# Patient Record
Sex: FEMALE | Race: WHITE | NOT HISPANIC OR LATINO | Employment: OTHER | URBAN - METROPOLITAN AREA
[De-identification: names, ages, dates, MRNs, and addresses within clinical notes are randomized per-mention and may not be internally consistent; named-entity substitution may affect disease eponyms.]

---

## 2021-03-12 ENCOUNTER — HOSPITAL ENCOUNTER (OUTPATIENT)
Dept: URGENT CARE | Facility: CLINIC | Age: 71
Discharge: HOME | End: 2021-03-12
Attending: PHYSICIAN ASSISTANT

## 2021-03-15 ENCOUNTER — HOSPITAL ENCOUNTER (OUTPATIENT)
Dept: URGENT CARE | Facility: CLINIC | Age: 71
Discharge: HOME | End: 2021-03-15
Attending: EMERGENCY MEDICINE

## 2021-03-16 LAB — SARS-COV-2 RNA SPEC QL NAA+PROBE: NOT DETECTED

## 2021-03-17 ENCOUNTER — HOSPITAL ENCOUNTER (OUTPATIENT)
Dept: FAMILY MEDICINE CLINIC | Facility: CLINIC | Age: 71
Discharge: HOME | End: 2021-03-17
Attending: NURSE PRACTITIONER

## 2021-03-17 ENCOUNTER — OFFICE VISIT CONVERTED (OUTPATIENT)
Dept: FAMILY MEDICINE CLINIC | Facility: CLINIC | Age: 71
End: 2021-03-17
Attending: NURSE PRACTITIONER

## 2021-03-17 LAB
ALBUMIN SERPL-MCNC: 4.4 G/DL (ref 3.5–5)
ALBUMIN/GLOB SERPL: 1.8 {RATIO} (ref 1.4–2.6)
ALP SERPL-CCNC: 98 U/L (ref 43–160)
ALT SERPL-CCNC: 88 U/L (ref 10–40)
ANION GAP SERPL CALC-SCNC: 18 MMOL/L (ref 8–19)
AST SERPL-CCNC: 80 U/L (ref 15–50)
BASOPHILS # BLD AUTO: 0.03 10*3/UL (ref 0–0.2)
BASOPHILS NFR BLD AUTO: 0.1 % (ref 0–3)
BILIRUB SERPL-MCNC: 0.27 MG/DL (ref 0.2–1.3)
BUN SERPL-MCNC: 32 MG/DL (ref 5–25)
BUN/CREAT SERPL: 41 {RATIO} (ref 6–20)
CALCIUM SERPL-MCNC: 9.3 MG/DL (ref 8.7–10.4)
CHLORIDE SERPL-SCNC: 103 MMOL/L (ref 99–111)
CHOLEST SERPL-MCNC: 226 MG/DL (ref 107–200)
CHOLEST/HDLC SERPL: 5.4 {RATIO} (ref 3–6)
CONV ABS IMM GRAN: 0.14 10*3/UL (ref 0–0.2)
CONV CO2: 26 MMOL/L (ref 22–32)
CONV IMMATURE GRAN: 0.6 % (ref 0–1.8)
CONV TOTAL PROTEIN: 6.9 G/DL (ref 6.3–8.2)
CREAT UR-MCNC: 0.78 MG/DL (ref 0.5–0.9)
DEPRECATED RDW RBC AUTO: 45.7 FL (ref 36.4–46.3)
EOSINOPHIL # BLD AUTO: 0 % (ref 0–7)
EOSINOPHIL # BLD AUTO: 0 10*3/UL (ref 0–0.7)
ERYTHROCYTE [DISTWIDTH] IN BLOOD BY AUTOMATED COUNT: 14.9 % (ref 11.7–14.4)
GFR SERPLBLD BASED ON 1.73 SQ M-ARVRAT: >60 ML/MIN/{1.73_M2}
GLOBULIN UR ELPH-MCNC: 2.5 G/DL (ref 2–3.5)
GLUCOSE SERPL-MCNC: 126 MG/DL (ref 65–99)
HCT VFR BLD AUTO: 32.6 % (ref 37–47)
HDLC SERPL-MCNC: 42 MG/DL (ref 40–60)
HGB BLD-MCNC: 10.2 G/DL (ref 12–16)
LDLC SERPL CALC-MCNC: 153 MG/DL (ref 70–100)
LYMPHOCYTES # BLD AUTO: 2.41 10*3/UL (ref 1–5)
LYMPHOCYTES NFR BLD AUTO: 11.1 % (ref 20–45)
MCH RBC QN AUTO: 26.6 PG (ref 27–31)
MCHC RBC AUTO-ENTMCNC: 31.3 G/DL (ref 33–37)
MCV RBC AUTO: 85.1 FL (ref 81–99)
MONOCYTES # BLD AUTO: 1.16 10*3/UL (ref 0.2–1.2)
MONOCYTES NFR BLD AUTO: 5.3 % (ref 3–10)
NEUTROPHILS # BLD AUTO: 17.95 10*3/UL (ref 2–8)
NEUTROPHILS NFR BLD AUTO: 82.9 % (ref 30–85)
NRBC CBCN: 0 % (ref 0–0.7)
OSMOLALITY SERPL CALC.SUM OF ELEC: 304 MOSM/KG (ref 273–304)
PLATELET # BLD AUTO: 445 10*3/UL (ref 130–400)
PMV BLD AUTO: 10 FL (ref 9.4–12.3)
POTASSIUM SERPL-SCNC: 4.3 MMOL/L (ref 3.5–5.3)
RBC # BLD AUTO: 3.83 10*6/UL (ref 4.2–5.4)
SODIUM SERPL-SCNC: 143 MMOL/L (ref 135–147)
TRIGL SERPL-MCNC: 156 MG/DL (ref 40–150)
TSH SERPL-ACNC: 1.16 M[IU]/L (ref 0.27–4.2)
VLDLC SERPL-MCNC: 31 MG/DL (ref 5–37)
WBC # BLD AUTO: 21.69 10*3/UL (ref 4.8–10.8)

## 2021-03-18 ENCOUNTER — TRANSCRIBE ORDERS (OUTPATIENT)
Dept: ADMINISTRATIVE | Facility: HOSPITAL | Age: 71
End: 2021-03-18

## 2021-03-18 DIAGNOSIS — R01.1 HEART MURMUR: Primary | ICD-10-CM

## 2021-03-19 ENCOUNTER — APPOINTMENT (OUTPATIENT)
Dept: GENERAL RADIOLOGY | Facility: HOSPITAL | Age: 71
End: 2021-03-19

## 2021-03-19 ENCOUNTER — APPOINTMENT (OUTPATIENT)
Dept: CT IMAGING | Facility: HOSPITAL | Age: 71
End: 2021-03-19

## 2021-03-19 ENCOUNTER — HOSPITAL ENCOUNTER (INPATIENT)
Facility: HOSPITAL | Age: 71
LOS: 4 days | Discharge: HOME | End: 2021-03-23
Attending: EMERGENCY MEDICINE | Admitting: INTERNAL MEDICINE

## 2021-03-19 DIAGNOSIS — I38 ENDOCARDITIS, UNSPECIFIED CHRONICITY, UNSPECIFIED ENDOCARDITIS TYPE: Primary | ICD-10-CM

## 2021-03-19 DIAGNOSIS — R77.8 ELEVATED TROPONIN: ICD-10-CM

## 2021-03-19 DIAGNOSIS — I50.9 ACUTE CONGESTIVE HEART FAILURE, UNSPECIFIED HEART FAILURE TYPE (HCC): ICD-10-CM

## 2021-03-19 PROBLEM — R79.89 ELEVATED TROPONIN: Status: ACTIVE | Noted: 2021-03-19

## 2021-03-19 PROBLEM — K04.7 DENTAL INFECTION: Status: ACTIVE | Noted: 2021-03-19

## 2021-03-19 PROBLEM — D64.9 ANEMIA: Status: ACTIVE | Noted: 2021-03-19

## 2021-03-19 LAB
ALBUMIN SERPL-MCNC: 3.8 G/DL (ref 3.5–5.2)
ALBUMIN/GLOB SERPL: 1.9 G/DL
ALP SERPL-CCNC: 95 U/L (ref 39–117)
ALT SERPL W P-5'-P-CCNC: 113 U/L (ref 1–33)
ANION GAP SERPL CALCULATED.3IONS-SCNC: 9.1 MMOL/L (ref 5–15)
AST SERPL-CCNC: 56 U/L (ref 1–32)
B PARAPERT DNA SPEC QL NAA+PROBE: NOT DETECTED
B PERT DNA SPEC QL NAA+PROBE: NOT DETECTED
BASOPHILS # BLD AUTO: 0.04 10*3/MM3 (ref 0–0.2)
BASOPHILS NFR BLD AUTO: 0.3 % (ref 0–1.5)
BILIRUB SERPL-MCNC: 0.4 MG/DL (ref 0–1.2)
BUN SERPL-MCNC: 25 MG/DL (ref 8–23)
BUN/CREAT SERPL: 32.1 (ref 7–25)
C PNEUM DNA NPH QL NAA+NON-PROBE: NOT DETECTED
CALCIUM SPEC-SCNC: 8.2 MG/DL (ref 8.6–10.5)
CHLORIDE SERPL-SCNC: 104 MMOL/L (ref 98–107)
CO2 SERPL-SCNC: 27.9 MMOL/L (ref 22–29)
CREAT SERPL-MCNC: 0.78 MG/DL (ref 0.57–1)
CRP SERPL-MCNC: 0.5 MG/DL (ref 0–0.5)
CRP SERPL-MCNC: 6 MG/L (ref 0–5)
D-LACTATE SERPL-SCNC: 1.6 MMOL/L (ref 0.5–2)
DEPRECATED RDW RBC AUTO: 43.2 FL (ref 37–54)
EOSINOPHIL # BLD AUTO: 0.16 10*3/MM3 (ref 0–0.4)
EOSINOPHIL NFR BLD AUTO: 1.1 % (ref 0.3–6.2)
ERYTHROCYTE [DISTWIDTH] IN BLOOD BY AUTOMATED COUNT: 14.5 % (ref 12.3–15.4)
EST. AVERAGE GLUCOSE BLD GHB EST-MCNC: 120 MG/DL
FLUAV SUBTYP SPEC NAA+PROBE: NOT DETECTED
FLUBV RNA ISLT QL NAA+PROBE: NOT DETECTED
GFR SERPL CREATININE-BSD FRML MDRD: 72 ML/MIN/1.73
GLOBULIN UR ELPH-MCNC: 2 GM/DL
GLUCOSE SERPL-MCNC: 97 MG/DL (ref 65–99)
HADV DNA SPEC NAA+PROBE: NOT DETECTED
HBA1C MFR BLD: 5.8 % (ref 3.5–5.7)
HCOV 229E RNA SPEC QL NAA+PROBE: NOT DETECTED
HCOV HKU1 RNA SPEC QL NAA+PROBE: NOT DETECTED
HCOV NL63 RNA SPEC QL NAA+PROBE: NOT DETECTED
HCOV OC43 RNA SPEC QL NAA+PROBE: NOT DETECTED
HCT VFR BLD AUTO: 30.8 % (ref 34–46.6)
HGB BLD-MCNC: 10.1 G/DL (ref 12–15.9)
HMPV RNA NPH QL NAA+NON-PROBE: NOT DETECTED
HPIV1 RNA SPEC QL NAA+PROBE: NOT DETECTED
HPIV2 RNA SPEC QL NAA+PROBE: NOT DETECTED
HPIV3 RNA NPH QL NAA+PROBE: NOT DETECTED
HPIV4 P GENE NPH QL NAA+PROBE: NOT DETECTED
IMM GRANULOCYTES # BLD AUTO: 0.12 10*3/MM3 (ref 0–0.05)
IMM GRANULOCYTES NFR BLD AUTO: 0.8 % (ref 0–0.5)
LYMPHOCYTES # BLD AUTO: 4.29 10*3/MM3 (ref 0.7–3.1)
LYMPHOCYTES NFR BLD AUTO: 30.4 % (ref 19.6–45.3)
M PNEUMO IGG SER IA-ACNC: NOT DETECTED
MCH RBC QN AUTO: 27.1 PG (ref 26.6–33)
MCHC RBC AUTO-ENTMCNC: 32.8 G/DL (ref 31.5–35.7)
MCV RBC AUTO: 82.6 FL (ref 79–97)
MONOCYTES # BLD AUTO: 1.33 10*3/MM3 (ref 0.1–0.9)
MONOCYTES NFR BLD AUTO: 9.4 % (ref 5–12)
NEUTROPHILS NFR BLD AUTO: 58 % (ref 42.7–76)
NEUTROPHILS NFR BLD AUTO: 8.18 10*3/MM3 (ref 1.7–7)
NRBC BLD AUTO-RTO: 0 /100 WBC (ref 0–0.2)
NT-PROBNP SERPL-MCNC: 2477 PG/ML (ref 0–900)
PLATELET # BLD AUTO: 383 10*3/MM3 (ref 140–450)
PMV BLD AUTO: 9.3 FL (ref 6–12)
POTASSIUM SERPL-SCNC: 3.7 MMOL/L (ref 3.5–5.2)
PROCALCITONIN SERPL-MCNC: 0.07 NG/ML (ref 0–0.25)
PROT SERPL-MCNC: 5.8 G/DL (ref 6–8.5)
RBC # BLD AUTO: 3.73 10*6/MM3 (ref 3.77–5.28)
RHINOVIRUS RNA SPEC NAA+PROBE: NOT DETECTED
RSV RNA NPH QL NAA+NON-PROBE: NOT DETECTED
SARS-COV-2 RNA NPH QL NAA+NON-PROBE: NOT DETECTED
SODIUM SERPL-SCNC: 141 MMOL/L (ref 136–145)
TROPONIN T SERPL-MCNC: 0.67 NG/ML (ref 0–0.03)
WBC # BLD AUTO: 14.12 10*3/MM3 (ref 3.4–10.8)

## 2021-03-19 PROCEDURE — 85025 COMPLETE CBC W/AUTO DIFF WBC: CPT | Performed by: NURSE PRACTITIONER

## 2021-03-19 PROCEDURE — 25010000003 CEFTRIAXONE PER 250 MG: Performed by: NURSE PRACTITIONER

## 2021-03-19 PROCEDURE — 71045 X-RAY EXAM CHEST 1 VIEW: CPT

## 2021-03-19 PROCEDURE — 83880 ASSAY OF NATRIURETIC PEPTIDE: CPT | Performed by: NURSE PRACTITIONER

## 2021-03-19 PROCEDURE — 86140 C-REACTIVE PROTEIN: CPT | Performed by: INTERNAL MEDICINE

## 2021-03-19 PROCEDURE — 25010000002 FUROSEMIDE PER 20 MG: Performed by: INTERNAL MEDICINE

## 2021-03-19 PROCEDURE — 83605 ASSAY OF LACTIC ACID: CPT | Performed by: NURSE PRACTITIONER

## 2021-03-19 PROCEDURE — 25010000002 VANCOMYCIN 10 G RECONSTITUTED SOLUTION: Performed by: NURSE PRACTITIONER

## 2021-03-19 PROCEDURE — 87040 BLOOD CULTURE FOR BACTERIA: CPT | Performed by: NURSE PRACTITIONER

## 2021-03-19 PROCEDURE — 71275 CT ANGIOGRAPHY CHEST: CPT

## 2021-03-19 PROCEDURE — 0202U NFCT DS 22 TRGT SARS-COV-2: CPT | Performed by: NURSE PRACTITIONER

## 2021-03-19 PROCEDURE — 93005 ELECTROCARDIOGRAM TRACING: CPT | Performed by: NURSE PRACTITIONER

## 2021-03-19 PROCEDURE — 0 IOPAMIDOL PER 1 ML: Performed by: EMERGENCY MEDICINE

## 2021-03-19 PROCEDURE — 93010 ELECTROCARDIOGRAM REPORT: CPT | Performed by: INTERNAL MEDICINE

## 2021-03-19 PROCEDURE — 84484 ASSAY OF TROPONIN QUANT: CPT | Performed by: NURSE PRACTITIONER

## 2021-03-19 PROCEDURE — 84145 PROCALCITONIN (PCT): CPT | Performed by: NURSE PRACTITIONER

## 2021-03-19 PROCEDURE — 63710000001 DIPHENHYDRAMINE PER 50 MG: Performed by: INTERNAL MEDICINE

## 2021-03-19 PROCEDURE — 99285 EMERGENCY DEPT VISIT HI MDM: CPT

## 2021-03-19 PROCEDURE — 80053 COMPREHEN METABOLIC PANEL: CPT | Performed by: NURSE PRACTITIONER

## 2021-03-19 RX ORDER — BENZONATATE 200 MG/1
200 CAPSULE ORAL 3 TIMES DAILY PRN
COMMUNITY
End: 2021-03-23 | Stop reason: HOSPADM

## 2021-03-19 RX ORDER — PENICILLIN V POTASSIUM 500 MG/1
500 TABLET ORAL 4 TIMES DAILY
COMMUNITY
Start: 2021-03-12 | End: 2021-03-23 | Stop reason: HOSPADM

## 2021-03-19 RX ORDER — ONDANSETRON 2 MG/ML
4 INJECTION INTRAMUSCULAR; INTRAVENOUS EVERY 6 HOURS PRN
Status: DISCONTINUED | OUTPATIENT
Start: 2021-03-19 | End: 2021-03-23 | Stop reason: HOSPADM

## 2021-03-19 RX ORDER — ACETAMINOPHEN 160 MG/5ML
650 SOLUTION ORAL EVERY 4 HOURS PRN
Status: DISCONTINUED | OUTPATIENT
Start: 2021-03-19 | End: 2021-03-23 | Stop reason: HOSPADM

## 2021-03-19 RX ORDER — ASPIRIN 325 MG
325 TABLET ORAL ONCE
Status: COMPLETED | OUTPATIENT
Start: 2021-03-19 | End: 2021-03-19

## 2021-03-19 RX ORDER — ALBUTEROL SULFATE 90 UG/1
2 AEROSOL, METERED RESPIRATORY (INHALATION) EVERY 4 HOURS PRN
Status: ON HOLD | COMMUNITY
End: 2021-11-27

## 2021-03-19 RX ORDER — ACETAMINOPHEN 650 MG/1
650 SUPPOSITORY RECTAL EVERY 4 HOURS PRN
Status: DISCONTINUED | OUTPATIENT
Start: 2021-03-19 | End: 2021-03-23 | Stop reason: HOSPADM

## 2021-03-19 RX ORDER — SODIUM CHLORIDE 0.9 % (FLUSH) 0.9 %
10 SYRINGE (ML) INJECTION EVERY 12 HOURS SCHEDULED
Status: DISCONTINUED | OUTPATIENT
Start: 2021-03-19 | End: 2021-03-23 | Stop reason: HOSPADM

## 2021-03-19 RX ORDER — GUAIFENESIN AND DEXTROMETHORPHAN HYDROBROMIDE 100; 10 MG/5ML; MG/5ML
10 SOLUTION ORAL EVERY 6 HOURS PRN
COMMUNITY
End: 2021-03-23 | Stop reason: HOSPADM

## 2021-03-19 RX ORDER — FUROSEMIDE 10 MG/ML
40 INJECTION INTRAMUSCULAR; INTRAVENOUS EVERY 12 HOURS
Status: DISCONTINUED | OUTPATIENT
Start: 2021-03-19 | End: 2021-03-21

## 2021-03-19 RX ORDER — CEFTRIAXONE SODIUM 2 G/50ML
2 INJECTION, SOLUTION INTRAVENOUS EVERY 24 HOURS
Status: DISCONTINUED | OUTPATIENT
Start: 2021-03-20 | End: 2021-03-23 | Stop reason: HOSPADM

## 2021-03-19 RX ORDER — SODIUM CHLORIDE 0.9 % (FLUSH) 0.9 %
10 SYRINGE (ML) INJECTION AS NEEDED
Status: DISCONTINUED | OUTPATIENT
Start: 2021-03-19 | End: 2021-03-23 | Stop reason: HOSPADM

## 2021-03-19 RX ORDER — NITROGLYCERIN 0.4 MG/1
0.4 TABLET SUBLINGUAL
Status: DISCONTINUED | OUTPATIENT
Start: 2021-03-19 | End: 2021-03-23 | Stop reason: HOSPADM

## 2021-03-19 RX ORDER — ACETAMINOPHEN 325 MG/1
650 TABLET ORAL EVERY 4 HOURS PRN
Status: DISCONTINUED | OUTPATIENT
Start: 2021-03-19 | End: 2021-03-23 | Stop reason: HOSPADM

## 2021-03-19 RX ORDER — TRAZODONE HYDROCHLORIDE 50 MG/1
50 TABLET ORAL NIGHTLY
COMMUNITY
End: 2021-06-09

## 2021-03-19 RX ORDER — DIPHENHYDRAMINE HCL 25 MG
25 CAPSULE ORAL NIGHTLY PRN
Status: DISCONTINUED | OUTPATIENT
Start: 2021-03-19 | End: 2021-03-23 | Stop reason: HOSPADM

## 2021-03-19 RX ORDER — VANCOMYCIN HYDROCHLORIDE 1 G/200ML
15 INJECTION, SOLUTION INTRAVENOUS EVERY 12 HOURS
Status: DISCONTINUED | OUTPATIENT
Start: 2021-03-19 | End: 2021-03-19

## 2021-03-19 RX ORDER — CEFDINIR 300 MG/1
300 CAPSULE ORAL 2 TIMES DAILY
COMMUNITY
Start: 2021-03-15 | End: 2021-03-23 | Stop reason: HOSPADM

## 2021-03-19 RX ORDER — CEFTRIAXONE SODIUM 2 G/50ML
2 INJECTION, SOLUTION INTRAVENOUS ONCE
Status: COMPLETED | OUTPATIENT
Start: 2021-03-19 | End: 2021-03-19

## 2021-03-19 RX ORDER — ACETAMINOPHEN 500 MG
500 TABLET ORAL NIGHTLY PRN
Status: DISCONTINUED | OUTPATIENT
Start: 2021-03-19 | End: 2021-03-23 | Stop reason: HOSPADM

## 2021-03-19 RX ADMIN — IOPAMIDOL 95 ML: 755 INJECTION, SOLUTION INTRAVENOUS at 15:18

## 2021-03-19 RX ADMIN — VANCOMYCIN HYDROCHLORIDE 1500 MG: 10 INJECTION, POWDER, LYOPHILIZED, FOR SOLUTION INTRAVENOUS at 15:59

## 2021-03-19 RX ADMIN — SODIUM CHLORIDE, PRESERVATIVE FREE 10 ML: 5 INJECTION INTRAVENOUS at 21:52

## 2021-03-19 RX ADMIN — CEFTRIAXONE SODIUM 2 G: 2 INJECTION, SOLUTION INTRAVENOUS at 14:54

## 2021-03-19 RX ADMIN — ACETAMINOPHEN 500 MG: 500 TABLET, FILM COATED ORAL at 21:51

## 2021-03-19 RX ADMIN — ASPIRIN 325 MG: 325 TABLET ORAL at 14:54

## 2021-03-19 RX ADMIN — FUROSEMIDE 40 MG: 10 INJECTION, SOLUTION INTRAMUSCULAR; INTRAVENOUS at 19:55

## 2021-03-19 RX ADMIN — DIPHENHYDRAMINE HYDROCHLORIDE 25 MG: 25 CAPSULE ORAL at 21:52

## 2021-03-19 NOTE — ED PROVIDER NOTES
Brief history of present illness: 71-year-old female with worsening shortness of breath and associated cough with hemoptysis.  No recent fevers to the patient did have oral surgery in early March followed by complicating infection.  Patient unaware previously of heart murmur.    Physical exam:     ED Triage Vitals   Temp Heart Rate Resp BP SpO2   03/19/21 1242 03/19/21 1210 03/19/21 1210 03/19/21 1242 03/19/21 1210   98.2 °F (36.8 °C) 120 20 144/93 96 %      Temp src Heart Rate Source Patient Position BP Location FiO2 (%)   -- 03/19/21 1210 -- -- --    Monitor        Alert appropriate.  Not overtly toxic.  Pink warm well perfused without tachypnea noted.  Bibasilar crackles noted.  Mild systolic murmur noted.  Pink warm well-perfused otherwise.    MDM:    Results for orders placed or performed during the hospital encounter of 03/19/21   Comprehensive Metabolic Panel    Specimen: Blood   Result Value Ref Range    Glucose 97 65 - 99 mg/dL    BUN 25 (H) 8 - 23 mg/dL    Creatinine 0.78 0.57 - 1.00 mg/dL    Sodium 141 136 - 145 mmol/L    Potassium 3.7 3.5 - 5.2 mmol/L    Chloride 104 98 - 107 mmol/L    CO2 27.9 22.0 - 29.0 mmol/L    Calcium 8.2 (L) 8.6 - 10.5 mg/dL    Total Protein 5.8 (L) 6.0 - 8.5 g/dL    Albumin 3.80 3.50 - 5.20 g/dL    ALT (SGPT) 113 (H) 1 - 33 U/L    AST (SGOT) 56 (H) 1 - 32 U/L    Alkaline Phosphatase 95 39 - 117 U/L    Total Bilirubin 0.4 0.0 - 1.2 mg/dL    eGFR Non African Amer 72 >60 mL/min/1.73    Globulin 2.0 gm/dL    A/G Ratio 1.9 g/dL    BUN/Creatinine Ratio 32.1 (H) 7.0 - 25.0    Anion Gap 9.1 5.0 - 15.0 mmol/L   Procalcitonin    Specimen: Blood   Result Value Ref Range    Procalcitonin 0.07 0.00 - 0.25 ng/mL   Lactic Acid, Plasma    Specimen: Blood   Result Value Ref Range    Lactate 1.6 0.5 - 2.0 mmol/L   Troponin    Specimen: Blood   Result Value Ref Range    Troponin T 0.670 (C) 0.000 - 0.030 ng/mL   BNP    Specimen: Blood   Result Value Ref Range    proBNP 2,477.0 (H) 0.0 - 900.0 pg/mL    CBC Auto Differential    Specimen: Blood   Result Value Ref Range    WBC 14.12 (H) 3.40 - 10.80 10*3/mm3    RBC 3.73 (L) 3.77 - 5.28 10*6/mm3    Hemoglobin 10.1 (L) 12.0 - 15.9 g/dL    Hematocrit 30.8 (L) 34.0 - 46.6 %    MCV 82.6 79.0 - 97.0 fL    MCH 27.1 26.6 - 33.0 pg    MCHC 32.8 31.5 - 35.7 g/dL    RDW 14.5 12.3 - 15.4 %    RDW-SD 43.2 37.0 - 54.0 fl    MPV 9.3 6.0 - 12.0 fL    Platelets 383 140 - 450 10*3/mm3    Neutrophil % 58.0 42.7 - 76.0 %    Lymphocyte % 30.4 19.6 - 45.3 %    Monocyte % 9.4 5.0 - 12.0 %    Eosinophil % 1.1 0.3 - 6.2 %    Basophil % 0.3 0.0 - 1.5 %    Immature Grans % 0.8 (H) 0.0 - 0.5 %    Neutrophils, Absolute 8.18 (H) 1.70 - 7.00 10*3/mm3    Lymphocytes, Absolute 4.29 (H) 0.70 - 3.10 10*3/mm3    Monocytes, Absolute 1.33 (H) 0.10 - 0.90 10*3/mm3    Eosinophils, Absolute 0.16 0.00 - 0.40 10*3/mm3    Basophils, Absolute 0.04 0.00 - 0.20 10*3/mm3    Immature Grans, Absolute 0.12 (H) 0.00 - 0.05 10*3/mm3    nRBC 0.0 0.0 - 0.2 /100 WBC     Agree with plan for CTA chest.  Recommend broad-spectrum antibiotics and admission for evaluation of endocarditis.  Patient agreeable.    I have seen and personally evaluated this patient, discussed the case with the treating advanced practice provider, and reviewed their note. I was involved in the medical decision making during the evaluation, testing and disposition planning for this patient.     Grzegorz Knox MD  03/19/21 6975

## 2021-03-19 NOTE — ED PROVIDER NOTES
EMERGENCY DEPARTMENT ENCOUNTER    Room Number:  03/03  Date of encounter:  3/19/2021  PCP: Candida Ryder APRN  Historian: Patient      PPE    Patient was placed in face mask in first look. Patient was wearing facemask when I entered the room and throughout our encounter. I wore full protective equipment throughout this patient encounter including a N95 face mask, eye shield, gown and gloves. Hand hygiene was performed before donning protective equipment and after removal when leaving the room.        HPI:  Chief Complaint: Shortness of breath  A complete HPI/ROS/PMH/PSH/SH/FH are unobtainable due to: Nothing    Context: Rachael South is a 71 y.o. female who arrives to the ED via private vehicle.  Patient presents with c/o mild, ongoing, shortness of breath that began initially on March 12 and has worsened since.   Patient also complains of cough with bloody sputum.  Patient denies fever, chills, nausea, vomiting, chest pain.  Patient states that she had teeth pulled on March 9, head dental infection on March 12 and was placed on penicillin.  She states on March 15 she went to the urgent care center again was diagnosed with pneumonia.  She states she was started on Omnicef, prednisone, inhaler and is still not feeling any better.  She states that she is a smoker, however has not smoked since March 9.  She denies any past medical history.  Patient states that nothing makes the symptoms better and exertion worsens symptoms.          PAST MEDICAL HISTORY  Active Ambulatory Problems     Diagnosis Date Noted   • No Active Ambulatory Problems     Resolved Ambulatory Problems     Diagnosis Date Noted   • No Resolved Ambulatory Problems     No Additional Past Medical History         PAST SURGICAL HISTORY  No past surgical history on file.      FAMILY HISTORY  No family history on file.      SOCIAL HISTORY  Social History     Socioeconomic History   • Marital status:      Spouse name: Not on file   • Number of  children: Not on file   • Years of education: Not on file   • Highest education level: Not on file         ALLERGIES  Patient has no known allergies.        REVIEW OF SYSTEMS  Review of Systems     All systems reviewed and negative except for those discussed in HPI.        PHYSICAL EXAM    ED Triage Vitals   Temp Heart Rate Resp BP SpO2   03/19/21 1242 03/19/21 1210 03/19/21 1210 03/19/21 1242 03/19/21 1210   98.2 °F (36.8 °C) 120 20 144/93 96 %       Physical Exam  GENERAL: Well appearing, non-toxic appearing, not distressed speaking in full sentences  HENT: normocephalic, atraumatic  EYES: no scleral icterus, PERRL  CV: regular rhythm, tachycardic,systolic murmur  RESPIRATORY: normal effort, CTAB  ABDOMEN: soft, nontender  MUSCULOSKELETAL: no deformity, no calf tenderness or bilateral lower extremity edema  NEURO: alert, moves all extremities, follows commands, mental status normal/baseline  SKIN: warm, dry, no rash   Psych: Appropriate mood and affect  Nursing notes and vital signs reviewed      LAB RESULTS  Recent Results (from the past 24 hour(s))   Comprehensive Metabolic Panel    Collection Time: 03/19/21  1:26 PM    Specimen: Blood   Result Value Ref Range    Glucose 97 65 - 99 mg/dL    BUN 25 (H) 8 - 23 mg/dL    Creatinine 0.78 0.57 - 1.00 mg/dL    Sodium 141 136 - 145 mmol/L    Potassium 3.7 3.5 - 5.2 mmol/L    Chloride 104 98 - 107 mmol/L    CO2 27.9 22.0 - 29.0 mmol/L    Calcium 8.2 (L) 8.6 - 10.5 mg/dL    Total Protein 5.8 (L) 6.0 - 8.5 g/dL    Albumin 3.80 3.50 - 5.20 g/dL    ALT (SGPT) 113 (H) 1 - 33 U/L    AST (SGOT) 56 (H) 1 - 32 U/L    Alkaline Phosphatase 95 39 - 117 U/L    Total Bilirubin 0.4 0.0 - 1.2 mg/dL    eGFR Non African Amer 72 >60 mL/min/1.73    Globulin 2.0 gm/dL    A/G Ratio 1.9 g/dL    BUN/Creatinine Ratio 32.1 (H) 7.0 - 25.0    Anion Gap 9.1 5.0 - 15.0 mmol/L   Procalcitonin    Collection Time: 03/19/21  1:26 PM    Specimen: Blood   Result Value Ref Range    Procalcitonin 0.07 0.00 -  0.25 ng/mL   Lactic Acid, Plasma    Collection Time: 03/19/21  1:26 PM    Specimen: Blood   Result Value Ref Range    Lactate 1.6 0.5 - 2.0 mmol/L   Troponin    Collection Time: 03/19/21  1:26 PM    Specimen: Blood   Result Value Ref Range    Troponin T 0.670 (C) 0.000 - 0.030 ng/mL   BNP    Collection Time: 03/19/21  1:26 PM    Specimen: Blood   Result Value Ref Range    proBNP 2,477.0 (H) 0.0 - 900.0 pg/mL   CBC Auto Differential    Collection Time: 03/19/21  1:26 PM    Specimen: Blood   Result Value Ref Range    WBC 14.12 (H) 3.40 - 10.80 10*3/mm3    RBC 3.73 (L) 3.77 - 5.28 10*6/mm3    Hemoglobin 10.1 (L) 12.0 - 15.9 g/dL    Hematocrit 30.8 (L) 34.0 - 46.6 %    MCV 82.6 79.0 - 97.0 fL    MCH 27.1 26.6 - 33.0 pg    MCHC 32.8 31.5 - 35.7 g/dL    RDW 14.5 12.3 - 15.4 %    RDW-SD 43.2 37.0 - 54.0 fl    MPV 9.3 6.0 - 12.0 fL    Platelets 383 140 - 450 10*3/mm3    Neutrophil % 58.0 42.7 - 76.0 %    Lymphocyte % 30.4 19.6 - 45.3 %    Monocyte % 9.4 5.0 - 12.0 %    Eosinophil % 1.1 0.3 - 6.2 %    Basophil % 0.3 0.0 - 1.5 %    Immature Grans % 0.8 (H) 0.0 - 0.5 %    Neutrophils, Absolute 8.18 (H) 1.70 - 7.00 10*3/mm3    Lymphocytes, Absolute 4.29 (H) 0.70 - 3.10 10*3/mm3    Monocytes, Absolute 1.33 (H) 0.10 - 0.90 10*3/mm3    Eosinophils, Absolute 0.16 0.00 - 0.40 10*3/mm3    Basophils, Absolute 0.04 0.00 - 0.20 10*3/mm3    Immature Grans, Absolute 0.12 (H) 0.00 - 0.05 10*3/mm3    nRBC 0.0 0.0 - 0.2 /100 WBC   Respiratory Panel PCR w/COVID-19(SARS-CoV-2) [facility]/TAMANNA/LUIS MIGUEL/PAD/COR/MAD/JENNIFER In-House, NP Swab in UTM/VTM, 3-4 HR TAT - Swab, Nasopharynx    Collection Time: 03/19/21  1:27 PM    Specimen: Nasopharynx; Swab   Result Value Ref Range    ADENOVIRUS, PCR Not Detected Not Detected    Coronavirus 229E Not Detected Not Detected    Coronavirus HKU1 Not Detected Not Detected    Coronavirus NL63 Not Detected Not Detected    Coronavirus OC43 Not Detected Not Detected    COVID19 Not Detected Not Detected - Ref. Range    Human  Metapneumovirus Not Detected Not Detected    Human Rhinovirus/Enterovirus Not Detected Not Detected    Influenza A PCR Not Detected Not Detected    Influenza B PCR Not Detected Not Detected    Parainfluenza Virus 1 Not Detected Not Detected    Parainfluenza Virus 2 Not Detected Not Detected    Parainfluenza Virus 3 Not Detected Not Detected    Parainfluenza Virus 4 Not Detected Not Detected    RSV, PCR Not Detected Not Detected    Bordetella pertussis pcr Not Detected Not Detected    Bordetella parapertussis PCR Not Detected Not Detected    Chlamydophila pneumoniae PCR Not Detected Not Detected    Mycoplasma pneumo by PCR Not Detected Not Detected   ECG 12 Lead    Collection Time: 03/19/21  2:22 PM   Result Value Ref Range    QT Interval 321 ms       Ordered the above labs and independently reviewed the results.      RADIOLOGY  CT Chest Pulmonary Embolism    Result Date: 3/19/2021  CT CHEST PULMONARY EMBOLISM-  CLINICAL HISTORY: Dyspnea. Tachycardia. Hemoptysis.  TECHNIQUE: Spiral CT images were obtained through the chest during rapid IV injection of contrast and were reconstructed in 2 mm thick axial slices. Multiple coronal and sagittal and 3-D reconstructions were produced.  Radiation dose reduction techniques were utilized, including automated exposure control and exposure modulation based on body size.  COMPARISON: None  FINDINGS: The main pulmonary arteries and their lobar and segmental branches are well opacified, and demonstrate no filling defects. There is no CT evidence of pulmonary embolism. Lung window images demonstrate mild pulmonary vascular engorgement. There are patchy ill-defined groundglass opacities within both lungs that are most likely due to mild pulmonary edema. Pneumonia cannot be entirely excluded. There are small bilateral posteriorly layering pleural effusions. The heart is mildly enlarged. There is no mediastinal or hilar or axillary lymphadenopathy. There is a tiny hiatal hernia. A  single small gallstone is noted in the lumen of gallbladder.      There is no CT evidence of acute pulmonary thromboembolism. There is mild cardiomegaly and there is moderate vascular engorgement with ill-defined groundglass airspace opacities in both lungs that are suspected represent pulmonary edema. Pneumonia cannot entirely excluded. Small bilateral pleural effusions are present.  This report was finalized on 3/19/2021 3:40 PM by Dr. Kojo Ellsworth M.D.      XR Chest AP    Result Date: 3/19/2021  PORTABLE CHEST X-RAY  HISTORY: COVID evaluation, cough and fever.  TECHNIQUE: Portable chest x-ray is provided. No previous imaging for correlation.  FINDINGS: Cardiac silhouette is normal. There is abnormal interstitial prominence throughout the lungs bilaterally. No dense focal infiltrate is appreciated. Its unclear whether this represents pulmonary edema or diffuse pneumonia. Pulmonary vasculature appears mildly prominent. There is no pleural effusion however.      Abnormal increased density throughout the lungs bilaterally representing either pulmonary edema or bilateral pneumonia.  This report was finalized on 3/19/2021 3:11 PM by Dr. Gabe Rose M.D.        I ordered the above noted radiological studies and viewed the images on the PACS system.       EKG      Independently viewed by me and interpreted by Dr Knox         MEDICAL RECORD REVIEW  No medical records to review in [facility], patient was seen at urgent cares in [facility]      PROCEDURES    Critical Care  Performed by: Melania Lance APRN  Authorized by: Grzegorz Knox MD     Critical care provider statement:     Critical care time (minutes):  20    Critical care time was exclusive of: endocarditis, elevated troponin.    Critical care was time spent personally by me on the following activities:  Ordering and performing treatments and interventions, development of treatment plan with patient or surrogate, ordering and review of laboratory  studies, discussions with consultants, pulse oximetry, ordering and review of radiographic studies, re-evaluation of patient's condition, examination of patient and obtaining history from patient or surrogate            DIFFERENTIAL DIAGNOSIS  Differential Diagnosis for Dyspnea include but are not limited to the following:  -Asthma  - COPD exacerbation  -Pneumonia  -PE  - Acute Respiratory Distress  - Pneumothorax  - CHF  - MI  - Anemia  - Hyperventilation  - Pleural Effusion  - Sepsis          PROGRESS, DATA ANALYSIS, CONSULTS, AND MEDICAL DECISION MAKING        ED Course as of Mar 19 1625   Fri Mar 19, 2021   1320 Discussed pertinent information from history and physical exam with patient.  Discussed differential diagnosis and plan for ED evaluation/work-up and treatment including labs, EKG, CTA of the chest.  All questions answered.  Patient is agreeable with this plan.        [MS]   1350 WBC(!): 14.12 [MS]   1350 Hemoglobin(!): 10.1 [MS]   1410 Troponin T(!!): 0.670 [MS]   1412 proBNP(!): 2,477.0 [MS]   1412 Reviewed pt's history and workup with Dr. Knox.  After a bedside evaluation, they agree with the plan of care.          [MS]   1426 EKG           EKG time: 1422  Rhythm/Rate: Sinus tachycardia, 105  P waves and OR: CARMEN within normal limits  QRS, axis: Narrow QRS complex  ST and T waves: Inferolateral ST depression with no reciprocal change.    Interpreted Contemporaneously by me, independently viewed  Comparison: Unavailable      [RS]   1459 Discussed with Dr. Bustillos, on-call cardiology regarding patient's relevant history, exam, workup and ED findings/concerns.  He agrees to see patient in consult, medicine to admit.        [MS]   1541  Discussed with Dr. Ellsworth regarding the CTA chest results which revealed no PE, mild CHF.        [MS]   1541 BP: 118/77 [MS]   1612 Consult Note    Discussed care with Dr Acevedo  Reviewed patient's history, exam, results and need for admission secondary to endocarditis,  CHF  Dr. Acevedo accepts the patient to be admitted to telemetry inpatient bed.        [MS]   1624 Heart Rate: 104 [MS]   1624 SpO2: 95 % [MS]      ED Course User Index  [MS] Melania Lance APRN  [RS] Grzegorz Knox MD     ADMISSION    Discussed treatment plan and reason for admission with pt/family and admitting physician.  Pt/family voiced understanding of the plan for admission for further testing/treatment as needed.      DIAGNOSIS  Final diagnoses:   Endocarditis, unspecified chronicity, unspecified endocarditis type   Acute congestive heart failure, unspecified heart failure type (CMS/HCC)   Elevated troponin             MEDICATIONS GIVEN IN ED    Medications   sodium chloride 0.9 % flush 10 mL (has no administration in time range)   vancomycin 1500 mg/500 mL 0.9% NS IVPB ([facility]) (1,500 mg Intravenous New Bag 3/19/21 0149)   cefTRIAXone (ROCEPHIN) IVPB 2 g (0 g Intravenous Stopped 3/19/21 1531)   aspirin tablet 325 mg (325 mg Oral Given 3/19/21 1454)   iopamidol (ISOVUE-370) 76 % injection 100 mL (95 mL Intravenous Given by Other 3/19/21 1518)           COURSE & MEDICAL DECISION MAKING  Any/All labs and Any/All Imaging studies that were ordered were reviewed and are noted above.  Results were reviewed/discussed with the patient and they were also made aware of online access.    Pt also made aware that some labs, such as cultures, will not be resulted during ER visit and follow up with PMD is necessary.        Melania Lance APRN  03/19/21 2604

## 2021-03-19 NOTE — PROGRESS NOTES
Clinical Pharmacy Services: Medication History    Rachael South is a 71 y.o. female presenting to [facility] for   Chief Complaint   Patient presents with   • Shortness of Breath       She  has no past medical history on file.    Allergies as of 03/19/2021   • (No Known Allergies)       Medication information was obtained from: family and pharmacy  Pharmacy and Phone Number:   [id number] - [place], [place] - [address] AT [facility] [address] - [phone]  - [phone] FX  [address]  [facility] [address]  Phone: [phone] Fax: [phone]        Prior to Admission Medications     Prescriptions Last Dose Informant Patient Reported? Taking?    albuterol sulfate  (90 Base) MCG/ACT inhaler  Pharmacy Yes Yes    Inhale 2 puffs Every 4 (Four) Hours As Needed for Wheezing.    benzonatate (TESSALON) 200 MG capsule  Pharmacy Yes Yes    Take 200 mg by mouth 3 (Three) Times a Day As Needed for Cough.    cefdinir (OMNICEF) 300 MG capsule  Pharmacy Yes Yes    Take 300 mg by mouth 2 (Two) Times a Day.    dextromethorphan-guaifenesin (ROBITUSSIN-DM)  MG/5ML syrup  Pharmacy Yes Yes    Take 10 mL by mouth Every 6 (Six) Hours As Needed.    penicillin v potassium (VEETID) 500 MG tablet  Pharmacy Yes Yes    Take 500 mg by mouth 4 (Four) Times a Day.    traZODone (DESYREL) 50 MG tablet  Pharmacy Yes Yes    Take 50 mg by mouth Every Night.            Medication notes: patient just finished prednisone 20mg (3 day dosing) on 3/18/21    This medication list is complete to the best of my knowledge as of 3/19/2021    Please call if questions.    Leslye Chung [facility]  Medication History Technician  [phone]    3/19/2021 18:29 EDT

## 2021-03-19 NOTE — ED NOTES
Pt presents to ED with complains of SOA over the last 2-3 days. Pt reports she got diagnosied with pneumonia on 3/15/21. Pt denies any fevers at this time. Pt also reports she has been coughing up blood the last 2-3 days.      Lucas Reyes, RN  03/19/21 2335

## 2021-03-19 NOTE — PROGRESS NOTES
"Pharmacokinetic Consult - Vancomycin Dosing (Follow-up Note)    Rachael South is on day 1 pharmacy to dose vancomycin for endocarditis. (pt had Teeth pulled on march 9, dental infection march 12)  Pharmacy dosing vancomycin per Dr Acevedo's request.   Goal AUC= 400-600   Duration of therapy: 7 days  Is Patient on Dialysis or Renal Replacement: No    Relevant clinical data and objective history reviewed:  71 y.o. female 157.5 cm (62\") 70.8 kg (156 lb)    Creatinine   Date Value Ref Range Status   03/19/2021 0.78 0.57 - 1.00 mg/dL Final     BUN   Date Value Ref Range Status   03/19/2021 25 (H) 8 - 23 mg/dL Final     Estimated Creatinine Clearance: 57.7 mL/min (by C-G formula based on SCr of 0.78 mg/dL).    Lab Results   Component Value Date    WBC 14.12 (H) 03/19/2021     Temp Readings from Last 3 Encounters:   03/19/21 97.7 °F (36.5 °C) (Oral)     Cultures: pending       Anti-Infectives (From admission, onward)    Ordered     Dose/Rate Route Frequency Start Stop    03/19/21 1931  cefTRIAXone (ROCEPHIN) IVPB 2 g     Ordering Provider: Jayden Acevedo MD    2 g  100 mL/hr over 30 Minutes Intravenous Every 24 Hours 03/20/21 1400 03/25/21 1359    03/19/21 1942  vancomycin 750 mg/250 mL 0.9% NS IVPB ([facility])     Ordering Provider: Jayden Acevedo MD    750 mg Intravenous Every 12 Hours 03/20/21 0300 03/27/21 0259    03/19/21 1936  Pharmacy to dose vancomycin     Ordering Provider: Jayden Acevedo MD     Does not apply Continuous PRN 03/19/21 1935 03/26/21 1934    03/19/21 1416  vancomycin 1500 mg/500 mL 0.9% NS IVPB ([facility])     Ordering Provider: Melania Lance APRN    20 mg/kg × 70.8 kg Intravenous Once 03/19/21 1418 03/19/21 1823    03/19/21 1416  cefTRIAXone (ROCEPHIN) IVPB 2 g     Ordering Provider: Melania Lance APRN    2 g  100 mL/hr over 30 Minutes Intravenous Once 03/19/21 1418 03/19/21 1531         No results found for: VANCOTROUGH  No results found for: VANCORANDOM    Dosing History  Vanc 1500mg 3/19 " @ 15:59    Assessment/Plan  Using Bayesian analysis via [employer] software a dosing regimen of vancomycin 750mg Q12H has a predicted steady-state trough of 16.2mg/L and an AUC24 of 496mg*h/L.  AUC24 is a stronger predictor of vancomycin efficacy and thus will be targeted in place of trough levels to best balance efficacy and toxicity.  - will start vanc 750mg Q12h  -Vancomycin level 3/21 @ 14:30  Pharmacy will continue to follow daily while on vancomycin and adjust as needed.     Hernesto MalikD

## 2021-03-19 NOTE — H&P
Internal medicine history and physical  INTERNAL MEDICINE   [facility]       Patient Identification:  Name: Rachael South  Age: 71 y.o.  Sex: female  :  1950  MRN: [id number]                   Primary Care Physician: Candida Ryder APRN                               Date of admission:3/19/2021    Chief Complaint: Progressive shortness of breath since the  of this month and coughing up blood for the last 2 3 days.    History of Present Illness:   Patient is a 71-year-old female who has issues with her dentition and teeth and has had previous teeth pulled and currently using partials went for elective dental extraction on 3/9/2021 and has 6 teeth pulled and afterwards she was instructed to keep the plate on for 24 hours.  According to her she was subsequently in significant pain and could not reach her dentist until 3 days later when she went to [facility] care center and at that time she was given penicillin.  Subsequently she thought she had reaction to penicillin because she was becoming short of breath.  She went back to the [facility] care center on 3/15/2021 and at that time she was diagnosed with pneumonia and was changed from penicillin to Omnicef.  Patient continued to have progressive shortness of breath and to 3 days ago started having cough with hemoptysis.  This got her very concerned and she went back to the [facility] care center and from there she was sent to the emergency room for further evaluation.  Patient denies any fever denies any chills denies any change in her appetite or night sweats.  She has been taking antibiotics as prescribed with no improvement.  Patient also admits to be a chronic smoker and has been smoking since the age of 14 ~71 with attempts to quit smoking in between.  Patient does not take any medication on a regular basis and prior to  of this month since her dental procedure she was not having any issues with breathing or dyspnea on exertion  or orthopnea or PND.  In the last 5 days patient is unable to lay flat sleeping in the recliner and having significant dyspnea on exertion with activities that she could do now becoming hard for her because she gets out of breath easily.      Past Medical History:  No past medical history on file.  Past Surgical History:  No past surgical history on file.   Home Meds:  (Not in a hospital admission)    Current Meds:     Current Facility-Administered Medications:   •  [COMPLETED] Insert peripheral IV, , , Once **AND** sodium chloride 0.9 % flush 10 mL, 10 mL, Intravenous, PRN, Stettenbenz, Melania R, APRN  No current outpatient medications on file.  Allergies:  No Known Allergies  Social History:   Social History     Tobacco Use   • Smoking status:  Has not smoked since she was 14 chills she is 71 with attempt to quit smoking in between.   Substance Use Topics   • Alcohol use: Not on file, denies any alcohol use.      Family History:  No family history on file.       Review of Systems  See history of present illness and past medical history.  Constitutional: Remarkable for no fever or chills but does have generalized weakness  Cardiovascular: Remarkable for dyspnea on exertion orthopnea PND denies any swelling of her legs does have hemoptysis and cough.  Respiratory: Remarkable for cough with hemoptysis in the last few days with associated shortness of breath but denies any pleuritic pain.  GI: Remarkable for preserved appetite though her appetite is not terribly good for long period of time denies any nausea vomiting or diarrhea.  : Remarkable for no burning in urination frequency or urgency  Musculoskeletal: Remarkable for no new joint aches and pain  Neurological: Remarkable for no loss of consciousness or continence  HEENT: Remarkable for significant pain and discomfort in the upper gums after dental extraction.  Patient uses partial.  Some resolving fever blister on her lips.      Vitals:   /77   Pulse  "104   Temp 98.2 °F (36.8 °C)   Resp 14   Ht 157.5 cm (62\")   Wt 70.8 kg (156 lb)   SpO2 95%   BMI 28.53 kg/m²   I/O: No intake or output data in the 24 hours ending 03/19/21 1812  Exam:  Patient is examined using the personal protective equipment as per guidelines from infection control for this particular patient as enacted.  Hand washing was performed before and after patient interaction.  General Appearance:   Alert cooperative nontoxic-appearing female who feels a lot better after arriving on the floor because she has to go to the bathroom to urinate after receiving IV Lasix on the floor.   Head:    Normocephalic, without obvious abnormality, atraumatic   Eyes:    PERRL, conjunctiva/corneas clear, EOM's intact, both eyes   Ears:    Normal external ear canals, both ears   Nose:   Nares normal, septum midline, mucosa normal, no drainage    or sinus tenderness   Throat:  Resolving fever blister on the upper lip, upper incisor gumline appears to be swollen with some erythema, multiple missing teeth.   Neck:   Supple, symmetrical, trachea midline, no adenopathy;     thyroid:  no enlargement/tenderness/nodules; no carotid    bruit or JVD   Back:     Symmetric, no curvature, ROM normal, no CVA tenderness   Lungs:     Clear to auscultation bilaterally, respirations unlabored   Chest Wall:    No tenderness or deformity    Heart:   S1-S2 regular 2/6 systolic murmur noted   Abdomen:    Obese soft nontender   Extremities:   Extremities normal, atraumatic, trace edema   Pulses:   Pulses palpable in all extremities; symmetric all extremities   Skin:   Skin color normal, Skin is warm and dry,  no rashes or palpable lesions   Neurologic:   CNII-XII intact, motor strength grossly intact, sensation grossly intact to light touch, no focal deficits noted       Data Review:      I reviewed the patient's new clinical results.  Results from last 7 days   Lab Units 03/19/21  1326   WBC 10*3/mm3 14.12*   HEMOGLOBIN g/dL 10.1* "   PLATELETS 10*3/mm3 383     Results from last 7 days   Lab Units 03/19/21  1326   SODIUM mmol/L 141   POTASSIUM mmol/L 3.7   CHLORIDE mmol/L 104   CO2 mmol/L 27.9   BUN mg/dL 25*   CREATININE mg/dL 0.78   CALCIUM mg/dL 8.2*   GLUCOSE mg/dL 97     Microbiology Results (last 10 days)     Procedure Component Value - Date/Time    Respiratory Panel PCR w/COVID-19(SARS-CoV-2) EFREM/TAMANNA/LUIS MIGUEL/PAD/COR/MAD/JENNIFER In-House, NP Swab in UTM/VTM, 3-4 HR TAT - Swab, Nasopharynx [[id number]]  (Normal) Collected: 03/19/21 1327    Lab Status: Final result Specimen: Swab from Nasopharynx Updated: 03/19/21 1501     ADENOVIRUS, PCR Not Detected     Coronavirus 229E Not Detected     Coronavirus HKU1 Not Detected     Coronavirus NL63 Not Detected     Coronavirus OC43 Not Detected     COVID19 Not Detected     Human Metapneumovirus Not Detected     Human Rhinovirus/Enterovirus Not Detected     Influenza A PCR Not Detected     Influenza B PCR Not Detected     Parainfluenza Virus 1 Not Detected     Parainfluenza Virus 2 Not Detected     Parainfluenza Virus 3 Not Detected     Parainfluenza Virus 4 Not Detected     RSV, PCR Not Detected     Bordetella pertussis pcr Not Detected     Bordetella parapertussis PCR Not Detected     Chlamydophila pneumoniae PCR Not Detected     Mycoplasma pneumo by PCR Not Detected    Narrative:      Fact sheet for providers: https://docs.[employer]/wp-content/uploads/QCG0757-7117-DY3.1-EUA-Provider-Fact-Sheet-3.pdf    Fact sheet for patients: https://docs.[employer]/wp-content/uploads/TDG6656-6980-ET7.1-EUA-Patient-Fact-Sheet-1.pdf    Test performed by PCR.        CT Chest Pulmonary Embolism    Result Date: 3/19/2021  There is no CT evidence of acute pulmonary thromboembolism. There is mild cardiomegaly and there is moderate vascular engorgement with ill-defined groundglass airspace opacities in both lungs that are suspected represent pulmonary edema. Pneumonia cannot entirely excluded. Small bilateral pleural  effusions are present.  This report was finalized on 3/19/2021 3:40 PM by Dr. Kojo Ellsworth M.D.      XR Chest AP    Result Date: 3/19/2021  Abnormal increased density throughout the lungs bilaterally representing either pulmonary edema or bilateral pneumonia.  This report was finalized on 3/19/2021 3:11 PM by Dr. Gabe Rose M.D.      ECG 12 Lead   Preliminary Result   HEART RATE= 105  bpm   RR Interval= 572  ms   DC Interval= 146  ms   P Horizontal Axis= 6  deg   P Front Axis= 29  deg   QRSD Interval= 99  ms   QT Interval= 321  ms   QRS Axis= 52  deg   T Wave Axis= 231  deg   - ABNORMAL ECG -   Sinus tachycardia   Probable left atrial enlargement   Repol abnrm suggests ischemia, diffuse leads   Electronically Signed By:    Date and Time of Study: 2021-03-19 14:22:32            Assessment:  Active Hospital Problems    Diagnosis  POA   • Endocarditis [I38]  Yes   • Acute CHF (CMS/HCC) [I50.9]  Unknown   • Dental infection [K04.7]  Unknown   • Anemia [D64.9]  Unknown   • Elevated troponin [R77.8]  Unknown       Medical decision making/assessment and plan: See admission orders  Progressive dyspnea, orthopnea, hemoptysis and abnormal imaging studies in the context of recent dental surgery, chronic smoking with no systemic fever and chills and decrease in appetite night sweats and elevated troponin and hemoglobin of 10-the likely diagnosis acting alone or in combination to cause this presentation include:   -Acute congestive heart failure with pulmonary edema and vascular congestion explaining hemoptysis   -Recent acute coronary syndrome with elevated troponin and cardiac dysfunction   -Possible aspiration pneumonia though less likely   -Acute exacerbation of COPD/emphysema   -Concern for infective endocarditis needs to be ruled out but less likely as explanation such as congestive heart failure is more likely to explain her presentation.  Plan is to admit the patient provide her with IV diuretics, check 2D echo  with Doppler follow-up on the blood cultures and continue empiric antibiotics until her blood cultures are finalized as negative.  Repeat chest x-ray after diuretics to see if there is significant improvement.  Also check inflammatory markers.  Check serial troponin and get cardiology consultation.  Chronic smoking with risk of chronic lung disease-provided with smoking cessation counseling, monitor her oxygen saturation and use as needed inhaler/nebulizer treatment.  Poor dentition with recent dental surgery-provide symptomatic management and continue systemic antibiotics which could be transitioned to oral regimen to complete short course of treatment for infected gum and teeth following extraction.  Anemia-multifactorial and needs to be worked up plan is to perform basic anemia studies including stool for Hemoccult and reticulocyte count as well as serum iron ferritin and B12 levels.      Jayden Acevedo MD   3/19/2021  18:12 EDT  Much of this encounter note is an electronic transcription/translation of spoken language to printed text. The electronic translation of spoken language may permit erroneous, or at times, nonsensical words or phrases to be inadvertently transcribed; Although I have reviewed the note for such errors, some may still exist

## 2021-03-20 ENCOUNTER — APPOINTMENT (OUTPATIENT)
Dept: CARDIOLOGY | Facility: HOSPITAL | Age: 71
End: 2021-03-20

## 2021-03-20 LAB
ALBUMIN SERPL-MCNC: 3.7 G/DL (ref 3.5–5.2)
ALBUMIN/GLOB SERPL: 1.5 G/DL
ALP SERPL-CCNC: 100 U/L (ref 39–117)
ALT SERPL W P-5'-P-CCNC: 84 U/L (ref 1–33)
ANION GAP SERPL CALCULATED.3IONS-SCNC: 8.4 MMOL/L (ref 5–15)
AORTIC DIMENSIONLESS INDEX: 0.7 (DI)
APTT PPP: 31 SECONDS (ref 22.7–35.4)
AST SERPL-CCNC: 30 U/L (ref 1–32)
BASOPHILS # BLD AUTO: 0.06 10*3/MM3 (ref 0–0.2)
BASOPHILS # BLD AUTO: 0.07 10*3/MM3 (ref 0–0.2)
BASOPHILS NFR BLD AUTO: 0.5 % (ref 0–1.5)
BASOPHILS NFR BLD AUTO: 0.6 % (ref 0–1.5)
BH CV ECHO MEAS - AI DEC SLOPE: 419 CM/SEC^2
BH CV ECHO MEAS - AI MAX PG: 49.3 MMHG
BH CV ECHO MEAS - AI MAX VEL: 351 CM/SEC
BH CV ECHO MEAS - AI P1/2T: 245.4 MSEC
BH CV ECHO MEAS - AO MAX PG (FULL): 3.1 MMHG
BH CV ECHO MEAS - AO MAX PG: 6.9 MMHG
BH CV ECHO MEAS - AO MEAN PG (FULL): 2 MMHG
BH CV ECHO MEAS - AO MEAN PG: 4 MMHG
BH CV ECHO MEAS - AO ROOT AREA (BSA CORRECTED): 1.7
BH CV ECHO MEAS - AO ROOT AREA: 6.6 CM^2
BH CV ECHO MEAS - AO ROOT DIAM: 2.9 CM
BH CV ECHO MEAS - AO V2 MAX: 131 CM/SEC
BH CV ECHO MEAS - AO V2 MEAN: 97.8 CM/SEC
BH CV ECHO MEAS - AO V2 VTI: 21.2 CM
BH CV ECHO MEAS - ASC AORTA: 3.2 CM
BH CV ECHO MEAS - AVA(I,A): 1.8 CM^2
BH CV ECHO MEAS - AVA(I,D): 1.8 CM^2
BH CV ECHO MEAS - AVA(V,A): 1.9 CM^2
BH CV ECHO MEAS - AVA(V,D): 1.9 CM^2
BH CV ECHO MEAS - BSA(HAYCOCK): 1.8 M^2
BH CV ECHO MEAS - BSA: 1.7 M^2
BH CV ECHO MEAS - BZI_BMI: 28 KILOGRAMS/M^2
BH CV ECHO MEAS - BZI_METRIC_HEIGHT: 157 CM
BH CV ECHO MEAS - BZI_METRIC_WEIGHT: 69 KG
BH CV ECHO MEAS - EDV(CUBED): 157.5 ML
BH CV ECHO MEAS - EDV(MOD-SP2): 111 ML
BH CV ECHO MEAS - EDV(MOD-SP4): 155 ML
BH CV ECHO MEAS - EDV(TEICH): 141.3 ML
BH CV ECHO MEAS - EF(CUBED): 34.1 %
BH CV ECHO MEAS - EF(MOD-BP): 25.3 %
BH CV ECHO MEAS - EF(MOD-SP2): 18.9 %
BH CV ECHO MEAS - EF(MOD-SP4): 29 %
BH CV ECHO MEAS - EF(TEICH): 27.6 %
BH CV ECHO MEAS - ESV(CUBED): 103.8 ML
BH CV ECHO MEAS - ESV(MOD-SP2): 90 ML
BH CV ECHO MEAS - ESV(MOD-SP4): 110 ML
BH CV ECHO MEAS - ESV(TEICH): 102.4 ML
BH CV ECHO MEAS - FS: 13 %
BH CV ECHO MEAS - IVS/LVPW: 1.6
BH CV ECHO MEAS - IVSD: 1.1 CM
BH CV ECHO MEAS - LAT PEAK E' VEL: 8.3 CM/SEC
BH CV ECHO MEAS - LV DIASTOLIC VOL/BSA (35-75): 91.3 ML/M^2
BH CV ECHO MEAS - LV MASS(C)D: 180.1 GRAMS
BH CV ECHO MEAS - LV MASS(C)DI: 106.1 GRAMS/M^2
BH CV ECHO MEAS - LV MAX PG: 3.7 MMHG
BH CV ECHO MEAS - LV MEAN PG: 2 MMHG
BH CV ECHO MEAS - LV SYSTOLIC VOL/BSA (12-30): 64.8 ML/M^2
BH CV ECHO MEAS - LV V1 MAX: 96.8 CM/SEC
BH CV ECHO MEAS - LV V1 MEAN: 60 CM/SEC
BH CV ECHO MEAS - LV V1 VTI: 15.1 CM
BH CV ECHO MEAS - LVIDD: 5.4 CM
BH CV ECHO MEAS - LVIDS: 4.7 CM
BH CV ECHO MEAS - LVLD AP2: 8.2 CM
BH CV ECHO MEAS - LVLD AP4: 8.5 CM
BH CV ECHO MEAS - LVLS AP2: 8.4 CM
BH CV ECHO MEAS - LVLS AP4: 8.2 CM
BH CV ECHO MEAS - LVOT AREA (M): 2.5 CM^2
BH CV ECHO MEAS - LVOT AREA: 2.5 CM^2
BH CV ECHO MEAS - LVOT DIAM: 1.8 CM
BH CV ECHO MEAS - LVPWD: 0.7 CM
BH CV ECHO MEAS - MED PEAK E' VEL: 5.7 CM/SEC
BH CV ECHO MEAS - MR MAX PG: 102.4 MMHG
BH CV ECHO MEAS - MR MAX VEL: 506 CM/SEC
BH CV ECHO MEAS - MR MEAN PG: 63 MMHG
BH CV ECHO MEAS - MR MEAN VEL: 373 CM/SEC
BH CV ECHO MEAS - MR VTI: 124 CM
BH CV ECHO MEAS - MV A DUR: 137 SEC
BH CV ECHO MEAS - MV A MAX VEL: 88.9 CM/SEC
BH CV ECHO MEAS - MV DEC SLOPE: 1334 CM/SEC^2
BH CV ECHO MEAS - MV DEC TIME: 98 SEC
BH CV ECHO MEAS - MV E MAX VEL: 141 CM/SEC
BH CV ECHO MEAS - MV E/A: 1.6
BH CV ECHO MEAS - MV MAX PG: 11.4 MMHG
BH CV ECHO MEAS - MV MEAN PG: 4 MMHG
BH CV ECHO MEAS - MV P1/2T MAX VEL: 175 CM/SEC
BH CV ECHO MEAS - MV P1/2T: 38.4 MSEC
BH CV ECHO MEAS - MV V2 MAX: 169 CM/SEC
BH CV ECHO MEAS - MV V2 MEAN: 96 CM/SEC
BH CV ECHO MEAS - MV V2 VTI: 27.2 CM
BH CV ECHO MEAS - MVA P1/2T LCG: 1.3 CM^2
BH CV ECHO MEAS - MVA(P1/2T): 5.7 CM^2
BH CV ECHO MEAS - MVA(VTI): 1.4 CM^2
BH CV ECHO MEAS - PA ACC TIME: 0.07 SEC
BH CV ECHO MEAS - PA MAX PG (FULL): 0.9 MMHG
BH CV ECHO MEAS - PA MAX PG: 2 MMHG
BH CV ECHO MEAS - PA PR(ACCEL): 45.7 MMHG
BH CV ECHO MEAS - PA V2 MAX: 70.6 CM/SEC
BH CV ECHO MEAS - PVA(V,A): 2.6 CM^2
BH CV ECHO MEAS - PVA(V,D): 2.6 CM^2
BH CV ECHO MEAS - QP/QS: 0.69
BH CV ECHO MEAS - RAP SYSTOLE: 8 MMHG
BH CV ECHO MEAS - RV MAX PG: 1.1 MMHG
BH CV ECHO MEAS - RV MEAN PG: 1 MMHG
BH CV ECHO MEAS - RV V1 MAX: 52.2 CM/SEC
BH CV ECHO MEAS - RV V1 MEAN: 32.1 CM/SEC
BH CV ECHO MEAS - RV V1 VTI: 7.7 CM
BH CV ECHO MEAS - RVOT AREA: 3.5 CM^2
BH CV ECHO MEAS - RVOT DIAM: 2.1 CM
BH CV ECHO MEAS - RVSP: 50 MMHG
BH CV ECHO MEAS - SI(AO): 82.5 ML/M^2
BH CV ECHO MEAS - SI(CUBED): 31.6 ML/M^2
BH CV ECHO MEAS - SI(LVOT): 22.6 ML/M^2
BH CV ECHO MEAS - SI(MOD-SP2): 12.4 ML/M^2
BH CV ECHO MEAS - SI(MOD-SP4): 26.5 ML/M^2
BH CV ECHO MEAS - SI(TEICH): 22.9 ML/M^2
BH CV ECHO MEAS - SV(AO): 140 ML
BH CV ECHO MEAS - SV(CUBED): 53.6 ML
BH CV ECHO MEAS - SV(LVOT): 38.4 ML
BH CV ECHO MEAS - SV(MOD-SP2): 21 ML
BH CV ECHO MEAS - SV(MOD-SP4): 45 ML
BH CV ECHO MEAS - SV(RVOT): 26.7 ML
BH CV ECHO MEAS - SV(TEICH): 39 ML
BH CV ECHO MEAS - TR MAX PG: 42 MMHG
BH CV ECHO MEAS - TR MAX VEL: 326 CM/SEC
BH CV ECHO MEASUREMENTS AVERAGE E/E' RATIO: 20.14
BH CV XLRA - RV BASE: 2.8 CM
BH CV XLRA - RV LENGTH: 7 CM
BH CV XLRA - RV MID: 1.9 CM
BH CV XLRA - TDI S': 18.3 CM/SEC
BILIRUB SERPL-MCNC: 0.3 MG/DL (ref 0–1.2)
BUN SERPL-MCNC: 21 MG/DL (ref 8–23)
BUN/CREAT SERPL: 26.3 (ref 7–25)
CALCIUM SPEC-SCNC: 8.6 MG/DL (ref 8.6–10.5)
CHLORIDE SERPL-SCNC: 102 MMOL/L (ref 98–107)
CO2 SERPL-SCNC: 29.6 MMOL/L (ref 22–29)
CREAT SERPL-MCNC: 0.8 MG/DL (ref 0.57–1)
DEPRECATED RDW RBC AUTO: 43.7 FL (ref 37–54)
DEPRECATED RDW RBC AUTO: 44.6 FL (ref 37–54)
EOSINOPHIL # BLD AUTO: 0.33 10*3/MM3 (ref 0–0.4)
EOSINOPHIL # BLD AUTO: 0.33 10*3/MM3 (ref 0–0.4)
EOSINOPHIL NFR BLD AUTO: 2.5 % (ref 0.3–6.2)
EOSINOPHIL NFR BLD AUTO: 3 % (ref 0.3–6.2)
ERYTHROCYTE [DISTWIDTH] IN BLOOD BY AUTOMATED COUNT: 14.7 % (ref 12.3–15.4)
ERYTHROCYTE [DISTWIDTH] IN BLOOD BY AUTOMATED COUNT: 14.7 % (ref 12.3–15.4)
GFR SERPL CREATININE-BSD FRML MDRD: 70 ML/MIN/1.73
GLOBULIN UR ELPH-MCNC: 2.5 GM/DL
GLUCOSE SERPL-MCNC: 90 MG/DL (ref 65–99)
HCT VFR BLD AUTO: 31.3 % (ref 34–46.6)
HCT VFR BLD AUTO: 37.3 % (ref 34–46.6)
HGB BLD-MCNC: 10.1 G/DL (ref 12–15.9)
HGB BLD-MCNC: 11.8 G/DL (ref 12–15.9)
IMM GRANULOCYTES # BLD AUTO: 0.06 10*3/MM3 (ref 0–0.05)
IMM GRANULOCYTES # BLD AUTO: 0.07 10*3/MM3 (ref 0–0.05)
IMM GRANULOCYTES NFR BLD AUTO: 0.5 % (ref 0–0.5)
IMM GRANULOCYTES NFR BLD AUTO: 0.6 % (ref 0–0.5)
INR PPP: 1.02 (ref 0.9–1.1)
IRON 24H UR-MRATE: 28 MCG/DL (ref 37–145)
IRON SATN MFR SERPL: 6 % (ref 20–50)
LEFT ATRIUM VOLUME INDEX: 20 ML/M2
LYMPHOCYTES # BLD AUTO: 2.91 10*3/MM3 (ref 0.7–3.1)
LYMPHOCYTES # BLD AUTO: 3.43 10*3/MM3 (ref 0.7–3.1)
LYMPHOCYTES NFR BLD AUTO: 22.3 % (ref 19.6–45.3)
LYMPHOCYTES NFR BLD AUTO: 31.5 % (ref 19.6–45.3)
MAXIMAL PREDICTED HEART RATE: 147 BPM
MCH RBC QN AUTO: 26.6 PG (ref 26.6–33)
MCH RBC QN AUTO: 26.8 PG (ref 26.6–33)
MCHC RBC AUTO-ENTMCNC: 31.6 G/DL (ref 31.5–35.7)
MCHC RBC AUTO-ENTMCNC: 32.3 G/DL (ref 31.5–35.7)
MCV RBC AUTO: 83 FL (ref 79–97)
MCV RBC AUTO: 84.2 FL (ref 79–97)
MONOCYTES # BLD AUTO: 1.12 10*3/MM3 (ref 0.1–0.9)
MONOCYTES # BLD AUTO: 1.25 10*3/MM3 (ref 0.1–0.9)
MONOCYTES NFR BLD AUTO: 10.3 % (ref 5–12)
MONOCYTES NFR BLD AUTO: 9.6 % (ref 5–12)
NEUTROPHILS NFR BLD AUTO: 5.88 10*3/MM3 (ref 1.7–7)
NEUTROPHILS NFR BLD AUTO: 54 % (ref 42.7–76)
NEUTROPHILS NFR BLD AUTO: 64.6 % (ref 42.7–76)
NEUTROPHILS NFR BLD AUTO: 8.43 10*3/MM3 (ref 1.7–7)
NRBC BLD AUTO-RTO: 0 /100 WBC (ref 0–0.2)
NRBC BLD AUTO-RTO: 0 /100 WBC (ref 0–0.2)
PLATELET # BLD AUTO: 380 10*3/MM3 (ref 140–450)
PLATELET # BLD AUTO: 443 10*3/MM3 (ref 140–450)
PMV BLD AUTO: 9.2 FL (ref 6–12)
PMV BLD AUTO: 9.6 FL (ref 6–12)
POTASSIUM SERPL-SCNC: 3.7 MMOL/L (ref 3.5–5.2)
PROT SERPL-MCNC: 6.2 G/DL (ref 6–8.5)
PROTHROMBIN TIME: 13.2 SECONDS (ref 11.7–14.2)
QT INTERVAL: 321 MS
RBC # BLD AUTO: 3.77 10*6/MM3 (ref 3.77–5.28)
RBC # BLD AUTO: 4.43 10*6/MM3 (ref 3.77–5.28)
SODIUM SERPL-SCNC: 140 MMOL/L (ref 136–145)
STRESS TARGET HR: 125 BPM
TIBC SERPL-MCNC: 459 MCG/DL (ref 298–536)
TRANSFERRIN SERPL-MCNC: 308 MG/DL (ref 200–360)
TROPONIN T SERPL-MCNC: 0.67 NG/ML (ref 0–0.03)
TROPONIN T SERPL-MCNC: 0.7 NG/ML (ref 0–0.03)
TROPONIN T SERPL-MCNC: 0.86 NG/ML (ref 0–0.03)
WBC # BLD AUTO: 10.88 10*3/MM3 (ref 3.4–10.8)
WBC # BLD AUTO: 13.06 10*3/MM3 (ref 3.4–10.8)

## 2021-03-20 PROCEDURE — 80053 COMPREHEN METABOLIC PANEL: CPT | Performed by: INTERNAL MEDICINE

## 2021-03-20 PROCEDURE — 85025 COMPLETE CBC W/AUTO DIFF WBC: CPT | Performed by: INTERNAL MEDICINE

## 2021-03-20 PROCEDURE — 84484 ASSAY OF TROPONIN QUANT: CPT | Performed by: INTERNAL MEDICINE

## 2021-03-20 PROCEDURE — 63710000001 DIPHENHYDRAMINE PER 50 MG: Performed by: INTERNAL MEDICINE

## 2021-03-20 PROCEDURE — 93306 TTE W/DOPPLER COMPLETE: CPT | Performed by: INTERNAL MEDICINE

## 2021-03-20 PROCEDURE — 25010000002 FUROSEMIDE PER 20 MG: Performed by: INTERNAL MEDICINE

## 2021-03-20 PROCEDURE — 83540 ASSAY OF IRON: CPT | Performed by: INTERNAL MEDICINE

## 2021-03-20 PROCEDURE — 25010000002 PERFLUTREN (DEFINITY) 8.476 MG IN SODIUM CHLORIDE (PF) 0.9 % 10 ML INJECTION: Performed by: INTERNAL MEDICINE

## 2021-03-20 PROCEDURE — 99232 SBSQ HOSP IP/OBS MODERATE 35: CPT | Performed by: INTERNAL MEDICINE

## 2021-03-20 PROCEDURE — 25010000002 HEPARIN (PORCINE) PER 1000 UNITS: Performed by: INTERNAL MEDICINE

## 2021-03-20 PROCEDURE — 25010000002 VANCOMYCIN 750 MG RECONSTITUTED SOLUTION: Performed by: INTERNAL MEDICINE

## 2021-03-20 PROCEDURE — 85610 PROTHROMBIN TIME: CPT | Performed by: INTERNAL MEDICINE

## 2021-03-20 PROCEDURE — 85730 THROMBOPLASTIN TIME PARTIAL: CPT | Performed by: INTERNAL MEDICINE

## 2021-03-20 PROCEDURE — 25010000003 CEFTRIAXONE PER 250 MG: Performed by: INTERNAL MEDICINE

## 2021-03-20 PROCEDURE — 84466 ASSAY OF TRANSFERRIN: CPT | Performed by: INTERNAL MEDICINE

## 2021-03-20 RX ORDER — HEPARIN SODIUM 5000 [USP'U]/ML
58 INJECTION, SOLUTION INTRAVENOUS; SUBCUTANEOUS ONCE
Status: COMPLETED | OUTPATIENT
Start: 2021-03-20 | End: 2021-03-20

## 2021-03-20 RX ORDER — HEPARIN SODIUM 10000 [USP'U]/100ML
12 INJECTION, SOLUTION INTRAVENOUS
Status: DISCONTINUED | OUTPATIENT
Start: 2021-03-20 | End: 2021-03-23

## 2021-03-20 RX ORDER — ATORVASTATIN CALCIUM 20 MG/1
40 TABLET, FILM COATED ORAL NIGHTLY
Status: DISCONTINUED | OUTPATIENT
Start: 2021-03-20 | End: 2021-03-23 | Stop reason: HOSPADM

## 2021-03-20 RX ORDER — ASPIRIN 81 MG/1
81 TABLET ORAL DAILY
Status: DISCONTINUED | OUTPATIENT
Start: 2021-03-20 | End: 2021-03-23 | Stop reason: HOSPADM

## 2021-03-20 RX ORDER — HEPARIN SODIUM 5000 [USP'U]/ML
30-58 INJECTION, SOLUTION INTRAVENOUS; SUBCUTANEOUS EVERY 6 HOURS PRN
Status: DISCONTINUED | OUTPATIENT
Start: 2021-03-20 | End: 2021-03-23

## 2021-03-20 RX ORDER — AMOXICILLIN 250 MG
2 CAPSULE ORAL 2 TIMES DAILY
Status: DISCONTINUED | OUTPATIENT
Start: 2021-03-20 | End: 2021-03-23 | Stop reason: HOSPADM

## 2021-03-20 RX ADMIN — HEPARIN SODIUM 4000 UNITS: 5000 INJECTION INTRAVENOUS; SUBCUTANEOUS at 17:42

## 2021-03-20 RX ADMIN — SODIUM CHLORIDE, PRESERVATIVE FREE 10 ML: 5 INJECTION INTRAVENOUS at 09:16

## 2021-03-20 RX ADMIN — METOPROLOL TARTRATE 12.5 MG: 25 TABLET, FILM COATED ORAL at 21:09

## 2021-03-20 RX ADMIN — DOCUSATE SODIUM 50MG AND SENNOSIDES 8.6MG 2 TABLET: 8.6; 5 TABLET, FILM COATED ORAL at 21:09

## 2021-03-20 RX ADMIN — CEFTRIAXONE SODIUM 2 G: 2 INJECTION, SOLUTION INTRAVENOUS at 14:45

## 2021-03-20 RX ADMIN — FUROSEMIDE 40 MG: 10 INJECTION, SOLUTION INTRAMUSCULAR; INTRAVENOUS at 21:09

## 2021-03-20 RX ADMIN — SODIUM CHLORIDE, PRESERVATIVE FREE 10 ML: 5 INJECTION INTRAVENOUS at 21:09

## 2021-03-20 RX ADMIN — ASPIRIN 81 MG: 81 TABLET, COATED ORAL at 15:46

## 2021-03-20 RX ADMIN — SODIUM CHLORIDE 750 MG: 900 INJECTION, SOLUTION INTRAVENOUS at 15:47

## 2021-03-20 RX ADMIN — FUROSEMIDE 40 MG: 10 INJECTION, SOLUTION INTRAMUSCULAR; INTRAVENOUS at 09:16

## 2021-03-20 RX ADMIN — DOCUSATE SODIUM 50MG AND SENNOSIDES 8.6MG 2 TABLET: 8.6; 5 TABLET, FILM COATED ORAL at 14:45

## 2021-03-20 RX ADMIN — PERFLUTREN 2 ML: 6.52 INJECTION, SUSPENSION INTRAVENOUS at 13:52

## 2021-03-20 RX ADMIN — ACETAMINOPHEN 500 MG: 500 TABLET, FILM COATED ORAL at 22:24

## 2021-03-20 RX ADMIN — ATORVASTATIN CALCIUM 40 MG: 20 TABLET, FILM COATED ORAL at 21:09

## 2021-03-20 RX ADMIN — SODIUM CHLORIDE 750 MG: 900 INJECTION, SOLUTION INTRAVENOUS at 02:59

## 2021-03-20 RX ADMIN — HEPARIN SODIUM 12 UNITS/KG/HR: 10000 INJECTION, SOLUTION INTRAVENOUS at 17:42

## 2021-03-20 RX ADMIN — DIPHENHYDRAMINE HYDROCHLORIDE 25 MG: 25 CAPSULE ORAL at 22:24

## 2021-03-20 NOTE — CONSULTS
Date of Hospital Visit: 3/20/2021  Encounter Provider: Luiza Pimentel RN  Place of Service: [facility] CARDIOLOGY  Patient Name: Rachael South  :1950  Referral Provider: Jayden Acevedo MD    Chief complaint: Shortness of breath     History of Present Illness  Rachael South is a 71 y.o. female patient with a history of anemia, chronic lung disease, and smoker who had six teeth pulled on 3/9/2021. She was reportedly in significant pain for three days afterwards but was unable to see her dentist. She went to an [facility] and was given penicillin. The patient reported she was seen in an [facility] again on 3/15 and was started on Omnicef, prednisone and an inhaler.     She presented to the [facility] ER on 3/19/2021 with worsening shortness of breath over the past week. She complained of an associated cough with hemoptysis. She denied any nausea, vomiting or chest pain.     Initial troponin 0.67 and repeat 0.862 this morning. Patient denies angina.  EKG shows diffuse 1 mm ST depression.  ProBNP 2,477 and BUN/Cr 21/0.8. Lactate 1.6, procal 0.07 and WBC 14; started on vancomycin and ceftriaxone in the ER. CTA negative for pulmonary thromboembolism, mild cardiomegaly and moderate vascular engorgement with ill-define groundglass airspace opacities. Covid-19 negative.     No past medical history on file.    No past surgical history on file.    Medications Prior to Admission   Medication Sig Dispense Refill Last Dose   • albuterol sulfate  (90 Base) MCG/ACT inhaler Inhale 2 puffs Every 4 (Four) Hours As Needed for Wheezing.      • benzonatate (TESSALON) 200 MG capsule Take 200 mg by mouth 3 (Three) Times a Day As Needed for Cough.      • cefdinir (OMNICEF) 300 MG capsule Take 300 mg by mouth 2 (Two) Times a Day.      • dextromethorphan-guaifenesin (ROBITUSSIN-DM)  MG/5ML syrup Take 10 mL by mouth Every 6 (Six) Hours As Needed.      • penicillin v potassium (VEETID) 500 MG tablet Take 500  mg by mouth 4 (Four) Times a Day.      • traZODone (DESYREL) 50 MG tablet Take 50 mg by mouth Every Night.          Current Meds  Scheduled Meds:cefTRIAXone, 2 g, Intravenous, Q24H  furosemide, 40 mg, Intravenous, Q12H  sodium chloride, 10 mL, Intravenous, Q12H  vancomycin, 750 mg, Intravenous, Q12H      Continuous Infusions:Pharmacy to dose vancomycin,       PRN Meds:.•  acetaminophen **OR** acetaminophen **OR** acetaminophen  •  acetaminophen  •  diphenhydrAMINE  •  nitroglycerin  •  ondansetron  •  Pharmacy to dose vancomycin  •  [COMPLETED] Insert peripheral IV **AND** sodium chloride  •  sodium chloride    Allergies as of 2021   • (No Known Allergies)       Social History     Socioeconomic History   • Marital status:      Spouse name: Not on file   • Number of children: Not on file   • Years of education: Not on file   • Highest education level: Not on file   Tobacco Use   • Smoking status: Former Smoker     Packs/day: 0.50     Types: Cigarettes     Quit date: 3/9/2021     Years since quittin.0   • Smokeless tobacco: Never Used   Substance and Sexual Activity   • Alcohol use: Never   • Drug use: Never       No family history on file.    Review of Systems   Constitutional: Positive for malaise/fatigue. Negative for chills and fever.   HENT: Negative for hoarse voice and sore throat.    Eyes: Negative for double vision and photophobia.   Cardiovascular: Positive for dyspnea on exertion. Negative for chest pain, leg swelling, near-syncope, orthopnea, palpitations, paroxysmal nocturnal dyspnea and syncope.   Respiratory: Positive for hemoptysis and shortness of breath. Negative for cough and wheezing.    Skin: Negative for poor wound healing and rash.   Musculoskeletal: Negative for arthritis and joint swelling.   Gastrointestinal: Positive for bloating and abdominal pain. Negative for hematemesis and hematochezia.   Neurological: Negative for dizziness and focal weakness.   Psychiatric/Behavioral:  "Negative for depression and suicidal ideas.            Objective:   Temp:  [97.7 °F (36.5 °C)-98.3 °F (36.8 °C)] 98.3 °F (36.8 °C)  Heart Rate:  [] 101  Resp:  [14-20] 17  BP: (118-144)/(69-93) 120/78  Body mass index is 27.82 kg/m².  Flowsheet Rows      First Filed Value   Admission Height  157.5 cm (62\") Documented at 03/19/2021 1242   Admission Weight  70.8 kg (156 lb) Documented at 03/19/2021 1242        Vitals:    03/19/21 2334   BP: 120/78   Pulse: 101   Resp: 17   Temp: 98.3 °F (36.8 °C)   SpO2: 94%       Vitals reviewed.   Constitutional:       Appearance: Acutely ill-appearing.      Comments: Mild distress secondary to abdominal pain   Neck:      Vascular: No JVR. JVD normal.   Pulmonary:      Effort: Pulmonary effort is normal.      Breath sounds: No wheezing. No rhonchi. No rales.      Comments: Poor air entry bibasilar zones  Chest:      Chest wall: Not tender to palpatation.   Cardiovascular:      PMI at left midclavicular line. Normal rate. Regular rhythm. Normal S1. Normal S2.      Murmurs: There is a systolic murmur.      No gallop. No click. No rub.   Pulses:     Intact distal pulses.   Edema:     Peripheral edema absent.   Abdominal:      General: Bowel sounds are normal. There is distension.      Palpations: Abdomen is soft.      Tenderness: There is no abdominal tenderness.   Musculoskeletal: Normal range of motion.         General: No tenderness. Skin:     General: Skin is warm and dry.   Neurological:      General: No focal deficit present.      Mental Status: Alert and oriented to person, place and time.                 Lab Review:      Results from last 7 days   Lab Units 03/20/21  0636   SODIUM mmol/L 140   POTASSIUM mmol/L 3.7   CHLORIDE mmol/L 102   CO2 mmol/L 29.6*   BUN mg/dL 21   CREATININE mg/dL 0.80   CALCIUM mg/dL 8.6   BILIRUBIN mg/dL 0.3   ALK PHOS U/L 100   ALT (SGPT) U/L 84*   AST (SGOT) U/L 30   GLUCOSE mg/dL 90     Results from last 7 days   Lab Units 03/20/21  0636 " 03/19/21  1326   TROPONIN T ng/mL 0.862* 0.670*     @LABRCNTbnp@  Results from last 7 days   Lab Units 03/20/21  0636 03/19/21  1326   WBC 10*3/mm3 10.88* 14.12*   HEMOGLOBIN g/dL 10.1* 10.1*   HEMATOCRIT % 31.3* 30.8*   PLATELETS 10*3/mm3 380 383             @LABRCNTIP(chol,trig,hdl,ldl)    EKG 3/19/2021        I personally viewed and interpreted the patient's EKG/Telemetry data)  Patient Active Problem List   Diagnosis   • Endocarditis   • Acute CHF (CMS/HCC)   • Dental infection   • Anemia   • Elevated troponin     Assessment and Plan:    1. Congestive heart failure -etiology uncertain.  Abnormal EKG with elevated troponin.  New murmur.  Concern given recent dental work for endocarditis.  Check echocardiogram today.  Patient received IV diuretic.  Will continue for today.  She does not look grossly volume overloaded.  Elevated blood pressure and heart rate.  Will add beta-blocker.  2. Elevated troponin -elevated to indeterminate level with diffuse ST depression.  This is above and beyond what I would expect for a mild heart failure exacerbation.  I would like to start the patient on aspirin and heparin however recent hemoptysis with chronic anemia.  We will start aspirin and statin.  If there are regional wall motion abnormalities on echocardiogram we will start heparin and monitor closely.  3. Chronic anemia  4. Recent dental abscess    Addendum: Dilated LV with EF 30 to 35%.  Inferior and posterior wall seem more hypokinetic than the remainder of the ventricle.  Patient also has moderate to severe MR and moderate AI.  No overt evidence for vegetation.  Giving her clinical story I think it is reasonable to perform a RITA on Monday.  Given the abnormal EKG, significantly elevated biomarkers and clinical risk factors we will plan for coronary angiogram on Monday as well.  Differential diagnosis includes primary valvular disease versus secondary to due endocarditis.    Gabe Nelson MD  03/20/21  08:33 EDT.  Time  spent in reviewing chart, discussion and examination:

## 2021-03-20 NOTE — PROGRESS NOTES
"DAILY PROGRESS NOTE  [facility]    Patient Identification:  Name: Rachael South  Age: 71 y.o.  Sex: female  :  1950  MRN: [id number]         Primary Care Physician: Candida Ryder APRN      Subjective  Patient overall feeling comfortable at present.  No acute complaints.    Objective:  General Appearance:  Comfortable, well-appearing, in no acute distress and not in pain.    Vital signs: (most recent): Blood pressure 150/93, pulse 103, temperature 98.1 °F (36.7 °C), temperature source Oral, resp. rate 18, height 157 cm (61.81\"), weight 69 kg (152 lb 1.9 oz), SpO2 97 %.    Lungs:  Normal effort and normal respiratory rate.  She is not in respiratory distress.  No stridor.  There are rales.  No decreased breath sounds, wheezes or rhonchi.  (If you faint fine basilar rales.)  Heart: Tachycardia.  Positive for murmur.  (Mildly tacky 100-110.  1/6 to 2/6 systolic murmur precordial.)  Extremities: There is no dependent edema.    Neurological: Patient is alert and oriented to person, place and time.    Skin:  Warm and dry.                Vital signs in last 24 hours:  Temp:  [97.7 °F (36.5 °C)-98.3 °F (36.8 °C)] 98.1 °F (36.7 °C)  Heart Rate:  [] 103  Resp:  [16-18] 18  BP: (120-150)/(69-93) 150/93    Intake/Output:    Intake/Output Summary (Last 24 hours) at 3/20/2021 1821  Last data filed at 3/20/2021 1700  Gross per 24 hour   Intake 1930 ml   Output 3750 ml   Net -1820 ml         Results from last 7 days   Lab Units 21  1442 21  0636 21  1326   WBC 10*3/mm3 13.06* 10.88* 14.12*   HEMOGLOBIN g/dL 11.8* 10.1* 10.1*   PLATELETS 10*3/mm3 443 380 383     Results from last 7 days   Lab Units 21  0636 21  1326   SODIUM mmol/L 140 141   POTASSIUM mmol/L 3.7 3.7   CHLORIDE mmol/L 102 104   CO2 mmol/L 29.6* 27.9   BUN mg/dL 21 25*   CREATININE mg/dL 0.80 0.78   GLUCOSE mg/dL 90 97   Estimated Creatinine Clearance: 56.8 mL/min (by C-G formula based on SCr of 0.8 " mg/dL).  Results from last 7 days   Lab Units 03/20/21  0636 03/19/21  1326   CALCIUM mg/dL 8.6 8.2*   ALBUMIN g/dL 3.70 3.80     Results from last 7 days   Lab Units 03/20/21  0636 03/19/21  1326   ALBUMIN g/dL 3.70 3.80   BILIRUBIN mg/dL 0.3 0.4   ALK PHOS U/L 100 95   AST (SGOT) U/L 30 56*   ALT (SGPT) U/L 84* 113*       Assessment:  Possible endocarditis: Presently afebrile.  Blood cultures pending.  Transthoracic echo noted.  Transesophageal scheduled for Monday as well as cardiac catheterization.  Acute CHF -systolic:  LVEF 30-35 percent.   Recent dental infection  Anemia: Iron panel consistent with severe iron deficiency with component of anemia chronic disease.  Start supplements.  Elevated troponin    All problems new to this examiner.    Plan:  Please see above.  Discussed with cardiology.  Input greatly appreciated.    Gabriele Osorio MD  3/20/2021  18:21 EDT

## 2021-03-20 NOTE — PROGRESS NOTES
"[facility]  Clinical Pharmacy Department     Vancomycin Pharmacokinetic Note    Rachael South is a 71 y.o. female who is on day 1 of pharmacy to dose vancomycin for possible endoarditis.    Target level: AUC24 400-600  Consulting Provider:  Dr. Acevedo  Current Vancomycin Dose:   750mg IV q12h  Planned Duration of Therapy: TBD  Other Antimicrobials: ceftriaxone    Allergies  Allergies as of 03/19/2021   • (No Known Allergies)       Microbiology:  3/19 bld cx x2: pending    Radiology/Imaging:      Vitals/Labs/I&O  157.5 cm (62\")  69 kg (152 lb 1.6 oz)  Body mass index is 27.82 kg/m².   Temp:  [97.7 °F (36.5 °C)-98.3 °F (36.8 °C)] 98.3 °F (36.8 °C)  Heart Rate:  [] 101  Resp:  [14-18] 18  BP: (118-144)/(69-93) 126/72    Results from last 7 days   Lab Units 03/20/21  0636 03/19/21  1326   WBC 10*3/mm3 10.88* 14.12*     Results from last 7 days   Lab Units 03/19/21  1326   LACTATE mmol/L 1.6      Results from last 7 days   Lab Units 03/19/21  1326   PROCALCITONIN ng/mL 0.07     Results from last 7 days   Lab Units 03/19/21  1326   CRP mg/dL 0.50              Estimated Creatinine Clearance: 57 mL/min (by C-G formula based on SCr of 0.8 mg/dL).  Results from last 7 days   Lab Units 03/20/21  0636 03/19/21  1326   BUN mg/dL 21 25*   CREATININE mg/dL 0.80 0.78     Intake & Output (last 3 days)       03/17 0701 - 03/18 0700 03/18 0701 - 03/19 0700 03/19 0701 - 03/20 0700 03/20 0701 - 03/21 0700    P.O.   960 240    IV Piggyback   250     Total Intake(mL/kg)   1210 (17.5) 240 (3.5)    Urine (mL/kg/hr)   2150 900 (2.3)    Total Output   2150 900    Net   -940 -660            Urine Unmeasured Occurrence   2 x           Vancomycin Dosing and Level History:  3/19 1559: 1500mg x1  3/20 0259: 750mg q12h started                  Assessment/Plan:    Assessment:  InsightRX provides the following population based pharmacokinetic parameters:   CL: 2.86 L/h   Vd: 57.6 L   T1/2: 14.9 h  If the predicted trough on this " "regimen is not within what was previously considered a \"target trough range\", the AUC24 (a stronger predictor of vancomycin efficacy) is predicted to achieve the accepted target of 400-600 mg*h/L. To best balance efficacy and toxicity, we will be targeting AUC24 in this patient rather than steady-state trough levels.     Continuing the regimen of 750 mg (10.9 mg/kg) IV every 12 hours gives a predicted steady-state trough of 17 mg/L and AUC24 of 516 mg*h/L.    Recommendations/Plan:  - Continue current vancomycin regimen of 750 mg (10.9 mg/kg) IV every 12 hours  - Vancomycin level is due prior to 1500 dose tomorrow. Timed for 1330 tomorrow.  - Continue to monitor SCr      Thank you for involving pharmacy in this patient's care. Please contact pharmacy with any questions or concerns.                           Aliza Aguilar [facility]  Clinical Pharmacist  03/20/21 12:40 EDT    "

## 2021-03-20 NOTE — NURSING NOTE
Critical troponin called to the answering service with [facility]- rc'd call back from the on-call APRN, informed of critical troponin value of 0.862, no new orders at this time.   12:15 call to [facility] answering service for critical troponin of 0.7. Rc'd call back from on-call APRN, no new orders at this time.   16:00 call to [facility] answering service for critical troponin of 0.674, no new orders at this time.

## 2021-03-20 NOTE — PLAN OF CARE
Goal Outcome Evaluation:  VSS. Admitted from the ED with endocarditis and chf. She had 6 teeth removed on 3/9 and since then has developed SOA, pneumonia, then worsening SOA which lead her to the ED. IV lasix given with good results. Patient states that she does not get as SOA when up now. Started on IV rocephin and vanc. Cardiology consulted and 2D echo to be done in am. Up ad katie. No c/o pain, RA. Will continue to monitor.

## 2021-03-21 LAB
ANION GAP SERPL CALCULATED.3IONS-SCNC: 12.5 MMOL/L (ref 5–15)
APTT PPP: 158.3 SECONDS (ref 22.7–35.4)
APTT PPP: 38.5 SECONDS (ref 22.7–35.4)
APTT PPP: 43 SECONDS (ref 22.7–35.4)
APTT PPP: 65.1 SECONDS (ref 22.7–35.4)
BASOPHILS # BLD AUTO: 0.07 10*3/MM3 (ref 0–0.2)
BASOPHILS # BLD AUTO: 0.07 10*3/MM3 (ref 0–0.2)
BASOPHILS NFR BLD AUTO: 0.5 % (ref 0–1.5)
BASOPHILS NFR BLD AUTO: 0.6 % (ref 0–1.5)
BUN SERPL-MCNC: 19 MG/DL (ref 8–23)
BUN/CREAT SERPL: 22.6 (ref 7–25)
CALCIUM SPEC-SCNC: 8.8 MG/DL (ref 8.6–10.5)
CHLORIDE SERPL-SCNC: 98 MMOL/L (ref 98–107)
CO2 SERPL-SCNC: 27.5 MMOL/L (ref 22–29)
CREAT SERPL-MCNC: 0.83 MG/DL (ref 0.57–1)
CREAT SERPL-MCNC: 0.84 MG/DL (ref 0.57–1)
DEPRECATED RDW RBC AUTO: 41.5 FL (ref 37–54)
DEPRECATED RDW RBC AUTO: 44.8 FL (ref 37–54)
EOSINOPHIL # BLD AUTO: 0.46 10*3/MM3 (ref 0–0.4)
EOSINOPHIL # BLD AUTO: 0.48 10*3/MM3 (ref 0–0.4)
EOSINOPHIL NFR BLD AUTO: 3.4 % (ref 0.3–6.2)
EOSINOPHIL NFR BLD AUTO: 3.7 % (ref 0.3–6.2)
ERYTHROCYTE [DISTWIDTH] IN BLOOD BY AUTOMATED COUNT: 14.4 % (ref 12.3–15.4)
ERYTHROCYTE [DISTWIDTH] IN BLOOD BY AUTOMATED COUNT: 14.8 % (ref 12.3–15.4)
GFR SERPL CREATININE-BSD FRML MDRD: 66 ML/MIN/1.73
GFR SERPL CREATININE-BSD FRML MDRD: 67 ML/MIN/1.73
GLUCOSE SERPL-MCNC: 122 MG/DL (ref 65–99)
HCT VFR BLD AUTO: 29.9 % (ref 34–46.6)
HCT VFR BLD AUTO: 31.1 % (ref 34–46.6)
HGB BLD-MCNC: 10.1 G/DL (ref 12–15.9)
HGB BLD-MCNC: 9.8 G/DL (ref 12–15.9)
IMM GRANULOCYTES # BLD AUTO: 0.08 10*3/MM3 (ref 0–0.05)
IMM GRANULOCYTES # BLD AUTO: 0.09 10*3/MM3 (ref 0–0.05)
IMM GRANULOCYTES NFR BLD AUTO: 0.6 % (ref 0–0.5)
IMM GRANULOCYTES NFR BLD AUTO: 0.6 % (ref 0–0.5)
LYMPHOCYTES # BLD AUTO: 3.54 10*3/MM3 (ref 0.7–3.1)
LYMPHOCYTES # BLD AUTO: 4.35 10*3/MM3 (ref 0.7–3.1)
LYMPHOCYTES NFR BLD AUTO: 28.6 % (ref 19.6–45.3)
LYMPHOCYTES NFR BLD AUTO: 31.2 % (ref 19.6–45.3)
MCH RBC QN AUTO: 26.5 PG (ref 26.6–33)
MCH RBC QN AUTO: 27 PG (ref 26.6–33)
MCHC RBC AUTO-ENTMCNC: 32.5 G/DL (ref 31.5–35.7)
MCHC RBC AUTO-ENTMCNC: 32.8 G/DL (ref 31.5–35.7)
MCV RBC AUTO: 80.8 FL (ref 79–97)
MCV RBC AUTO: 83.2 FL (ref 79–97)
MONOCYTES # BLD AUTO: 1.1 10*3/MM3 (ref 0.1–0.9)
MONOCYTES # BLD AUTO: 1.37 10*3/MM3 (ref 0.1–0.9)
MONOCYTES NFR BLD AUTO: 8.9 % (ref 5–12)
MONOCYTES NFR BLD AUTO: 9.8 % (ref 5–12)
NEUTROPHILS NFR BLD AUTO: 54.5 % (ref 42.7–76)
NEUTROPHILS NFR BLD AUTO: 57.6 % (ref 42.7–76)
NEUTROPHILS NFR BLD AUTO: 7.14 10*3/MM3 (ref 1.7–7)
NEUTROPHILS NFR BLD AUTO: 7.57 10*3/MM3 (ref 1.7–7)
NRBC BLD AUTO-RTO: 0 /100 WBC (ref 0–0.2)
NRBC BLD AUTO-RTO: 0 /100 WBC (ref 0–0.2)
PLATELET # BLD AUTO: 351 10*3/MM3 (ref 140–450)
PLATELET # BLD AUTO: 352 10*3/MM3 (ref 140–450)
PMV BLD AUTO: 9.4 FL (ref 6–12)
PMV BLD AUTO: 9.5 FL (ref 6–12)
POTASSIUM SERPL-SCNC: 3.6 MMOL/L (ref 3.5–5.2)
RBC # BLD AUTO: 3.7 10*6/MM3 (ref 3.77–5.28)
RBC # BLD AUTO: 3.74 10*6/MM3 (ref 3.77–5.28)
SARS-COV-2 ORF1AB RESP QL NAA+PROBE: NOT DETECTED
SODIUM SERPL-SCNC: 138 MMOL/L (ref 136–145)
TROPONIN T SERPL-MCNC: 0.88 NG/ML (ref 0–0.03)
VANCOMYCIN TROUGH SERPL-MCNC: 13.7 MCG/ML (ref 5–20)
WBC # BLD AUTO: 12.39 10*3/MM3 (ref 3.4–10.8)
WBC # BLD AUTO: 13.93 10*3/MM3 (ref 3.4–10.8)

## 2021-03-21 PROCEDURE — 93005 ELECTROCARDIOGRAM TRACING: CPT | Performed by: INTERNAL MEDICINE

## 2021-03-21 PROCEDURE — 80048 BASIC METABOLIC PNL TOTAL CA: CPT | Performed by: INTERNAL MEDICINE

## 2021-03-21 PROCEDURE — 80202 ASSAY OF VANCOMYCIN: CPT | Performed by: INTERNAL MEDICINE

## 2021-03-21 PROCEDURE — 63710000001 DIPHENHYDRAMINE PER 50 MG: Performed by: INTERNAL MEDICINE

## 2021-03-21 PROCEDURE — 85025 COMPLETE CBC W/AUTO DIFF WBC: CPT | Performed by: INTERNAL MEDICINE

## 2021-03-21 PROCEDURE — 25010000003 CEFTRIAXONE PER 250 MG: Performed by: INTERNAL MEDICINE

## 2021-03-21 PROCEDURE — 25010000002 HEPARIN (PORCINE) PER 1000 UNITS: Performed by: INTERNAL MEDICINE

## 2021-03-21 PROCEDURE — U0004 COV-19 TEST NON-CDC HGH THRU: HCPCS | Performed by: INTERNAL MEDICINE

## 2021-03-21 PROCEDURE — 25010000002 VANCOMYCIN 750 MG RECONSTITUTED SOLUTION: Performed by: INTERNAL MEDICINE

## 2021-03-21 PROCEDURE — 25010000002 FUROSEMIDE PER 20 MG: Performed by: INTERNAL MEDICINE

## 2021-03-21 PROCEDURE — 93010 ELECTROCARDIOGRAM REPORT: CPT | Performed by: INTERNAL MEDICINE

## 2021-03-21 PROCEDURE — 82565 ASSAY OF CREATININE: CPT | Performed by: INTERNAL MEDICINE

## 2021-03-21 PROCEDURE — 85730 THROMBOPLASTIN TIME PARTIAL: CPT | Performed by: INTERNAL MEDICINE

## 2021-03-21 PROCEDURE — 99233 SBSQ HOSP IP/OBS HIGH 50: CPT | Performed by: INTERNAL MEDICINE

## 2021-03-21 RX ORDER — FUROSEMIDE 40 MG/1
40 TABLET ORAL DAILY
Status: DISCONTINUED | OUTPATIENT
Start: 2021-03-21 | End: 2021-03-23 | Stop reason: HOSPADM

## 2021-03-21 RX ORDER — CALCIUM CARBONATE 200(500)MG
2 TABLET,CHEWABLE ORAL 4 TIMES DAILY PRN
Status: DISCONTINUED | OUTPATIENT
Start: 2021-03-21 | End: 2021-03-23 | Stop reason: HOSPADM

## 2021-03-21 RX ADMIN — CALCIUM CARBONATE 2 TABLET: 500 TABLET, CHEWABLE ORAL at 18:08

## 2021-03-21 RX ADMIN — DOCUSATE SODIUM 50MG AND SENNOSIDES 8.6MG 2 TABLET: 8.6; 5 TABLET, FILM COATED ORAL at 09:13

## 2021-03-21 RX ADMIN — SODIUM CHLORIDE 750 MG: 900 INJECTION, SOLUTION INTRAVENOUS at 04:05

## 2021-03-21 RX ADMIN — ATORVASTATIN CALCIUM 40 MG: 20 TABLET, FILM COATED ORAL at 20:45

## 2021-03-21 RX ADMIN — SODIUM CHLORIDE, PRESERVATIVE FREE 10 ML: 5 INJECTION INTRAVENOUS at 09:13

## 2021-03-21 RX ADMIN — HEPARIN SODIUM 4000 UNITS: 5000 INJECTION INTRAVENOUS; SUBCUTANEOUS at 00:33

## 2021-03-21 RX ADMIN — HEPARIN SODIUM 15 UNITS/KG/HR: 10000 INJECTION, SOLUTION INTRAVENOUS at 22:40

## 2021-03-21 RX ADMIN — ACETAMINOPHEN 500 MG: 500 TABLET, FILM COATED ORAL at 22:51

## 2021-03-21 RX ADMIN — SODIUM CHLORIDE 750 MG: 900 INJECTION, SOLUTION INTRAVENOUS at 16:05

## 2021-03-21 RX ADMIN — HEPARIN SODIUM 15 UNITS/KG/HR: 10000 INJECTION, SOLUTION INTRAVENOUS at 15:58

## 2021-03-21 RX ADMIN — HEPARIN SODIUM 4000 UNITS: 5000 INJECTION INTRAVENOUS; SUBCUTANEOUS at 15:57

## 2021-03-21 RX ADMIN — METOPROLOL TARTRATE 12.5 MG: 25 TABLET, FILM COATED ORAL at 20:46

## 2021-03-21 RX ADMIN — METOPROLOL TARTRATE 12.5 MG: 25 TABLET, FILM COATED ORAL at 09:11

## 2021-03-21 RX ADMIN — CEFTRIAXONE SODIUM 2 G: 2 INJECTION, SOLUTION INTRAVENOUS at 14:39

## 2021-03-21 RX ADMIN — ASPIRIN 81 MG: 81 TABLET, COATED ORAL at 09:13

## 2021-03-21 RX ADMIN — FUROSEMIDE 40 MG: 10 INJECTION, SOLUTION INTRAMUSCULAR; INTRAVENOUS at 09:13

## 2021-03-21 RX ADMIN — SODIUM CHLORIDE, PRESERVATIVE FREE 10 ML: 5 INJECTION INTRAVENOUS at 20:46

## 2021-03-21 RX ADMIN — FUROSEMIDE 40 MG: 40 TABLET ORAL at 14:39

## 2021-03-21 RX ADMIN — DIPHENHYDRAMINE HYDROCHLORIDE 25 MG: 25 CAPSULE ORAL at 22:51

## 2021-03-21 NOTE — PROGRESS NOTES
"[facility]    Patient Name: Rachael South  :1950  71 y.o.  LOS: 2  Encounter Provider: Gabe Nelson Jr, MD      Patient Care Team:  Candida Ryder APRN as PCP - General (Nurse Practitioner)    Chief Complaint: Follow-up dental abscess, leukocytosis, newly diagnosed LV dysfunction and valvular heart disease, elevated troponin, abnormal EKG, tobacco abuse    Interval History: Abnormal echo results reviewed.  Case details discussed with patient and family at bedside.  No arrhythmias on telemetry.  No hemoptysis after starting heparin drip.  Hemoglobin 9.8: Hemoglobin was 10.1 on admission.       Objective   Vital Signs  Temp:  [97.7 °F (36.5 °C)-98.7 °F (37.1 °C)] 97.7 °F (36.5 °C)  Heart Rate:  [] 94  Resp:  [18] 18  BP: (122-150)/(67-93) 125/69    Intake/Output Summary (Last 24 hours) at 3/21/2021 1307  Last data filed at 3/21/2021 1300  Gross per 24 hour   Intake 1450 ml   Output 3150 ml   Net -1700 ml     Flowsheet Rows      First Filed Value   Admission Height  157.5 cm (62\") Documented at 2021 1242   Admission Weight  70.8 kg (156 lb) Documented at 2021 1242            Physical Exam  Vitals reviewed.   Constitutional:       Appearance: Acutely ill-appearing.      Comments: Mild distress secondary to abdominal pain   Neck:      Vascular: No JVR. JVD normal.   Pulmonary:      Effort: Pulmonary effort is normal.      Breath sounds: No wheezing. No rhonchi. No rales.      Comments: Poor air entry bibasilar zones  Chest:      Chest wall: Not tender to palpatation.   Cardiovascular:      PMI at left midclavicular line. Normal rate. Regular rhythm. Normal S1. Normal S2.      Murmurs: There is a systolic murmur.      No gallop. No click. No rub.   Pulses:     Intact distal pulses.   Edema:     Peripheral edema absent.   Abdominal:      General: Bowel sounds are normal. There is distension.      Palpations: Abdomen is soft.      Tenderness: There is no abdominal " tenderness.   Musculoskeletal: Normal range of motion.         General: No tenderness. Skin:     General: Skin is warm and dry.   Neurological:      General: No focal deficit present.      Mental Status: Alert and oriented to person, place and time.       Physical exam was reviewed, updated amended when necessary.      Pertinent Test Results:  Results from last 7 days   Lab Units 03/20/21  0636 03/19/21  1326   SODIUM mmol/L 140 141   POTASSIUM mmol/L 3.7 3.7   CHLORIDE mmol/L 102 104   CO2 mmol/L 29.6* 27.9   BUN mg/dL 21 25*   CREATININE mg/dL 0.80 0.78   GLUCOSE mg/dL 90 97   CALCIUM mg/dL 8.6 8.2*   AST (SGOT) U/L 30 56*   ALT (SGPT) U/L 84* 113*     Results from last 7 days   Lab Units 03/20/21  2343 03/20/21  1442 03/20/21  1204 03/20/21  0636 03/19/21  1326   TROPONIN T ng/mL 0.877* 0.674* 0.700* 0.862* 0.670*     Results from last 7 days   Lab Units 03/21/21  0640 03/21/21  0443 03/20/21  1442 03/20/21  0636 03/19/21  1326   WBC 10*3/mm3 12.39* 13.93* 13.06* 10.88* 14.12*   HEMOGLOBIN g/dL 9.8* 10.1* 11.8* 10.1* 10.1*   HEMATOCRIT % 29.9* 31.1* 37.3 31.3* 30.8*   PLATELETS 10*3/mm3 351 352 443 380 383     Results from last 7 days   Lab Units 03/21/21  0640 03/20/21  2343 03/20/21  1442   INR   --   --  1.02   APTT seconds 158.3* 43.0* 31.0               Invalid input(s): LDLCALC  Results from last 7 days   Lab Units 03/19/21  1326   PROBNP pg/mL 2,477.0*               Medication Review:   aspirin, 81 mg, Oral, Daily  atorvastatin, 40 mg, Oral, Nightly  cefTRIAXone, 2 g, Intravenous, Q24H  furosemide, 40 mg, Intravenous, Q12H  metoprolol tartrate, 12.5 mg, Oral, Q12H  senna-docusate sodium, 2 tablet, Oral, BID  sodium chloride, 10 mL, Intravenous, Q12H  vancomycin, 750 mg, Intravenous, Q12H         heparin (porcine), 12 Units/kg/hr, Last Rate: 12 Units/kg/hr (03/21/21 0837)  Pharmacy to dose vancomycin,         Assessment/Plan   1. Congestive heart failure -etiology uncertain.  Abnormal EKG with elevated  troponin.  New murmur.  Concern given recent dental work for endocarditis.    Echocardiogram showed significant aortic regurgitation and mitral regurgitation as well as new diagnosis of moderate LV dysfunction with EF 30 to 35%.  Switch to oral diuretics.   Switch to Toprol and start losartan.  Abnormal EKG and elevated troponin.  Troponin levels are oscillating.  She denies angina.  Cardiac catheterization about 15 years ago with no significant coronary artery disease.  She is an everyday smoker.  Although etiology seems more infectious in nature given her risk factors and other objective data I cannot exclude ischemic heart disease.  Patient was started on aspirin, beta-blocker, statin and heparin.  After long discussion today about the risks and procedures of coronary angiogram the patient states that regardless of the outcome of the study she would absolutely refuse any intervention including PCI.  In an asymptomatic patient this would make me doubt the utility of the diagnostic study.  May consider cardiac MRI but will review the results of RITA first.  2. Elevated troponin -elevated to indeterminate level with diffuse ST depression.    Troponin trend and clinical picture seems more consistent with myocarditis however cannot exclude ischemic heart disease.  Patient started on aspirin, beta-blocker and statin.  Heparin started as patient with recent complaint of hemoptysis for which she explained his blood-streaked sputum.  Hemoglobin is stable from admitting value.  We will keep the patient on heparin for another 24 hours.  3. Valvular heart disease -no vegetations seen on echo.  Plan for RITA tomorrow morning.  Follow results of diagnostic testing for further treatment recommendations.  Patient is currently on antibiotics.  Microbiology pending.  4. Chronic anemia  5. Recent dental abscess       Gabe Nelson Jr, MD  [facility]  03/21/21  13:07 EDT

## 2021-03-21 NOTE — PROGRESS NOTES
"DAILY PROGRESS NOTE  [facility]    Patient Identification:  Name: Rachael South  Age: 71 y.o.  Sex: female  :  1950  MRN: [id number]         Primary Care Physician: Candida Ryder APRN      Subjective  No new complaints.  States that overall she is feeling better.    Objective:  General Appearance:  Comfortable, well-appearing, in no acute distress and not in pain.    Vital signs: (most recent): Blood pressure 125/69, pulse 94, temperature 97.7 °F (36.5 °C), temperature source Oral, resp. rate 18, height 157 cm (61.81\"), weight 66.5 kg (146 lb 8 oz), SpO2 96 %.    HEENT: (Exceedingly poor dental hygiene.  No evidence of active peridental abscess at present however.  No submandibular lymphadenopathy.)    Lungs:  Normal effort and normal respiratory rate.  Breath sounds clear to auscultation.    Heart: Normal rate.  Regular rhythm.  Positive for murmur.    Extremities: There is no dependent edema.    Neurological: Patient is alert and oriented to person, place and time.    Skin:  Warm and dry.                Vital signs in last 24 hours:  Temp:  [97.7 °F (36.5 °C)-98.7 °F (37.1 °C)] 97.7 °F (36.5 °C)  Heart Rate:  [] 94  Resp:  [18] 18  BP: (122-150)/(67-93) 125/69    Intake/Output:    Intake/Output Summary (Last 24 hours) at 3/21/2021 1441  Last data filed at 3/21/2021 1300  Gross per 24 hour   Intake 1450 ml   Output 3150 ml   Net -1700 ml         Results from last 7 days   Lab Units 21  0640 21  0443 21  1442 21  0636 21  1326   WBC 10*3/mm3 12.39* 13.93* 13.06* 10.88* 14.12*   HEMOGLOBIN g/dL 9.8* 10.1* 11.8* 10.1* 10.1*   PLATELETS 10*3/mm3 351 352 443 380 383     Results from last 7 days   Lab Units 21  1336 21  1328 21  0636 21  1326   SODIUM mmol/L 138  --  140 141   POTASSIUM mmol/L 3.6  --  3.7 3.7   CHLORIDE mmol/L 98  --  102 104   CO2 mmol/L 27.5  --  29.6* 27.9   BUN mg/dL -   25*   CREATININE mg/dL 0.84 " 0.83 0.80 0.78   GLUCOSE mg/dL 122*  --  90 97   Estimated Creatinine Clearance: 53.1 mL/min (by C-G formula based on SCr of 0.84 mg/dL).  Results from last 7 days   Lab Units 03/21/21  1336 03/20/21  0636 03/19/21  1326   CALCIUM mg/dL 8.8 8.6 8.2*   ALBUMIN g/dL  --  3.70 3.80     Results from last 7 days   Lab Units 03/20/21  0636 03/19/21  1326   ALBUMIN g/dL 3.70 3.80   BILIRUBIN mg/dL 0.3 0.4   ALK PHOS U/L 100 95   AST (SGOT) U/L 30 56*   ALT (SGPT) U/L 84* 113*       Assessment:  Acute CHF -systolic/valvular:  LVEF 30-35 percent. moderate to severe MR and moderate AI.  Appears to be reasonably compensated at this point.  Weight down 10 pounds from admission.  Possible endocarditis: Presently afebrile.  Blood cultures negative to date.  Transthoracic echo noted.  Transesophageal scheduled for Monday.  Patient refusing cardiac catheterization.  Discussed with the patient further today.  She may reconsider.  Recent periodontal abscess: Clinically appears resolved.  Anemia: Iron panel consistent with severe iron deficiency with component of anemia chronic disease.  Start supplements.  Elevated troponin: Persistent.  Cardiology input appreciated.  Tobacco abuse: Discussed smoking cessation with patient.  She at least showing good motivation to discontinue smoking.      Plan:  Please see above.  Discussed with patient and  at bedside.  Discussed with RN.    Over 35 minutes spent with over one half in counseling medical management.    Gabriele Osorio MD  3/21/2021  14:41 EDT

## 2021-03-21 NOTE — PLAN OF CARE
Goal Outcome Evaluation:  VSS. No c/o pain. Continue to titrate heparin gtt, still not therapeutic. Continue IV antibiotics and lasix, good UOP. Plan for RITA and cardiac angiogram Monday. Will continue to monitor.

## 2021-03-21 NOTE — PROGRESS NOTES
"[facility]  Clinical Pharmacy Department     Vancomycin Pharmacokinetic Note    Rachael South is a 71 y.o. female who is on day 2 of pharmacy to dose vancomycin for possible endoarditis.    Target level: AUC24 400-600  Consulting Provider:  Dr. Acevedo  Current Vancomycin Dose:   750mg IV q12h  Planned Duration of Therapy: TBD  Other Antimicrobials: ceftriaxone    Allergies  Allergies as of 03/19/2021   • (No Known Allergies)       Microbiology:  3/19 bld cx x2: pending    Radiology/Imaging:      Vitals/Labs/I&O  157 cm (61.81\")  66.5 kg (146 lb 8 oz)  Body mass index is 26.96 kg/m².   Temp:  [97.7 °F (36.5 °C)-98.7 °F (37.1 °C)] 97.7 °F (36.5 °C)  Heart Rate:  [] 94  Resp:  [18] 18  BP: (122-132)/(67-74) 125/69    Results from last 7 days   Lab Units 03/21/21  0640 03/21/21  0443 03/20/21  1442   WBC 10*3/mm3 12.39* 13.93* 13.06*     Results from last 7 days   Lab Units 03/19/21  1326   LACTATE mmol/L 1.6      Results from last 7 days   Lab Units 03/19/21  1326   PROCALCITONIN ng/mL 0.07     Results from last 7 days   Lab Units 03/19/21  1326   CRP mg/dL 0.50              Estimated Creatinine Clearance: 53.1 mL/min (by C-G formula based on SCr of 0.84 mg/dL).  Results from last 7 days   Lab Units 03/21/21  1336 03/21/21  1328 03/20/21  0636 03/19/21  1326   BUN mg/dL 19  --  21 25*   CREATININE mg/dL 0.84 0.83 0.80 0.78     Intake & Output (last 3 days)       03/18 0701 - 03/19 0700 03/19 0701 - 03/20 0700 03/20 0701 - 03/21 0700 03/21 0701 - 03/22 0700    P.O.  960 1200 480    IV Piggyback  250 250     Total Intake(mL/kg)  1210 (17.5) 1450 (21.8) 480 (7.2)    Urine (mL/kg/hr)  2150 3450 (2.2) 600 (1.1)    Total Output  2150 3450 600    Net  -940 -2000 -120            Urine Unmeasured Occurrence  2 x 1 x           Vancomycin Dosing and Level History:  3/19 1559: 1500mg x1  3/20 0259: 750mg q12h started   3/21 1328 trough: 13.7 mcg/ml (9.5hr level)            Results from last 7 days   Lab Units " "03/21/21  1328   VANCOMYCIN TR mcg/mL 13.70       Assessment/Plan:    Assessment:  InsightRX provides the following patient-specific pharmacokinetic parameters:   CL: 3.04 L/h   Vd: 55.7 L   T1/2: 13.7 h  If the predicted trough on this regimen is not within what was previously considered a \"target trough range\", the AUC24 (a stronger predictor of vancomycin efficacy) is predicted to achieve the accepted target of 400-600 mg*h/L. To best balance efficacy and toxicity, we will be targeting AUC24 in this patient rather than steady-state trough levels.     Continuing the regimen of 750 mg (10.9 mg/kg) IV every 12 hours gives a predicted steady-state trough of 15.8 mg/L and AUC24 of 488 mg*h/L.  Within goal range for AUC.    Recommendations/Plan:  - Continue current dose vancomycin 750mg q12h (adjusting time to 0600, 1800 beginning tomorrow AM)  - Plan recheck trough prior to 0600 dose on 3/23  - Continue to monitor SCr      Thank you for involving pharmacy in this patient's care. Please contact pharmacy with any questions or concerns.                           Aliza Aguilar [facility]  Clinical Pharmacist  03/21/21 15:20 EDT    "

## 2021-03-22 ENCOUNTER — APPOINTMENT (OUTPATIENT)
Dept: CARDIOLOGY | Facility: HOSPITAL | Age: 71
End: 2021-03-22

## 2021-03-22 LAB
ANION GAP SERPL CALCULATED.3IONS-SCNC: 10.1 MMOL/L (ref 5–15)
APTT PPP: 79.6 SECONDS (ref 22.7–35.4)
BASOPHILS # BLD AUTO: 0.09 10*3/MM3 (ref 0–0.2)
BASOPHILS NFR BLD AUTO: 0.7 % (ref 0–1.5)
BUN SERPL-MCNC: 14 MG/DL (ref 8–23)
BUN/CREAT SERPL: 20 (ref 7–25)
CALCIUM SPEC-SCNC: 8.4 MG/DL (ref 8.6–10.5)
CHLORIDE SERPL-SCNC: 101 MMOL/L (ref 98–107)
CO2 SERPL-SCNC: 26.9 MMOL/L (ref 22–29)
CREAT SERPL-MCNC: 0.7 MG/DL (ref 0.57–1)
DEPRECATED RDW RBC AUTO: 43.7 FL (ref 37–54)
EOSINOPHIL # BLD AUTO: 0.48 10*3/MM3 (ref 0–0.4)
EOSINOPHIL NFR BLD AUTO: 3.8 % (ref 0.3–6.2)
ERYTHROCYTE [DISTWIDTH] IN BLOOD BY AUTOMATED COUNT: 14.6 % (ref 12.3–15.4)
GFR SERPL CREATININE-BSD FRML MDRD: 82 ML/MIN/1.73
GLUCOSE SERPL-MCNC: 84 MG/DL (ref 65–99)
HCT VFR BLD AUTO: 31.1 % (ref 34–46.6)
HGB BLD-MCNC: 10.3 G/DL (ref 12–15.9)
IMM GRANULOCYTES # BLD AUTO: 0.05 10*3/MM3 (ref 0–0.05)
IMM GRANULOCYTES NFR BLD AUTO: 0.4 % (ref 0–0.5)
LYMPHOCYTES # BLD AUTO: 4 10*3/MM3 (ref 0.7–3.1)
LYMPHOCYTES NFR BLD AUTO: 31.6 % (ref 19.6–45.3)
MCH RBC QN AUTO: 27.2 PG (ref 26.6–33)
MCHC RBC AUTO-ENTMCNC: 33.1 G/DL (ref 31.5–35.7)
MCV RBC AUTO: 82.1 FL (ref 79–97)
MONOCYTES # BLD AUTO: 1.03 10*3/MM3 (ref 0.1–0.9)
MONOCYTES NFR BLD AUTO: 8.1 % (ref 5–12)
NEUTROPHILS NFR BLD AUTO: 55.4 % (ref 42.7–76)
NEUTROPHILS NFR BLD AUTO: 7.02 10*3/MM3 (ref 1.7–7)
NRBC BLD AUTO-RTO: 0 /100 WBC (ref 0–0.2)
PLATELET # BLD AUTO: 350 10*3/MM3 (ref 140–450)
PMV BLD AUTO: 9.7 FL (ref 6–12)
POTASSIUM SERPL-SCNC: 3.6 MMOL/L (ref 3.5–5.2)
QT INTERVAL: 354 MS
RBC # BLD AUTO: 3.79 10*6/MM3 (ref 3.77–5.28)
SODIUM SERPL-SCNC: 138 MMOL/L (ref 136–145)
TROPONIN T SERPL-MCNC: 0.61 NG/ML (ref 0–0.03)
WBC # BLD AUTO: 12.67 10*3/MM3 (ref 3.4–10.8)

## 2021-03-22 PROCEDURE — 80048 BASIC METABOLIC PNL TOTAL CA: CPT | Performed by: INTERNAL MEDICINE

## 2021-03-22 PROCEDURE — 25010000002 MIDAZOLAM PER 1 MG: Performed by: INTERNAL MEDICINE

## 2021-03-22 PROCEDURE — 84484 ASSAY OF TROPONIN QUANT: CPT | Performed by: INTERNAL MEDICINE

## 2021-03-22 PROCEDURE — 85025 COMPLETE CBC W/AUTO DIFF WBC: CPT | Performed by: INTERNAL MEDICINE

## 2021-03-22 PROCEDURE — 93325 DOPPLER ECHO COLOR FLOW MAPG: CPT | Performed by: INTERNAL MEDICINE

## 2021-03-22 PROCEDURE — 93321 DOPPLER ECHO F-UP/LMTD STD: CPT

## 2021-03-22 PROCEDURE — 25010000002 HEPARIN (PORCINE) PER 1000 UNITS: Performed by: INTERNAL MEDICINE

## 2021-03-22 PROCEDURE — 93312 ECHO TRANSESOPHAGEAL: CPT | Performed by: INTERNAL MEDICINE

## 2021-03-22 PROCEDURE — 25010000002 VANCOMYCIN 750 MG RECONSTITUTED SOLUTION: Performed by: INTERNAL MEDICINE

## 2021-03-22 PROCEDURE — 99152 MOD SED SAME PHYS/QHP 5/>YRS: CPT

## 2021-03-22 PROCEDURE — 99153 MOD SED SAME PHYS/QHP EA: CPT

## 2021-03-22 PROCEDURE — 25010000002 FENTANYL CITRATE (PF) 100 MCG/2ML SOLUTION: Performed by: INTERNAL MEDICINE

## 2021-03-22 PROCEDURE — 93312 ECHO TRANSESOPHAGEAL: CPT

## 2021-03-22 PROCEDURE — 93325 DOPPLER ECHO COLOR FLOW MAPG: CPT

## 2021-03-22 PROCEDURE — 25010000003 CEFTRIAXONE PER 250 MG: Performed by: INTERNAL MEDICINE

## 2021-03-22 PROCEDURE — 99232 SBSQ HOSP IP/OBS MODERATE 35: CPT | Performed by: INTERNAL MEDICINE

## 2021-03-22 PROCEDURE — 85730 THROMBOPLASTIN TIME PARTIAL: CPT | Performed by: INTERNAL MEDICINE

## 2021-03-22 PROCEDURE — 63710000001 DIPHENHYDRAMINE PER 50 MG: Performed by: INTERNAL MEDICINE

## 2021-03-22 PROCEDURE — 93321 DOPPLER ECHO F-UP/LMTD STD: CPT | Performed by: INTERNAL MEDICINE

## 2021-03-22 RX ORDER — SODIUM CHLORIDE 9 MG/ML
INJECTION, SOLUTION INTRAVENOUS
Status: COMPLETED | OUTPATIENT
Start: 2021-03-22 | End: 2021-03-22

## 2021-03-22 RX ORDER — FENTANYL CITRATE 50 UG/ML
INJECTION, SOLUTION INTRAMUSCULAR; INTRAVENOUS
Status: COMPLETED | OUTPATIENT
Start: 2021-03-22 | End: 2021-03-22

## 2021-03-22 RX ORDER — LIDOCAINE HYDROCHLORIDE 20 MG/ML
SOLUTION OROPHARYNGEAL
Status: COMPLETED | OUTPATIENT
Start: 2021-03-22 | End: 2021-03-22

## 2021-03-22 RX ORDER — MIDAZOLAM HYDROCHLORIDE 1 MG/ML
INJECTION INTRAMUSCULAR; INTRAVENOUS
Status: COMPLETED | OUTPATIENT
Start: 2021-03-22 | End: 2021-03-22

## 2021-03-22 RX ORDER — FERROUS SULFATE 325(65) MG
325 TABLET ORAL
Status: DISCONTINUED | OUTPATIENT
Start: 2021-03-23 | End: 2021-03-23 | Stop reason: HOSPADM

## 2021-03-22 RX ADMIN — FUROSEMIDE 40 MG: 40 TABLET ORAL at 08:20

## 2021-03-22 RX ADMIN — SODIUM CHLORIDE, PRESERVATIVE FREE 10 ML: 5 INJECTION INTRAVENOUS at 08:20

## 2021-03-22 RX ADMIN — LIDOCAINE HYDROCHLORIDE 10 ML: 20 SOLUTION ORAL; TOPICAL at 12:03

## 2021-03-22 RX ADMIN — ASPIRIN 81 MG: 81 TABLET, COATED ORAL at 08:20

## 2021-03-22 RX ADMIN — BENZOCAINE, BUTAMBEN, AND TETRACAINE HYDROCHLORIDE 1 SPRAY: .028; .004; .004 AEROSOL, SPRAY TOPICAL at 12:04

## 2021-03-22 RX ADMIN — ACETAMINOPHEN 500 MG: 500 TABLET, FILM COATED ORAL at 22:28

## 2021-03-22 RX ADMIN — SODIUM CHLORIDE 750 MG: 900 INJECTION, SOLUTION INTRAVENOUS at 17:02

## 2021-03-22 RX ADMIN — CEFTRIAXONE SODIUM 2 G: 2 INJECTION, SOLUTION INTRAVENOUS at 14:26

## 2021-03-22 RX ADMIN — FENTANYL CITRATE 25 MCG: 50 INJECTION INTRAMUSCULAR; INTRAVENOUS at 12:17

## 2021-03-22 RX ADMIN — SODIUM CHLORIDE 750 MG: 900 INJECTION, SOLUTION INTRAVENOUS at 05:20

## 2021-03-22 RX ADMIN — MIDAZOLAM 2 MG: 1 INJECTION INTRAMUSCULAR; INTRAVENOUS at 12:15

## 2021-03-22 RX ADMIN — FENTANYL CITRATE 25 MCG: 50 INJECTION INTRAMUSCULAR; INTRAVENOUS at 12:15

## 2021-03-22 RX ADMIN — DIPHENHYDRAMINE HYDROCHLORIDE 25 MG: 25 CAPSULE ORAL at 22:23

## 2021-03-22 RX ADMIN — SODIUM CHLORIDE 50 ML/HR: 9 INJECTION, SOLUTION INTRAVENOUS at 12:02

## 2021-03-22 RX ADMIN — METOPROLOL TARTRATE 12.5 MG: 25 TABLET, FILM COATED ORAL at 21:09

## 2021-03-22 RX ADMIN — HEPARIN SODIUM 15 UNITS/KG/HR: 10000 INJECTION, SOLUTION INTRAVENOUS at 21:10

## 2021-03-22 RX ADMIN — SODIUM CHLORIDE, PRESERVATIVE FREE 10 ML: 5 INJECTION INTRAVENOUS at 21:17

## 2021-03-22 RX ADMIN — ATORVASTATIN CALCIUM 40 MG: 20 TABLET, FILM COATED ORAL at 21:09

## 2021-03-22 RX ADMIN — DOCUSATE SODIUM 50MG AND SENNOSIDES 8.6MG 2 TABLET: 8.6; 5 TABLET, FILM COATED ORAL at 08:20

## 2021-03-22 RX ADMIN — METOPROLOL TARTRATE 12.5 MG: 25 TABLET, FILM COATED ORAL at 08:20

## 2021-03-22 NOTE — PROGRESS NOTES
Discharge Planning Assessment  [facility]     Patient Name: Rachael South  MRN: [id number]  Today's Date: 3/22/2021    Admit Date: 3/19/2021    Discharge Needs Assessment     Row Name 03/22/21 1624       Living Environment    Lives With  spouse    Current Living Arrangements  home/apartment/condo    Primary Care Provided by  self    Provides Primary Care For  no one    Family Caregiver if Needed  spouse    Quality of Family Relationships  supportive;involved;helpful    Able to Return to Prior Arrangements  yes       Resource/Environmental Concerns    Resource/Environmental Concerns  none    Transportation Concerns  car, none       Transition Planning    Patient/Family Anticipates Transition to  home with family    Patient/Family Anticipated Services at Transition  none    Transportation Anticipated  family or friend will provide       Discharge Needs Assessment    Equipment Currently Used at Home  none    Concerns to be Addressed  denies needs/concerns at this time;no discharge needs identified        Discharge Plan     Row Name 03/22/21 162       Plan    Plan  Home with spouse    Plan Comments  Spoke with pt to screen for DCP/needs.  Pt confirms that she does reside at facesheet addresss with her spouse and plans to return home at DC.  Pt reports that she is up and around independently and does not feel she will have any DC needs.  Pt confirmed her pharamcy info as correct.  Pt's spouse will be available to transport her home at DC.  CCP will follow to assist if any DC needs do airse.        Continued Care and Services - Admitted Since 3/19/2021    Coordination has not been started for this encounter.         Demographic Summary     Row Name 03/22/21 1621       General Information    Admission Type  inpatient    Arrived From  home    Referral Source  admission list    Reason for Consult  discharge planning        Functional Status     Row Name 03/22/21 1625       Functional Status    Usual Activity  Tolerance  good    Current Activity Tolerance  good       Functional Status, IADL    Medications  independent    Meal Preparation  independent    Housekeeping  independent    Laundry  independent    Shopping  independent       Mental Status    General Appearance WDL  WDL        Psychosocial    No documentation.       Abuse/Neglect    No documentation.       Legal    No documentation.       Substance Abuse    No documentation.       Patient Forms    No documentation.           REGINA Cortes

## 2021-03-22 NOTE — PLAN OF CARE
Goal Outcome Evaluation:  Plan of Care Reviewed With: patient  Progress: improving  Outcome Summary: patient vss. no pain reported. on heparin drip and iv abx. RITA done today. will monitor.

## 2021-03-22 NOTE — PLAN OF CARE
Goal Outcome Evaluation:  Plan of Care Reviewed With: patient  Progress: improving  Outcome Summary: No issues through night. NPO since midnight. To go for RITA today, time unknown. Heparin drip infusing, PTT at 2200 therapeutic. Pt refusing heart cath. Denies pain. Rested well after Tylenol PM given. Will continue to monitor.

## 2021-03-22 NOTE — PROGRESS NOTES
[facility]  Progress note: 3/22/2021    Patient Identification:  Name:Rachael South  Age:71 y.o.  Sex: female  :  1950  MRN: [id number]           CC:  Elevated troponin, valve regurgitation, congestive heart failure, questionable endocarditis    Interval history:  Vitals stable overnight.  Remains on heparin drip.  Blood cultures still pending.  Patient denies any chest pain, shortness of breath.  Blood pressure borderline low overnight and losartan not started.  RITA today with cardiomyopathy ejection fraction estimated 3035%,  thickened chordae of the posterior mitral valve leaflet with decreased excursion and moderate to severe mitral regurgitation with multiple jets.  Aortic regurgitation was mild    Vital Signs:   Temp:  [97.8 °F (36.6 °C)-98.1 °F (36.7 °C)] 97.8 °F (36.6 °C)  Heart Rate:  [75-93] 88  Resp:  [16-20] 16  BP: ()/(42-84) 122/72    Intake/Output Summary (Last 24 hours) at 3/22/2021 1336  Last data filed at 3/22/2021 1100  Gross per 24 hour   Intake 740 ml   Output 2100 ml   Net -1360 ml       Physical Examination:    General Appearance No acute distress   Neck No adenopathy, supple, trachea midline, no thyromegaly, no carotid bruit, no JVD   Lungs Clear to auscultation,respirations regular, even and unlabored   Heart Regular rhythm and normal rate, normal S1 and S2, 2/6 systolic murmur, no gallop, no rub, no click   Chest wall No abnormalities observed   Abdomen Normal bowel sounds, no masses, no hepatomegaly, soft   Extremities Moves all extremities well, no edema, no cyanosis, no redness   Neurological Alert and oriented x 3     Lab Review:  Personally reviewed the labs, radiology imaging and other cardiac procedures.   Results from last 7 days   Lab Units 21  0503 21  1328 21  0636   SODIUM mmol/L 138   < > 140   POTASSIUM mmol/L 3.6   < > 3.7   CHLORIDE mmol/L 101   < > 102   CO2 mmol/L 26.9   < > 29.6*   BUN mg/dL 14   < > 21   CREATININE mg/dL 0.70   --  0.80   CALCIUM mg/dL 8.4*   < > 8.6   BILIRUBIN mg/dL  --   --  0.3   ALK PHOS U/L  --   --  100   ALT (SGPT) U/L  --   --  84*   AST (SGOT) U/L  --   --  30   GLUCOSE mg/dL 84   < > 90    < > = values in this interval not displayed.     Results from last 7 days   Lab Units 03/22/21  0503 03/20/21  2343 03/20/21  1442   TROPONIN T ng/mL 0.614* 0.877* 0.674*     Results from last 7 days   Lab Units 03/22/21  0503 03/21/21  0640 03/21/21  0443   WBC 10*3/mm3 12.67* 12.39* 13.93*   HEMOGLOBIN g/dL 10.3* 9.8* 10.1*   HEMATOCRIT % 31.1* 29.9* 31.1*   PLATELETS 10*3/mm3 350 351 352     Results from last 7 days   Lab Units 03/22/21  0503 03/21/21  2209 03/21/21  1328 03/20/21  1442   INR   --   --   --  1.02   APTT seconds 79.6* 65.1* 38.5* 31.0     Medication Review:   Meds reviewed  Scheduled Meds:aspirin, 81 mg, Oral, Daily  atorvastatin, 40 mg, Oral, Nightly  cefTRIAXone, 2 g, Intravenous, Q24H  furosemide, 40 mg, Oral, Daily  metoprolol tartrate, 12.5 mg, Oral, Q12H  senna-docusate sodium, 2 tablet, Oral, BID  sodium chloride, 10 mL, Intravenous, Q12H  vancomycin, 750 mg, Intravenous, Q12H      Continuous Infusions:heparin (porcine), 12 Units/kg/hr, Last Rate: 15 Units/kg/hr (03/21/21 2240)  Pharmacy to dose vancomycin,       I personally viewed and interpreted the patient's EKG/Telemetry data    Assessment and Plan  1.  Congestive heart failure with acute systolic and diastolic heart failure, ejection fraction 30 to 35%.  Switch to oral diuretics yesterday.  Continue with the current dose of metoprolol.  As blood pressure allows add low-dose losartan.  2.  Elevated troponin.  Possibly embolic event.  Patient had a long discussion with Dr. Nelson yesterday and noted she would not want any coronary mention including PCI..  Remains on IV heparin.  Again does not wish to consider any further coronary evaluation despite full knowledge that this may lead to under diagnosis of coronary disease and prevention of  myocardial infarction.  She just wants medical therapy at this time.  She does not wish to have a stress test or cardiac cath.  With this in mind we will place a Zio patch at dismissal to look for atrial arrhythmias that may also lead to embolic injury.  3.  Mitral regurgitation.  Posterior mitral valve leaflet is thickened and sclerotic.  There is no evidence of vegetative process at this point.  Cannot rule out prior endocarditis but unlikely.  4.  Aortic valve regurgitation.  This is mild.  Treat medically  5.  Ongoing nicotine use  6.  Recent dental abscess  7.  Chronic anemia    Mini Ketih  3/22/810140:26 EDT  25min spent in reviewing records, discussion and examination of the patient and discussion with other members of the patient's medical team.     Dictated utilizing Dragon dictation

## 2021-03-22 NOTE — PROGRESS NOTES
"DAILY PROGRESS NOTE  [facility]    Patient Identification:  Name: Rachael South  Age: 71 y.o.  Sex: female  :  1950  MRN: [id number]         Primary Care Physician: Candida Ryder APRN      Subjective  No complaints.  Patient states she feels well.  She is very anxious to go home because \"my poor doggy misses me\".    Objective:  General Appearance:  Comfortable, well-appearing, in no acute distress and not in pain.    Vital signs: (most recent): Blood pressure 125/65, pulse 100, temperature 98.2 °F (36.8 °C), temperature source Oral, resp. rate 16, height 157 cm (61.81\"), weight 65.7 kg (144 lb 14.4 oz), SpO2 98 %.    Lungs:  Normal effort and normal respiratory rate.  Breath sounds clear to auscultation.    Heart: Normal rate.  Regular rhythm.    Extremities: There is no dependent edema.    Neurological: Patient is alert and oriented to person, place and time.    Skin:  Warm and dry.                Vital signs in last 24 hours:  Temp:  [97.8 °F (36.6 °C)-98.2 °F (36.8 °C)] 98.2 °F (36.8 °C)  Heart Rate:  [] 100  Resp:  [16] 16  BP: ()/(42-84) 125/65    Intake/Output:    Intake/Output Summary (Last 24 hours) at 3/22/2021 1637  Last data filed at 3/22/2021 1430  Gross per 24 hour   Intake 980 ml   Output 1900 ml   Net -920 ml         Results from last 7 days   Lab Units 21  0503 21  0640 21  0443 21  1442 21  0636 21  1326   WBC 10*3/mm3 12.67* 12.39* 13.93* 13.06* 10.88* 14.12*   HEMOGLOBIN g/dL 10.3* 9.8* 10.1* 11.8* 10.1* 10.1*   PLATELETS 10*3/mm3 350 351 352 443 380 383     Results from last 7 days   Lab Units 21  0503 21  1336 21  1328 21  0636 21  1326   SODIUM mmol/L 138 138  --  140 141   POTASSIUM mmol/L 3.6 3.6  --  3.7 3.7   CHLORIDE mmol/L 101 98  --  102 104   CO2 mmol/L 26.9 27.5  --  29.6* 27.9   BUN mg/dL 14 19  --  21 25*   CREATININE mg/dL 0.70 0.84 0.83 0.80 0.78   GLUCOSE mg/dL 84 122*  -- "  90 97   Estimated Creatinine Clearance: 55.5 mL/min (by C-G formula based on SCr of 0.7 mg/dL).  Results from last 7 days   Lab Units 03/22/21  0503 03/21/21  1336 03/20/21  0636 03/19/21  1326   CALCIUM mg/dL 8.4* 8.8 8.6 8.2*   ALBUMIN g/dL  --   --  3.70 3.80     Results from last 7 days   Lab Units 03/20/21  0636 03/19/21  1326   ALBUMIN g/dL 3.70 3.80   BILIRUBIN mg/dL 0.3 0.4   ALK PHOS U/L 100 95   AST (SGOT) U/L 30 56*   ALT (SGPT) U/L 84* 113*       Assessment:  Acute CHF -systolic/valvular:  LVEF 30-35 percent. moderate to severe MR and moderate AI.  Appears to be reasonably compensated at this point.  Weight down 12 pounds from admission.  Possible endocarditis: Presently afebrile.  Blood cultures negative to date.  Transthoracic echo noted.  Transesophageal results pending.  Patient refusing cardiac catheterization.  Recent periodontal abscess: Clinically appears resolved.  Anemia: Iron panel consistent with severe iron deficiency with component of anemia chronic disease.  Start supplements.  Elevated troponin: Persistent.  Cardiology input appreciated.  Tobacco abuse:  Patient reiterates her promised to stop smoking during the encounter today.    Plan:  Please see above.  Possible discharge tomorrow if no significant issues on her transesophageal echo.    Gabriele Osorio MD  3/22/2021  16:37 EDT  .

## 2021-03-23 VITALS
SYSTOLIC BLOOD PRESSURE: 124 MMHG | HEIGHT: 62 IN | RESPIRATION RATE: 16 BRPM | BODY MASS INDEX: 26.7 KG/M2 | HEART RATE: 87 BPM | OXYGEN SATURATION: 98 % | DIASTOLIC BLOOD PRESSURE: 92 MMHG | TEMPERATURE: 98.1 F | WEIGHT: 145.1 LBS

## 2021-03-23 LAB
APTT PPP: 71.9 SECONDS (ref 22.7–35.4)
BASOPHILS # BLD AUTO: 0.06 10*3/MM3 (ref 0–0.2)
BASOPHILS NFR BLD AUTO: 0.6 % (ref 0–1.5)
CREAT SERPL-MCNC: 0.74 MG/DL (ref 0.57–1)
DEPRECATED RDW RBC AUTO: 45.8 FL (ref 37–54)
EOSINOPHIL # BLD AUTO: 0.55 10*3/MM3 (ref 0–0.4)
EOSINOPHIL NFR BLD AUTO: 5.3 % (ref 0.3–6.2)
ERYTHROCYTE [DISTWIDTH] IN BLOOD BY AUTOMATED COUNT: 14.8 % (ref 12.3–15.4)
GFR SERPL CREATININE-BSD FRML MDRD: 77 ML/MIN/1.73
HCT VFR BLD AUTO: 31.5 % (ref 34–46.6)
HGB BLD-MCNC: 9.9 G/DL (ref 12–15.9)
IMM GRANULOCYTES # BLD AUTO: 0.06 10*3/MM3 (ref 0–0.05)
IMM GRANULOCYTES NFR BLD AUTO: 0.6 % (ref 0–0.5)
LYMPHOCYTES # BLD AUTO: 3.08 10*3/MM3 (ref 0.7–3.1)
LYMPHOCYTES NFR BLD AUTO: 29.7 % (ref 19.6–45.3)
MCH RBC QN AUTO: 26.5 PG (ref 26.6–33)
MCHC RBC AUTO-ENTMCNC: 31.4 G/DL (ref 31.5–35.7)
MCV RBC AUTO: 84.5 FL (ref 79–97)
MONOCYTES # BLD AUTO: 1.03 10*3/MM3 (ref 0.1–0.9)
MONOCYTES NFR BLD AUTO: 9.9 % (ref 5–12)
NEUTROPHILS NFR BLD AUTO: 5.59 10*3/MM3 (ref 1.7–7)
NEUTROPHILS NFR BLD AUTO: 53.9 % (ref 42.7–76)
NRBC BLD AUTO-RTO: 0 /100 WBC (ref 0–0.2)
PLATELET # BLD AUTO: 320 10*3/MM3 (ref 140–450)
PMV BLD AUTO: 9.4 FL (ref 6–12)
RBC # BLD AUTO: 3.73 10*6/MM3 (ref 3.77–5.28)
VANCOMYCIN TROUGH SERPL-MCNC: 9.8 MCG/ML (ref 5–20)
WBC # BLD AUTO: 10.37 10*3/MM3 (ref 3.4–10.8)

## 2021-03-23 PROCEDURE — 25010000003 CEFTRIAXONE PER 250 MG: Performed by: INTERNAL MEDICINE

## 2021-03-23 PROCEDURE — 25010000002 VANCOMYCIN 750 MG RECONSTITUTED SOLUTION: Performed by: INTERNAL MEDICINE

## 2021-03-23 PROCEDURE — 99233 SBSQ HOSP IP/OBS HIGH 50: CPT | Performed by: INTERNAL MEDICINE

## 2021-03-23 PROCEDURE — 80202 ASSAY OF VANCOMYCIN: CPT | Performed by: INTERNAL MEDICINE

## 2021-03-23 PROCEDURE — 85025 COMPLETE CBC W/AUTO DIFF WBC: CPT | Performed by: INTERNAL MEDICINE

## 2021-03-23 PROCEDURE — 85730 THROMBOPLASTIN TIME PARTIAL: CPT | Performed by: INTERNAL MEDICINE

## 2021-03-23 PROCEDURE — 82565 ASSAY OF CREATININE: CPT | Performed by: INTERNAL MEDICINE

## 2021-03-23 RX ORDER — ATORVASTATIN CALCIUM 40 MG/1
40 TABLET, FILM COATED ORAL NIGHTLY
Qty: 30 TABLET | Refills: 0 | Status: SHIPPED | OUTPATIENT
Start: 2021-03-23 | End: 2021-05-12 | Stop reason: SDUPTHER

## 2021-03-23 RX ORDER — FUROSEMIDE 40 MG/1
40 TABLET ORAL DAILY
Qty: 30 TABLET | Refills: 0 | Status: SHIPPED | OUTPATIENT
Start: 2021-03-24 | End: 2021-05-06 | Stop reason: SDUPTHER

## 2021-03-23 RX ORDER — VANCOMYCIN HYDROCHLORIDE 1 G/200ML
1000 INJECTION, SOLUTION INTRAVENOUS EVERY 12 HOURS
Status: DISCONTINUED | OUTPATIENT
Start: 2021-03-23 | End: 2021-03-23

## 2021-03-23 RX ORDER — LANOLIN ALCOHOL/MO/W.PET/CERES
325 CREAM (GRAM) TOPICAL
Qty: 30 TABLET | Refills: 0 | Status: SHIPPED | OUTPATIENT
Start: 2021-03-24 | End: 2021-11-04

## 2021-03-23 RX ORDER — ASPIRIN 81 MG/1
81 TABLET ORAL DAILY
Qty: 30 TABLET | Refills: 0 | Status: ON HOLD | OUTPATIENT
Start: 2021-03-24 | End: 2021-11-27 | Stop reason: SDDI

## 2021-03-23 RX ADMIN — METOPROLOL TARTRATE 12.5 MG: 25 TABLET, FILM COATED ORAL at 08:10

## 2021-03-23 RX ADMIN — SODIUM CHLORIDE, PRESERVATIVE FREE 10 ML: 5 INJECTION INTRAVENOUS at 08:10

## 2021-03-23 RX ADMIN — CEFTRIAXONE SODIUM 2 G: 2 INJECTION, SOLUTION INTRAVENOUS at 14:19

## 2021-03-23 RX ADMIN — FERROUS SULFATE TAB 325 MG (65 MG ELEMENTAL FE) 325 MG: 325 (65 FE) TAB at 08:10

## 2021-03-23 RX ADMIN — FUROSEMIDE 40 MG: 40 TABLET ORAL at 08:10

## 2021-03-23 RX ADMIN — ASPIRIN 81 MG: 81 TABLET, COATED ORAL at 08:10

## 2021-03-23 RX ADMIN — SODIUM CHLORIDE 750 MG: 900 INJECTION, SOLUTION INTRAVENOUS at 06:42

## 2021-03-23 NOTE — DISCHARGE SUMMARY
PHYSICIAN DISCHARGE SUMMARY                                                                        [facility]    Patient Identification:  Name: Rachael South  Age: 71 y.o.  Sex: female  :  1950  MRN: [id number]  Primary Care Physician: Candida Ryder APRN    Admit date: 3/19/2021  Discharge date and time: 3/23/2021     Discharged Condition: good    Discharge Diagnoses:  Acute CHF -systolic/valvular:  LVEF 30-35 percent. moderate to severe MR and moderate AI.   Recent periodontal abscess: Clinically appears resolved.  Anemia: Iron panel consistent with severe iron deficiency with component of anemia chronic disease.  Start supplements.  Elevated troponin: Persistent.    Tobacco abuse:      Hospital Course:  Pleasant 71-year-old female presenting with complaints of increasing shortness of breath.  No chest pain.  Please H&P for full details.  On work-up she had a chest x-ray that was consistent with CHF.  She had elevated BNP level.  Also elevated troponin levels.  EKG though abnormal with no acute changes.  Ejection fraction of 30 to 35%.  Associated with severe mitral vegetation and mild to moderate aortic insufficiency.  Also on the transthoracic adequate echocardiogram there were subtle changes that cause concern for possible SBE.  She did very recently have a periodontal abscess that was treated.  She then underwent transesophageal echocardiogram in which she ejection fraction was 41 to 45%.  This does show the severe mitral vegetation.  Aortic insufficiency was only mild on this 1.  Most importantly there were no changes concerning for SBE.  She had originally been put on vancomycin and Rocephin pending the work-up.  She remained afebrile throughout the hospitalization.  WBCs were unremarkable.  Blood cultures are negative inflammatory parameters were also unremarkable.  Antibiotics can be discontinued at this point  "time.  There is also concern concern for an embolic MI noting her elevated troponin level of note it been persistently elevated between 0.6 and 0.8.  No chest pain.  She was put on heparin drip.  Cardiology is recommending she stay on anticoagulants until she can be followed up by cardiologist in [place] hometown.  Cardiac catheterization was recommended but the patient refuses.  At this point then she can be discharged remainder treatment follow-up as an outpatient.      Consults:     Consults     Date and Time Order Name Status Description    3/19/2021  7:31 PM Inpatient Cardiology Consult Completed     3/19/2021  3:41 PM [facility] (on-call MD unless specified) Details Completed     3/19/2021  2:30 PM Cardiology (on-call MD unless specified) Completed             Discharge Exam:  General Appearance:  Comfortable, well-appearing, in no acute distress and not in pain.    Vital signs: (most recent): Blood pressure 117/72, pulse 82, temperature 98.3 °F (36.8 °C), temperature source Oral, resp. rate 16, height 157 cm (61.81\"), weight 65.8 kg (145 lb 1.6 oz), SpO2 98 %.    Lungs:  Normal effort and normal respiratory rate.  Breath sounds clear to auscultation.    Heart: Normal rate.  Regular rhythm.    Extremities: There is no dependent edema.    Neurological: Patient is alert and oriented to person, place and time.    Skin:  Warm and dry.       Disposition:  Home    Patient Instructions:      Discharge Medications      New Medications      Instructions Start Date   aspirin 81 MG EC tablet   81 mg, Oral, Daily   Start Date: March 24, 2021     atorvastatin 40 MG tablet  Commonly known as: LIPITOR   40 mg, Oral, Nightly      ferrous sulfate 325 (65 FE) MG tablet   325 mg, Oral, Daily With Breakfast   Start Date: March 24, 2021     furosemide 40 MG tablet  Commonly known as: LASIX   40 mg, Oral, Daily   Start Date: March 24, 2021     metoprolol tartrate 25 MG tablet  Commonly known as: LOPRESSOR   12.5 mg, Oral, Every 12 Hours " Scheduled      rivaroxaban 20 MG tablet  Commonly known as: Xarelto   20 mg, Oral, Daily With Dinner         Continue These Medications      Instructions Start Date   albuterol sulfate  (90 Base) MCG/ACT inhaler  Commonly known as: PROVENTIL HFA;VENTOLIN HFA;PROAIR HFA   2 puffs, Inhalation, Every 4 Hours PRN      traZODone 50 MG tablet  Commonly known as: DESYREL   50 mg, Oral, Nightly         Stop These Medications    benzonatate 200 MG capsule  Commonly known as: TESSALON     cefdinir 300 MG capsule  Commonly known as: OMNICEF     dextromethorphan-guaifenesin  MG/5ML syrup  Commonly known as: ROBITUSSIN-DM     penicillin v potassium 500 MG tablet  Commonly known as: VEETID          Diet Instructions     Diet: Cardiac      Discharge Diet: Cardiac        Future Appointments   Date Time Provider Department Center   4/16/2021  9:30 AM [facility] E[facility]  [facility]     Additional Instructions for the Follow-ups that You Need to Schedule     Discharge Follow-up with PCP   As directed       Currently Documented PCP:    Candida Ryder APRN    PCP Phone Number:    [phone]     Follow Up Details: 1 week         Discharge Follow-up with Specialty: [facility]   As directed      Specialty: [facility]    Follow Up Details: As directed.           Follow-up Information     Candida Ryder APRN .    Specialty: Nurse Practitioner  Why: 1 week  Contact information:  [place] [address] [phone]                 Discharge Order (From admission, onward)     Start     Ordered    03/23/21 1550  Discharge patient  Once     Expected Discharge Date: 03/23/21    Discharge Disposition: Home or Self Care    Physician of Record for Attribution - Please select from Treatment Team: VEGA RODRIGES [[id number]]    Review needed by CMO to determine Physician of Record: No       Question Answer Comment   Physician of Record for Attribution - Please select from Treatment Team VEGA RODRIGES     Review needed by CMO to determine Physician of Record No        03/23/21 1553                  Total time spent discharging patient including evaluation,post hospitalization follow up,  medication and post hospitalization instructions and education total time exceeds 30 minutes.    Signed:  Gabriele Osorio MD  3/23/2021  15:54 EDT    EMR Dragon/Transcription disclaimer:   Much of this encounter note is an electronic transcription/translation of spoken language to printed text. The electronic translation of spoken language may permit erroneous, or at times, nonsensical words or phrases to be inadvertently transcribed; Although I have reviewed the note for such errors, some may still exist.

## 2021-03-23 NOTE — PROGRESS NOTES
"[facility]  Clinical Pharmacy Department     Vancomycin Pharmacokinetic Note    Rachael South is a 71 y.o. female who is on day 4 of pharmacy to dose vancomycin for possible endoarditis.    Target level: AUC24 400-600  Consulting Provider:  Dr. Acevedo  Current Vancomycin Dose:   750mg IV q12h  Planned Duration of Therapy: TBD  Other Antimicrobials: ceftriaxone 2 g IV q24h    Allergies  Allergies as of 03/19/2021   • (No Known Allergies)       Microbiology:  3/19 bld cx x2: pending    Radiology/Imaging:      Vitals/Labs/I&O  157 cm (61.81\")  65.8 kg (145 lb 1.6 oz)  Body mass index is 26.7 kg/m².   Temp:  [97.8 °F (36.6 °C)-98.4 °F (36.9 °C)] 98.4 °F (36.9 °C)  Heart Rate:  [] 85  Resp:  [16] 16  BP: ()/(42-78) 126/69    Results from last 7 days   Lab Units 03/23/21  0515 03/22/21  0503 03/21/21  0640   WBC 10*3/mm3 10.37 12.67* 12.39*     Results from last 7 days   Lab Units 03/19/21  1326   LACTATE mmol/L 1.6      Results from last 7 days   Lab Units 03/19/21  1326   PROCALCITONIN ng/mL 0.07     Results from last 7 days   Lab Units 03/19/21  1326   CRP mg/dL 0.50              Estimated Creatinine Clearance: 55.5 mL/min (by C-G formula based on SCr of 0.7 mg/dL).  Results from last 7 days   Lab Units 03/22/21  0503 03/21/21  1336 03/21/21  1328 03/20/21  0636   BUN mg/dL 14 19  --  21   CREATININE mg/dL 0.70 0.84 0.83 0.80     Intake & Output (last 3 days)       03/20 0701 - 03/21 0700 03/21 0701 - 03/22 0700 03/22 0701 - 03/23 0700 03/23 0701 - 03/24 0700    P.O. 1200 720 800     IV Piggyback 250 500 550     Total Intake(mL/kg) 1450 (21.8) 1220 (18.6) 1350 (20.5)     Urine (mL/kg/hr) 3450 (2.2) 1700 (1.1) 2200 (1.4)     Total Output 3450 1700 2200     Net -2000 -480 -850             Urine Unmeasured Occurrence 1 x 1 x            Vancomycin Dosing and Level History:  3/19 1559: 1500mg x1  3/20 0259: 750mg q12h started   3/21 1328 trough: 13.7 mcg/ml (9.5hr level)            Results from last 7 " "days   Lab Units 03/23/21  0515 03/21/21  1328   VANCOMYCIN TR mcg/mL 9.80 13.70       Assessment/Plan:      Assessment:  [employer] provides the following patient-specific pharmacokinetic parameters:   CL: 3.72 L/h   Vd: 45.6 L   T1/2: 9.55 h  If the predicted trough on this regimen is not within what was previously considered a \"target trough range\", the AUC24 (a stronger predictor of vancomycin efficacy) is predicted to achieve the accepted target of 400-600 mg*h/L. To best balance efficacy and toxicity, we will be targeting AUC24 in this patient rather than steady-state trough levels.     Increase regimen of 750 mg (10.9 mg/kg) IV every 12 hours to 1000 mg (15.2 mg/kg) IV every 12 hours. This gives an trough of 15.7 mg/L and AUC24 of 5.37 mg*h/L.    Recommendations/Plan:  - Increase current dose vancomycin dose to 1000mg q12h   - Plan recheck trough prior to 0600 dose on 3/25  - Continue to monitor SCr    Thank you for involving pharmacy in this patient's care. Please contact pharmacy with any questions or concerns.                           Ricki Luther [facility]  Clinical Pharmacist  03/23/21 08:05 EDT    "

## 2021-03-23 NOTE — PROGRESS NOTES
[facility]  Progress note: 3/23/2021    Patient Identification:  Name:Rachael South  Age:71 y.o.  Sex: female  :  1950  MRN: [id number]           CC:  Elevated troponin, valve regurgitation, congestive heart failure, questionable endocarditis    Interval history:  Vitals stable and anxious to get home.  No chest pain or shortness of breath.    Vital Signs:   Temp:  [97.8 °F (36.6 °C)-98.4 °F (36.9 °C)] 98.3 °F (36.8 °C)  Heart Rate:  [] 82  Resp:  [16] 16  BP: (114-126)/(58-72) 117/72    Intake/Output Summary (Last 24 hours) at 3/23/2021 1320  Last data filed at 3/23/2021 1011  Gross per 24 hour   Intake 1710 ml   Output 1600 ml   Net 110 ml       Physical Examination:    General Appearance No acute distress   Neck No adenopathy, supple, trachea midline, no thyromegaly, no carotid bruit, no JVD   Lungs Clear to auscultation,respirations regular, even and unlabored   Heart Regular rhythm and normal rate, normal S1 and S2, 2/6 holosystolic murmur, no gallop, no rub, no click   Chest wall No abnormalities observed   Abdomen Normal bowel sounds, no masses, no hepatomegaly, soft   Extremities Moves all extremities well, no edema, no cyanosis, no redness   Neurological Alert and oriented x 3     Lab Review:  Personally reviewed the labs, radiology imaging and other cardiac procedures.   Results from last 7 days   Lab Units 21  0814 21  0503 21  1328 21  0636   SODIUM mmol/L  --  138   < > 140   POTASSIUM mmol/L  --  3.6   < > 3.7   CHLORIDE mmol/L  --  101   < > 102   CO2 mmol/L  --  26.9   < > 29.6*   BUN mg/dL  --  14   < > 21   CREATININE mg/dL 0.74 0.70  --  0.80   CALCIUM mg/dL  --  8.4*   < > 8.6   BILIRUBIN mg/dL  --   --   --  0.3   ALK PHOS U/L  --   --   --  100   ALT (SGPT) U/L  --   --   --  84*   AST (SGOT) U/L  --   --   --  30   GLUCOSE mg/dL  --  84   < > 90    < > = values in this interval not displayed.     Results from last 7 days   Lab Units  03/22/21  0503 03/20/21  2343 03/20/21  1442   TROPONIN T ng/mL 0.614* 0.877* 0.674*     Results from last 7 days   Lab Units 03/23/21  0515 03/22/21  0503 03/21/21  0640   WBC 10*3/mm3 10.37 12.67* 12.39*   HEMOGLOBIN g/dL 9.9* 10.3* 9.8*   HEMATOCRIT % 31.5* 31.1* 29.9*   PLATELETS 10*3/mm3 320 350 351     Results from last 7 days   Lab Units 03/23/21  0515 03/22/21  0503 03/21/21  2209 03/20/21  1442   INR   --   --   --  1.02   APTT seconds 71.9* 79.6* 65.1* 31.0     Medication Review:   Meds reviewed  Scheduled Meds:aspirin, 81 mg, Oral, Daily  atorvastatin, 40 mg, Oral, Nightly  cefTRIAXone, 2 g, Intravenous, Q24H  ferrous sulfate, 325 mg, Oral, Daily With Breakfast  furosemide, 40 mg, Oral, Daily  metoprolol tartrate, 12.5 mg, Oral, Q12H  senna-docusate sodium, 2 tablet, Oral, BID  sodium chloride, 10 mL, Intravenous, Q12H  vancomycin, 1,000 mg, Intravenous, Q12H      Continuous Infusions:heparin (porcine), 12 Units/kg/hr, Last Rate: 15 Units/kg/hr (03/22/21 2110)  Pharmacy to dose vancomycin,       I personally viewed and interpreted the patient's EKG/Telemetry data    Assessment and Plan  1.  Congestive heart failure with acute systolic and diastolic heart failure, ejection fraction 30 to 35%.    Tolerating oral Lasix.  Blood pressure too low to increase beta-blocker or aaddace inhibitor at this time.  She will need follow-up with cardiology which she plans to do closer to home in [place].  At that time will need further titration of meds and hopefully can add ACE inhibitor therapy.  We will need to repeat echocardiogram in 6 to 8 weeks.  2.  Elevated troponin.  Possibly embolic event cannot rule out obstructive coronary artery disease.  Patient on repeated occasions has refused further evaluation with stress test or cardiac cath and she again continues to refuse this today.  It is possible this is likely embolic in nature and she has done well on IV heparin.  We therefore will switch to Xarelto 20 mg  every afternoon with supper.  I reviewed risks and benefits with her and she prefers to do this instead of Coumadin.  Await [facility] feedback regarding affordability  3.  Mitral regurgitation, moderate to severe.  Posterior mitral valve leaflet is thickened and sclerotic.  There is no evidence of vegetative process at this point.  Cannot rule out prior endocarditis though unlikely active endocarditis.  Hopefully this will improve with recovery of LV systolic function  4.  Aortic valve regurgitation.  This is mild.  Treat medically  5.  Ongoing nicotine use  6.  Recent dental abscess  7.  Chronic anemia    Mini Keith  3/23/320756:20 EDT  35min spent in reviewing records, discussion and examination of the patient and discussion with other members of the patient's medical team.     Dictated utilizing Dragon dictation

## 2021-03-23 NOTE — PROGRESS NOTES
"DAILY PROGRESS NOTE  [facility]    Patient Identification:  Name: Rachael South  Age: 71 y.o.  Sex: female  :  1950  MRN: [id number]         Primary Care Physician: Candida Ryder APRN      Subjective  Patient has no complaints and is very anxious to return home.    Objective:  General Appearance:  Comfortable, well-appearing, in no acute distress and not in pain.    Vital signs: (most recent): Blood pressure 117/72, pulse 82, temperature 98.3 °F (36.8 °C), temperature source Oral, resp. rate 16, height 157 cm (61.81\"), weight 65.8 kg (145 lb 1.6 oz), SpO2 98 %.    Lungs:  Normal effort and normal respiratory rate.  Breath sounds clear to auscultation.    Heart: Normal rate.  Regular rhythm.    Extremities: There is no dependent edema.    Neurological: Patient is alert and oriented to person, place and time.    Skin:  Warm and dry.                Vital signs in last 24 hours:  Temp:  [97.8 °F (36.6 °C)-98.4 °F (36.9 °C)] 98.3 °F (36.8 °C)  Heart Rate:  [] 82  Resp:  [16] 16  BP: ()/(53-72) 117/72    Intake/Output:    Intake/Output Summary (Last 24 hours) at 3/23/2021 1237  Last data filed at 3/23/2021 1011  Gross per 24 hour   Intake 1710 ml   Output 1600 ml   Net 110 ml         Results from last 7 days   Lab Units 21  0515 21  0503 21  0640 21  0443 21  1442 21  0636 21  1326   WBC 10*3/mm3 10.37 12.67* 12.39* 13.93* 13.06* 10.88* 14.12*   HEMOGLOBIN g/dL 9.9* 10.3* 9.8* 10.1* 11.8* 10.1* 10.1*   PLATELETS 10*3/mm3 320 350 351 352 443 380 383     Results from last 7 days   Lab Units 21  0814 21  0503 21  1336 21  1328 21  0636 21  1326   SODIUM mmol/L  --  138 138  --  140 141   POTASSIUM mmol/L  --  3.6 3.6  --  3.7 3.7   CHLORIDE mmol/L  --  101 98  --  102 104   CO2 mmol/L  --  26.9 27.5  --  29.6* 27.9   BUN mg/dL  --    --   25*   CREATININE mg/dL 0.74 0.70 0.84 0.83 0.80 0.78 "   GLUCOSE mg/dL  --  84 122*  --  90 97   Estimated Creatinine Clearance: 55.5 mL/min (by C-G formula based on SCr of 0.74 mg/dL).  Results from last 7 days   Lab Units 03/22/21  0503 03/21/21  1336 03/20/21  0636 03/19/21  1326   CALCIUM mg/dL 8.4* 8.8 8.6 8.2*   ALBUMIN g/dL  --   --  3.70 3.80     Results from last 7 days   Lab Units 03/20/21  0636 03/19/21  1326   ALBUMIN g/dL 3.70 3.80   BILIRUBIN mg/dL 0.3 0.4   ALK PHOS U/L 100 95   AST (SGOT) U/L 30 56*   ALT (SGPT) U/L 84* 113*       Assessment:  Acute CHF -systolic/valvular:  LVEF 30-35 percent. moderate to severe MR and moderate AI.  Appears to be reasonably compensated at this point.  Weight down 11 pounds from admission.  Concern for possible endocarditis on initial TTE: Patient remains afebrile.  Blood cultures negative.  CBC unremarkable.  Inflammatory parameters unremarkable.  Transesophageal echo without evidence of vegetation.  DC heparin if okay with cardiology.  DC vancomycin if okay with cardiology.  Recent periodontal abscess: Clinically appears resolved.  Anemia: Iron panel consistent with severe iron deficiency with component of anemia chronic disease.  Start supplements.  Elevated troponin: Persistent.  Cardiology input appreciated.  Tobacco abuse:  Patient reiterates her promised to stop smoking during the encounter today.      Plan:  Please see above.  DC heparin if okay with cardiology.  DC vancomycin if okay with cardiology.  DC home when okay with cardiology.  Discussed with RN.    Gabriele Osorio MD  3/23/2021  12:37 EDT

## 2021-03-23 NOTE — PLAN OF CARE
Goal Outcome Evaluation:  Plan of Care Reviewed With: patient  Progress: improving  Outcome Summary: VSS, denies pain, pt. to go home on xarelto, home today.

## 2021-03-23 NOTE — PLAN OF CARE
Problem: Adult Inpatient Plan of Care  Goal: Plan of Care Review  Flowsheets (Taken 3/23/2021 5973)  Progress: improving  Plan of Care Reviewed With: patient  Outcome Summary: denies pain heparin and antibiotics continue ready to go home no distress   Goal Outcome Evaluation:  Plan of Care Reviewed With: patient  Progress: improving  Outcome Summary: denies pain heparin and antibiotics continue ready to go home no distress

## 2021-03-24 ENCOUNTER — READMISSION MANAGEMENT (OUTPATIENT)
Dept: CALL CENTER | Facility: HOSPITAL | Age: 71
End: 2021-03-24

## 2021-03-24 LAB
BACTERIA SPEC AEROBE CULT: NORMAL
BACTERIA SPEC AEROBE CULT: NORMAL

## 2021-03-24 NOTE — PROGRESS NOTES
Case Management Discharge Note      Final Note: Pt discharged home, no known needs.  DAVINA Bell RN         Selected Continued Care - Discharged on 3/23/2021 Admission date: 3/19/2021 - Discharge disposition: Home or Self Care    Destination    No services have been selected for the patient.              Durable Medical Equipment    No services have been selected for the patient.              Dialysis/Infusion    No services have been selected for the patient.              Home Medical Care    No services have been selected for the patient.              Therapy    No services have been selected for the patient.              Community Resources    No services have been selected for the patient.                  Transportation Services  Private: Car    Final Discharge Disposition Code: 01 - home or self-care

## 2021-03-24 NOTE — OUTREACH NOTE
Prep Survey      Responses   [facility] discharged from?  [place]   Is LACE score < 7 ?  No   Emergency Room discharge w/ pulse ox?  No   Eligibility  Readm Mgmt   Discharge diagnosis  CHF   Does the patient have one of the following disease processes/diagnoses(primary or secondary)?  CHF   Does the patient have Home health ordered?  No   Is there a DME ordered?  No   Comments regarding appointments  per AVS   Medication alerts for this patient  per AVS   Prep survey completed?  Yes          Laverne Knox RN

## 2021-03-28 ENCOUNTER — READMISSION MANAGEMENT (OUTPATIENT)
Dept: CALL CENTER | Facility: HOSPITAL | Age: 71
End: 2021-03-28

## 2021-03-28 NOTE — OUTREACH NOTE
CHF Week 1 Survey      Responses   [facility] patient discharged from?  [place]   Does the patient have one of the following disease processes/diagnoses(primary or secondary)?  CHF   CHF Week 1 attempt successful?  Yes   Call start time  1351   Call end time  1359   Discharge diagnosis  CHF   Meds reviewed with patient/caregiver?  Yes   Is the patient having any side effects they believe may be caused by any medication additions or changes?  No   Does the patient have all medications ordered at discharge?  Yes   Is the patient taking all medications as directed (includes completed medication regime)?  Yes   Does the patient have a primary care provider?   Yes   Does the patient have an appointment with their PCP within 7 days of discharge?  Yes   Has the patient kept scheduled appointments due by today?  N/A   Has home health visited the patient within 72 hours of discharge?  N/A   Pulse Ox monitoring  None   Psychosocial issues?  No   Did the patient receive a copy of their discharge instructions?  Yes   Nursing interventions  Reviewed instructions with patient   What is the patient's perception of their health status since discharge?  Improving   Nursing interventions  Nurse provided patient education   Is the patient weighing daily?  Yes   Does the patient have scales?  Yes   Daily weight interventions  Education provided on importance of daily weight   Is the patient able to teach back Heart Failure diet management?  Yes   Is the patient able to teach back Heart Failure Zones?  Yes   Is the patient able to teach back signs and symptoms of worsening condition? (i.e. weight gain, shortness of air, etc.)  Yes   If the patient is a current smoker, are they able to teach back resources for cessation?  Not a smoker   Is the patient/caregiver able to teach back the hierarchy of who to call/visit for symptoms/problems? PCP, Specialist, Home health nurse, Urgent Care, ED, 911  Yes   Additional teach back  comments  New diagnosis and will discuss w/ PCP on Tuesday, education on daily weights and diet.    CHF Week 1 call completed?  Yes   Wrap up additional comments  She says her mom  of CHF, encouraged a conversation with her pharmacist with her new medications, cautioned to assess her skin daily and beware of nose bleeds.          Kami Barclay RN

## 2021-03-30 ENCOUNTER — OFFICE VISIT CONVERTED (OUTPATIENT)
Dept: FAMILY MEDICINE CLINIC | Facility: CLINIC | Age: 71
End: 2021-03-30
Attending: NURSE PRACTITIONER

## 2021-03-30 ENCOUNTER — HOSPITAL ENCOUNTER (OUTPATIENT)
Dept: FAMILY MEDICINE CLINIC | Facility: CLINIC | Age: 71
Discharge: HOME | End: 2021-03-30
Attending: NURSE PRACTITIONER

## 2021-03-30 ENCOUNTER — CONVERSION ENCOUNTER (OUTPATIENT)
Dept: FAMILY MEDICINE CLINIC | Facility: CLINIC | Age: 71
End: 2021-03-30

## 2021-03-30 LAB
ALBUMIN SERPL-MCNC: 4 G/DL (ref 3.5–5)
ALBUMIN/GLOB SERPL: 1.4 {RATIO} (ref 1.4–2.6)
ALP SERPL-CCNC: 94 U/L (ref 43–160)
ALT SERPL-CCNC: 30 U/L (ref 10–40)
ANION GAP SERPL CALC-SCNC: 16 MMOL/L (ref 8–19)
AST SERPL-CCNC: 24 U/L (ref 15–50)
BASOPHILS # BLD AUTO: 0.07 10*3/UL (ref 0–0.2)
BASOPHILS NFR BLD AUTO: 0.8 % (ref 0–3)
BILIRUB SERPL-MCNC: 0.59 MG/DL (ref 0.2–1.3)
BUN SERPL-MCNC: 17 MG/DL (ref 5–25)
BUN/CREAT SERPL: 19 {RATIO} (ref 6–20)
CALCIUM SERPL-MCNC: 8.8 MG/DL (ref 8.7–10.4)
CHLORIDE SERPL-SCNC: 99 MMOL/L (ref 99–111)
CONV ABS IMM GRAN: 0.04 10*3/UL (ref 0–0.2)
CONV CO2: 25 MMOL/L (ref 22–32)
CONV IMMATURE GRAN: 0.4 % (ref 0–1.8)
CONV TOTAL PROTEIN: 6.8 G/DL (ref 6.3–8.2)
CREAT UR-MCNC: 0.91 MG/DL (ref 0.5–0.9)
DEPRECATED RDW RBC AUTO: 41.9 FL (ref 36.4–46.3)
EOSINOPHIL # BLD AUTO: 0.12 10*3/UL (ref 0–0.7)
EOSINOPHIL # BLD AUTO: 1.3 % (ref 0–7)
ERYTHROCYTE [DISTWIDTH] IN BLOOD BY AUTOMATED COUNT: 14.2 % (ref 11.7–14.4)
FERRITIN SERPL-MCNC: 20 NG/ML (ref 10–200)
GFR SERPLBLD BASED ON 1.73 SQ M-ARVRAT: >60 ML/MIN/{1.73_M2}
GLOBULIN UR ELPH-MCNC: 2.8 G/DL (ref 2–3.5)
GLUCOSE SERPL-MCNC: 97 MG/DL (ref 65–99)
HCT VFR BLD AUTO: 30.5 % (ref 37–47)
HGB BLD-MCNC: 9.6 G/DL (ref 12–16)
IRON SATN MFR SERPL: 5 % (ref 20–55)
IRON SERPL-MCNC: 23 UG/DL (ref 60–170)
LYMPHOCYTES # BLD AUTO: 2.38 10*3/UL (ref 1–5)
LYMPHOCYTES NFR BLD AUTO: 26.1 % (ref 20–45)
MCH RBC QN AUTO: 26 PG (ref 27–31)
MCHC RBC AUTO-ENTMCNC: 31.5 G/DL (ref 33–37)
MCV RBC AUTO: 82.7 FL (ref 81–99)
MONOCYTES # BLD AUTO: 0.85 10*3/UL (ref 0.2–1.2)
MONOCYTES NFR BLD AUTO: 9.3 % (ref 3–10)
NEUTROPHILS # BLD AUTO: 5.65 10*3/UL (ref 2–8)
NEUTROPHILS NFR BLD AUTO: 62.1 % (ref 30–85)
NRBC CBCN: 0 % (ref 0–0.7)
OSMOLALITY SERPL CALC.SUM OF ELEC: 283 MOSM/KG (ref 273–304)
PLATELET # BLD AUTO: 322 10*3/UL (ref 130–400)
PMV BLD AUTO: 10.6 FL (ref 9.4–12.3)
POTASSIUM SERPL-SCNC: 3.8 MMOL/L (ref 3.5–5.3)
RBC # BLD AUTO: 3.69 10*6/UL (ref 4.2–5.4)
SODIUM SERPL-SCNC: 136 MMOL/L (ref 135–147)
TIBC SERPL-MCNC: 488 UG/DL (ref 245–450)
TRANSFERRIN SERPL-MCNC: 341 MG/DL (ref 250–380)
WBC # BLD AUTO: 9.11 10*3/UL (ref 4.8–10.8)

## 2021-04-05 ENCOUNTER — OFFICE VISIT CONVERTED (OUTPATIENT)
Dept: FAMILY MEDICINE CLINIC | Facility: CLINIC | Age: 71
End: 2021-04-05
Attending: NURSE PRACTITIONER

## 2021-04-06 ENCOUNTER — READMISSION MANAGEMENT (OUTPATIENT)
Dept: CALL CENTER | Facility: HOSPITAL | Age: 71
End: 2021-04-06

## 2021-04-06 NOTE — OUTREACH NOTE
CHF Week 2 Survey      Responses   [facility] patient discharged from?  [place]   Does the patient have one of the following disease processes/diagnoses(primary or secondary)?  CHF   Week 2 attempt successful?  No   Unsuccessful attempts  Attempt 1   Discharge diagnosis  CHF          Kim Mari RN

## 2021-04-07 ENCOUNTER — APPOINTMENT (OUTPATIENT)
Dept: GENERAL RADIOLOGY | Facility: HOSPITAL | Age: 71
End: 2021-04-07

## 2021-04-07 ENCOUNTER — HOSPITAL ENCOUNTER (EMERGENCY)
Facility: HOSPITAL | Age: 71
Discharge: HOME | End: 2021-04-07
Attending: EMERGENCY MEDICINE | Admitting: EMERGENCY MEDICINE

## 2021-04-07 VITALS
RESPIRATION RATE: 18 BRPM | SYSTOLIC BLOOD PRESSURE: 141 MMHG | WEIGHT: 143 LBS | BODY MASS INDEX: 27 KG/M2 | TEMPERATURE: 98.1 F | OXYGEN SATURATION: 92 % | HEIGHT: 61 IN | HEART RATE: 113 BPM | DIASTOLIC BLOOD PRESSURE: 87 MMHG

## 2021-04-07 DIAGNOSIS — Z86.79 HISTORY OF CHF (CONGESTIVE HEART FAILURE): ICD-10-CM

## 2021-04-07 DIAGNOSIS — R06.00 DYSPNEA, UNSPECIFIED TYPE: Primary | ICD-10-CM

## 2021-04-07 DIAGNOSIS — Z87.09 HISTORY OF COPD: ICD-10-CM

## 2021-04-07 LAB
ALBUMIN SERPL-MCNC: 3.9 G/DL (ref 3.5–5.2)
ALBUMIN/GLOB SERPL: 1.5 G/DL
ALP SERPL-CCNC: 96 U/L (ref 39–117)
ALT SERPL W P-5'-P-CCNC: 22 U/L (ref 1–33)
ANION GAP SERPL CALCULATED.3IONS-SCNC: 10.2 MMOL/L (ref 5–15)
APTT PPP: 45.1 SECONDS (ref 22.7–35.4)
AST SERPL-CCNC: 19 U/L (ref 1–32)
BASOPHILS # BLD AUTO: 0.07 10*3/MM3 (ref 0–0.2)
BASOPHILS NFR BLD AUTO: 0.8 % (ref 0–1.5)
BILIRUB SERPL-MCNC: 0.6 MG/DL (ref 0–1.2)
BUN SERPL-MCNC: 16 MG/DL (ref 8–23)
BUN/CREAT SERPL: 20.3 (ref 7–25)
CALCIUM SPEC-SCNC: 8.7 MG/DL (ref 8.6–10.5)
CHLORIDE SERPL-SCNC: 99 MMOL/L (ref 98–107)
CO2 SERPL-SCNC: 25.8 MMOL/L (ref 22–29)
CREAT SERPL-MCNC: 0.79 MG/DL (ref 0.57–1)
DEPRECATED RDW RBC AUTO: 41.9 FL (ref 37–54)
EOSINOPHIL # BLD AUTO: 0.28 10*3/MM3 (ref 0–0.4)
EOSINOPHIL NFR BLD AUTO: 3.2 % (ref 0.3–6.2)
ERYTHROCYTE [DISTWIDTH] IN BLOOD BY AUTOMATED COUNT: 14.3 % (ref 12.3–15.4)
GFR SERPL CREATININE-BSD FRML MDRD: 71 ML/MIN/1.73
GLOBULIN UR ELPH-MCNC: 2.6 GM/DL
GLUCOSE SERPL-MCNC: 119 MG/DL (ref 65–99)
HCT VFR BLD AUTO: 29.4 % (ref 34–46.6)
HGB BLD-MCNC: 9.1 G/DL (ref 12–15.9)
IMM GRANULOCYTES # BLD AUTO: 0.06 10*3/MM3 (ref 0–0.05)
IMM GRANULOCYTES NFR BLD AUTO: 0.7 % (ref 0–0.5)
INR PPP: 1.83 (ref 0.9–1.1)
LYMPHOCYTES # BLD AUTO: 2.05 10*3/MM3 (ref 0.7–3.1)
LYMPHOCYTES NFR BLD AUTO: 23.6 % (ref 19.6–45.3)
MCH RBC QN AUTO: 25.3 PG (ref 26.6–33)
MCHC RBC AUTO-ENTMCNC: 31 G/DL (ref 31.5–35.7)
MCV RBC AUTO: 81.9 FL (ref 79–97)
MONOCYTES # BLD AUTO: 0.7 10*3/MM3 (ref 0.1–0.9)
MONOCYTES NFR BLD AUTO: 8.1 % (ref 5–12)
NEUTROPHILS NFR BLD AUTO: 5.51 10*3/MM3 (ref 1.7–7)
NEUTROPHILS NFR BLD AUTO: 63.6 % (ref 42.7–76)
NRBC BLD AUTO-RTO: 0.1 /100 WBC (ref 0–0.2)
NT-PROBNP SERPL-MCNC: 2320 PG/ML (ref 0–900)
PLATELET # BLD AUTO: 405 10*3/MM3 (ref 140–450)
PMV BLD AUTO: 9.7 FL (ref 6–12)
POTASSIUM SERPL-SCNC: 3.6 MMOL/L (ref 3.5–5.2)
PROT SERPL-MCNC: 6.5 G/DL (ref 6–8.5)
PROTHROMBIN TIME: 20.9 SECONDS (ref 11.7–14.2)
QT INTERVAL: 318 MS
RBC # BLD AUTO: 3.59 10*6/MM3 (ref 3.77–5.28)
SODIUM SERPL-SCNC: 135 MMOL/L (ref 136–145)
TROPONIN T SERPL-MCNC: 0.01 NG/ML (ref 0–0.03)
WBC # BLD AUTO: 8.67 10*3/MM3 (ref 3.4–10.8)

## 2021-04-07 PROCEDURE — 93005 ELECTROCARDIOGRAM TRACING: CPT | Performed by: EMERGENCY MEDICINE

## 2021-04-07 PROCEDURE — 99284 EMERGENCY DEPT VISIT MOD MDM: CPT

## 2021-04-07 PROCEDURE — 93010 ELECTROCARDIOGRAM REPORT: CPT | Performed by: INTERNAL MEDICINE

## 2021-04-07 PROCEDURE — 96374 THER/PROPH/DIAG INJ IV PUSH: CPT

## 2021-04-07 PROCEDURE — 93005 ELECTROCARDIOGRAM TRACING: CPT

## 2021-04-07 PROCEDURE — 85025 COMPLETE CBC W/AUTO DIFF WBC: CPT | Performed by: EMERGENCY MEDICINE

## 2021-04-07 PROCEDURE — 83880 ASSAY OF NATRIURETIC PEPTIDE: CPT | Performed by: EMERGENCY MEDICINE

## 2021-04-07 PROCEDURE — 85610 PROTHROMBIN TIME: CPT | Performed by: EMERGENCY MEDICINE

## 2021-04-07 PROCEDURE — 85730 THROMBOPLASTIN TIME PARTIAL: CPT | Performed by: EMERGENCY MEDICINE

## 2021-04-07 PROCEDURE — 80053 COMPREHEN METABOLIC PANEL: CPT | Performed by: EMERGENCY MEDICINE

## 2021-04-07 PROCEDURE — 25010000002 FUROSEMIDE PER 20 MG: Performed by: EMERGENCY MEDICINE

## 2021-04-07 PROCEDURE — 71045 X-RAY EXAM CHEST 1 VIEW: CPT

## 2021-04-07 PROCEDURE — 84484 ASSAY OF TROPONIN QUANT: CPT | Performed by: EMERGENCY MEDICINE

## 2021-04-07 RX ORDER — FUROSEMIDE 10 MG/ML
40 INJECTION INTRAMUSCULAR; INTRAVENOUS ONCE
Status: COMPLETED | OUTPATIENT
Start: 2021-04-07 | End: 2021-04-07

## 2021-04-07 RX ORDER — SODIUM CHLORIDE 0.9 % (FLUSH) 0.9 %
10 SYRINGE (ML) INJECTION AS NEEDED
Status: DISCONTINUED | OUTPATIENT
Start: 2021-04-07 | End: 2021-04-07 | Stop reason: HOSPADM

## 2021-04-07 RX ORDER — FUROSEMIDE 20 MG/1
40 TABLET ORAL DAILY
Qty: 60 TABLET | Refills: 0 | Status: SHIPPED | OUTPATIENT
Start: 2021-04-07 | End: 2021-04-12 | Stop reason: DRUGHIGH

## 2021-04-07 RX ADMIN — FUROSEMIDE 40 MG: 10 INJECTION, SOLUTION INTRAMUSCULAR; INTRAVENOUS at 08:26

## 2021-04-07 NOTE — DISCHARGE INSTRUCTIONS
Call the cardiology office tomorrow to confirm follow-up appointment. For today, Thursday, Friday I want you to take 40mg Lasix in the AM, and 40mg Lasix in the afternoon.  Saturday and Sunday go back to your typical 40 mg daily with an extra dose only as needed.  Be sure to stay well-hydrated.  Continue all other current medications.  PCP follow-up as needed.  ED return for worsening symptoms as needed.

## 2021-04-07 NOTE — ED NOTES
Pt states she saw PCP yesterday and her PCP is supposed to be getting her a cardiology. Pt has not followed up with cardiologist since being discharged from here 3/23. Pt states she needs o2 bc she can't breath well when she lies down and or exerts herself. Explained to pt that her oxygen level is good and that more than likely she is having difficulty with breathing [place] to her heart issues. Pt has chf and low EF. Pt has hx of recent endocarditis as well.      Yoselin Johnson, RN  04/07/21 0552       Yoselin Johnson, TERESA  04/07/21 0555

## 2021-04-07 NOTE — ED NOTES
Pt via PV from home with SOA x few weeks that gets worse when lying down.    Mask placed on pt, staff wearing appropriate PPE.     Miguel, TERESA Adams  04/07/21 0699

## 2021-04-07 NOTE — ED NOTES
Pt in mask in room. This RN wore appropriate PPE throughout encounter with pt, hand hygiene performed prior to entering room and upon exiting the room.      Yoselin Johnson, RN  04/07/21 0569

## 2021-04-07 NOTE — ED PROVIDER NOTES
EMERGENCY DEPARTMENT ENCOUNTER    Room Number:  10/10  Date of encounter:  4/7/2021  PCP: Candida Ryder APRN  Historian: Patient,       HPI:  Chief Complaint: Shortness of breath  A complete HPI/ROS/PMH/PSH/SH/FH are unobtainable due to: None    Context: Rachael South is a 71 y.o. female who presents to the ED via private vehicle for evaluation for ongoing shortness of breath when lying flat and with exertion since being hospitalized back in March.  She reports compliance with her Lasix which is 40 mg daily, has not had any peripheral edema.  Patient was supposed to see a cardiologist but has not yet been set up for follow-up that they are aware of.  Denies any fevers, chills.  Has been coughing up productive sputum with occasional streaks of blood.  This has been ongoing since hospitalization.  Denies any chest pains, vomiting, diarrhea.      MEDICAL RECORD REVIEW    Adult RITA March 22, 2021    · Left ventricular ejection fraction appears to be 41 - 45%. Normal left ventricular cavity size and wall thickness noted. All left ventricular wall segments contract normally. Left ventricular diastolic function was not assessed.  · No evidence of a left atrial appendage thrombus was present. The interatrial septum does not appear to be redundant. Saline test results are negative.  · Mild aortic valve regurgitation is present.  · Posterior mitral valve leaflet chordae is thickened with retraction of the posterior leaflet. No evidence of a mitral valve mass is present. Moderate to severe mitral valve regurgitation is present. No significant mitral valve stenosis is present.  · Mild tricuspid valve regurgitation is present. Insufficient TR velocity profile to estimate the right ventricular systolic pressure.    PAST MEDICAL HISTORY  Active Ambulatory Problems     Diagnosis Date Noted   • Acute CHF (CMS/HCC) 03/19/2021   • Dental infection 03/19/2021   • Anemia 03/19/2021   • Elevated troponin 03/19/2021      Resolved Ambulatory Problems     Diagnosis Date Noted   • No Resolved Ambulatory Problems     Past Medical History:   Diagnosis Date   • CHF (congestive heart failure) (CMS/[facility])    • COPD (chronic obstructive pulmonary disease) (CMS/[facility])    • Endocarditis    • Hyperlipidemia          PAST SURGICAL HISTORY  History reviewed. No pertinent surgical history.      FAMILY HISTORY  History reviewed. No pertinent family history.      SOCIAL HISTORY  Social History     Socioeconomic History   • Marital status:      Spouse name: Not on file   • Number of children: Not on file   • Years of education: Not on file   • Highest education level: Not on file   Tobacco Use   • Smoking status: Former Smoker     Packs/day: 0.50     Types: Cigarettes     Quit date: 3/9/2021     Years since quittin.0   • Smokeless tobacco: Never Used   Substance and Sexual Activity   • Alcohol use: Never   • Drug use: Never         ALLERGIES  Patient has no known allergies.        REVIEW OF SYSTEMS  Review of Systems     All systems reviewed and negative except for those discussed in HPI.       PHYSICAL EXAM    I have reviewed the triage vital signs and nursing notes.    ED Triage Vitals   Temp Heart Rate Resp BP SpO2   21 0527 21 0527 21 0527 21 0544 21 0527   98.1 °F (36.7 °C) (!) 123 20 129/83 96 %      Temp src Heart Rate Source Patient Position BP Location FiO2 (%)   -- -- 21 0544 21 0544 --     Sitting Right arm        Physical Exam  General: Awake, alert, no acute distress, nontoxic, nondiaphoretic  HEENT: Mucous membranes moist, atraumatic, EOMI  Neck: Full ROM  Pulm: Symmetric chest rise, nonlabored, lungs with faint rales in the bases bilaterally but no overt wheezes or rhonchi  Cardiovascular: Regular rate and rhythm, intact distal pulses, no peripheral edema  GI: Soft, nontender, nondistended, no rebound, no guarding, bowel sounds present  MSK: Full ROM, no deformity  Skin: Warm,  dry  Neuro: Alert and oriented x 3, GCS 15, moving all extremities, no focal deficits  Psych: Calm, cooperative      Surgical mask, protective eye goggles, and gloves used during this encounter. Patient in surgical mask.      LAB RESULTS  Recent Results (from the past 24 hour(s))   ECG 12 Lead    Collection Time: 04/07/21  5:34 AM   Result Value Ref Range    QT Interval 318 ms   Comprehensive Metabolic Panel    Collection Time: 04/07/21  5:45 AM    Specimen: Blood   Result Value Ref Range    Glucose 119 (H) 65 - 99 mg/dL    BUN 16 8 - 23 mg/dL    Creatinine 0.79 0.57 - 1.00 mg/dL    Sodium 135 (L) 136 - 145 mmol/L    Potassium 3.6 3.5 - 5.2 mmol/L    Chloride 99 98 - 107 mmol/L    CO2 25.8 22.0 - 29.0 mmol/L    Calcium 8.7 8.6 - 10.5 mg/dL    Total Protein 6.5 6.0 - 8.5 g/dL    Albumin 3.90 3.50 - 5.20 g/dL    ALT (SGPT) 22 1 - 33 U/L    AST (SGOT) 19 1 - 32 U/L    Alkaline Phosphatase 96 39 - 117 U/L    Total Bilirubin 0.6 0.0 - 1.2 mg/dL    eGFR Non African Amer 71 >60 mL/min/1.73    Globulin 2.6 gm/dL    A/G Ratio 1.5 g/dL    BUN/Creatinine Ratio 20.3 7.0 - 25.0    Anion Gap 10.2 5.0 - 15.0 mmol/L   Protime-INR    Collection Time: 04/07/21  5:45 AM    Specimen: Blood   Result Value Ref Range    Protime 20.9 (H) 11.7 - 14.2 Seconds    INR 1.83 (H) 0.90 - 1.10   aPTT    Collection Time: 04/07/21  5:45 AM    Specimen: Blood   Result Value Ref Range    PTT 45.1 (H) 22.7 - 35.4 seconds   BNP    Collection Time: 04/07/21  5:45 AM    Specimen: Blood   Result Value Ref Range    proBNP 2,320.0 (H) 0.0 - 900.0 pg/mL   Troponin    Collection Time: 04/07/21  5:45 AM    Specimen: Blood   Result Value Ref Range    Troponin T 0.012 0.000 - 0.030 ng/mL   CBC Auto Differential    Collection Time: 04/07/21  5:45 AM    Specimen: Blood   Result Value Ref Range    WBC 8.67 3.40 - 10.80 10*3/mm3    RBC 3.59 (L) 3.77 - 5.28 10*6/mm3    Hemoglobin 9.1 (L) 12.0 - 15.9 g/dL    Hematocrit 29.4 (L) 34.0 - 46.6 %    MCV 81.9 79.0 - 97.0 fL     MCH 25.3 (L) 26.6 - 33.0 pg    MCHC 31.0 (L) 31.5 - 35.7 g/dL    RDW 14.3 12.3 - 15.4 %    RDW-SD 41.9 37.0 - 54.0 fl    MPV 9.7 6.0 - 12.0 fL    Platelets 405 140 - 450 10*3/mm3    Neutrophil % 63.6 42.7 - 76.0 %    Lymphocyte % 23.6 19.6 - 45.3 %    Monocyte % 8.1 5.0 - 12.0 %    Eosinophil % 3.2 0.3 - 6.2 %    Basophil % 0.8 0.0 - 1.5 %    Immature Grans % 0.7 (H) 0.0 - 0.5 %    Neutrophils, Absolute 5.51 1.70 - 7.00 10*3/mm3    Lymphocytes, Absolute 2.05 0.70 - 3.10 10*3/mm3    Monocytes, Absolute 0.70 0.10 - 0.90 10*3/mm3    Eosinophils, Absolute 0.28 0.00 - 0.40 10*3/mm3    Basophils, Absolute 0.07 0.00 - 0.20 10*3/mm3    Immature Grans, Absolute 0.06 (H) 0.00 - 0.05 10*3/mm3    nRBC 0.1 0.0 - 0.2 /100 WBC       Ordered the above labs and independently reviewed the results.        RADIOLOGY  XR Chest 1 View    Result Date: 4/7/2021  SINGLE VIEW OF THE CHEST  HISTORY: Shortness of air  COMPARISON: 03/19/2021  FINDINGS: Cardiomegaly is identified. Vascular congestion is suspected. No pneumothorax is seen. There are bilateral pleural effusions.      Cardiomegaly with vascular congestion.  This report was finalized on 4/7/2021 5:55 AM by Dr. aGyle Hopper M.D.        I ordered the above noted radiological studies. Reviewed by me.  See dictation for official radiology interpretation.      PROCEDURES    Procedures  EKG    EKG Time: 0534  Rhythm/Rate: Sinus tachycardia with a rate of 107  Normal axis  Normal intervals  Nonspecific repolarization abnormalities   No STEMI     Interpreted Contemporaneously by me, independently viewed  No emergent changes compared to March 21 of 2021      MEDICATIONS GIVEN IN ER    Medications   furosemide (LASIX) injection 40 mg (40 mg Intravenous Given 4/7/21 0826)         PROGRESS, DATA ANALYSIS, CONSULTS, AND MEDICAL DECISION MAKING    All labs have been independently reviewed by me.  All radiology studies have been reviewed by me and discussed with radiologist dictating the  report.   EKG's independently viewed and interpreted by me.  Discussion below represents my analysis of pertinent findings related to patient's condition, differential diagnosis, treatment plan and final disposition.    Initial concern for possible worsening heart failure, pneumonia, renal failure, electrolyte abnormality, among others.    ED Course as of Apr 07 1434   Wed Apr 07, 2021   0701 Glucose(!): 119 [DC]   0701 Creatinine: 0.79 [DC]   0701 Troponin T: 0.012 [DC]   0701 Improved from 2 weeks ago   proBNP(!): 2,320.0 [DC]   0701 WBC: 8.67 [DC]   0701 9.9 two weeks ago   Hemoglobin(!): 9.1 [DC]   0854 Ambulatory pulse ox of 94% on room air, will check with cardiology and plan disposition accordingly.    [DC]   1003 Discussed with ERICK Mercedes, discussed patient's clinical course and back today, and the need for a close follow-up in the clinic.  Discussed that she has an appointment scheduled with Dr. Meyer in May, but the patient is not an established patient of his, and she was seen by Dr. Keith and Dr. Nelson during her recent hospital stay.  I do think L CG follow-up is reasonable and likely needed within the next week or so.  She will contact her schedulers and work to get her in in the near future.  I will have patient call the office in the next 1 to 2 days for confirmation.    [DC]   1024 Patient and  updated on the discussions with cardiology, and plan for close follow-up in their clinic.  Discussed that we will have her increase her Lasix dosing from once a day to twice a day for the next couple of days.  ED return for worsening symptoms as needed.  Patient and  understand agree with the course and plan, all questions answered and concerns addressed.    [DC]      ED Course User Index  [DC] Robert Simon MD       AS OF 14:34 EDT VITALS:    BP - 141/87  HR - 113  TEMP - 98.1 °F (36.7 °C)  02 SATS - 92%        DIAGNOSIS  Final diagnoses:   Dyspnea, unspecified type   History of  CHF (congestive heart failure)   History of COPD         DISPOSITION  DISCHARGE    Patient discharged in stable condition.    Reviewed implications of results, diagnosis, meds, responsibility to follow up, warning signs and symptoms of possible worsening, potential complications and reasons to return to ER.    Patient/Family voiced understanding of above instructions.    Discussed plan for discharge, as there is no emergent indication for admission. Patient referred to primary care provider for BP management due to today's BP. Pt/family is agreeable and understands need for follow up and repeat testing.  Pt is aware that discharge does not mean that nothing is wrong but it indicates no emergency is present that requires admission and they must continue care with follow-up as given below or physician of their choice.     FOLLOW-UP  [facility] Emergency Department  [address] [address] [phone]    As needed, If symptoms worsen    [facility] CARDIOLOGY  [address]  [place] [address] [phone]  Call on 4/8/2021  to confirm follow up appointment         Medication List      Changed    * furosemide 40 MG tablet  Commonly known as: LASIX  Take 1 tablet by mouth Daily.  What changed: Another medication with the same name was added. Make sure you understand how and when to take each.     * furosemide 20 MG tablet  Commonly known as: LASIX  Take 2 tablets by mouth Daily.  What changed: You were already taking a medication with the same name, and this prescription was added. Make sure you understand how and when to take each.         * This list has 2 medication(s) that are the same as other medications prescribed for you. Read the directions carefully, and ask your doctor or other care provider to review them with you.               Where to Get Your Medications      You can get these medications from any pharmacy    Bring a paper  prescription for each of these medications  · furosemide 20 MG tablet                    Robert Simon MD  04/07/21 6958

## 2021-04-09 ENCOUNTER — READMISSION MANAGEMENT (OUTPATIENT)
Dept: CALL CENTER | Facility: HOSPITAL | Age: 71
End: 2021-04-09

## 2021-04-09 NOTE — OUTREACH NOTE
CHF Week 2 Survey      Responses   [facility] patient discharged from?  [place]   Does the patient have one of the following disease processes/diagnoses(primary or secondary)?  CHF   Week 2 attempt successful?  Yes   Call start time  1431   Call end time  1439   Discharge diagnosis  CHF   Is patient permission given to speak with other caregiver?  Yes   List who call center can speak with     Meds reviewed with patient/caregiver?  Yes   Is the patient having any side effects they believe may be caused by any medication additions or changes?  No   Is the patient taking all medications as directed (includes completed medication regime)?  Yes   Does the patient have an appointment with their PCP within 7 days of discharge?  Yes   Comments regarding PCP  f/u appt 4/12 cardiology   Has the patient kept scheduled appointments due by today?  Yes   Pulse Ox monitoring  None   Psychosocial issues?  No   Comments  Pt has SOA w/exertion, which is new to her she reports. Denies CP.    What is the patient's perception of their health status since discharge?  Improving   Is the patient weighing daily?  Yes   Is the patient able to teach back Heart Failure diet management?  Yes   Is the patient able to teach back Heart Failure Zones?  Yes   If the patient is a current smoker, are they able to teach back resources for cessation?  -- [quit smoking in March 2021]   CHF Week 2 call completed?  Yes          Hui Prabhakar RN

## 2021-04-12 ENCOUNTER — HOSPITAL ENCOUNTER (OUTPATIENT)
Dept: CARDIOLOGY | Facility: HOSPITAL | Age: 71
Discharge: HOME | End: 2021-04-12
Admitting: NURSE PRACTITIONER

## 2021-04-12 ENCOUNTER — OFFICE VISIT (OUTPATIENT)
Dept: CARDIOLOGY | Facility: CLINIC | Age: 71
End: 2021-04-12

## 2021-04-12 VITALS
HEIGHT: 61 IN | BODY MASS INDEX: 26.24 KG/M2 | SYSTOLIC BLOOD PRESSURE: 122 MMHG | HEART RATE: 107 BPM | DIASTOLIC BLOOD PRESSURE: 78 MMHG | WEIGHT: 139 LBS

## 2021-04-12 DIAGNOSIS — I50.21 ACUTE SYSTOLIC CONGESTIVE HEART FAILURE DUE TO VALVULAR DISEASE (HCC): Primary | ICD-10-CM

## 2021-04-12 DIAGNOSIS — I38 ACUTE SYSTOLIC CONGESTIVE HEART FAILURE DUE TO VALVULAR DISEASE (HCC): Primary | ICD-10-CM

## 2021-04-12 PROCEDURE — 25010000002 FUROSEMIDE PER 20 MG: Performed by: NURSE PRACTITIONER

## 2021-04-12 PROCEDURE — 96376 TX/PRO/DX INJ SAME DRUG ADON: CPT

## 2021-04-12 PROCEDURE — 96374 THER/PROPH/DIAG INJ IV PUSH: CPT

## 2021-04-12 PROCEDURE — 93000 ELECTROCARDIOGRAM COMPLETE: CPT | Performed by: NURSE PRACTITIONER

## 2021-04-12 PROCEDURE — 99214 OFFICE O/P EST MOD 30 MIN: CPT | Performed by: NURSE PRACTITIONER

## 2021-04-12 PROCEDURE — 36415 COLL VENOUS BLD VENIPUNCTURE: CPT

## 2021-04-12 RX ORDER — FUROSEMIDE 10 MG/ML
40 INJECTION INTRAMUSCULAR; INTRAVENOUS ONCE
Status: COMPLETED | OUTPATIENT
Start: 2021-04-12 | End: 2021-04-12

## 2021-04-12 RX ORDER — AMOXICILLIN AND CLAVULANATE POTASSIUM 500; 125 MG/1; MG/1
TABLET, FILM COATED ORAL
Status: ON HOLD | COMMUNITY
Start: 2021-04-05 | End: 2021-04-21

## 2021-04-12 RX ORDER — METOPROLOL SUCCINATE 25 MG/1
25 TABLET, EXTENDED RELEASE ORAL NIGHTLY
Qty: 30 TABLET | Refills: 6 | Status: ON HOLD | OUTPATIENT
Start: 2021-04-12 | End: 2021-05-05 | Stop reason: SDUPTHER

## 2021-04-12 RX ORDER — POTASSIUM CHLORIDE 600 MG/1
TABLET, FILM COATED, EXTENDED RELEASE ORAL
COMMUNITY
Start: 2021-04-01 | End: 2021-07-21

## 2021-04-12 RX ORDER — METOPROLOL SUCCINATE 25 MG/1
25 TABLET, EXTENDED RELEASE ORAL NIGHTLY
Qty: 90 TABLET | Refills: 2 | Status: SHIPPED | OUTPATIENT
Start: 2021-04-12 | End: 2021-04-12 | Stop reason: SDUPTHER

## 2021-04-12 RX ADMIN — FUROSEMIDE 40 MG: 10 INJECTION, SOLUTION INTRAMUSCULAR; INTRAVENOUS at 11:27

## 2021-04-12 NOTE — PROGRESS NOTES
Date of Office Visit: 21  Encounter Provider: KENNETH Muñoz  Place of Service: [facility] CARDIOLOGY  Patient Name: Rachael South  :1950    Chief Complaint   Patient presents with   • Congestive Heart Failure   • Shortness of Breath   • Follow-up   :     HPI: Rachael South is a 71 y.o. female  with history of systolic heart failure, dental abscess, mitral regurgitation, and former tobacco use.    She is followed by Dr. Nelson. I will visit with her for the first time today and have reviewed her medical record.     In 2020 when she was found to have moderately decreased left ventricular systolic function, grade 2 diastolic dysfunction, moderate aortic valve regurgitation, calcified mitral valve with moderate to severe mitral valve regurgitation.  There was no evidence of endocarditis.  Transesophageal echocardiogram which showed ejection fraction 41-45%, negative saline test, mild aortic valve regurgitation, moderate to severe mitral regurgitation with no evidence of endocarditis.  She had a recent dental abscess and leukocytosis was treated with vancomycin and Rocephin pending the work-up.  She remained afebrile throughout hospitalization.  Blood cultures were negative.  Inflammatory markers were unremarkable.  Antibiotics were ultimately discontinued.  There was concern for an embolic myocardial infarction as her troponin persisted between 0.6-0.8.  Cardiac catheterization was recommended but patient refused.  She was advised to remain on anticoagulant until she followed up with her cardiologist in her hometown..  She then presented to the emergency department with c/o continued dyspnea on exertion.  There was no lower extremity edema. proBNP was elevated at 3320.  Chest x-ray on 2021 showed cardiomegaly with vascular congestion. She was given IV lasix 40 mg x 1.     She presents today for reassessment.  She continues to complain of orthopnea and  "shortness of breath with exertion.  She started coughing up blood but she has been doing that for a week.  She has continued to take Xarelto.  She does not have a cardiologist near [place].  She plans for us to be her cardiologist.  She has palpitations described as racing heartbeat sensation but no chest pain tightness pressure.  No near syncope or syncope.        No Known Allergies        Family and social history reviewed.     ROS  All other systems were reviewed and are negative          Objective:     Vitals:    04/12/21 1041   BP: 122/78   BP Location: Left arm   Patient Position: Sitting   Pulse: 107   Weight: 63 kg (139 lb)   Height: 154.9 cm (61\")     Body mass index is 26.26 kg/m².    PHYSICAL EXAM:  Constitutional:       General: Not in acute distress.     Appearance: Well-developed. Not diaphoretic.   HENT:      Head: Normocephalic.   Pulmonary:      Effort: Pulmonary effort is normal. No respiratory distress.      Breath sounds: Examination of the right-lower field reveals decreased breath sounds. Examination of the left-lower field reveals decreased breath sounds. Decreased breath sounds present. No wheezing. No rhonchi. No rales.   Cardiovascular:      Tachycardia present. Regular rhythm.   Pulses:     Radial: 2+ bilaterally.  Skin:     General: Skin is warm and dry. There is no cyanosis.      Findings: No rash.   Neurological:      Mental Status: Alert and oriented to person, place, and time.   Psychiatric:         Behavior: Behavior normal.         Thought Content: Thought content normal.         Judgment: Judgment normal.           ECG 12 Lead    Date/Time: 4/12/2021 11:29 AM  Performed by: Cathie Felipe APRN  Authorized by: Cathie Felipe APRN   Comparison: compared with previous ECG   Similar to previous ECG  Rhythm: sinus tachycardia    Clinical impression: abnormal EKG              Current Outpatient Medications   Medication Sig Dispense Refill   • albuterol sulfate  (90 Base) " MCG/ACT inhaler Inhale 2 puffs Every 4 (Four) Hours As Needed for Wheezing.     • amoxicillin-clavulanate (AUGMENTIN) 500-125 MG per tablet      • aspirin 81 MG EC tablet Take 1 tablet by mouth Daily. 30 tablet 0   • atorvastatin (LIPITOR) 40 MG tablet Take 1 tablet by mouth Every Night. 30 tablet 0   • ferrous sulfate 325 (65 FE) MG EC tablet Take 1 tablet by mouth Daily With Breakfast. 30 tablet 0   • furosemide (LASIX) 40 MG tablet Take 1 tablet by mouth Daily. 30 tablet 0   • potassium chloride (KLOR-CON) 8 MEQ CR tablet      • rivaroxaban (Xarelto) 20 MG tablet Take 1 tablet by mouth Daily With Dinner. 30 tablet 6   • traZODone (DESYREL) 50 MG tablet Take 50 mg by mouth Every Night.     • metoprolol succinate XL (TOPROL-XL) 25 MG 24 hr tablet Take 1 tablet by mouth Every Night. 30 tablet 6     Current Facility-Administered Medications   Medication Dose Route Frequency Provider Last Rate Last Admin   • furosemide (LASIX) injection 40 mg  40 mg Intravenous Once Cathie Felipe APRN         Assessment:       Diagnosis Plan   1. Acute systolic congestive heart failure due to valvular disease (CMS/[facility])  Case Request Cath Lab: Right and Left Heart Cath    furosemide (LASIX) injection 40 mg        No orders of the defined types were placed in this encounter.        Plan:       1. 71 year old female with systolic CHF. EF 35-50% 03/2021. She declined stress test and cath as she preferred medical therapy.  However, she is now agreeable so we will arrange for right and left heart catheterization to be performed in the next 1-2 weeks.  I am going to give her another dose of IV Lasix today and increase her by mouth to 40 mg in the morning to 20 mg in the evening.  I am also going to switch her from metoprolol tartrate to metoprolol succinate 25 mg daily.  We may be able to arrange her preop testing to be at [facility].  2.  Moderate to severe mitral regurgitation on RITA March 2021. No evidence eof vegetation  3. Chornic  anemia  4. Dental abscess 02/2021-she was treated for endocarditis but ultimately there was no evidence of vegetative process with RITA and negative blood cultures  5.  Sinus tachycardia-as above switch metoprolol to long-acting form  6.  Embolic event/elevated troponin continue Xarelto 20 g.  I gave her some samples because this is expensive  7.  Mild aortic valve regurgitation  8.  Ongoing nicotine use-she has quit smoking.  I commended her for this  9.  Mild hemoptysis- felt to be secondary to vascular congestion.  I have given the okay for her to hold her evening dose of Xarelto today but then resume daily      Follow-up post heart cath.            It has been a pleasure to participate in this patient's care.      Thank you,  KENNETH Muñoz      **I used Dragon to dictate this note:**

## 2021-04-13 PROBLEM — I50.21 ACUTE SYSTOLIC CONGESTIVE HEART FAILURE DUE TO VALVULAR DISEASE: Status: ACTIVE | Noted: 2021-04-13

## 2021-04-13 PROBLEM — I38 ACUTE SYSTOLIC CONGESTIVE HEART FAILURE DUE TO VALVULAR DISEASE: Status: ACTIVE | Noted: 2021-04-13

## 2021-04-14 ENCOUNTER — TRANSCRIBE ORDERS (OUTPATIENT)
Dept: SLEEP MEDICINE | Facility: HOSPITAL | Age: 71
End: 2021-04-14

## 2021-04-14 DIAGNOSIS — Z01.818 OTHER SPECIFIED PRE-OPERATIVE EXAMINATION: Primary | ICD-10-CM

## 2021-04-16 ENCOUNTER — HOSPITAL ENCOUNTER (OUTPATIENT)
Dept: FAMILY MEDICINE CLINIC | Facility: CLINIC | Age: 71
Discharge: HOME | End: 2021-04-16
Attending: NURSE PRACTITIONER

## 2021-04-16 ENCOUNTER — OFFICE VISIT CONVERTED (OUTPATIENT)
Dept: FAMILY MEDICINE CLINIC | Facility: CLINIC | Age: 71
End: 2021-04-16
Attending: NURSE PRACTITIONER

## 2021-04-18 LAB — BACTERIA SPEC AEROBE CULT: ABNORMAL

## 2021-04-19 ENCOUNTER — LAB (OUTPATIENT)
Dept: LAB | Facility: HOSPITAL | Age: 71
End: 2021-04-19

## 2021-04-19 DIAGNOSIS — Z01.818 OTHER SPECIFIED PRE-OPERATIVE EXAMINATION: ICD-10-CM

## 2021-04-19 PROCEDURE — U0004 COV-19 TEST NON-CDC HGH THRU: HCPCS

## 2021-04-19 PROCEDURE — C9803 HOPD COVID-19 SPEC COLLECT: HCPCS

## 2021-04-20 ENCOUNTER — READMISSION MANAGEMENT (OUTPATIENT)
Dept: CALL CENTER | Facility: HOSPITAL | Age: 71
End: 2021-04-20

## 2021-04-20 LAB — SARS-COV-2 RNA RESP QL NAA+PROBE: NOT DETECTED

## 2021-04-21 ENCOUNTER — APPOINTMENT (OUTPATIENT)
Dept: GENERAL RADIOLOGY | Facility: HOSPITAL | Age: 71
End: 2021-04-21

## 2021-04-21 ENCOUNTER — APPOINTMENT (OUTPATIENT)
Dept: CARDIOLOGY | Facility: HOSPITAL | Age: 71
End: 2021-04-21

## 2021-04-21 ENCOUNTER — HOSPITAL ENCOUNTER (INPATIENT)
Facility: HOSPITAL | Age: 71
LOS: 14 days | Discharge: HOME HEALTH | End: 2021-05-05
Attending: INTERNAL MEDICINE | Admitting: THORACIC SURGERY (CARDIOTHORACIC VASCULAR SURGERY)

## 2021-04-21 DIAGNOSIS — I50.21 ACUTE SYSTOLIC CONGESTIVE HEART FAILURE DUE TO VALVULAR DISEASE (HCC): ICD-10-CM

## 2021-04-21 DIAGNOSIS — I38 ACUTE SYSTOLIC CONGESTIVE HEART FAILURE DUE TO VALVULAR DISEASE (HCC): ICD-10-CM

## 2021-04-21 DIAGNOSIS — I25.118 CORONARY ARTERY DISEASE OF NATIVE ARTERY OF NATIVE HEART WITH STABLE ANGINA PECTORIS (HCC): ICD-10-CM

## 2021-04-21 DIAGNOSIS — I48.91 ATRIAL FIBRILLATION WITH RVR (HCC): ICD-10-CM

## 2021-04-21 DIAGNOSIS — Z95.2 S/P MVR (MITRAL VALVE REPLACEMENT): ICD-10-CM

## 2021-04-21 DIAGNOSIS — Z95.1 S/P CABG (CORONARY ARTERY BYPASS GRAFT): ICD-10-CM

## 2021-04-21 DIAGNOSIS — I25.118 CORONARY ARTERY DISEASE OF NATIVE HEART WITH STABLE ANGINA PECTORIS, UNSPECIFIED VESSEL OR LESION TYPE (HCC): Primary | ICD-10-CM

## 2021-04-21 PROBLEM — I25.10 CAD (CORONARY ARTERY DISEASE): Status: ACTIVE | Noted: 2021-04-21

## 2021-04-21 LAB
ALBUMIN SERPL-MCNC: 3.6 G/DL (ref 3.5–5.2)
ALBUMIN/GLOB SERPL: 1.7 G/DL
ALP SERPL-CCNC: 73 U/L (ref 39–117)
ALT SERPL W P-5'-P-CCNC: 14 U/L (ref 1–33)
ANION GAP SERPL CALCULATED.3IONS-SCNC: 11.1 MMOL/L (ref 5–15)
APTT PPP: 31.5 SECONDS (ref 22.7–35.4)
ARTERIAL PATENCY WRIST A: POSITIVE
AST SERPL-CCNC: 14 U/L (ref 1–32)
ATMOSPHERIC PRESS: 753.1 MMHG
BASE EXCESS BLDA CALC-SCNC: 4.2 MMOL/L (ref 0–2)
BASOPHILS # BLD AUTO: 0.05 10*3/MM3 (ref 0–0.2)
BASOPHILS NFR BLD AUTO: 0.7 % (ref 0–1.5)
BDY SITE: ABNORMAL
BH CV XLRA MEAS - DIST GSV CALF DIST LEFT: 0.18 CM
BH CV XLRA MEAS - DIST GSV CALF DIST RIGHT: 0.22 CM
BH CV XLRA MEAS - DIST GSV THIGH DIST LEFT: 0.3 CM
BH CV XLRA MEAS - DIST GSV THIGH DIST RIGHT: 0.29 CM
BH CV XLRA MEAS - GSV ANKLE DIST LEFT: 0.17 CM
BH CV XLRA MEAS - GSV ANKLE DIST RIGHT: 0.26 CM
BH CV XLRA MEAS - GSV KNEE DIST LEFT: 0.22 CM
BH CV XLRA MEAS - GSV KNEE DIST RIGHT: 0.25 CM
BH CV XLRA MEAS - GSV ORIGIN DIST LEFT: 0.85 CM
BH CV XLRA MEAS - GSV ORIGIN DIST RIGHT: 0.86 CM
BH CV XLRA MEAS - MID GSV CALF LEFT: 0.18 CM
BH CV XLRA MEAS - MID GSV CALF RIGHT: 0.18 CM
BH CV XLRA MEAS - MID GSV THIGH  LEFT: 0.23 CM
BH CV XLRA MEAS - MID GSV THIGH  RIGHT: 0.18 CM
BH CV XLRA MEAS - PROX GSV CALF DIST LEFT: 0.18 CM
BH CV XLRA MEAS - PROX GSV CALF DIST RIGHT: 0.22 CM
BH CV XLRA MEAS - PROX GSV THIGH  LEFT: 0.42 CM
BH CV XLRA MEAS - PROX GSV THIGH  RIGHT: 0.24 CM
BH CV XLRA MEAS LEFT CCA RATIO VEL: 70.4 CM/SEC
BH CV XLRA MEAS LEFT DIST CCA EDV: 26.4 CM/SEC
BH CV XLRA MEAS LEFT DIST CCA PSV: 70.4 CM/SEC
BH CV XLRA MEAS LEFT DIST ICA EDV: -41 CM/SEC
BH CV XLRA MEAS LEFT DIST ICA PSV: -92 CM/SEC
BH CV XLRA MEAS LEFT ICA RATIO VEL: 127 CM/SEC
BH CV XLRA MEAS LEFT ICA/CCA RATIO: 1.8
BH CV XLRA MEAS LEFT MID ICA EDV: -31.1 CM/SEC
BH CV XLRA MEAS LEFT MID ICA PSV: -86.2 CM/SEC
BH CV XLRA MEAS LEFT PROX CCA EDV: 22.3 CM/SEC
BH CV XLRA MEAS LEFT PROX CCA PSV: 78 CM/SEC
BH CV XLRA MEAS LEFT PROX ECA EDV: -25.1 CM/SEC
BH CV XLRA MEAS LEFT PROX ECA PSV: -102 CM/SEC
BH CV XLRA MEAS LEFT PROX ICA EDV: 45.6 CM/SEC
BH CV XLRA MEAS LEFT PROX ICA PSV: 127 CM/SEC
BH CV XLRA MEAS LEFT PROX SCLA PSV: 92.5 CM/SEC
BH CV XLRA MEAS LEFT VERTEBRAL A EDV: 32.2 CM/SEC
BH CV XLRA MEAS LEFT VERTEBRAL A PSV: 71.5 CM/SEC
BH CV XLRA MEAS RIGHT CCA RATIO VEL: -82.1 CM/SEC
BH CV XLRA MEAS RIGHT DIST CCA EDV: -29.9 CM/SEC
BH CV XLRA MEAS RIGHT DIST CCA PSV: -82.1 CM/SEC
BH CV XLRA MEAS RIGHT DIST ICA EDV: -42.8 CM/SEC
BH CV XLRA MEAS RIGHT DIST ICA PSV: -103 CM/SEC
BH CV XLRA MEAS RIGHT ICA RATIO VEL: -103 CM/SEC
BH CV XLRA MEAS RIGHT ICA/CCA RATIO: 1.3
BH CV XLRA MEAS RIGHT MID ICA EDV: -32.8 CM/SEC
BH CV XLRA MEAS RIGHT MID ICA PSV: -82.1 CM/SEC
BH CV XLRA MEAS RIGHT PROX CCA EDV: 24 CM/SEC
BH CV XLRA MEAS RIGHT PROX CCA PSV: 73.1 CM/SEC
BH CV XLRA MEAS RIGHT PROX ECA EDV: 17.3 CM/SEC
BH CV XLRA MEAS RIGHT PROX ECA PSV: 118 CM/SEC
BH CV XLRA MEAS RIGHT PROX ICA EDV: -23.5 CM/SEC
BH CV XLRA MEAS RIGHT PROX ICA PSV: -90.3 CM/SEC
BH CV XLRA MEAS RIGHT PROX SCLA PSV: 120 CM/SEC
BH CV XLRA MEAS RIGHT VERTEBRAL A EDV: 11 CM/SEC
BH CV XLRA MEAS RIGHT VERTEBRAL A PSV: 42 CM/SEC
BILIRUB SERPL-MCNC: 0.5 MG/DL (ref 0–1.2)
BUN SERPL-MCNC: 15 MG/DL (ref 8–23)
BUN/CREAT SERPL: 17.6 (ref 7–25)
CALCIUM SPEC-SCNC: 8.8 MG/DL (ref 8.6–10.5)
CHLORIDE SERPL-SCNC: 100 MMOL/L (ref 98–107)
CHOLEST SERPL-MCNC: 94 MG/DL (ref 0–200)
CLOSE TME COLL+ADP + EPINEP PNL BLD: 41 % (ref 86–100)
CO2 SERPL-SCNC: 26.9 MMOL/L (ref 22–29)
CREAT SERPL-MCNC: 0.85 MG/DL (ref 0.57–1)
DEPRECATED RDW RBC AUTO: 39.3 FL (ref 37–54)
EOSINOPHIL # BLD AUTO: 0.08 10*3/MM3 (ref 0–0.4)
EOSINOPHIL NFR BLD AUTO: 1.1 % (ref 0.3–6.2)
ERYTHROCYTE [DISTWIDTH] IN BLOOD BY AUTOMATED COUNT: 13.8 % (ref 12.3–15.4)
GFR SERPL CREATININE-BSD FRML MDRD: 66 ML/MIN/1.73
GLOBULIN UR ELPH-MCNC: 2.1 GM/DL
GLUCOSE BLDC GLUCOMTR-MCNC: 127 MG/DL (ref 70–130)
GLUCOSE SERPL-MCNC: 114 MG/DL (ref 65–99)
HBA1C MFR BLD: 5.09 % (ref 4.8–5.6)
HCO3 BLDA-SCNC: 27.9 MMOL/L (ref 22–28)
HCT VFR BLD AUTO: 27.2 % (ref 34–46.6)
HDLC SERPL-MCNC: 31 MG/DL (ref 40–60)
HGB BLD-MCNC: 8.6 G/DL (ref 12–15.9)
IMM GRANULOCYTES # BLD AUTO: 0.03 10*3/MM3 (ref 0–0.05)
IMM GRANULOCYTES NFR BLD AUTO: 0.4 % (ref 0–0.5)
INR PPP: 1.1 (ref 0.9–1.1)
LDLC SERPL CALC-MCNC: 42 MG/DL (ref 0–100)
LDLC/HDLC SERPL: 1.29 {RATIO}
LEFT ARM BP: NORMAL MMHG
LYMPHOCYTES # BLD AUTO: 1.57 10*3/MM3 (ref 0.7–3.1)
LYMPHOCYTES NFR BLD AUTO: 22.4 % (ref 19.6–45.3)
MAGNESIUM SERPL-MCNC: 2.1 MG/DL (ref 1.6–2.4)
MCH RBC QN AUTO: 25.3 PG (ref 26.6–33)
MCHC RBC AUTO-ENTMCNC: 31.6 G/DL (ref 31.5–35.7)
MCV RBC AUTO: 80 FL (ref 79–97)
MODALITY: ABNORMAL
MONOCYTES # BLD AUTO: 0.61 10*3/MM3 (ref 0.1–0.9)
MONOCYTES NFR BLD AUTO: 8.7 % (ref 5–12)
NEUTROPHILS NFR BLD AUTO: 4.68 10*3/MM3 (ref 1.7–7)
NEUTROPHILS NFR BLD AUTO: 66.7 % (ref 42.7–76)
NRBC BLD AUTO-RTO: 0 /100 WBC (ref 0–0.2)
NT-PROBNP SERPL-MCNC: 2209 PG/ML (ref 0–900)
PCO2 BLDA: 36.8 MM HG (ref 35–45)
PH BLDA: 7.49 PH UNITS (ref 7.35–7.45)
PLATELET # BLD AUTO: 376 10*3/MM3 (ref 140–450)
PMV BLD AUTO: 9.8 FL (ref 6–12)
PO2 BLDA: 64.1 MM HG (ref 80–100)
POTASSIUM SERPL-SCNC: 3.4 MMOL/L (ref 3.5–5.2)
PROT SERPL-MCNC: 5.7 G/DL (ref 6–8.5)
PROTHROMBIN TIME: 14 SECONDS (ref 11.7–14.2)
RBC # BLD AUTO: 3.4 10*6/MM3 (ref 3.77–5.28)
SAO2 % BLDCOA: 93.8 % (ref 92–99)
SET MECH RESP RATE: 18
SODIUM SERPL-SCNC: 138 MMOL/L (ref 136–145)
TRIGL SERPL-MCNC: 115 MG/DL (ref 0–150)
VLDLC SERPL-MCNC: 21 MG/DL (ref 5–40)
WBC # BLD AUTO: 7.02 10*3/MM3 (ref 3.4–10.8)

## 2021-04-21 PROCEDURE — 0 IOPAMIDOL PER 1 ML: Performed by: INTERNAL MEDICINE

## 2021-04-21 PROCEDURE — 83880 ASSAY OF NATRIURETIC PEPTIDE: CPT | Performed by: NURSE PRACTITIONER

## 2021-04-21 PROCEDURE — 63710000001 DIPHENHYDRAMINE PER 50 MG: Performed by: NURSE PRACTITIONER

## 2021-04-21 PROCEDURE — 99152 MOD SED SAME PHYS/QHP 5/>YRS: CPT | Performed by: INTERNAL MEDICINE

## 2021-04-21 PROCEDURE — 93880 EXTRACRANIAL BILAT STUDY: CPT

## 2021-04-21 PROCEDURE — B2151ZZ FLUOROSCOPY OF LEFT HEART USING LOW OSMOLAR CONTRAST: ICD-10-PCS | Performed by: INTERNAL MEDICINE

## 2021-04-21 PROCEDURE — C1769 GUIDE WIRE: HCPCS | Performed by: INTERNAL MEDICINE

## 2021-04-21 PROCEDURE — 93460 R&L HRT ART/VENTRICLE ANGIO: CPT | Performed by: INTERNAL MEDICINE

## 2021-04-21 PROCEDURE — 86900 BLOOD TYPING SEROLOGIC ABO: CPT

## 2021-04-21 PROCEDURE — 85018 HEMOGLOBIN: CPT

## 2021-04-21 PROCEDURE — 25010000002 FENTANYL CITRATE (PF) 100 MCG/2ML SOLUTION: Performed by: INTERNAL MEDICINE

## 2021-04-21 PROCEDURE — 85025 COMPLETE CBC W/AUTO DIFF WBC: CPT | Performed by: NURSE PRACTITIONER

## 2021-04-21 PROCEDURE — 25010000002 MIDAZOLAM PER 1 MG: Performed by: INTERNAL MEDICINE

## 2021-04-21 PROCEDURE — 83036 HEMOGLOBIN GLYCOSYLATED A1C: CPT | Performed by: NURSE PRACTITIONER

## 2021-04-21 PROCEDURE — 70355 PANORAMIC X-RAY OF JAWS: CPT

## 2021-04-21 PROCEDURE — 80061 LIPID PANEL: CPT | Performed by: NURSE PRACTITIONER

## 2021-04-21 PROCEDURE — 86901 BLOOD TYPING SEROLOGIC RH(D): CPT

## 2021-04-21 PROCEDURE — 83735 ASSAY OF MAGNESIUM: CPT | Performed by: NURSE PRACTITIONER

## 2021-04-21 PROCEDURE — 99222 1ST HOSP IP/OBS MODERATE 55: CPT | Performed by: THORACIC SURGERY (CARDIOTHORACIC VASCULAR SURGERY)

## 2021-04-21 PROCEDURE — 85730 THROMBOPLASTIN TIME PARTIAL: CPT | Performed by: NURSE PRACTITIONER

## 2021-04-21 PROCEDURE — 36600 WITHDRAWAL OF ARTERIAL BLOOD: CPT

## 2021-04-21 PROCEDURE — 80053 COMPREHEN METABOLIC PANEL: CPT | Performed by: NURSE PRACTITIONER

## 2021-04-21 PROCEDURE — 82962 GLUCOSE BLOOD TEST: CPT

## 2021-04-21 PROCEDURE — 85576 BLOOD PLATELET AGGREGATION: CPT | Performed by: NURSE PRACTITIONER

## 2021-04-21 PROCEDURE — 4A023N8 MEASUREMENT OF CARDIAC SAMPLING AND PRESSURE, BILATERAL, PERCUTANEOUS APPROACH: ICD-10-PCS | Performed by: INTERNAL MEDICINE

## 2021-04-21 PROCEDURE — 25010000002 HEPARIN (PORCINE) PER 1000 UNITS: Performed by: INTERNAL MEDICINE

## 2021-04-21 PROCEDURE — 85014 HEMATOCRIT: CPT

## 2021-04-21 PROCEDURE — 93970 EXTREMITY STUDY: CPT

## 2021-04-21 PROCEDURE — 82803 BLOOD GASES ANY COMBINATION: CPT

## 2021-04-21 PROCEDURE — C1894 INTRO/SHEATH, NON-LASER: HCPCS | Performed by: INTERNAL MEDICINE

## 2021-04-21 PROCEDURE — B2111ZZ FLUOROSCOPY OF MULTIPLE CORONARY ARTERIES USING LOW OSMOLAR CONTRAST: ICD-10-PCS | Performed by: INTERNAL MEDICINE

## 2021-04-21 PROCEDURE — 71046 X-RAY EXAM CHEST 2 VIEWS: CPT

## 2021-04-21 PROCEDURE — 85610 PROTHROMBIN TIME: CPT | Performed by: NURSE PRACTITIONER

## 2021-04-21 RX ORDER — SODIUM CHLORIDE 0.9 % (FLUSH) 0.9 %
3 SYRINGE (ML) INJECTION EVERY 12 HOURS SCHEDULED
Status: DISCONTINUED | OUTPATIENT
Start: 2021-04-21 | End: 2021-04-21 | Stop reason: HOSPADM

## 2021-04-21 RX ORDER — NALOXONE HCL 0.4 MG/ML
0.4 VIAL (ML) INJECTION
Status: DISCONTINUED | OUTPATIENT
Start: 2021-04-21 | End: 2021-04-27

## 2021-04-21 RX ORDER — SODIUM CHLORIDE 0.9 % (FLUSH) 0.9 %
10 SYRINGE (ML) INJECTION AS NEEDED
Status: DISCONTINUED | OUTPATIENT
Start: 2021-04-21 | End: 2021-04-21 | Stop reason: HOSPADM

## 2021-04-21 RX ORDER — BISACODYL 10 MG
10 SUPPOSITORY, RECTAL RECTAL DAILY PRN
Status: DISCONTINUED | OUTPATIENT
Start: 2021-04-21 | End: 2021-04-27

## 2021-04-21 RX ORDER — HYDROCODONE BITARTRATE AND ACETAMINOPHEN 5; 325 MG/1; MG/1
1 TABLET ORAL EVERY 4 HOURS PRN
Status: DISCONTINUED | OUTPATIENT
Start: 2021-04-21 | End: 2021-04-27

## 2021-04-21 RX ORDER — SODIUM CHLORIDE 9 MG/ML
75 INJECTION, SOLUTION INTRAVENOUS CONTINUOUS
Status: DISCONTINUED | OUTPATIENT
Start: 2021-04-21 | End: 2021-04-21

## 2021-04-21 RX ORDER — MORPHINE SULFATE 2 MG/ML
1 INJECTION, SOLUTION INTRAMUSCULAR; INTRAVENOUS EVERY 4 HOURS PRN
Status: DISCONTINUED | OUTPATIENT
Start: 2021-04-21 | End: 2021-04-27

## 2021-04-21 RX ORDER — METOPROLOL SUCCINATE 25 MG/1
25 TABLET, EXTENDED RELEASE ORAL NIGHTLY
Status: DISCONTINUED | OUTPATIENT
Start: 2021-04-21 | End: 2021-04-27

## 2021-04-21 RX ORDER — BISACODYL 5 MG/1
5 TABLET, DELAYED RELEASE ORAL DAILY PRN
Status: DISCONTINUED | OUTPATIENT
Start: 2021-04-21 | End: 2021-04-27

## 2021-04-21 RX ORDER — FERROUS SULFATE 325(65) MG
325 TABLET ORAL
Status: DISCONTINUED | OUTPATIENT
Start: 2021-04-21 | End: 2021-04-27

## 2021-04-21 RX ORDER — LIDOCAINE HYDROCHLORIDE 10 MG/ML
0.1 INJECTION, SOLUTION EPIDURAL; INFILTRATION; INTRACAUDAL; PERINEURAL ONCE AS NEEDED
Status: DISCONTINUED | OUTPATIENT
Start: 2021-04-21 | End: 2021-04-21 | Stop reason: HOSPADM

## 2021-04-21 RX ORDER — ACETAMINOPHEN 325 MG/1
650 TABLET ORAL EVERY 4 HOURS PRN
Status: DISCONTINUED | OUTPATIENT
Start: 2021-04-21 | End: 2021-04-27

## 2021-04-21 RX ORDER — ASPIRIN 81 MG/1
81 TABLET ORAL DAILY
Status: DISCONTINUED | OUTPATIENT
Start: 2021-04-21 | End: 2021-04-27

## 2021-04-21 RX ORDER — CHLORHEXIDINE GLUCONATE 500 MG/1
1 CLOTH TOPICAL EVERY 12 HOURS
Status: DISCONTINUED | OUTPATIENT
Start: 2021-04-22 | End: 2021-04-22

## 2021-04-21 RX ORDER — SODIUM CHLORIDE 9 MG/ML
75 INJECTION, SOLUTION INTRAVENOUS CONTINUOUS
Status: ACTIVE | OUTPATIENT
Start: 2021-04-21 | End: 2021-04-21

## 2021-04-21 RX ORDER — AMOXICILLIN 250 MG
2 CAPSULE ORAL NIGHTLY PRN
Status: DISCONTINUED | OUTPATIENT
Start: 2021-04-21 | End: 2021-04-27

## 2021-04-21 RX ORDER — CEFAZOLIN SODIUM 2 G/100ML
2 INJECTION, SOLUTION INTRAVENOUS
Status: DISCONTINUED | OUTPATIENT
Start: 2021-04-23 | End: 2021-04-24

## 2021-04-21 RX ORDER — ATORVASTATIN CALCIUM 20 MG/1
40 TABLET, FILM COATED ORAL NIGHTLY
Status: DISCONTINUED | OUTPATIENT
Start: 2021-04-21 | End: 2021-04-27

## 2021-04-21 RX ORDER — FENTANYL CITRATE 50 UG/ML
INJECTION, SOLUTION INTRAMUSCULAR; INTRAVENOUS AS NEEDED
Status: DISCONTINUED | OUTPATIENT
Start: 2021-04-21 | End: 2021-04-21 | Stop reason: HOSPADM

## 2021-04-21 RX ORDER — ALBUTEROL SULFATE 2.5 MG/3ML
2.5 SOLUTION RESPIRATORY (INHALATION) EVERY 6 HOURS PRN
Status: DISCONTINUED | OUTPATIENT
Start: 2021-04-21 | End: 2021-04-27

## 2021-04-21 RX ORDER — LIDOCAINE HYDROCHLORIDE 20 MG/ML
INJECTION, SOLUTION INFILTRATION; PERINEURAL AS NEEDED
Status: DISCONTINUED | OUTPATIENT
Start: 2021-04-21 | End: 2021-04-21 | Stop reason: HOSPADM

## 2021-04-21 RX ORDER — ONDANSETRON 4 MG/1
4 TABLET, FILM COATED ORAL EVERY 6 HOURS PRN
Status: DISCONTINUED | OUTPATIENT
Start: 2021-04-21 | End: 2021-04-27

## 2021-04-21 RX ORDER — FUROSEMIDE 40 MG/1
40 TABLET ORAL DAILY
Status: DISCONTINUED | OUTPATIENT
Start: 2021-04-21 | End: 2021-04-23

## 2021-04-21 RX ORDER — TRAZODONE HYDROCHLORIDE 50 MG/1
50 TABLET ORAL NIGHTLY PRN
Status: DISCONTINUED | OUTPATIENT
Start: 2021-04-21 | End: 2021-04-27

## 2021-04-21 RX ORDER — CHLORHEXIDINE GLUCONATE 0.12 MG/ML
15 RINSE ORAL EVERY 12 HOURS SCHEDULED
Status: DISCONTINUED | OUTPATIENT
Start: 2021-04-22 | End: 2021-04-22

## 2021-04-21 RX ORDER — POLYETHYLENE GLYCOL 3350 17 G/17G
17 POWDER, FOR SOLUTION ORAL DAILY PRN
Status: DISCONTINUED | OUTPATIENT
Start: 2021-04-21 | End: 2021-04-27

## 2021-04-21 RX ORDER — DIPHENHYDRAMINE HCL 25 MG
25 CAPSULE ORAL NIGHTLY PRN
Status: DISCONTINUED | OUTPATIENT
Start: 2021-04-21 | End: 2021-04-27

## 2021-04-21 RX ORDER — MIDAZOLAM HYDROCHLORIDE 1 MG/ML
INJECTION INTRAMUSCULAR; INTRAVENOUS AS NEEDED
Status: DISCONTINUED | OUTPATIENT
Start: 2021-04-21 | End: 2021-04-21 | Stop reason: HOSPADM

## 2021-04-21 RX ORDER — ONDANSETRON 2 MG/ML
4 INJECTION INTRAMUSCULAR; INTRAVENOUS EVERY 6 HOURS PRN
Status: DISCONTINUED | OUTPATIENT
Start: 2021-04-21 | End: 2021-04-27

## 2021-04-21 RX ADMIN — ACETAMINOPHEN 650 MG: 325 TABLET, FILM COATED ORAL at 21:33

## 2021-04-21 RX ADMIN — SODIUM CHLORIDE 75 ML/HR: 9 INJECTION, SOLUTION INTRAVENOUS at 10:17

## 2021-04-21 RX ADMIN — BISACODYL 5 MG: 5 TABLET ORAL at 21:33

## 2021-04-21 RX ADMIN — FUROSEMIDE 40 MG: 40 TABLET ORAL at 14:41

## 2021-04-21 RX ADMIN — DIPHENHYDRAMINE HYDROCHLORIDE 25 MG: 25 CAPSULE ORAL at 21:33

## 2021-04-21 RX ADMIN — ATORVASTATIN CALCIUM 40 MG: 20 TABLET, FILM COATED ORAL at 21:33

## 2021-04-21 RX ADMIN — ASPIRIN 81 MG: 81 TABLET, COATED ORAL at 14:42

## 2021-04-22 ENCOUNTER — APPOINTMENT (OUTPATIENT)
Dept: RESPIRATORY THERAPY | Facility: HOSPITAL | Age: 71
End: 2021-04-22

## 2021-04-22 LAB
ABO GROUP BLD: NORMAL
BACTERIA UR QL AUTO: ABNORMAL /HPF
BILIRUB UR QL STRIP: NEGATIVE
BLD GP AB SCN SERPL QL: NEGATIVE
CLARITY UR: CLEAR
CLOSE TME COLL+ADP + EPINEP PNL BLD: 44 % (ref 86–100)
COLOR UR: YELLOW
GLUCOSE BLDC GLUCOMTR-MCNC: 113 MG/DL (ref 70–130)
GLUCOSE BLDC GLUCOMTR-MCNC: 120 MG/DL (ref 70–130)
GLUCOSE BLDC GLUCOMTR-MCNC: 121 MG/DL (ref 70–130)
GLUCOSE BLDC GLUCOMTR-MCNC: 131 MG/DL (ref 70–130)
GLUCOSE UR STRIP-MCNC: NEGATIVE MG/DL
HCT VFR BLDA CALC: 26 % (ref 38–51)
HCT VFR BLDA CALC: 27 % (ref 38–51)
HGB BLDA-MCNC: 8.8 G/DL (ref 12–17)
HGB BLDA-MCNC: 9.2 G/DL (ref 12–17)
HGB UR QL STRIP.AUTO: NEGATIVE
HYALINE CASTS UR QL AUTO: ABNORMAL /LPF
KETONES UR QL STRIP: NEGATIVE
LEUKOCYTE ESTERASE UR QL STRIP.AUTO: ABNORMAL
NITRITE UR QL STRIP: NEGATIVE
PH UR STRIP.AUTO: <=5 [PH] (ref 5–8)
PROT UR QL STRIP: NEGATIVE
RBC # UR: ABNORMAL /HPF
REF LAB TEST METHOD: ABNORMAL
RH BLD: POSITIVE
SAO2 % BLDA: 57 % (ref 95–98)
SAO2 % BLDA: 94 % (ref 95–98)
SP GR UR STRIP: 1.02 (ref 1–1.03)
SQUAMOUS #/AREA URNS HPF: ABNORMAL /HPF
T&S EXPIRATION DATE: NORMAL
UROBILINOGEN UR QL STRIP: ABNORMAL
WBC UR QL AUTO: ABNORMAL /HPF

## 2021-04-22 PROCEDURE — 81001 URINALYSIS AUTO W/SCOPE: CPT | Performed by: NURSE PRACTITIONER

## 2021-04-22 PROCEDURE — 94010 BREATHING CAPACITY TEST: CPT

## 2021-04-22 PROCEDURE — 86900 BLOOD TYPING SEROLOGIC ABO: CPT | Performed by: NURSE PRACTITIONER

## 2021-04-22 PROCEDURE — 86901 BLOOD TYPING SEROLOGIC RH(D): CPT | Performed by: NURSE PRACTITIONER

## 2021-04-22 PROCEDURE — 86923 COMPATIBILITY TEST ELECTRIC: CPT

## 2021-04-22 PROCEDURE — 99232 SBSQ HOSP IP/OBS MODERATE 35: CPT | Performed by: INTERNAL MEDICINE

## 2021-04-22 PROCEDURE — 99024 POSTOP FOLLOW-UP VISIT: CPT | Performed by: THORACIC SURGERY (CARDIOTHORACIC VASCULAR SURGERY)

## 2021-04-22 PROCEDURE — 86850 RBC ANTIBODY SCREEN: CPT | Performed by: NURSE PRACTITIONER

## 2021-04-22 PROCEDURE — 85576 BLOOD PLATELET AGGREGATION: CPT | Performed by: NURSE PRACTITIONER

## 2021-04-22 PROCEDURE — 82962 GLUCOSE BLOOD TEST: CPT

## 2021-04-22 PROCEDURE — 63710000001 DIPHENHYDRAMINE PER 50 MG: Performed by: NURSE PRACTITIONER

## 2021-04-22 RX ORDER — POTASSIUM CHLORIDE 750 MG/1
10 TABLET, FILM COATED, EXTENDED RELEASE ORAL DAILY
Status: DISCONTINUED | OUTPATIENT
Start: 2021-04-22 | End: 2021-04-23

## 2021-04-22 RX ADMIN — FERROUS SULFATE TAB 325 MG (65 MG ELEMENTAL FE) 325 MG: 325 (65 FE) TAB at 08:48

## 2021-04-22 RX ADMIN — FUROSEMIDE 40 MG: 40 TABLET ORAL at 08:48

## 2021-04-22 RX ADMIN — POTASSIUM CHLORIDE 10 MEQ: 750 TABLET, EXTENDED RELEASE ORAL at 08:50

## 2021-04-22 RX ADMIN — ACETAMINOPHEN 650 MG: 325 TABLET, FILM COATED ORAL at 21:33

## 2021-04-22 RX ADMIN — ATORVASTATIN CALCIUM 40 MG: 20 TABLET, FILM COATED ORAL at 21:30

## 2021-04-22 RX ADMIN — BISACODYL 10 MG: 10 SUPPOSITORY RECTAL at 13:18

## 2021-04-22 RX ADMIN — ASPIRIN 81 MG: 81 TABLET, COATED ORAL at 08:48

## 2021-04-22 RX ADMIN — POLYETHYLENE GLYCOL 3350 17 G: 17 POWDER, FOR SOLUTION ORAL at 08:50

## 2021-04-22 RX ADMIN — METOPROLOL SUCCINATE 25 MG: 25 TABLET, EXTENDED RELEASE ORAL at 04:33

## 2021-04-22 RX ADMIN — DIPHENHYDRAMINE HYDROCHLORIDE 25 MG: 25 CAPSULE ORAL at 21:33

## 2021-04-23 ENCOUNTER — APPOINTMENT (OUTPATIENT)
Dept: GENERAL RADIOLOGY | Facility: HOSPITAL | Age: 71
End: 2021-04-23

## 2021-04-23 LAB
ALBUMIN SERPL-MCNC: 3.5 G/DL (ref 3.5–5.2)
ALBUMIN/GLOB SERPL: 1.8 G/DL
ALP SERPL-CCNC: 65 U/L (ref 39–117)
ALT SERPL W P-5'-P-CCNC: 13 U/L (ref 1–33)
ANION GAP SERPL CALCULATED.3IONS-SCNC: 9.5 MMOL/L (ref 5–15)
AST SERPL-CCNC: 14 U/L (ref 1–32)
BILIRUB SERPL-MCNC: 0.4 MG/DL (ref 0–1.2)
BUN SERPL-MCNC: 17 MG/DL (ref 8–23)
BUN/CREAT SERPL: 25.4 (ref 7–25)
CALCIUM SPEC-SCNC: 8.9 MG/DL (ref 8.6–10.5)
CHLORIDE SERPL-SCNC: 102 MMOL/L (ref 98–107)
CLOSE TME COLL+ADP + EPINEP PNL BLD: 91 % (ref 86–100)
CO2 SERPL-SCNC: 26.5 MMOL/L (ref 22–29)
CREAT SERPL-MCNC: 0.67 MG/DL (ref 0.57–1)
DEPRECATED RDW RBC AUTO: 40.5 FL (ref 37–54)
ERYTHROCYTE [DISTWIDTH] IN BLOOD BY AUTOMATED COUNT: 14.1 % (ref 12.3–15.4)
GFR SERPL CREATININE-BSD FRML MDRD: 86 ML/MIN/1.73
GLOBULIN UR ELPH-MCNC: 1.9 GM/DL
GLUCOSE BLDC GLUCOMTR-MCNC: 122 MG/DL (ref 70–130)
GLUCOSE SERPL-MCNC: 102 MG/DL (ref 65–99)
HCT VFR BLD AUTO: 25.3 % (ref 34–46.6)
HGB BLD-MCNC: 7.8 G/DL (ref 12–15.9)
MCH RBC QN AUTO: 24.7 PG (ref 26.6–33)
MCHC RBC AUTO-ENTMCNC: 30.8 G/DL (ref 31.5–35.7)
MCV RBC AUTO: 80.1 FL (ref 79–97)
PLATELET # BLD AUTO: 343 10*3/MM3 (ref 140–450)
PMV BLD AUTO: 9.6 FL (ref 6–12)
POTASSIUM SERPL-SCNC: 3.8 MMOL/L (ref 3.5–5.2)
PROT SERPL-MCNC: 5.4 G/DL (ref 6–8.5)
RBC # BLD AUTO: 3.16 10*6/MM3 (ref 3.77–5.28)
SODIUM SERPL-SCNC: 138 MMOL/L (ref 136–145)
WBC # BLD AUTO: 7.14 10*3/MM3 (ref 3.4–10.8)

## 2021-04-23 PROCEDURE — 82962 GLUCOSE BLOOD TEST: CPT

## 2021-04-23 PROCEDURE — 63710000001 DIPHENHYDRAMINE PER 50 MG: Performed by: NURSE PRACTITIONER

## 2021-04-23 PROCEDURE — 85027 COMPLETE CBC AUTOMATED: CPT | Performed by: INTERNAL MEDICINE

## 2021-04-23 PROCEDURE — 99232 SBSQ HOSP IP/OBS MODERATE 35: CPT | Performed by: NURSE PRACTITIONER

## 2021-04-23 PROCEDURE — 71046 X-RAY EXAM CHEST 2 VIEWS: CPT

## 2021-04-23 PROCEDURE — 80053 COMPREHEN METABOLIC PANEL: CPT | Performed by: NURSE PRACTITIONER

## 2021-04-23 PROCEDURE — 99024 POSTOP FOLLOW-UP VISIT: CPT | Performed by: THORACIC SURGERY (CARDIOTHORACIC VASCULAR SURGERY)

## 2021-04-23 PROCEDURE — 85576 BLOOD PLATELET AGGREGATION: CPT | Performed by: NURSE PRACTITIONER

## 2021-04-23 RX ORDER — FUROSEMIDE 40 MG/1
40 TABLET ORAL DAILY
Status: DISCONTINUED | OUTPATIENT
Start: 2021-04-25 | End: 2021-04-24

## 2021-04-23 RX ORDER — POTASSIUM CHLORIDE 750 MG/1
20 TABLET, FILM COATED, EXTENDED RELEASE ORAL ONCE
Status: COMPLETED | OUTPATIENT
Start: 2021-04-24 | End: 2021-04-24

## 2021-04-23 RX ORDER — POTASSIUM CHLORIDE 750 MG/1
20 TABLET, FILM COATED, EXTENDED RELEASE ORAL DAILY
Status: DISCONTINUED | OUTPATIENT
Start: 2021-04-25 | End: 2021-04-27

## 2021-04-23 RX ORDER — FUROSEMIDE 10 MG/ML
40 INJECTION INTRAMUSCULAR; INTRAVENOUS ONCE
Status: COMPLETED | OUTPATIENT
Start: 2021-04-24 | End: 2021-04-24

## 2021-04-23 RX ADMIN — ATORVASTATIN CALCIUM 40 MG: 20 TABLET, FILM COATED ORAL at 21:17

## 2021-04-23 RX ADMIN — FUROSEMIDE 40 MG: 40 TABLET ORAL at 08:00

## 2021-04-23 RX ADMIN — ASPIRIN 81 MG: 81 TABLET, COATED ORAL at 08:00

## 2021-04-23 RX ADMIN — ACETAMINOPHEN 650 MG: 325 TABLET, FILM COATED ORAL at 22:39

## 2021-04-23 RX ADMIN — METOPROLOL SUCCINATE 25 MG: 25 TABLET, EXTENDED RELEASE ORAL at 04:29

## 2021-04-23 RX ADMIN — FERROUS SULFATE TAB 325 MG (65 MG ELEMENTAL FE) 325 MG: 325 (65 FE) TAB at 08:00

## 2021-04-23 RX ADMIN — POTASSIUM CHLORIDE 10 MEQ: 750 TABLET, EXTENDED RELEASE ORAL at 08:00

## 2021-04-23 RX ADMIN — DIPHENHYDRAMINE HYDROCHLORIDE 25 MG: 25 CAPSULE ORAL at 21:18

## 2021-04-23 RX ADMIN — METOPROLOL SUCCINATE 25 MG: 25 TABLET, EXTENDED RELEASE ORAL at 21:16

## 2021-04-24 LAB
ANION GAP SERPL CALCULATED.3IONS-SCNC: 10.6 MMOL/L (ref 5–15)
BUN SERPL-MCNC: 16 MG/DL (ref 8–23)
BUN/CREAT SERPL: 22.9 (ref 7–25)
CALCIUM SPEC-SCNC: 9 MG/DL (ref 8.6–10.5)
CHLORIDE SERPL-SCNC: 102 MMOL/L (ref 98–107)
CLOSE TME COLL+ADP + EPINEP PNL BLD: 82 % (ref 86–100)
CO2 SERPL-SCNC: 26.4 MMOL/L (ref 22–29)
CREAT SERPL-MCNC: 0.7 MG/DL (ref 0.57–1)
DEPRECATED RDW RBC AUTO: 40.3 FL (ref 37–54)
ERYTHROCYTE [DISTWIDTH] IN BLOOD BY AUTOMATED COUNT: 13.9 % (ref 12.3–15.4)
GFR SERPL CREATININE-BSD FRML MDRD: 82 ML/MIN/1.73
GLUCOSE BLDC GLUCOMTR-MCNC: 109 MG/DL (ref 70–130)
GLUCOSE BLDC GLUCOMTR-MCNC: 115 MG/DL (ref 70–130)
GLUCOSE BLDC GLUCOMTR-MCNC: 123 MG/DL (ref 70–130)
GLUCOSE SERPL-MCNC: 107 MG/DL (ref 65–99)
HCT VFR BLD AUTO: 30.3 % (ref 34–46.6)
HGB BLD-MCNC: 9.2 G/DL (ref 12–15.9)
MCH RBC QN AUTO: 24.7 PG (ref 26.6–33)
MCHC RBC AUTO-ENTMCNC: 30.4 G/DL (ref 31.5–35.7)
MCV RBC AUTO: 81.5 FL (ref 79–97)
PLATELET # BLD AUTO: 432 10*3/MM3 (ref 140–450)
PMV BLD AUTO: 9.6 FL (ref 6–12)
POTASSIUM SERPL-SCNC: 4 MMOL/L (ref 3.5–5.2)
QT INTERVAL: 346 MS
RBC # BLD AUTO: 3.72 10*6/MM3 (ref 3.77–5.28)
SODIUM SERPL-SCNC: 139 MMOL/L (ref 136–145)
WBC # BLD AUTO: 8.7 10*3/MM3 (ref 3.4–10.8)

## 2021-04-24 PROCEDURE — 93005 ELECTROCARDIOGRAM TRACING: CPT | Performed by: INTERNAL MEDICINE

## 2021-04-24 PROCEDURE — 25010000002 FUROSEMIDE PER 20 MG: Performed by: NURSE PRACTITIONER

## 2021-04-24 PROCEDURE — 93010 ELECTROCARDIOGRAM REPORT: CPT | Performed by: INTERNAL MEDICINE

## 2021-04-24 PROCEDURE — 82962 GLUCOSE BLOOD TEST: CPT

## 2021-04-24 PROCEDURE — 94799 UNLISTED PULMONARY SVC/PX: CPT

## 2021-04-24 PROCEDURE — 80048 BASIC METABOLIC PNL TOTAL CA: CPT | Performed by: INTERNAL MEDICINE

## 2021-04-24 PROCEDURE — 99231 SBSQ HOSP IP/OBS SF/LOW 25: CPT | Performed by: NURSE PRACTITIONER

## 2021-04-24 PROCEDURE — 85027 COMPLETE CBC AUTOMATED: CPT | Performed by: INTERNAL MEDICINE

## 2021-04-24 PROCEDURE — 94640 AIRWAY INHALATION TREATMENT: CPT

## 2021-04-24 PROCEDURE — 63710000001 DIPHENHYDRAMINE PER 50 MG: Performed by: NURSE PRACTITIONER

## 2021-04-24 PROCEDURE — 85576 BLOOD PLATELET AGGREGATION: CPT | Performed by: NURSE PRACTITIONER

## 2021-04-24 PROCEDURE — 99232 SBSQ HOSP IP/OBS MODERATE 35: CPT | Performed by: NURSE PRACTITIONER

## 2021-04-24 RX ORDER — CEFAZOLIN SODIUM 2 G/100ML
2 INJECTION, SOLUTION INTRAVENOUS
Status: COMPLETED | OUTPATIENT
Start: 2021-04-27 | End: 2021-04-27

## 2021-04-24 RX ORDER — FUROSEMIDE 10 MG/ML
40 INJECTION INTRAMUSCULAR; INTRAVENOUS EVERY 12 HOURS
Status: DISCONTINUED | OUTPATIENT
Start: 2021-04-24 | End: 2021-04-26

## 2021-04-24 RX ORDER — CHLORHEXIDINE GLUCONATE 0.12 MG/ML
15 RINSE ORAL EVERY 12 HOURS SCHEDULED
Status: COMPLETED | OUTPATIENT
Start: 2021-04-26 | End: 2021-04-27

## 2021-04-24 RX ORDER — IPRATROPIUM BROMIDE AND ALBUTEROL SULFATE 2.5; .5 MG/3ML; MG/3ML
3 SOLUTION RESPIRATORY (INHALATION) 2 TIMES DAILY
Status: DISCONTINUED | OUTPATIENT
Start: 2021-04-24 | End: 2021-04-27

## 2021-04-24 RX ORDER — CHLORHEXIDINE GLUCONATE 500 MG/1
1 CLOTH TOPICAL EVERY 12 HOURS
Status: COMPLETED | OUTPATIENT
Start: 2021-04-26 | End: 2021-04-27

## 2021-04-24 RX ADMIN — IPRATROPIUM BROMIDE AND ALBUTEROL SULFATE 3 ML: 2.5; .5 SOLUTION RESPIRATORY (INHALATION) at 09:36

## 2021-04-24 RX ADMIN — POTASSIUM CHLORIDE 20 MEQ: 750 TABLET, EXTENDED RELEASE ORAL at 08:57

## 2021-04-24 RX ADMIN — FUROSEMIDE 40 MG: 10 INJECTION, SOLUTION INTRAMUSCULAR; INTRAVENOUS at 04:40

## 2021-04-24 RX ADMIN — METOPROLOL SUCCINATE 25 MG: 25 TABLET, EXTENDED RELEASE ORAL at 20:21

## 2021-04-24 RX ADMIN — ATORVASTATIN CALCIUM 40 MG: 20 TABLET, FILM COATED ORAL at 20:21

## 2021-04-24 RX ADMIN — ACETAMINOPHEN 650 MG: 325 TABLET, FILM COATED ORAL at 20:20

## 2021-04-24 RX ADMIN — IPRATROPIUM BROMIDE AND ALBUTEROL SULFATE 3 ML: 2.5; .5 SOLUTION RESPIRATORY (INHALATION) at 20:45

## 2021-04-24 RX ADMIN — DIPHENHYDRAMINE HYDROCHLORIDE 25 MG: 25 CAPSULE ORAL at 20:21

## 2021-04-24 RX ADMIN — ASPIRIN 81 MG: 81 TABLET, COATED ORAL at 08:57

## 2021-04-24 RX ADMIN — FERROUS SULFATE TAB 325 MG (65 MG ELEMENTAL FE) 325 MG: 325 (65 FE) TAB at 08:57

## 2021-04-24 RX ADMIN — FUROSEMIDE 40 MG: 10 INJECTION, SOLUTION INTRAMUSCULAR; INTRAVENOUS at 17:25

## 2021-04-24 RX ADMIN — DOCUSATE SODIUM 50MG AND SENNOSIDES 8.6MG 2 TABLET: 8.6; 5 TABLET, FILM COATED ORAL at 20:21

## 2021-04-25 LAB
ANION GAP SERPL CALCULATED.3IONS-SCNC: 10.8 MMOL/L (ref 5–15)
BUN SERPL-MCNC: 17 MG/DL (ref 8–23)
BUN/CREAT SERPL: 20.2 (ref 7–25)
CALCIUM SPEC-SCNC: 8.8 MG/DL (ref 8.6–10.5)
CHLORIDE SERPL-SCNC: 101 MMOL/L (ref 98–107)
CLOSE TME COLL+ADP + EPINEP PNL BLD: 54 % (ref 86–100)
CO2 SERPL-SCNC: 26.2 MMOL/L (ref 22–29)
CREAT SERPL-MCNC: 0.84 MG/DL (ref 0.57–1)
DEPRECATED RDW RBC AUTO: 39.5 FL (ref 37–54)
ERYTHROCYTE [DISTWIDTH] IN BLOOD BY AUTOMATED COUNT: 13.9 % (ref 12.3–15.4)
GFR SERPL CREATININE-BSD FRML MDRD: 66 ML/MIN/1.73
GLUCOSE SERPL-MCNC: 124 MG/DL (ref 65–99)
HCT VFR BLD AUTO: 27.1 % (ref 34–46.6)
HGB BLD-MCNC: 8.3 G/DL (ref 12–15.9)
MCH RBC QN AUTO: 24.8 PG (ref 26.6–33)
MCHC RBC AUTO-ENTMCNC: 30.6 G/DL (ref 31.5–35.7)
MCV RBC AUTO: 80.9 FL (ref 79–97)
PLATELET # BLD AUTO: 376 10*3/MM3 (ref 140–450)
PMV BLD AUTO: 9.7 FL (ref 6–12)
POTASSIUM SERPL-SCNC: 3.7 MMOL/L (ref 3.5–5.2)
RBC # BLD AUTO: 3.35 10*6/MM3 (ref 3.77–5.28)
SODIUM SERPL-SCNC: 138 MMOL/L (ref 136–145)
WBC # BLD AUTO: 7.93 10*3/MM3 (ref 3.4–10.8)

## 2021-04-25 PROCEDURE — 99024 POSTOP FOLLOW-UP VISIT: CPT | Performed by: NURSE PRACTITIONER

## 2021-04-25 PROCEDURE — 94799 UNLISTED PULMONARY SVC/PX: CPT

## 2021-04-25 PROCEDURE — 85576 BLOOD PLATELET AGGREGATION: CPT | Performed by: NURSE PRACTITIONER

## 2021-04-25 PROCEDURE — 86901 BLOOD TYPING SEROLOGIC RH(D): CPT | Performed by: NURSE PRACTITIONER

## 2021-04-25 PROCEDURE — 86850 RBC ANTIBODY SCREEN: CPT | Performed by: NURSE PRACTITIONER

## 2021-04-25 PROCEDURE — 85027 COMPLETE CBC AUTOMATED: CPT | Performed by: NURSE PRACTITIONER

## 2021-04-25 PROCEDURE — 86923 COMPATIBILITY TEST ELECTRIC: CPT

## 2021-04-25 PROCEDURE — 63710000001 DIPHENHYDRAMINE PER 50 MG: Performed by: NURSE PRACTITIONER

## 2021-04-25 PROCEDURE — 80048 BASIC METABOLIC PNL TOTAL CA: CPT | Performed by: NURSE PRACTITIONER

## 2021-04-25 PROCEDURE — 99232 SBSQ HOSP IP/OBS MODERATE 35: CPT | Performed by: NURSE PRACTITIONER

## 2021-04-25 PROCEDURE — 86900 BLOOD TYPING SEROLOGIC ABO: CPT | Performed by: NURSE PRACTITIONER

## 2021-04-25 PROCEDURE — 25010000002 FUROSEMIDE PER 20 MG: Performed by: NURSE PRACTITIONER

## 2021-04-25 RX ORDER — ALPRAZOLAM 0.25 MG/1
0.25 TABLET ORAL EVERY 8 HOURS PRN
Status: DISCONTINUED | OUTPATIENT
Start: 2021-04-25 | End: 2021-04-27

## 2021-04-25 RX ADMIN — POTASSIUM CHLORIDE 20 MEQ: 750 TABLET, EXTENDED RELEASE ORAL at 08:44

## 2021-04-25 RX ADMIN — IPRATROPIUM BROMIDE AND ALBUTEROL SULFATE 3 ML: 2.5; .5 SOLUTION RESPIRATORY (INHALATION) at 20:18

## 2021-04-25 RX ADMIN — ASPIRIN 81 MG: 81 TABLET, COATED ORAL at 08:44

## 2021-04-25 RX ADMIN — FUROSEMIDE 40 MG: 10 INJECTION, SOLUTION INTRAMUSCULAR; INTRAVENOUS at 06:15

## 2021-04-25 RX ADMIN — IPRATROPIUM BROMIDE AND ALBUTEROL SULFATE 3 ML: 2.5; .5 SOLUTION RESPIRATORY (INHALATION) at 10:13

## 2021-04-25 RX ADMIN — FERROUS SULFATE TAB 325 MG (65 MG ELEMENTAL FE) 325 MG: 325 (65 FE) TAB at 08:44

## 2021-04-25 RX ADMIN — METOPROLOL SUCCINATE 25 MG: 25 TABLET, EXTENDED RELEASE ORAL at 20:37

## 2021-04-25 RX ADMIN — DIPHENHYDRAMINE HYDROCHLORIDE 25 MG: 25 CAPSULE ORAL at 20:39

## 2021-04-25 RX ADMIN — ALPRAZOLAM 0.25 MG: 0.25 TABLET ORAL at 17:16

## 2021-04-25 RX ADMIN — ATORVASTATIN CALCIUM 40 MG: 20 TABLET, FILM COATED ORAL at 20:37

## 2021-04-25 RX ADMIN — ALPRAZOLAM 0.25 MG: 0.25 TABLET ORAL at 08:55

## 2021-04-25 RX ADMIN — FUROSEMIDE 40 MG: 10 INJECTION, SOLUTION INTRAMUSCULAR; INTRAVENOUS at 17:16

## 2021-04-25 RX ADMIN — ACETAMINOPHEN 650 MG: 325 TABLET, FILM COATED ORAL at 20:39

## 2021-04-26 ENCOUNTER — ANESTHESIA EVENT (OUTPATIENT)
Dept: PERIOP | Facility: HOSPITAL | Age: 71
End: 2021-04-26

## 2021-04-26 LAB
ABO GROUP BLD: NORMAL
ANION GAP SERPL CALCULATED.3IONS-SCNC: 8.1 MMOL/L (ref 5–15)
ARTERIAL PATENCY WRIST A: ABNORMAL
ATMOSPHERIC PRESS: 746.2 MMHG
BASE EXCESS BLDA CALC-SCNC: 7.5 MMOL/L (ref 0–2)
BDY SITE: ABNORMAL
BH BB BLOOD EXPIRATION DATE: NORMAL
BH BB BLOOD EXPIRATION DATE: NORMAL
BH BB BLOOD TYPE BARCODE: 5100
BH BB BLOOD TYPE BARCODE: 5100
BH BB DISPENSE STATUS: NORMAL
BH BB DISPENSE STATUS: NORMAL
BH BB PRODUCT CODE: NORMAL
BH BB PRODUCT CODE: NORMAL
BH BB UNIT NUMBER: NORMAL
BH BB UNIT NUMBER: NORMAL
BLD GP AB SCN SERPL QL: NEGATIVE
BUN SERPL-MCNC: 17 MG/DL (ref 8–23)
BUN/CREAT SERPL: 22.4 (ref 7–25)
CALCIUM SPEC-SCNC: 8.7 MG/DL (ref 8.6–10.5)
CHLORIDE SERPL-SCNC: 100 MMOL/L (ref 98–107)
CLOSE TME COLL+ADP + EPINEP PNL BLD: 25 % (ref 86–100)
CO2 SERPL-SCNC: 27.9 MMOL/L (ref 22–29)
CREAT SERPL-MCNC: 0.76 MG/DL (ref 0.57–1)
CROSSMATCH INTERPRETATION: NORMAL
CROSSMATCH INTERPRETATION: NORMAL
DEPRECATED RDW RBC AUTO: 41.4 FL (ref 37–54)
ERYTHROCYTE [DISTWIDTH] IN BLOOD BY AUTOMATED COUNT: 14.3 % (ref 12.3–15.4)
GFR SERPL CREATININE-BSD FRML MDRD: 75 ML/MIN/1.73
GLUCOSE BLDC GLUCOMTR-MCNC: 116 MG/DL (ref 70–130)
GLUCOSE BLDC GLUCOMTR-MCNC: 119 MG/DL (ref 70–130)
GLUCOSE BLDC GLUCOMTR-MCNC: 143 MG/DL (ref 70–130)
GLUCOSE SERPL-MCNC: 95 MG/DL (ref 65–99)
HCO3 BLDA-SCNC: 30.4 MMOL/L (ref 22–28)
HCT VFR BLD AUTO: 26.3 % (ref 34–46.6)
HGB BLD-MCNC: 8 G/DL (ref 12–15.9)
MCH RBC QN AUTO: 24.5 PG (ref 26.6–33)
MCHC RBC AUTO-ENTMCNC: 30.4 G/DL (ref 31.5–35.7)
MCV RBC AUTO: 80.4 FL (ref 79–97)
MODALITY: ABNORMAL
PCO2 BLDA: 35.7 MM HG (ref 35–45)
PH BLDA: 7.54 PH UNITS (ref 7.35–7.45)
PLATELET # BLD AUTO: 374 10*3/MM3 (ref 140–450)
PMV BLD AUTO: 9.5 FL (ref 6–12)
PO2 BLDA: 84.2 MM HG (ref 80–100)
POTASSIUM SERPL-SCNC: 3.7 MMOL/L (ref 3.5–5.2)
RBC # BLD AUTO: 3.27 10*6/MM3 (ref 3.77–5.28)
RH BLD: POSITIVE
SAO2 % BLDCOA: 97.5 % (ref 92–99)
SARS-COV-2 ORF1AB RESP QL NAA+PROBE: NOT DETECTED
SODIUM SERPL-SCNC: 136 MMOL/L (ref 136–145)
T&S EXPIRATION DATE: NORMAL
TOTAL RATE: 20 BREATHS/MINUTE
UNIT  ABO: NORMAL
UNIT  ABO: NORMAL
UNIT  RH: NORMAL
UNIT  RH: NORMAL
WBC # BLD AUTO: 8.39 10*3/MM3 (ref 3.4–10.8)

## 2021-04-26 PROCEDURE — 82962 GLUCOSE BLOOD TEST: CPT

## 2021-04-26 PROCEDURE — 80048 BASIC METABOLIC PNL TOTAL CA: CPT | Performed by: NURSE PRACTITIONER

## 2021-04-26 PROCEDURE — U0004 COV-19 TEST NON-CDC HGH THRU: HCPCS | Performed by: INTERNAL MEDICINE

## 2021-04-26 PROCEDURE — 82803 BLOOD GASES ANY COMBINATION: CPT

## 2021-04-26 PROCEDURE — 99232 SBSQ HOSP IP/OBS MODERATE 35: CPT | Performed by: INTERNAL MEDICINE

## 2021-04-26 PROCEDURE — 85576 BLOOD PLATELET AGGREGATION: CPT | Performed by: NURSE PRACTITIONER

## 2021-04-26 PROCEDURE — 36430 TRANSFUSION BLD/BLD COMPNT: CPT

## 2021-04-26 PROCEDURE — 85027 COMPLETE CBC AUTOMATED: CPT | Performed by: NURSE PRACTITIONER

## 2021-04-26 PROCEDURE — P9016 RBC LEUKOCYTES REDUCED: HCPCS

## 2021-04-26 PROCEDURE — 36600 WITHDRAWAL OF ARTERIAL BLOOD: CPT

## 2021-04-26 PROCEDURE — 25010000002 FUROSEMIDE PER 20 MG: Performed by: NURSE PRACTITIONER

## 2021-04-26 PROCEDURE — 63710000001 DIPHENHYDRAMINE PER 50 MG: Performed by: NURSE PRACTITIONER

## 2021-04-26 PROCEDURE — 94799 UNLISTED PULMONARY SVC/PX: CPT

## 2021-04-26 PROCEDURE — 86900 BLOOD TYPING SEROLOGIC ABO: CPT

## 2021-04-26 RX ADMIN — CHLORHEXIDINE GLUCONATE 1 APPLICATION: 500 CLOTH TOPICAL at 21:57

## 2021-04-26 RX ADMIN — ACETAMINOPHEN 650 MG: 325 TABLET, FILM COATED ORAL at 21:12

## 2021-04-26 RX ADMIN — ASPIRIN 81 MG: 81 TABLET, COATED ORAL at 08:00

## 2021-04-26 RX ADMIN — IPRATROPIUM BROMIDE AND ALBUTEROL SULFATE 3 ML: 2.5; .5 SOLUTION RESPIRATORY (INHALATION) at 11:08

## 2021-04-26 RX ADMIN — METOPROLOL SUCCINATE 25 MG: 25 TABLET, EXTENDED RELEASE ORAL at 21:12

## 2021-04-26 RX ADMIN — CHLORHEXIDINE GLUCONATE 15 ML: 1.2 RINSE ORAL at 21:11

## 2021-04-26 RX ADMIN — ALPRAZOLAM 0.25 MG: 0.25 TABLET ORAL at 18:47

## 2021-04-26 RX ADMIN — MUPIROCIN 1 APPLICATION: 20 OINTMENT TOPICAL at 21:11

## 2021-04-26 RX ADMIN — IPRATROPIUM BROMIDE AND ALBUTEROL SULFATE 3 ML: 2.5; .5 SOLUTION RESPIRATORY (INHALATION) at 21:16

## 2021-04-26 RX ADMIN — FERROUS SULFATE TAB 325 MG (65 MG ELEMENTAL FE) 325 MG: 325 (65 FE) TAB at 08:00

## 2021-04-26 RX ADMIN — DIPHENHYDRAMINE HYDROCHLORIDE 25 MG: 25 CAPSULE ORAL at 21:12

## 2021-04-26 RX ADMIN — FUROSEMIDE 40 MG: 10 INJECTION, SOLUTION INTRAMUSCULAR; INTRAVENOUS at 05:31

## 2021-04-26 RX ADMIN — ALPRAZOLAM 0.25 MG: 0.25 TABLET ORAL at 09:10

## 2021-04-26 RX ADMIN — POTASSIUM CHLORIDE 20 MEQ: 750 TABLET, EXTENDED RELEASE ORAL at 08:00

## 2021-04-26 RX ADMIN — ALPRAZOLAM 0.25 MG: 0.25 TABLET ORAL at 01:12

## 2021-04-26 RX ADMIN — ATORVASTATIN CALCIUM 40 MG: 20 TABLET, FILM COATED ORAL at 21:11

## 2021-04-27 ENCOUNTER — APPOINTMENT (OUTPATIENT)
Dept: GENERAL RADIOLOGY | Facility: HOSPITAL | Age: 71
End: 2021-04-27

## 2021-04-27 ENCOUNTER — ANESTHESIA (OUTPATIENT)
Dept: PERIOP | Facility: HOSPITAL | Age: 71
End: 2021-04-27

## 2021-04-27 ENCOUNTER — ANCILLARY PROCEDURE (OUTPATIENT)
Dept: PERIOP | Facility: HOSPITAL | Age: 71
End: 2021-04-27

## 2021-04-27 LAB
ACT BLD: 109 SECONDS (ref 82–152)
ACT BLD: 120 SECONDS (ref 82–152)
ACT BLD: 379 SECONDS (ref 82–152)
ACT BLD: 395 SECONDS (ref 82–152)
ACT BLD: 428 SECONDS (ref 82–152)
ACT BLD: 588 SECONDS (ref 82–152)
ALBUMIN SERPL-MCNC: 3.8 G/DL (ref 3.5–5.2)
ALBUMIN SERPL-MCNC: 4.1 G/DL (ref 3.5–5.2)
ANION GAP SERPL CALCULATED.3IONS-SCNC: 11.4 MMOL/L (ref 5–15)
ANION GAP SERPL CALCULATED.3IONS-SCNC: 12.4 MMOL/L (ref 5–15)
ANION GAP SERPL CALCULATED.3IONS-SCNC: 12.8 MMOL/L (ref 5–15)
APTT PPP: 36.1 SECONDS (ref 22.7–35.4)
APTT PPP: 36.1 SECONDS (ref 22.7–35.4)
ARTERIAL PATENCY WRIST A: ABNORMAL
ARTERIAL PATENCY WRIST A: ABNORMAL
ATMOSPHERIC PRESS: 744.9 MMHG
ATMOSPHERIC PRESS: 746.5 MMHG
ATMOSPHERIC PRESS: 746.6 MMHG
BASE EXCESS BLDA CALC-SCNC: -1.2 MMOL/L (ref 0–2)
BASE EXCESS BLDA CALC-SCNC: -1.8 MMOL/L (ref 0–2)
BASE EXCESS BLDA CALC-SCNC: 0 MMOL/L (ref -5–5)
BASE EXCESS BLDA CALC-SCNC: 0 MMOL/L (ref -5–5)
BASE EXCESS BLDA CALC-SCNC: 1 MMOL/L (ref -5–5)
BASE EXCESS BLDA CALC-SCNC: 1 MMOL/L (ref -5–5)
BASE EXCESS BLDA CALC-SCNC: 2 MMOL/L (ref -5–5)
BASE EXCESS BLDA CALC-SCNC: 3 MMOL/L (ref -5–5)
BASE EXCESS BLDA CALC-SCNC: 4 MMOL/L (ref -5–5)
BASE EXCESS BLDV CALC-SCNC: -0.8 MMOL/L (ref -2–2)
BASOPHILS # BLD AUTO: 0.07 10*3/MM3 (ref 0–0.2)
BASOPHILS NFR BLD AUTO: 0.8 % (ref 0–1.5)
BDY SITE: ABNORMAL
BUN SERPL-MCNC: 16 MG/DL (ref 8–23)
BUN SERPL-MCNC: 16 MG/DL (ref 8–23)
BUN SERPL-MCNC: 19 MG/DL (ref 8–23)
BUN/CREAT SERPL: 17 (ref 7–25)
BUN/CREAT SERPL: 17.2 (ref 7–25)
BUN/CREAT SERPL: 27.1 (ref 7–25)
CA-I BLD-MCNC: 5.3 MG/DL (ref 4.6–5.4)
CA-I BLDA-SCNC: ABNORMAL MMOL/L
CA-I SERPL ISE-MCNC: 1.32 MMOL/L (ref 1.15–1.35)
CALCIUM SPEC-SCNC: 8.8 MG/DL (ref 8.6–10.5)
CALCIUM SPEC-SCNC: 9.2 MG/DL (ref 8.6–10.5)
CALCIUM SPEC-SCNC: 9.3 MG/DL (ref 8.6–10.5)
CHLORIDE SERPL-SCNC: 103 MMOL/L (ref 98–107)
CHLORIDE SERPL-SCNC: 106 MMOL/L (ref 98–107)
CHLORIDE SERPL-SCNC: 98 MMOL/L (ref 98–107)
CLOSE TME COLL+ADP + EPINEP PNL BLD: 84 % (ref 86–100)
CO2 BLDA-SCNC: 27 MMOL/L (ref 24–29)
CO2 BLDA-SCNC: 28 MMOL/L (ref 24–29)
CO2 BLDA-SCNC: 29 MMOL/L (ref 24–29)
CO2 BLDA-SCNC: 30 MMOL/L (ref 24–29)
CO2 BLDA-SCNC: 30 MMOL/L (ref 24–29)
CO2 SERPL-SCNC: 22.2 MMOL/L (ref 22–29)
CO2 SERPL-SCNC: 22.6 MMOL/L (ref 22–29)
CO2 SERPL-SCNC: 25.6 MMOL/L (ref 22–29)
CREAT SERPL-MCNC: 0.7 MG/DL (ref 0.57–1)
CREAT SERPL-MCNC: 0.93 MG/DL (ref 0.57–1)
CREAT SERPL-MCNC: 0.94 MG/DL (ref 0.57–1)
DEPRECATED RDW RBC AUTO: 42.3 FL (ref 37–54)
DEPRECATED RDW RBC AUTO: 45 FL (ref 37–54)
DEPRECATED RDW RBC AUTO: 45.8 FL (ref 37–54)
DEPRECATED RDW RBC AUTO: 46.1 FL (ref 37–54)
EOSINOPHIL # BLD AUTO: 0.42 10*3/MM3 (ref 0–0.4)
EOSINOPHIL NFR BLD AUTO: 4.8 % (ref 0.3–6.2)
ERYTHROCYTE [DISTWIDTH] IN BLOOD BY AUTOMATED COUNT: 14.7 % (ref 12.3–15.4)
ERYTHROCYTE [DISTWIDTH] IN BLOOD BY AUTOMATED COUNT: 14.7 % (ref 12.3–15.4)
ERYTHROCYTE [DISTWIDTH] IN BLOOD BY AUTOMATED COUNT: 14.8 % (ref 12.3–15.4)
ERYTHROCYTE [DISTWIDTH] IN BLOOD BY AUTOMATED COUNT: 15.2 % (ref 12.3–15.4)
FIBRINOGEN PPP-MCNC: 185 MG/DL (ref 219–464)
FIBRINOGEN PPP-MCNC: 266 MG/DL (ref 219–464)
GFR SERPL CREATININE-BSD FRML MDRD: 58 ML/MIN/1.73
GFR SERPL CREATININE-BSD FRML MDRD: 59 ML/MIN/1.73
GFR SERPL CREATININE-BSD FRML MDRD: 82 ML/MIN/1.73
GLUCOSE BLDC GLUCOMTR-MCNC: 104 MG/DL (ref 70–130)
GLUCOSE BLDC GLUCOMTR-MCNC: 116 MG/DL (ref 70–130)
GLUCOSE BLDC GLUCOMTR-MCNC: 119 MG/DL (ref 70–130)
GLUCOSE BLDC GLUCOMTR-MCNC: 119 MG/DL (ref 70–130)
GLUCOSE BLDC GLUCOMTR-MCNC: 122 MG/DL (ref 70–130)
GLUCOSE BLDC GLUCOMTR-MCNC: 134 MG/DL (ref 70–130)
GLUCOSE BLDC GLUCOMTR-MCNC: 140 MG/DL (ref 70–130)
GLUCOSE BLDC GLUCOMTR-MCNC: 148 MG/DL (ref 70–130)
GLUCOSE BLDC GLUCOMTR-MCNC: 151 MG/DL (ref 70–130)
GLUCOSE BLDC GLUCOMTR-MCNC: 162 MG/DL (ref 70–130)
GLUCOSE BLDC GLUCOMTR-MCNC: 176 MG/DL (ref 70–130)
GLUCOSE BLDC GLUCOMTR-MCNC: 179 MG/DL (ref 70–130)
GLUCOSE BLDC GLUCOMTR-MCNC: 191 MG/DL (ref 70–130)
GLUCOSE BLDC GLUCOMTR-MCNC: 195 MG/DL (ref 70–130)
GLUCOSE BLDC GLUCOMTR-MCNC: 200 MG/DL (ref 70–130)
GLUCOSE BLDC GLUCOMTR-MCNC: 204 MG/DL (ref 70–130)
GLUCOSE SERPL-MCNC: 132 MG/DL (ref 65–99)
GLUCOSE SERPL-MCNC: 162 MG/DL (ref 65–99)
GLUCOSE SERPL-MCNC: 95 MG/DL (ref 65–99)
HCO3 BLDA-SCNC: 22.2 MMOL/L (ref 22–28)
HCO3 BLDA-SCNC: 23.1 MMOL/L (ref 22–28)
HCO3 BLDA-SCNC: 25.7 MMOL/L (ref 22–26)
HCO3 BLDA-SCNC: 26.2 MMOL/L (ref 22–26)
HCO3 BLDA-SCNC: 26.8 MMOL/L (ref 22–26)
HCO3 BLDA-SCNC: 26.8 MMOL/L (ref 22–26)
HCO3 BLDA-SCNC: 27.4 MMOL/L (ref 22–26)
HCO3 BLDA-SCNC: 28.3 MMOL/L (ref 22–26)
HCO3 BLDA-SCNC: 28.5 MMOL/L (ref 22–26)
HCO3 BLDV-SCNC: 23.3 MMOL/L (ref 22–28)
HCT VFR BLD AUTO: 23.8 % (ref 34–46.6)
HCT VFR BLD AUTO: 26 % (ref 34–46.6)
HCT VFR BLD AUTO: 30.7 % (ref 34–46.6)
HCT VFR BLD AUTO: 31 % (ref 34–46.6)
HCT VFR BLDA CALC: 21 % (ref 38–51)
HCT VFR BLDA CALC: 22 % (ref 38–51)
HCT VFR BLDA CALC: 22 % (ref 38–51)
HCT VFR BLDA CALC: 23 % (ref 38–51)
HCT VFR BLDA CALC: 25 % (ref 38–51)
HCT VFR BLDA CALC: 26 % (ref 38–51)
HCT VFR BLDA CALC: 27 % (ref 38–51)
HGB BLD-MCNC: 7.5 G/DL (ref 12–15.9)
HGB BLD-MCNC: 8.6 G/DL (ref 12–15.9)
HGB BLD-MCNC: 9.4 G/DL (ref 12–15.9)
HGB BLD-MCNC: 9.6 G/DL (ref 12–15.9)
HGB BLDA-MCNC: 7.1 G/DL (ref 12–17)
HGB BLDA-MCNC: 7.5 G/DL (ref 12–17)
HGB BLDA-MCNC: 7.5 G/DL (ref 12–17)
HGB BLDA-MCNC: 7.8 G/DL (ref 12–17)
HGB BLDA-MCNC: 8.5 G/DL (ref 12–17)
HGB BLDA-MCNC: 8.8 G/DL (ref 12–17)
HGB BLDA-MCNC: 9.2 G/DL (ref 12–17)
IMM GRANULOCYTES # BLD AUTO: 0.03 10*3/MM3 (ref 0–0.05)
IMM GRANULOCYTES NFR BLD AUTO: 0.3 % (ref 0–0.5)
INHALED O2 CONCENTRATION: 100 %
INHALED O2 CONCENTRATION: 40 %
INHALED O2 CONCENTRATION: 40 %
INR PPP: 1.48 (ref 0.9–1.1)
INR PPP: 1.5 (ref 0.8–1.2)
INR PPP: 1.88 (ref 0.9–1.1)
LYMPHOCYTES # BLD AUTO: 2.22 10*3/MM3 (ref 0.7–3.1)
LYMPHOCYTES # BLD MANUAL: 1.48 10*3/MM3 (ref 0.7–3.1)
LYMPHOCYTES NFR BLD AUTO: 25.2 % (ref 19.6–45.3)
LYMPHOCYTES NFR BLD MANUAL: 2.1 % (ref 5–12)
LYMPHOCYTES NFR BLD MANUAL: 7.2 % (ref 19.6–45.3)
MAGNESIUM SERPL-MCNC: 2.7 MG/DL (ref 1.6–2.4)
MAGNESIUM SERPL-MCNC: 2.9 MG/DL (ref 1.6–2.4)
MCH RBC QN AUTO: 25.8 PG (ref 26.6–33)
MCH RBC QN AUTO: 26.2 PG (ref 26.6–33)
MCH RBC QN AUTO: 26.7 PG (ref 26.6–33)
MCH RBC QN AUTO: 26.9 PG (ref 26.6–33)
MCHC RBC AUTO-ENTMCNC: 30.6 G/DL (ref 31.5–35.7)
MCHC RBC AUTO-ENTMCNC: 31 G/DL (ref 31.5–35.7)
MCHC RBC AUTO-ENTMCNC: 31.5 G/DL (ref 31.5–35.7)
MCHC RBC AUTO-ENTMCNC: 33.1 G/DL (ref 31.5–35.7)
MCV RBC AUTO: 80.7 FL (ref 79–97)
MCV RBC AUTO: 84.1 FL (ref 79–97)
MCV RBC AUTO: 84.7 FL (ref 79–97)
MCV RBC AUTO: 85.3 FL (ref 79–97)
MODALITY: ABNORMAL
MONOCYTES # BLD AUTO: 0.43 10*3/MM3 (ref 0.1–0.9)
MONOCYTES # BLD AUTO: 0.72 10*3/MM3 (ref 0.1–0.9)
MONOCYTES NFR BLD AUTO: 8.2 % (ref 5–12)
NEUTROPHILS # BLD AUTO: 18.69 10*3/MM3 (ref 1.7–7)
NEUTROPHILS NFR BLD AUTO: 5.34 10*3/MM3 (ref 1.7–7)
NEUTROPHILS NFR BLD AUTO: 60.7 % (ref 42.7–76)
NEUTROPHILS NFR BLD MANUAL: 90.7 % (ref 42.7–76)
NRBC BLD AUTO-RTO: 0 /100 WBC (ref 0–0.2)
NRBC BLD AUTO-RTO: 0 /100 WBC (ref 0–0.2)
O2 A-A PPRESDIFF RESPIRATORY: 0.6 MMHG
O2 A-A PPRESDIFF RESPIRATORY: 0.8 MMHG
PCO2 BLDA: 33.9 MM HG (ref 35–45)
PCO2 BLDA: 36.1 MM HG (ref 35–45)
PCO2 BLDA: 44.4 MM HG (ref 35–45)
PCO2 BLDA: 45.6 MM HG (ref 35–45)
PCO2 BLDA: 47.1 MM HG (ref 35–45)
PCO2 BLDA: 47.6 MM HG (ref 35–45)
PCO2 BLDA: 50.7 MM HG (ref 35–45)
PCO2 BLDA: 52.7 MM HG (ref 35–45)
PCO2 BLDA: 53.2 MM HG (ref 35–45)
PCO2 BLDV: 35.2 MM HG (ref 41–51)
PEEP RESPIRATORY: 7 CM[H2O]
PEEP RESPIRATORY: 7.5 CM[H2O]
PEEP RESPIRATORY: 7.5 CM[H2O]
PH BLDA: 7.31 PH UNITS (ref 7.35–7.6)
PH BLDA: 7.32 PH UNITS (ref 7.35–7.6)
PH BLDA: 7.32 PH UNITS (ref 7.35–7.6)
PH BLDA: 7.36 PH UNITS (ref 7.35–7.6)
PH BLDA: 7.37 PH UNITS (ref 7.35–7.6)
PH BLDA: 7.38 PH UNITS (ref 7.35–7.6)
PH BLDA: 7.41 PH UNITS (ref 7.35–7.45)
PH BLDA: 7.41 PH UNITS (ref 7.35–7.6)
PH BLDA: 7.42 PH UNITS (ref 7.35–7.45)
PH BLDV: 7.43 PH UNITS (ref 7.31–7.41)
PHOSPHATE SERPL-MCNC: 1.7 MG/DL (ref 2.5–4.5)
PHOSPHATE SERPL-MCNC: 2.4 MG/DL (ref 2.5–4.5)
PLAT MORPH BLD: NORMAL
PLATELET # BLD AUTO: 210 10*3/MM3 (ref 140–450)
PLATELET # BLD AUTO: 266 10*3/MM3 (ref 140–450)
PLATELET # BLD AUTO: 282 10*3/MM3 (ref 140–450)
PLATELET # BLD AUTO: 349 10*3/MM3 (ref 140–450)
PMV BLD AUTO: 9.5 FL (ref 6–12)
PMV BLD AUTO: 9.7 FL (ref 6–12)
PMV BLD AUTO: 9.7 FL (ref 6–12)
PMV BLD AUTO: 9.9 FL (ref 6–12)
PO2 BLDA: 152.6 MM HG (ref 80–100)
PO2 BLDA: 367 MMHG (ref 80–105)
PO2 BLDA: 370 MMHG (ref 80–105)
PO2 BLDA: 417 MMHG (ref 80–105)
PO2 BLDA: 461 MMHG (ref 80–105)
PO2 BLDA: 474 MMHG (ref 80–105)
PO2 BLDA: 480 MMHG (ref 80–105)
PO2 BLDA: 50 MMHG (ref 80–105)
PO2 BLDA: 553.3 MM HG (ref 80–100)
PO2 BLDV: 31.4 MM HG (ref 35–45)
POLYCHROMASIA BLD QL SMEAR: ABNORMAL
POTASSIUM BLDA-SCNC: 3.6 MMOL/L (ref 3.5–4.9)
POTASSIUM BLDA-SCNC: 3.8 MMOL/L (ref 3.5–4.9)
POTASSIUM BLDA-SCNC: 3.9 MMOL/L (ref 3.5–4.9)
POTASSIUM BLDA-SCNC: 4 MMOL/L (ref 3.5–4.9)
POTASSIUM BLDA-SCNC: 4 MMOL/L (ref 3.5–4.9)
POTASSIUM BLDA-SCNC: 4.2 MMOL/L (ref 3.5–4.9)
POTASSIUM BLDA-SCNC: 4.4 MMOL/L (ref 3.5–4.9)
POTASSIUM SERPL-SCNC: 3.7 MMOL/L (ref 3.5–5.2)
POTASSIUM SERPL-SCNC: 3.8 MMOL/L (ref 3.5–5.2)
POTASSIUM SERPL-SCNC: 4 MMOL/L (ref 3.5–5.2)
PROTHROMBIN TIME: 17.7 SECONDS (ref 11.7–14.2)
PROTHROMBIN TIME: 17.7 SECONDS (ref 12.8–15.2)
PROTHROMBIN TIME: 21.3 SECONDS (ref 11.7–14.2)
QT INTERVAL: 418 MS
RBC # BLD AUTO: 2.79 10*6/MM3 (ref 3.77–5.28)
RBC # BLD AUTO: 3.22 10*6/MM3 (ref 3.77–5.28)
RBC # BLD AUTO: 3.65 10*6/MM3 (ref 3.77–5.28)
RBC # BLD AUTO: 3.66 10*6/MM3 (ref 3.77–5.28)
SAO2 % BLDA: 100 % (ref 95–98)
SAO2 % BLDA: 83 % (ref 95–98)
SAO2 % BLDCOA: 100 % (ref 92–99)
SAO2 % BLDCOA: 62.8 % (ref 92–99)
SAO2 % BLDCOA: 99.4 % (ref 92–99)
SET MECH RESP RATE: 18
SODIUM SERPL-SCNC: 135 MMOL/L (ref 136–145)
SODIUM SERPL-SCNC: 138 MMOL/L (ref 136–145)
SODIUM SERPL-SCNC: 141 MMOL/L (ref 136–145)
TOTAL RATE: 18 BREATHS/MINUTE
TOTAL RATE: 18 BREATHS/MINUTE
TOTAL RATE: 20 BREATHS/MINUTE
VENTILATOR MODE: AC
VT ON VENT VENT: 550 ML
WBC # BLD AUTO: 12.35 10*3/MM3 (ref 3.4–10.8)
WBC # BLD AUTO: 16.58 10*3/MM3 (ref 3.4–10.8)
WBC # BLD AUTO: 20.61 10*3/MM3 (ref 3.4–10.8)
WBC # BLD AUTO: 8.8 10*3/MM3 (ref 3.4–10.8)
WBC MORPH BLD: NORMAL

## 2021-04-27 PROCEDURE — 25010000002 FENTANYL CITRATE (PF) 100 MCG/2ML SOLUTION: Performed by: ANESTHESIOLOGY

## 2021-04-27 PROCEDURE — C1751 CATH, INF, PER/CENT/MIDLINE: HCPCS | Performed by: ANESTHESIOLOGY

## 2021-04-27 PROCEDURE — 85347 COAGULATION TIME ACTIVATED: CPT

## 2021-04-27 PROCEDURE — 36430 TRANSFUSION BLD/BLD COMPNT: CPT

## 2021-04-27 PROCEDURE — 80048 BASIC METABOLIC PNL TOTAL CA: CPT | Performed by: THORACIC SURGERY (CARDIOTHORACIC VASCULAR SURGERY)

## 2021-04-27 PROCEDURE — 85014 HEMATOCRIT: CPT

## 2021-04-27 PROCEDURE — C1713 ANCHOR/SCREW BN/BN,TIS/BN: HCPCS | Performed by: THORACIC SURGERY (CARDIOTHORACIC VASCULAR SURGERY)

## 2021-04-27 PROCEDURE — P9041 ALBUMIN (HUMAN),5%, 50ML: HCPCS | Performed by: THORACIC SURGERY (CARDIOTHORACIC VASCULAR SURGERY)

## 2021-04-27 PROCEDURE — 80069 RENAL FUNCTION PANEL: CPT | Performed by: THORACIC SURGERY (CARDIOTHORACIC VASCULAR SURGERY)

## 2021-04-27 PROCEDURE — 85384 FIBRINOGEN ACTIVITY: CPT | Performed by: THORACIC SURGERY (CARDIOTHORACIC VASCULAR SURGERY)

## 2021-04-27 PROCEDURE — 85576 BLOOD PLATELET AGGREGATION: CPT | Performed by: NURSE PRACTITIONER

## 2021-04-27 PROCEDURE — 021109W BYPASS CORONARY ARTERY, TWO ARTERIES FROM AORTA WITH AUTOLOGOUS VENOUS TISSUE, OPEN APPROACH: ICD-10-PCS | Performed by: THORACIC SURGERY (CARDIOTHORACIC VASCULAR SURGERY)

## 2021-04-27 PROCEDURE — 33518 CABG ARTERY-VEIN TWO: CPT | Performed by: PHYSICIAN ASSISTANT

## 2021-04-27 PROCEDURE — 02100Z9 BYPASS CORONARY ARTERY, ONE ARTERY FROM LEFT INTERNAL MAMMARY, OPEN APPROACH: ICD-10-PCS | Performed by: THORACIC SURGERY (CARDIOTHORACIC VASCULAR SURGERY)

## 2021-04-27 PROCEDURE — 85018 HEMOGLOBIN: CPT

## 2021-04-27 PROCEDURE — 33533 CABG ARTERIAL SINGLE: CPT | Performed by: PHYSICIAN ASSISTANT

## 2021-04-27 PROCEDURE — 82962 GLUCOSE BLOOD TEST: CPT

## 2021-04-27 PROCEDURE — 82330 ASSAY OF CALCIUM: CPT | Performed by: THORACIC SURGERY (CARDIOTHORACIC VASCULAR SURGERY)

## 2021-04-27 PROCEDURE — 33508 ENDOSCOPIC VEIN HARVEST: CPT | Performed by: PHYSICIAN ASSISTANT

## 2021-04-27 PROCEDURE — 06BP4ZZ EXCISION OF RIGHT SAPHENOUS VEIN, PERCUTANEOUS ENDOSCOPIC APPROACH: ICD-10-PCS | Performed by: THORACIC SURGERY (CARDIOTHORACIC VASCULAR SURGERY)

## 2021-04-27 PROCEDURE — 33518 CABG ARTERY-VEIN TWO: CPT | Performed by: THORACIC SURGERY (CARDIOTHORACIC VASCULAR SURGERY)

## 2021-04-27 PROCEDURE — 25010000002 ALBUMIN HUMAN 5% PER 50 ML: Performed by: THORACIC SURGERY (CARDIOTHORACIC VASCULAR SURGERY)

## 2021-04-27 PROCEDURE — 25010000002 EPINEPHRINE PER 0.1 MG: Performed by: ANESTHESIOLOGY

## 2021-04-27 PROCEDURE — 25010000002 MAGNESIUM SULFATE 2 GM/50ML SOLUTION: Performed by: THORACIC SURGERY (CARDIOTHORACIC VASCULAR SURGERY)

## 2021-04-27 PROCEDURE — A4648 IMPLANTABLE TISSUE MARKER: HCPCS | Performed by: THORACIC SURGERY (CARDIOTHORACIC VASCULAR SURGERY)

## 2021-04-27 PROCEDURE — 94799 UNLISTED PULMONARY SVC/PX: CPT

## 2021-04-27 PROCEDURE — 88305 TISSUE EXAM BY PATHOLOGIST: CPT | Performed by: THORACIC SURGERY (CARDIOTHORACIC VASCULAR SURGERY)

## 2021-04-27 PROCEDURE — 25010000003 MILRINONE LACTATE 10 MG/10ML SOLUTION 10 ML VIAL: Performed by: ANESTHESIOLOGY

## 2021-04-27 PROCEDURE — 86927 PLASMA FRESH FROZEN: CPT

## 2021-04-27 PROCEDURE — B24BZZ4 ULTRASONOGRAPHY OF HEART WITH AORTA, TRANSESOPHAGEAL: ICD-10-PCS | Performed by: ANESTHESIOLOGY

## 2021-04-27 PROCEDURE — 33430 REPLACEMENT OF MITRAL VALVE: CPT | Performed by: THORACIC SURGERY (CARDIOTHORACIC VASCULAR SURGERY)

## 2021-04-27 PROCEDURE — 85007 BL SMEAR W/DIFF WBC COUNT: CPT | Performed by: THORACIC SURGERY (CARDIOTHORACIC VASCULAR SURGERY)

## 2021-04-27 PROCEDURE — 85610 PROTHROMBIN TIME: CPT | Performed by: THORACIC SURGERY (CARDIOTHORACIC VASCULAR SURGERY)

## 2021-04-27 PROCEDURE — 85025 COMPLETE CBC W/AUTO DIFF WBC: CPT | Performed by: THORACIC SURGERY (CARDIOTHORACIC VASCULAR SURGERY)

## 2021-04-27 PROCEDURE — C1889 IMPLANT/INSERT DEVICE, NOC: HCPCS | Performed by: THORACIC SURGERY (CARDIOTHORACIC VASCULAR SURGERY)

## 2021-04-27 PROCEDURE — P9012 CRYOPRECIPITATE EACH UNIT: HCPCS

## 2021-04-27 PROCEDURE — 86900 BLOOD TYPING SEROLOGIC ABO: CPT

## 2021-04-27 PROCEDURE — 25010000002 HEPARIN (PORCINE) PER 1000 UNITS: Performed by: ANESTHESIOLOGY

## 2021-04-27 PROCEDURE — C1729 CATH, DRAINAGE: HCPCS | Performed by: THORACIC SURGERY (CARDIOTHORACIC VASCULAR SURGERY)

## 2021-04-27 PROCEDURE — 25010000002 PROTAMINE SULFATE PER 10 MG: Performed by: ANESTHESIOLOGY

## 2021-04-27 PROCEDURE — 85610 PROTHROMBIN TIME: CPT

## 2021-04-27 PROCEDURE — 94002 VENT MGMT INPAT INIT DAY: CPT

## 2021-04-27 PROCEDURE — 02RG08Z REPLACEMENT OF MITRAL VALVE WITH ZOOPLASTIC TISSUE, OPEN APPROACH: ICD-10-PCS | Performed by: THORACIC SURGERY (CARDIOTHORACIC VASCULAR SURGERY)

## 2021-04-27 PROCEDURE — 25010000002 HEPARIN (PORCINE) PER 1000 UNITS

## 2021-04-27 PROCEDURE — 63710000001 INSULIN REGULAR HUMAN PER 5 UNITS: Performed by: ANESTHESIOLOGY

## 2021-04-27 PROCEDURE — 85730 THROMBOPLASTIN TIME PARTIAL: CPT | Performed by: THORACIC SURGERY (CARDIOTHORACIC VASCULAR SURGERY)

## 2021-04-27 PROCEDURE — 25010000002 HEPARIN (PORCINE) PER 1000 UNITS: Performed by: THORACIC SURGERY (CARDIOTHORACIC VASCULAR SURGERY)

## 2021-04-27 PROCEDURE — 82803 BLOOD GASES ANY COMBINATION: CPT

## 2021-04-27 PROCEDURE — 25010000002 MIDAZOLAM PER 1 MG: Performed by: ANESTHESIOLOGY

## 2021-04-27 PROCEDURE — 93005 ELECTROCARDIOGRAM TRACING: CPT | Performed by: THORACIC SURGERY (CARDIOTHORACIC VASCULAR SURGERY)

## 2021-04-27 PROCEDURE — 33430 REPLACEMENT OF MITRAL VALVE: CPT | Performed by: PHYSICIAN ASSISTANT

## 2021-04-27 PROCEDURE — 03HY32Z INSERTION OF MONITORING DEVICE INTO UPPER ARTERY, PERCUTANEOUS APPROACH: ICD-10-PCS | Performed by: ANESTHESIOLOGY

## 2021-04-27 PROCEDURE — 25010000002 PAPAVERINE PER 60 MG: Performed by: THORACIC SURGERY (CARDIOTHORACIC VASCULAR SURGERY)

## 2021-04-27 PROCEDURE — 25010000003 POTASSIUM CHLORIDE PER 2 MEQ: Performed by: THORACIC SURGERY (CARDIOTHORACIC VASCULAR SURGERY)

## 2021-04-27 PROCEDURE — 25010000003 CEFAZOLIN IN DEXTROSE 2-4 GM/100ML-% SOLUTION: Performed by: THORACIC SURGERY (CARDIOTHORACIC VASCULAR SURGERY)

## 2021-04-27 PROCEDURE — 82947 ASSAY GLUCOSE BLOOD QUANT: CPT

## 2021-04-27 PROCEDURE — 25010000002 MAGNESIUM SULFATE PER 500 MG OF MAGNESIUM: Performed by: ANESTHESIOLOGY

## 2021-04-27 PROCEDURE — 71045 X-RAY EXAM CHEST 1 VIEW: CPT

## 2021-04-27 PROCEDURE — 33508 ENDOSCOPIC VEIN HARVEST: CPT | Performed by: THORACIC SURGERY (CARDIOTHORACIC VASCULAR SURGERY)

## 2021-04-27 PROCEDURE — P9016 RBC LEUKOCYTES REDUCED: HCPCS

## 2021-04-27 PROCEDURE — P9047 ALBUMIN (HUMAN), 25%, 50ML: HCPCS

## 2021-04-27 PROCEDURE — 99231 SBSQ HOSP IP/OBS SF/LOW 25: CPT | Performed by: INTERNAL MEDICINE

## 2021-04-27 PROCEDURE — 85027 COMPLETE CBC AUTOMATED: CPT | Performed by: THORACIC SURGERY (CARDIOTHORACIC VASCULAR SURGERY)

## 2021-04-27 PROCEDURE — 25010000002 PROPOFOL 10 MG/ML EMULSION: Performed by: THORACIC SURGERY (CARDIOTHORACIC VASCULAR SURGERY)

## 2021-04-27 PROCEDURE — 02HP32Z INSERTION OF MONITORING DEVICE INTO PULMONARY TRUNK, PERCUTANEOUS APPROACH: ICD-10-PCS | Performed by: ANESTHESIOLOGY

## 2021-04-27 PROCEDURE — 83735 ASSAY OF MAGNESIUM: CPT | Performed by: THORACIC SURGERY (CARDIOTHORACIC VASCULAR SURGERY)

## 2021-04-27 PROCEDURE — 93318 ECHO TRANSESOPHAGEAL INTRAOP: CPT | Performed by: ANESTHESIOLOGY

## 2021-04-27 PROCEDURE — 25010000002 PROPOFOL 10 MG/ML EMULSION: Performed by: ANESTHESIOLOGY

## 2021-04-27 PROCEDURE — 25010000002 FUROSEMIDE PER 20 MG

## 2021-04-27 PROCEDURE — 33533 CABG ARTERIAL SINGLE: CPT | Performed by: THORACIC SURGERY (CARDIOTHORACIC VASCULAR SURGERY)

## 2021-04-27 PROCEDURE — 25010000003 CEFAZOLIN IN DEXTROSE 2-4 GM/100ML-% SOLUTION: Performed by: NURSE PRACTITIONER

## 2021-04-27 PROCEDURE — 25010000002 ALBUMIN HUMAN 25% PER 50 ML

## 2021-04-27 PROCEDURE — 25010000002 LIDOCAINE PER 10 MG: Performed by: THORACIC SURGERY (CARDIOTHORACIC VASCULAR SURGERY)

## 2021-04-27 PROCEDURE — 25010000002 MORPHINE PER 10 MG: Performed by: THORACIC SURGERY (CARDIOTHORACIC VASCULAR SURGERY)

## 2021-04-27 DEVICE — WAX BONE HEMO NAT 2.5G: Type: IMPLANTABLE DEVICE | Site: CHEST | Status: FUNCTIONAL

## 2021-04-27 DEVICE — VLV MITRAL HANCOCK 2 ULTR T510 29MM: Type: IMPLANTABLE DEVICE | Site: HEART | Status: FUNCTIONAL

## 2021-04-27 DEVICE — COR-KNOT MINI® COMBO KITBASE PACKAGE TYPE - KITEACH STERILE PACKAGE KIT CONTAINS (2) SINGLE PATIENT USE COR-KNOT MINI® DEVICES AND (12) COR-KNOT® QUICK LOADS®.
Type: IMPLANTABLE DEVICE | Site: HEART | Status: FUNCTIONAL
Brand: COR-KNOT MINI®

## 2021-04-27 DEVICE — ABSORBABLE HEMOSTAT (OXIDIZED REGENERATED CELLULOSE, U.S.P.)
Type: IMPLANTABLE DEVICE | Site: CHEST | Status: FUNCTIONAL
Brand: SURGICEL

## 2021-04-27 DEVICE — SS SUTURE, 3 PER SLEEVE
Type: IMPLANTABLE DEVICE | Site: STERNUM | Status: FUNCTIONAL
Brand: MYO/WIRE II

## 2021-04-27 DEVICE — FLOSEAL HEMOSTATIC MATRIX, 5ML
Type: IMPLANTABLE DEVICE | Site: HEART | Status: FUNCTIONAL
Brand: FLOSEAL HEMOSTATIC MATRIX

## 2021-04-27 DEVICE — SS SUTURE, 4 PER SLEEVE
Type: IMPLANTABLE DEVICE | Site: STERNUM | Status: FUNCTIONAL
Brand: MYO/WIRE II

## 2021-04-27 RX ORDER — SODIUM CHLORIDE 9 MG/ML
30 INJECTION, SOLUTION INTRAVENOUS CONTINUOUS
Status: DISCONTINUED | OUTPATIENT
Start: 2021-04-27 | End: 2021-05-05 | Stop reason: HOSPADM

## 2021-04-27 RX ORDER — PROTAMINE SULFATE 10 MG/ML
INJECTION, SOLUTION INTRAVENOUS AS NEEDED
Status: DISCONTINUED | OUTPATIENT
Start: 2021-04-27 | End: 2021-04-27 | Stop reason: SURG

## 2021-04-27 RX ORDER — POTASSIUM CHLORIDE 29.8 MG/ML
20 INJECTION INTRAVENOUS
Status: DISCONTINUED | OUTPATIENT
Start: 2021-04-27 | End: 2021-05-05 | Stop reason: HOSPADM

## 2021-04-27 RX ORDER — ACETAMINOPHEN 500 MG
1000 TABLET ORAL ONCE
Status: COMPLETED | OUTPATIENT
Start: 2021-04-27 | End: 2021-04-27

## 2021-04-27 RX ORDER — POTASSIUM CHLORIDE 750 MG/1
40 TABLET, FILM COATED, EXTENDED RELEASE ORAL AS NEEDED
Status: DISCONTINUED | OUTPATIENT
Start: 2021-04-27 | End: 2021-05-05 | Stop reason: HOSPADM

## 2021-04-27 RX ORDER — HEPARIN SODIUM 1000 [USP'U]/ML
INJECTION, SOLUTION INTRAVENOUS; SUBCUTANEOUS AS NEEDED
Status: DISCONTINUED | OUTPATIENT
Start: 2021-04-27 | End: 2021-04-27 | Stop reason: SURG

## 2021-04-27 RX ORDER — ASPIRIN 81 MG/1
81 TABLET ORAL DAILY
Status: DISCONTINUED | OUTPATIENT
Start: 2021-04-28 | End: 2021-05-05 | Stop reason: HOSPADM

## 2021-04-27 RX ORDER — BISACODYL 10 MG
10 SUPPOSITORY, RECTAL RECTAL DAILY PRN
Status: DISCONTINUED | OUTPATIENT
Start: 2021-04-28 | End: 2021-05-05 | Stop reason: HOSPADM

## 2021-04-27 RX ORDER — SODIUM CHLORIDE 0.9 % (FLUSH) 0.9 %
3-10 SYRINGE (ML) INJECTION AS NEEDED
Status: DISCONTINUED | OUTPATIENT
Start: 2021-04-27 | End: 2021-04-27 | Stop reason: HOSPADM

## 2021-04-27 RX ORDER — HEPARIN SODIUM 5000 [USP'U]/ML
INJECTION, SOLUTION INTRAVENOUS; SUBCUTANEOUS AS NEEDED
Status: DISCONTINUED | OUTPATIENT
Start: 2021-04-27 | End: 2021-04-27 | Stop reason: HOSPADM

## 2021-04-27 RX ORDER — ACETAMINOPHEN 650 MG/1
650 SUPPOSITORY RECTAL EVERY 4 HOURS
Status: ACTIVE | OUTPATIENT
Start: 2021-04-27 | End: 2021-04-28

## 2021-04-27 RX ORDER — CHLORHEXIDINE GLUCONATE 0.12 MG/ML
15 RINSE ORAL EVERY 12 HOURS
Status: DISCONTINUED | OUTPATIENT
Start: 2021-04-27 | End: 2021-05-03

## 2021-04-27 RX ORDER — FAMOTIDINE 10 MG/ML
20 INJECTION, SOLUTION INTRAVENOUS ONCE
Status: COMPLETED | OUTPATIENT
Start: 2021-04-27 | End: 2021-04-27

## 2021-04-27 RX ORDER — HYDROCODONE BITARTRATE AND ACETAMINOPHEN 10; 325 MG/1; MG/1
1 TABLET ORAL EVERY 4 HOURS PRN
Status: DISPENSED | OUTPATIENT
Start: 2021-04-27 | End: 2021-05-04

## 2021-04-27 RX ORDER — SODIUM CHLORIDE 0.9 % (FLUSH) 0.9 %
3 SYRINGE (ML) INJECTION EVERY 12 HOURS SCHEDULED
Status: DISCONTINUED | OUTPATIENT
Start: 2021-04-27 | End: 2021-04-27 | Stop reason: HOSPADM

## 2021-04-27 RX ORDER — FENTANYL CITRATE 50 UG/ML
50 INJECTION, SOLUTION INTRAMUSCULAR; INTRAVENOUS
Status: DISCONTINUED | OUTPATIENT
Start: 2021-04-27 | End: 2021-04-27 | Stop reason: HOSPADM

## 2021-04-27 RX ORDER — PROPOFOL 10 MG/ML
VIAL (ML) INTRAVENOUS CONTINUOUS PRN
Status: DISCONTINUED | OUTPATIENT
Start: 2021-04-27 | End: 2021-04-27 | Stop reason: SURG

## 2021-04-27 RX ORDER — DOPAMINE HYDROCHLORIDE 160 MG/100ML
2-20 INJECTION, SOLUTION INTRAVENOUS CONTINUOUS PRN
Status: DISCONTINUED | OUTPATIENT
Start: 2021-04-27 | End: 2021-04-30

## 2021-04-27 RX ORDER — SODIUM CHLORIDE 0.9 % (FLUSH) 0.9 %
30 SYRINGE (ML) INJECTION ONCE AS NEEDED
Status: COMPLETED | OUTPATIENT
Start: 2021-04-27 | End: 2021-05-01

## 2021-04-27 RX ORDER — ACETAMINOPHEN 325 MG/1
650 TABLET ORAL EVERY 4 HOURS
Status: ACTIVE | OUTPATIENT
Start: 2021-04-27 | End: 2021-04-28

## 2021-04-27 RX ORDER — POTASSIUM CHLORIDE 29.8 MG/ML
20 INJECTION INTRAVENOUS
Status: COMPLETED | OUTPATIENT
Start: 2021-04-27 | End: 2021-04-27

## 2021-04-27 RX ORDER — POLYETHYLENE GLYCOL 3350 17 G/17G
17 POWDER, FOR SOLUTION ORAL DAILY PRN
Status: DISCONTINUED | OUTPATIENT
Start: 2021-04-27 | End: 2021-05-05 | Stop reason: HOSPADM

## 2021-04-27 RX ORDER — ALBUMIN, HUMAN INJ 5% 5 %
500 SOLUTION INTRAVENOUS CONTINUOUS PRN
Status: DISCONTINUED | OUTPATIENT
Start: 2021-04-27 | End: 2021-05-05 | Stop reason: HOSPADM

## 2021-04-27 RX ORDER — ONDANSETRON 2 MG/ML
4 INJECTION INTRAMUSCULAR; INTRAVENOUS EVERY 6 HOURS PRN
Status: DISCONTINUED | OUTPATIENT
Start: 2021-04-27 | End: 2021-05-05 | Stop reason: HOSPADM

## 2021-04-27 RX ORDER — PANTOPRAZOLE SODIUM 40 MG/1
40 TABLET, DELAYED RELEASE ORAL EVERY MORNING
Status: COMPLETED | OUTPATIENT
Start: 2021-04-28 | End: 2021-04-30

## 2021-04-27 RX ORDER — POTASSIUM CHLORIDE 1.5 G/1.77G
40 POWDER, FOR SOLUTION ORAL AS NEEDED
Status: DISCONTINUED | OUTPATIENT
Start: 2021-04-27 | End: 2021-05-05 | Stop reason: HOSPADM

## 2021-04-27 RX ORDER — MAGNESIUM SULFATE HEPTAHYDRATE 500 MG/ML
INJECTION, SOLUTION INTRAMUSCULAR; INTRAVENOUS AS NEEDED
Status: DISCONTINUED | OUTPATIENT
Start: 2021-04-27 | End: 2021-04-27 | Stop reason: SURG

## 2021-04-27 RX ORDER — DEXMEDETOMIDINE HYDROCHLORIDE 4 UG/ML
INJECTION, SOLUTION INTRAVENOUS CONTINUOUS PRN
Status: DISCONTINUED | OUTPATIENT
Start: 2021-04-27 | End: 2021-04-27 | Stop reason: SURG

## 2021-04-27 RX ORDER — CEFAZOLIN SODIUM 2 G/100ML
2 INJECTION, SOLUTION INTRAVENOUS EVERY 8 HOURS
Status: COMPLETED | OUTPATIENT
Start: 2021-04-27 | End: 2021-04-29

## 2021-04-27 RX ORDER — LIDOCAINE HYDROCHLORIDE 10 MG/ML
0.5 INJECTION, SOLUTION EPIDURAL; INFILTRATION; INTRACAUDAL; PERINEURAL ONCE AS NEEDED
Status: DISCONTINUED | OUTPATIENT
Start: 2021-04-27 | End: 2021-04-27 | Stop reason: HOSPADM

## 2021-04-27 RX ORDER — KETAMINE HYDROCHLORIDE 10 MG/ML
INJECTION INTRAMUSCULAR; INTRAVENOUS AS NEEDED
Status: DISCONTINUED | OUTPATIENT
Start: 2021-04-27 | End: 2021-04-27 | Stop reason: SURG

## 2021-04-27 RX ORDER — BISACODYL 5 MG/1
10 TABLET, DELAYED RELEASE ORAL DAILY PRN
Status: DISCONTINUED | OUTPATIENT
Start: 2021-04-27 | End: 2021-05-05 | Stop reason: HOSPADM

## 2021-04-27 RX ORDER — ACETAMINOPHEN 650 MG/1
650 SUPPOSITORY RECTAL EVERY 4 HOURS PRN
Status: DISCONTINUED | OUTPATIENT
Start: 2021-04-28 | End: 2021-05-05 | Stop reason: HOSPADM

## 2021-04-27 RX ORDER — MIDAZOLAM HYDROCHLORIDE 1 MG/ML
1 INJECTION INTRAMUSCULAR; INTRAVENOUS
Status: COMPLETED | OUTPATIENT
Start: 2021-04-27 | End: 2021-04-27

## 2021-04-27 RX ORDER — ALBUMIN, HUMAN INJ 5% 5 %
1000 SOLUTION INTRAVENOUS AS NEEDED
Status: DISPENSED | OUTPATIENT
Start: 2021-04-27 | End: 2021-04-28

## 2021-04-27 RX ORDER — MIDAZOLAM HYDROCHLORIDE 1 MG/ML
0.5 INJECTION INTRAMUSCULAR; INTRAVENOUS
Status: COMPLETED | OUTPATIENT
Start: 2021-04-27 | End: 2021-04-27

## 2021-04-27 RX ORDER — POTASSIUM CHLORIDE 7.45 MG/ML
10 INJECTION INTRAVENOUS
Status: DISCONTINUED | OUTPATIENT
Start: 2021-04-27 | End: 2021-05-05 | Stop reason: HOSPADM

## 2021-04-27 RX ORDER — NOREPINEPHRINE BIT/0.9 % NACL 8 MG/250ML
.02-.3 INFUSION BOTTLE (ML) INTRAVENOUS CONTINUOUS PRN
Status: DISCONTINUED | OUTPATIENT
Start: 2021-04-27 | End: 2021-04-30

## 2021-04-27 RX ORDER — AMOXICILLIN 250 MG
2 CAPSULE ORAL 2 TIMES DAILY
Status: DISCONTINUED | OUTPATIENT
Start: 2021-04-27 | End: 2021-05-05 | Stop reason: HOSPADM

## 2021-04-27 RX ORDER — METOCLOPRAMIDE HYDROCHLORIDE 5 MG/ML
10 INJECTION INTRAMUSCULAR; INTRAVENOUS EVERY 6 HOURS
Status: DISPENSED | OUTPATIENT
Start: 2021-04-27 | End: 2021-04-28

## 2021-04-27 RX ORDER — PROPOFOL 10 MG/ML
VIAL (ML) INTRAVENOUS AS NEEDED
Status: DISCONTINUED | OUTPATIENT
Start: 2021-04-27 | End: 2021-04-27 | Stop reason: SURG

## 2021-04-27 RX ORDER — HYDROCODONE BITARTRATE AND ACETAMINOPHEN 5; 325 MG/1; MG/1
2 TABLET ORAL EVERY 4 HOURS PRN
Status: DISCONTINUED | OUTPATIENT
Start: 2021-04-27 | End: 2021-05-05 | Stop reason: HOSPADM

## 2021-04-27 RX ORDER — SODIUM CHLORIDE 9 MG/ML
INJECTION, SOLUTION INTRAVENOUS CONTINUOUS PRN
Status: DISCONTINUED | OUTPATIENT
Start: 2021-04-27 | End: 2021-04-27 | Stop reason: SURG

## 2021-04-27 RX ORDER — LIDOCAINE HYDROCHLORIDE ANHYDROUS AND DEXTROSE MONOHYDRATE 5; 400 G/100ML; MG/100ML
2 INJECTION, SOLUTION INTRAVENOUS CONTINUOUS
Status: DISCONTINUED | OUTPATIENT
Start: 2021-04-27 | End: 2021-04-30

## 2021-04-27 RX ORDER — PANTOPRAZOLE SODIUM 40 MG/10ML
40 INJECTION, POWDER, LYOPHILIZED, FOR SOLUTION INTRAVENOUS ONCE
Status: COMPLETED | OUTPATIENT
Start: 2021-04-27 | End: 2021-04-27

## 2021-04-27 RX ORDER — MILRINONE LACTATE 0.2 MG/ML
.25-.75 INJECTION, SOLUTION INTRAVENOUS CONTINUOUS PRN
Status: DISCONTINUED | OUTPATIENT
Start: 2021-04-27 | End: 2021-04-30

## 2021-04-27 RX ORDER — ACETAMINOPHEN 160 MG/5ML
650 SOLUTION ORAL EVERY 4 HOURS PRN
Status: DISCONTINUED | OUTPATIENT
Start: 2021-04-28 | End: 2021-05-05 | Stop reason: HOSPADM

## 2021-04-27 RX ORDER — NICARDIPINE HYDROCHLORIDE 2.5 MG/ML
INJECTION INTRAVENOUS AS NEEDED
Status: DISCONTINUED | OUTPATIENT
Start: 2021-04-27 | End: 2021-04-27 | Stop reason: SURG

## 2021-04-27 RX ORDER — ATORVASTATIN CALCIUM 20 MG/1
40 TABLET, FILM COATED ORAL NIGHTLY
Status: DISCONTINUED | OUTPATIENT
Start: 2021-04-27 | End: 2021-05-05 | Stop reason: HOSPADM

## 2021-04-27 RX ORDER — ROCURONIUM BROMIDE 10 MG/ML
INJECTION, SOLUTION INTRAVENOUS AS NEEDED
Status: DISCONTINUED | OUTPATIENT
Start: 2021-04-27 | End: 2021-04-27 | Stop reason: SURG

## 2021-04-27 RX ORDER — AMINOCAPROIC ACID 250 MG/ML
INJECTION, SOLUTION INTRAVENOUS AS NEEDED
Status: DISCONTINUED | OUTPATIENT
Start: 2021-04-27 | End: 2021-04-27 | Stop reason: SURG

## 2021-04-27 RX ORDER — MIDAZOLAM HYDROCHLORIDE 1 MG/ML
INJECTION INTRAMUSCULAR; INTRAVENOUS AS NEEDED
Status: DISCONTINUED | OUTPATIENT
Start: 2021-04-27 | End: 2021-04-27 | Stop reason: SURG

## 2021-04-27 RX ORDER — SODIUM CHLORIDE, SODIUM LACTATE, POTASSIUM CHLORIDE, CALCIUM CHLORIDE 600; 310; 30; 20 MG/100ML; MG/100ML; MG/100ML; MG/100ML
9 INJECTION, SOLUTION INTRAVENOUS CONTINUOUS
Status: DISCONTINUED | OUTPATIENT
Start: 2021-04-27 | End: 2021-04-27

## 2021-04-27 RX ORDER — NOREPINEPHRINE BITARTRATE 1 MG/ML
INJECTION, SOLUTION INTRAVENOUS CONTINUOUS PRN
Status: DISCONTINUED | OUTPATIENT
Start: 2021-04-27 | End: 2021-04-27 | Stop reason: SURG

## 2021-04-27 RX ORDER — MORPHINE SULFATE 2 MG/ML
4 INJECTION, SOLUTION INTRAMUSCULAR; INTRAVENOUS
Status: DISPENSED | OUTPATIENT
Start: 2021-04-27 | End: 2021-05-04

## 2021-04-27 RX ORDER — NALOXONE HCL 0.4 MG/ML
0.4 VIAL (ML) INJECTION
Status: DISCONTINUED | OUTPATIENT
Start: 2021-04-27 | End: 2021-05-05 | Stop reason: HOSPADM

## 2021-04-27 RX ORDER — MORPHINE SULFATE 2 MG/ML
1 INJECTION, SOLUTION INTRAMUSCULAR; INTRAVENOUS EVERY 4 HOURS PRN
Status: DISPENSED | OUTPATIENT
Start: 2021-04-27 | End: 2021-05-04

## 2021-04-27 RX ORDER — NITROGLYCERIN 20 MG/100ML
5-200 INJECTION INTRAVENOUS
Status: DISCONTINUED | OUTPATIENT
Start: 2021-04-27 | End: 2021-04-30

## 2021-04-27 RX ORDER — MEPERIDINE HYDROCHLORIDE 25 MG/ML
25 INJECTION INTRAMUSCULAR; INTRAVENOUS; SUBCUTANEOUS EVERY 4 HOURS PRN
Status: DISPENSED | OUTPATIENT
Start: 2021-04-27 | End: 2021-04-28

## 2021-04-27 RX ORDER — MIDAZOLAM HYDROCHLORIDE 1 MG/ML
2 INJECTION INTRAMUSCULAR; INTRAVENOUS
Status: DISCONTINUED | OUTPATIENT
Start: 2021-04-27 | End: 2021-04-30

## 2021-04-27 RX ORDER — ACETAMINOPHEN 160 MG/5ML
650 SOLUTION ORAL EVERY 4 HOURS
Status: DISPENSED | OUTPATIENT
Start: 2021-04-27 | End: 2021-04-28

## 2021-04-27 RX ORDER — MAGNESIUM SULFATE HEPTAHYDRATE 40 MG/ML
2 INJECTION, SOLUTION INTRAVENOUS EVERY 8 HOURS
Status: DISPENSED | OUTPATIENT
Start: 2021-04-27 | End: 2021-04-28

## 2021-04-27 RX ORDER — PAPAVERINE HYDROCHLORIDE 30 MG/ML
INJECTION INTRAMUSCULAR; INTRAVENOUS AS NEEDED
Status: DISCONTINUED | OUTPATIENT
Start: 2021-04-27 | End: 2021-04-27 | Stop reason: HOSPADM

## 2021-04-27 RX ORDER — SODIUM CHLORIDE 9 MG/ML
30 INJECTION, SOLUTION INTRAVENOUS CONTINUOUS PRN
Status: DISCONTINUED | OUTPATIENT
Start: 2021-04-27 | End: 2021-05-05 | Stop reason: HOSPADM

## 2021-04-27 RX ORDER — ACETAMINOPHEN 325 MG/1
650 TABLET ORAL EVERY 4 HOURS PRN
Status: DISCONTINUED | OUTPATIENT
Start: 2021-04-28 | End: 2021-05-05 | Stop reason: HOSPADM

## 2021-04-27 RX ORDER — FENTANYL CITRATE 50 UG/ML
INJECTION, SOLUTION INTRAMUSCULAR; INTRAVENOUS AS NEEDED
Status: DISCONTINUED | OUTPATIENT
Start: 2021-04-27 | End: 2021-04-27 | Stop reason: SURG

## 2021-04-27 RX ADMIN — DEXMEDETOMIDINE HYDROCHLORIDE 0.9 MCG/KG/HR: 100 INJECTION, SOLUTION, CONCENTRATE INTRAVENOUS at 19:29

## 2021-04-27 RX ADMIN — CHLORHEXIDINE GLUCONATE 1 APPLICATION: 500 CLOTH TOPICAL at 08:07

## 2021-04-27 RX ADMIN — MIDAZOLAM 1 MG: 1 INJECTION INTRAMUSCULAR; INTRAVENOUS at 08:41

## 2021-04-27 RX ADMIN — PROPOFOL 100 MG: 10 INJECTION, EMULSION INTRAVENOUS at 10:29

## 2021-04-27 RX ADMIN — MORPHINE SULFATE 4 MG: 2 INJECTION, SOLUTION INTRAMUSCULAR; INTRAVENOUS at 19:50

## 2021-04-27 RX ADMIN — NICARDIPINE HYDROCHLORIDE 0.3 MG: 2.5 INJECTION, SOLUTION INTRAVENOUS at 11:43

## 2021-04-27 RX ADMIN — METOPROLOL TARTRATE 12.5 MG: 25 TABLET, FILM COATED ORAL at 07:20

## 2021-04-27 RX ADMIN — AMINOCAPROIC ACID 10 G: 250 INJECTION, SOLUTION INTRAVENOUS at 14:03

## 2021-04-27 RX ADMIN — CHLORHEXIDINE GLUCONATE 15 ML: 1.2 RINSE ORAL at 07:19

## 2021-04-27 RX ADMIN — PANTOPRAZOLE SODIUM 40 MG: 40 INJECTION, POWDER, FOR SOLUTION INTRAVENOUS at 18:02

## 2021-04-27 RX ADMIN — SODIUM CHLORIDE 30 ML/HR: 9 INJECTION, SOLUTION INTRAVENOUS at 16:53

## 2021-04-27 RX ADMIN — HEPARIN SODIUM 20000 UNITS: 1000 INJECTION INTRAVENOUS; SUBCUTANEOUS at 11:52

## 2021-04-27 RX ADMIN — MAGNESIUM SULFATE HEPTAHYDRATE 2 G: 2 INJECTION, SOLUTION INTRAVENOUS at 16:57

## 2021-04-27 RX ADMIN — SODIUM CHLORIDE 5 UNITS/HR: 9 INJECTION, SOLUTION INTRAVENOUS at 12:34

## 2021-04-27 RX ADMIN — SODIUM CHLORIDE: 9 INJECTION, SOLUTION INTRAVENOUS at 10:18

## 2021-04-27 RX ADMIN — FENTANYL CITRATE 50 MCG: 50 INJECTION INTRAMUSCULAR; INTRAVENOUS at 08:53

## 2021-04-27 RX ADMIN — CEFAZOLIN SODIUM 2 G: 2 INJECTION, SOLUTION INTRAVENOUS at 10:24

## 2021-04-27 RX ADMIN — SODIUM CHLORIDE 0.25 MCG/KG/MIN: 0.9 INJECTION, SOLUTION INTRAVENOUS at 11:28

## 2021-04-27 RX ADMIN — FENTANYL CITRATE 100 MCG: 50 INJECTION INTRAMUSCULAR; INTRAVENOUS at 11:09

## 2021-04-27 RX ADMIN — LIDOCAINE HYDROCHLORIDE 2 MG/MIN: 4 INJECTION, SOLUTION INTRAVENOUS at 23:11

## 2021-04-27 RX ADMIN — KETAMINE HYDROCHLORIDE 10 MG: 10 INJECTION INTRAMUSCULAR; INTRAVENOUS at 12:52

## 2021-04-27 RX ADMIN — ROCURONIUM BROMIDE 50 MG: 50 INJECTION INTRAVENOUS at 13:31

## 2021-04-27 RX ADMIN — PROTAMINE SULFATE 250 MG: 10 INJECTION, SOLUTION INTRAVENOUS at 13:54

## 2021-04-27 RX ADMIN — POTASSIUM PHOSPHATE, MONOBASIC AND POTASSIUM PHOSPHATE, DIBASIC 18 MMOL: 224; 236 INJECTION, SOLUTION, CONCENTRATE INTRAVENOUS at 22:41

## 2021-04-27 RX ADMIN — PROPOFOL 10 MCG/KG/MIN: 10 INJECTION, EMULSION INTRAVENOUS at 20:15

## 2021-04-27 RX ADMIN — EPINEPHRINE 0.02 MCG/KG/MIN: 1 INJECTION, SOLUTION, CONCENTRATE INTRAVENOUS at 11:09

## 2021-04-27 RX ADMIN — CEFAZOLIN SODIUM 2 G: 2 INJECTION, SOLUTION INTRAVENOUS at 14:04

## 2021-04-27 RX ADMIN — NOREPINEPHRINE BITARTRATE 0.03 MCG/KG/MIN: 1 INJECTION, SOLUTION, CONCENTRATE INTRAVENOUS at 10:29

## 2021-04-27 RX ADMIN — NICARDIPINE HYDROCHLORIDE 0.3 MG: 2.5 INJECTION, SOLUTION INTRAVENOUS at 11:09

## 2021-04-27 RX ADMIN — IPRATROPIUM BROMIDE AND ALBUTEROL SULFATE 3 ML: 2.5; .5 SOLUTION RESPIRATORY (INHALATION) at 07:48

## 2021-04-27 RX ADMIN — MAGNESIUM SULFATE HEPTAHYDRATE 2 G: 500 INJECTION, SOLUTION INTRAMUSCULAR; INTRAVENOUS at 13:40

## 2021-04-27 RX ADMIN — MIDAZOLAM 1 MG: 1 INJECTION INTRAMUSCULAR; INTRAVENOUS at 08:53

## 2021-04-27 RX ADMIN — ALPRAZOLAM 0.25 MG: 0.25 TABLET ORAL at 04:07

## 2021-04-27 RX ADMIN — FENTANYL CITRATE 100 MCG: 50 INJECTION INTRAMUSCULAR; INTRAVENOUS at 11:18

## 2021-04-27 RX ADMIN — KETAMINE HYDROCHLORIDE 10 MG: 10 INJECTION INTRAMUSCULAR; INTRAVENOUS at 14:35

## 2021-04-27 RX ADMIN — DEXMEDETOMIDINE HYDROCHLORIDE 1 MCG/KG/HR: 4 INJECTION, SOLUTION INTRAVENOUS at 10:29

## 2021-04-27 RX ADMIN — FENTANYL CITRATE 50 MCG: 50 INJECTION INTRAMUSCULAR; INTRAVENOUS at 11:17

## 2021-04-27 RX ADMIN — CHLORHEXIDINE GLUCONATE 15 ML: 1.2 RINSE ORAL at 22:17

## 2021-04-27 RX ADMIN — FENTANYL CITRATE 50 MCG: 50 INJECTION INTRAMUSCULAR; INTRAVENOUS at 14:57

## 2021-04-27 RX ADMIN — FENTANYL CITRATE 50 MCG: 50 INJECTION INTRAMUSCULAR; INTRAVENOUS at 08:41

## 2021-04-27 RX ADMIN — NICARDIPINE HYDROCHLORIDE 0.3 MG: 2.5 INJECTION, SOLUTION INTRAVENOUS at 11:19

## 2021-04-27 RX ADMIN — KETAMINE HYDROCHLORIDE 10 MG: 10 INJECTION INTRAMUSCULAR; INTRAVENOUS at 13:41

## 2021-04-27 RX ADMIN — ALBUMIN HUMAN 250 ML: 0.05 INJECTION, SOLUTION INTRAVENOUS at 19:46

## 2021-04-27 RX ADMIN — ROCURONIUM BROMIDE 50 MG: 50 INJECTION INTRAVENOUS at 10:29

## 2021-04-27 RX ADMIN — ACETAMINOPHEN 1000 MG: 500 TABLET ORAL at 08:36

## 2021-04-27 RX ADMIN — FENTANYL CITRATE 100 MCG: 50 INJECTION INTRAMUSCULAR; INTRAVENOUS at 14:16

## 2021-04-27 RX ADMIN — MUPIROCIN 1 APPLICATION: 20 OINTMENT TOPICAL at 07:20

## 2021-04-27 RX ADMIN — POTASSIUM CHLORIDE 20 MEQ: 29.8 INJECTION, SOLUTION INTRAVENOUS at 16:44

## 2021-04-27 RX ADMIN — MIDAZOLAM 2 MG: 1 INJECTION INTRAMUSCULAR; INTRAVENOUS at 10:27

## 2021-04-27 RX ADMIN — AMINOCAPROIC ACID 10 G: 250 INJECTION, SOLUTION INTRAVENOUS at 11:09

## 2021-04-27 RX ADMIN — ALBUMIN HUMAN 250 ML: 0.05 INJECTION, SOLUTION INTRAVENOUS at 21:40

## 2021-04-27 RX ADMIN — PROPOFOL 50 MCG/KG/MIN: 10 INJECTION, EMULSION INTRAVENOUS at 10:28

## 2021-04-27 RX ADMIN — HEPARIN SODIUM 5000 UNITS: 1000 INJECTION INTRAVENOUS; SUBCUTANEOUS at 12:05

## 2021-04-27 RX ADMIN — FAMOTIDINE 20 MG: 10 INJECTION INTRAVENOUS at 08:38

## 2021-04-27 RX ADMIN — KETAMINE HYDROCHLORIDE 10 MG: 10 INJECTION INTRAMUSCULAR; INTRAVENOUS at 11:09

## 2021-04-27 RX ADMIN — SODIUM CHLORIDE: 900 INJECTION, SOLUTION INTRAVENOUS at 10:14

## 2021-04-27 RX ADMIN — KETAMINE HYDROCHLORIDE 10 MG: 10 INJECTION INTRAMUSCULAR; INTRAVENOUS at 11:43

## 2021-04-27 RX ADMIN — MUPIROCIN 1 APPLICATION: 20 OINTMENT TOPICAL at 22:17

## 2021-04-27 RX ADMIN — FENTANYL CITRATE 100 MCG: 50 INJECTION INTRAMUSCULAR; INTRAVENOUS at 13:07

## 2021-04-27 RX ADMIN — CEFAZOLIN SODIUM 2 G: 2 INJECTION, SOLUTION INTRAVENOUS at 22:18

## 2021-04-27 NOTE — ANESTHESIA PROCEDURE NOTES
Preanesthesia Checklist:  Patient identified, IV assessed, risks and benefits discussed, monitors and equipment assessed, procedure being performed at surgeon's request and anesthesia consent obtained.    General Procedure Information  Diagnostic Indications for Echo:  assessment of ascending aorta, assessment of surgical repair and hemodynamic monitoring  Physician Requesting Echo: Rafiq Gregg MD  Location performed:  OR  Intubated  Bite block placed  Heart visualized  Probe Insertion:  Easy  Probe Type:  Multiplane  Modalities:  Color flow mapping, pulse wave Doppler and continuous wave Doppler    Echocardiographic and Doppler Measurements    Ventricles    Right Ventricle:  Hypertrophy not present.  Thrombus not present.  Global function moderately impaired.    Left Ventricle:  Hypertrophy present.  Thrombus not present.  Global Function severely impaired.  Ejection Fraction 20%.    Other Ventricular Findings:       3D analysis yielded LVEF of 28% I believe this overestimates the true EF  Improved to 35% with 0.02 mcg/kg/min epi and 0.25 mcg/kg/min milrinone drips        Valves    Aortic Valve:  Annulus normal.  Stenosis not present.  Area: 1.43 cm².  Mean Gradient: 2 mmHg.  Pressure Half-time: 432 ms.  Regurgitation mild.  Leaflets calcified and thickened.  Leaflet motions normal.      Mitral Valve:  Annulus dilated.  Stenosis not present.  Mean Gradient: 2 mmHg.  Regurgitation severe.  Leaflets calcified and thickened.  Leaflet motions restricted.  Specific leaflet segments with abnormal motions are described in the following comments:       anterior and posterior    Tricuspid Valve:  Annulus normal.  Stenosis not present.  Regurgitation mild.  Leaflets normal.  Leaflet motions normal.    Pulmonic Valve:  Annulus normal.  Regurgitation trace.    Other Valve Findings:       Mitral regurg EROA .63cm^2 , extremely large jet central jet with a little anterior deflection    Aorta    Ascending Aorta:  Size  normal.  Diameter 2.9 cm.  Dissection not present.  Plaque thickness less than 3 mm.  Mobile plaque not present.    Aortic Arch:  Size normal.  Diameter 2.6 cm.  Dissection not present.  Plaque thickness less than 3 mm.  Mobile plaque not present.    Descending Aorta:  Size normal.  Diameter 2.2 cm.  Dissection not present.  Plaque thickness less than 3 mm.  Mobile plaque not present.          Atria    Right Atrium:  Size normal.  Spontaneous echo contrast not present.  Thrombus not present.  Tumor not present.  Device not present.      Left Atrium:  Size dilated.  Spontaneous echo contrast not present.  Thrombus not present.  Tumor not present.  Device not present.    Left atrial appendage normal.      Septa    Atrial Septum:  Intra-atrial septal morphology normal.      Ventricular Septum:  Intra-ventricular septum morphology normal.      Diastolic Function Measurements:  Diastolic Dysfunction Grade=  E= 62.6 ms  A= 24.5 ms  E/A Ratio= 2.6  DT= ms  S/D=   IVRT=    Other Findings  Pericardium:  normal  Pleural Effusion:  bilateral  Pulmonary Venous Flow:  systolic flow reversal    Anesthesia Information  Performed Personally  Anesthesiologist:  Gabe Armijo MD      Echocardiogram Comments:       Postbypass results:  MV s/p new bioprosthetic MV no perivalvular leak no MR new mean gradient 4 mmHg  All leaflets opening and closing well  AV mild AI no AS  TV mild TR no TS   LVEF retained at 35% on epi 0.06 and milrinone 0.25 mcg/kg/min

## 2021-04-27 NOTE — ANESTHESIA PROCEDURE NOTES
Central Line      Patient reassessed immediately prior to procedure    Patient location during procedure: holding area  Start time: 4/27/2021 9:30 AM  Stop Time:4/27/2021 9:34 AM  Indications: vascular access and central pressure monitoring  Staff  Anesthesiologist: Silvestre Parks MD  Preanesthetic Checklist  Completed: patient identified and risks and benefits discussed  Central Line Prep  Sterile Tech:cap, gloves, gown, mask and sterile barriers  Prep: chloraprep  Patient monitoring: blood pressure monitoring, continuous pulse oximetry and EKG  Central Line Procedure  Laterality:left  Location:internal jugular  Catheter Type:double lumen and MAC  Catheter Size:9 Fr  Guidance:ultrasound guided  PROCEDURE NOTE/ULTRASOUND INTERPRETATION.  Using ultrasound guidance the potential vascular sites for insertion of the catheter were visualized to determine the patency of the vessel to be used for vascular access.  After selecting the appropriate site for insertion, the needle was visualized under ultrasound being inserted into the internal jugular vein, followed by ultrasound confirmation of wire and catheter placement. There were no abnormalities seen on ultrasound; an image was taken; and the patient tolerated the procedure with no complications. Images: still images obtained, printed/placed on chart  Assessment  Post procedure:biopatch applied, line sutured, occlusive dressing applied and secured with tape  Assessement:blood return through all ports and chest x-ray ordered  Complications:no  Patient Tolerance:patient tolerated the procedure well with no apparent complications  Additional Notes  Ultrasound Interpretation:  Using ultrasound guidance the potential vascular sites for insertion of the catheter were visualized to determine the patency of the vessel to be used for vascular access.  After selecting the appropriate site for insertion, the needle was visualized under ultrasound being inserted into the  vessel, followed by ultrasound confirmation of wire and catheter placement.  There were no abnormalities seen on ultrasound; an image was taken/ and the patient tolerated the procedure with no complications.  Attempted right IJ first but the introducer kinked while trying to advance it over the wire.

## 2021-04-27 NOTE — ANESTHESIA PROCEDURE NOTES
Arterial Line      Patient reassessed immediately prior to procedure    Patient location during procedure: holding area  Start time: 4/27/2021 9:21 AM  Stop Time:4/27/2021 9:25 AM       Performed By   Anesthesiologist: Silvestre Parks MD  Preanesthetic Checklist  Completed: patient identified, risks and benefits discussed, surgical consent, pre-op evaluation and timeout performed  Arterial Line Prep   Sterile Tech: cap, gloves and mask  Prep: alcohol swabs and ChloraPrep  Patient monitoring: blood pressure monitoring, continuous pulse oximetry and EKG  Arterial Line Procedure   Laterality:left  Location:  radial artery  Catheter size: 20 G   Guidance: ultrasound guided  PROCEDURE NOTE/ULTRASOUND INTERPRETATION.  Using ultrasound guidance the potential vascular sites for insertion of the catheter were visualized to determine the patency of the vessel to be used for vascular access.  After selecting the appropriate site for insertion, the needle was visualized under ultrasound being inserted into the radial artery, followed by ultrasound confirmation of wire and catheter placement. There were no abnormalities seen on ultrasound; an image was taken; and the patient tolerated the procedure with no complications.   Number of attempts: 1  Successful placement: yes  Post Assessment   Dressing Type: occlusive dressing applied and secured with tape.   Complications no  Patient Tolerance: patient tolerated the procedure well with no apparent complications

## 2021-04-27 NOTE — ANESTHESIA POSTPROCEDURE EVALUATION
"Patient: Rachael South    Procedure Summary     Date: 04/27/21 Room / Location: [facility] OR [address] [place] [address] MAIN OR    Anesthesia Start: 1014 Anesthesia Stop: 1557    Procedure: RITA STERNOTOMY CORONARY ARTERY BYPASS GRAFT TIMES 3 USING LEFT INTERNAL MAMMARY ARTERY AND ENDOSCOPICALLY HARVESTED LEFT GREATER SAPHENOUS VEIN, MITRAL VALVE REPLACEMENT AND PRP (N/A Chest) Diagnosis:       Coronary artery disease of native heart with stable angina pectoris, unspecified vessel or lesion type (CMS/HCC)      (Coronary artery disease of native heart with stable angina pectoris, unspecified vessel or lesion type (CMS/HCC) [I25.118])    Surgeons: Rafiq Gregg MD Provider: Gabe Armijo MD    Anesthesia Type: general ASA Status: 4          Anesthesia Type: general    Vitals  Vitals Value Taken Time   BP 97/59 04/27/21 1830   Temp 36.5 °C (97.7 °F) 04/27/21 1845   Pulse 89 04/27/21 1845   Resp     SpO2 100 % 04/27/21 1845   Vitals shown include unvalidated device data.        Post Anesthesia Care and Evaluation    Patient location during evaluation: bedside  Patient participation: complete - patient participated  Level of consciousness: awake and alert  Pain management: adequate  Airway patency: patent  Anesthetic complications: No anesthetic complications    Cardiovascular status: acceptable  Respiratory status: acceptable  Hydration status: acceptable    Comments: BP 93/55   Pulse 89   Temp 36 °C (96.8 °F)   Resp 14   Ht 154.9 cm (61\")   Wt 63.7 kg (140 lb 6.4 oz)   SpO2 100%   BMI 26.53 kg/m²       "

## 2021-04-27 NOTE — ANESTHESIA PREPROCEDURE EVALUATION
Anesthesia Evaluation     history of anesthetic complications: PONV  NPO Solid Status: > 8 hours             Airway   Mallampati: I  TM distance: >3 FB  Neck ROM: full  Dental          Pulmonary - normal exam   (+) COPD,   Cardiovascular - normal exam    (+) valvular problems/murmurs MR, CAD, CHF Systolic <55%, hyperlipidemia,       Neuro/Psych  GI/Hepatic/Renal/Endo      Musculoskeletal     Abdominal    Substance History      OB/GYN          Other                        Anesthesia Plan    ASA 4     general   (Discussed arterial line, central line, intubation, RITA)  intravenous induction   Postoperative Plan: Expected vent after surgery  Anesthetic plan, all risks, benefits, and alternatives have been provided, discussed and informed consent has been obtained with: patient.

## 2021-04-27 NOTE — ANESTHESIA PROCEDURE NOTES
Central Line      Patient reassessed immediately prior to procedure    Patient location during procedure: holding area  Start time: 4/27/2021 9:43 AM  Stop Time:4/27/2021 9:46 AM  Staff  Anesthesiologist: Silvestre Parks MD  Preanesthetic Checklist  Completed: patient identified, risks and benefits discussed, surgical consent, pre-op evaluation and timeout performed  Central Line Prep  Sterile Tech:cap, gloves, gown, mask and sterile barriers  Prep: chloraprep  Patient monitoring: blood pressure monitoring, continuous pulse oximetry and EKG  Central Line Procedure  Laterality:left  Location:internal jugular  Catheter Type:[place]-Alex  Assessment  Post procedure:biopatch applied, line sutured and occlusive dressing applied  Assessement:blood return through all ports and free fluid flow  Patient Tolerance:patient tolerated the procedure well with no apparent complications

## 2021-04-27 NOTE — ANESTHESIA PROCEDURE NOTES
Airway  Urgency: elective    Date/Time: 4/27/2021 10:33 AM  Airway not difficult    General Information and Staff    Patient location during procedure: OR  Anesthesiologist: Gabe Armijo MD    Indications and Patient Condition  Indications for airway management: airway protection    Preoxygenated: yes  Mask difficulty assessment: 1 - vent by mask    Final Airway Details  Final airway type: endotracheal airway      Successful airway: ETT  Cuffed: yes   Successful intubation technique: direct laryngoscopy  Facilitating devices/methods: intubating stylet  Endotracheal tube insertion site: oral  Blade: [place]  Blade size: 4  ETT size (mm): 8.0  Cormack-Lehane Classification: grade I - full view of glottis  Placement verified by: chest auscultation   Measured from: lips  ETT/EBT  to lips (cm): 21  Number of attempts at approach: 1  Assessment: lips, teeth, and gum same as pre-op and atraumatic intubation

## 2021-04-28 ENCOUNTER — APPOINTMENT (OUTPATIENT)
Dept: GENERAL RADIOLOGY | Facility: HOSPITAL | Age: 71
End: 2021-04-28

## 2021-04-28 ENCOUNTER — PATIENT OUTREACH - CONVERTED (OUTPATIENT)
Dept: FAMILY MEDICINE CLINIC | Facility: CLINIC | Age: 71
End: 2021-04-28
Attending: NURSE PRACTITIONER

## 2021-04-28 LAB
ALBUMIN SERPL-MCNC: 4.3 G/DL (ref 3.5–5.2)
ANION GAP SERPL CALCULATED.3IONS-SCNC: 13 MMOL/L (ref 5–15)
ANION GAP SERPL CALCULATED.3IONS-SCNC: 14.4 MMOL/L (ref 5–15)
ARTERIAL PATENCY WRIST A: ABNORMAL
ARTERIAL PATENCY WRIST A: ABNORMAL
ATMOSPHERIC PRESS: 745.9 MMHG
ATMOSPHERIC PRESS: 746 MMHG
ATMOSPHERIC PRESS: 747 MMHG
ATMOSPHERIC PRESS: 748.9 MMHG
ATMOSPHERIC PRESS: 749 MMHG
BASE EXCESS BLDA CALC-SCNC: -2.4 MMOL/L (ref 0–2)
BASE EXCESS BLDA CALC-SCNC: -4.2 MMOL/L (ref 0–2)
BASE EXCESS BLDV CALC-SCNC: -2.1 MMOL/L (ref -2–2)
BASE EXCESS BLDV CALC-SCNC: -3.1 MMOL/L (ref -2–2)
BASE EXCESS BLDV CALC-SCNC: -4.2 MMOL/L (ref -2–2)
BDY SITE: ABNORMAL
BH BB BLOOD EXPIRATION DATE: NORMAL
BH BB BLOOD TYPE BARCODE: 5100
BH BB DISPENSE STATUS: NORMAL
BH BB PRODUCT CODE: NORMAL
BH BB UNIT NUMBER: NORMAL
BUN SERPL-MCNC: 15 MG/DL (ref 8–23)
BUN SERPL-MCNC: 16 MG/DL (ref 8–23)
BUN/CREAT SERPL: 15.4 (ref 7–25)
BUN/CREAT SERPL: 18.1 (ref 7–25)
CA-I BLD-MCNC: 4.8 MG/DL (ref 4.6–5.4)
CA-I SERPL ISE-MCNC: 1.19 MMOL/L (ref 1.15–1.35)
CALCIUM SPEC-SCNC: 7.8 MG/DL (ref 8.6–10.5)
CALCIUM SPEC-SCNC: 8.8 MG/DL (ref 8.6–10.5)
CHLORIDE SERPL-SCNC: 106 MMOL/L (ref 98–107)
CHLORIDE SERPL-SCNC: 109 MMOL/L (ref 98–107)
CO2 SERPL-SCNC: 19.6 MMOL/L (ref 22–29)
CO2 SERPL-SCNC: 21 MMOL/L (ref 22–29)
CREAT SERPL-MCNC: 0.83 MG/DL (ref 0.57–1)
CREAT SERPL-MCNC: 1.04 MG/DL (ref 0.57–1)
DEPRECATED RDW RBC AUTO: 46.1 FL (ref 37–54)
DEPRECATED RDW RBC AUTO: 47 FL (ref 37–54)
DEPRECATED RDW RBC AUTO: 49 FL (ref 37–54)
ERYTHROCYTE [DISTWIDTH] IN BLOOD BY AUTOMATED COUNT: 15.1 % (ref 12.3–15.4)
ERYTHROCYTE [DISTWIDTH] IN BLOOD BY AUTOMATED COUNT: 15.4 % (ref 12.3–15.4)
ERYTHROCYTE [DISTWIDTH] IN BLOOD BY AUTOMATED COUNT: 15.5 % (ref 12.3–15.4)
GAS FLOW AIRWAY: 2 LPM
GAS FLOW AIRWAY: 4 LPM
GAS FLOW AIRWAY: 5 LPM
GAS FLOW AIRWAY: 5 LPM
GFR SERPL CREATININE-BSD FRML MDRD: 52 ML/MIN/1.73
GFR SERPL CREATININE-BSD FRML MDRD: 67 ML/MIN/1.73
GLUCOSE BLDC GLUCOMTR-MCNC: 122 MG/DL (ref 70–130)
GLUCOSE BLDC GLUCOMTR-MCNC: 127 MG/DL (ref 70–130)
GLUCOSE BLDC GLUCOMTR-MCNC: 137 MG/DL (ref 70–130)
GLUCOSE BLDC GLUCOMTR-MCNC: 142 MG/DL (ref 70–130)
GLUCOSE BLDC GLUCOMTR-MCNC: 143 MG/DL (ref 70–130)
GLUCOSE BLDC GLUCOMTR-MCNC: 143 MG/DL (ref 70–130)
GLUCOSE BLDC GLUCOMTR-MCNC: 147 MG/DL (ref 70–130)
GLUCOSE BLDC GLUCOMTR-MCNC: 147 MG/DL (ref 70–130)
GLUCOSE BLDC GLUCOMTR-MCNC: 150 MG/DL (ref 70–130)
GLUCOSE BLDC GLUCOMTR-MCNC: 155 MG/DL (ref 70–130)
GLUCOSE BLDC GLUCOMTR-MCNC: 169 MG/DL (ref 70–130)
GLUCOSE BLDC GLUCOMTR-MCNC: 175 MG/DL (ref 70–130)
GLUCOSE BLDC GLUCOMTR-MCNC: 186 MG/DL (ref 70–130)
GLUCOSE BLDC GLUCOMTR-MCNC: 191 MG/DL (ref 70–130)
GLUCOSE SERPL-MCNC: 119 MG/DL (ref 65–99)
GLUCOSE SERPL-MCNC: 146 MG/DL (ref 65–99)
HCO3 BLDA-SCNC: 19.6 MMOL/L (ref 22–28)
HCO3 BLDA-SCNC: 22.8 MMOL/L (ref 22–28)
HCO3 BLDV-SCNC: 21.7 MMOL/L (ref 22–28)
HCO3 BLDV-SCNC: 22.2 MMOL/L (ref 22–28)
HCO3 BLDV-SCNC: 22.6 MMOL/L (ref 22–28)
HCT VFR BLD AUTO: 26.1 % (ref 34–46.6)
HCT VFR BLD AUTO: 26.8 % (ref 34–46.6)
HCT VFR BLD AUTO: 30.6 % (ref 34–46.6)
HGB BLD-MCNC: 8.1 G/DL (ref 12–15.9)
HGB BLD-MCNC: 8.4 G/DL (ref 12–15.9)
HGB BLD-MCNC: 9.8 G/DL (ref 12–15.9)
INHALED O2 CONCENTRATION: 40 %
INR PPP: 1.56 (ref 0.9–1.1)
LYMPHOCYTES # BLD MANUAL: 0.37 10*3/MM3 (ref 0.7–3.1)
LYMPHOCYTES NFR BLD MANUAL: 3 % (ref 19.6–45.3)
LYMPHOCYTES NFR BLD MANUAL: 4 % (ref 5–12)
MAGNESIUM SERPL-MCNC: 2.5 MG/DL (ref 1.6–2.4)
MCH RBC QN AUTO: 26.2 PG (ref 26.6–33)
MCH RBC QN AUTO: 26.5 PG (ref 26.6–33)
MCH RBC QN AUTO: 27.8 PG (ref 26.6–33)
MCHC RBC AUTO-ENTMCNC: 31 G/DL (ref 31.5–35.7)
MCHC RBC AUTO-ENTMCNC: 31.3 G/DL (ref 31.5–35.7)
MCHC RBC AUTO-ENTMCNC: 32 G/DL (ref 31.5–35.7)
MCV RBC AUTO: 84.5 FL (ref 79–97)
MCV RBC AUTO: 84.5 FL (ref 79–97)
MCV RBC AUTO: 86.9 FL (ref 79–97)
MODALITY: ABNORMAL
MONOCYTES # BLD AUTO: 0.49 10*3/MM3 (ref 0.1–0.9)
NEUTROPHILS # BLD AUTO: 11.4 10*3/MM3 (ref 1.7–7)
NEUTROPHILS NFR BLD MANUAL: 92.9 % (ref 42.7–76)
NRBC BLD AUTO-RTO: 0 /100 WBC (ref 0–0.2)
O2 A-A PPRESDIFF RESPIRATORY: 0.6 MMHG
PCO2 BLDA: 30.4 MM HG (ref 35–45)
PCO2 BLDA: 40.3 MM HG (ref 35–45)
PCO2 BLDV: 37.6 MM HG (ref 41–51)
PCO2 BLDV: 39.6 MM HG (ref 41–51)
PCO2 BLDV: 42.7 MM HG (ref 41–51)
PEEP RESPIRATORY: 7.5 CM[H2O]
PH BLDA: 7.36 PH UNITS (ref 7.35–7.45)
PH BLDA: 7.42 PH UNITS (ref 7.35–7.45)
PH BLDV: 7.31 PH UNITS (ref 7.31–7.41)
PH BLDV: 7.36 PH UNITS (ref 7.31–7.41)
PH BLDV: 7.39 PH UNITS (ref 7.31–7.41)
PHOSPHATE SERPL-MCNC: 4.9 MG/DL (ref 2.5–4.5)
PLAT MORPH BLD: NORMAL
PLATELET # BLD AUTO: 219 10*3/MM3 (ref 140–450)
PLATELET # BLD AUTO: 249 10*3/MM3 (ref 140–450)
PLATELET # BLD AUTO: 257 10*3/MM3 (ref 140–450)
PMV BLD AUTO: 10 FL (ref 6–12)
PMV BLD AUTO: 10.5 FL (ref 6–12)
PMV BLD AUTO: 10.5 FL (ref 6–12)
PO2 BLDA: 137.9 MM HG (ref 80–100)
PO2 BLDA: 94.5 MM HG (ref 80–100)
PO2 BLDV: 25.4 MM HG (ref 35–45)
PO2 BLDV: 29.4 MM HG (ref 35–45)
PO2 BLDV: 33.2 MM HG (ref 35–45)
POTASSIUM SERPL-SCNC: 4.6 MMOL/L (ref 3.5–5.2)
POTASSIUM SERPL-SCNC: 4.9 MMOL/L (ref 3.5–5.2)
PROTHROMBIN TIME: 18.5 SECONDS (ref 11.7–14.2)
PSV: 8 CMH2O
QT INTERVAL: 313 MS
RBC # BLD AUTO: 3.09 10*6/MM3 (ref 3.77–5.28)
RBC # BLD AUTO: 3.17 10*6/MM3 (ref 3.77–5.28)
RBC # BLD AUTO: 3.52 10*6/MM3 (ref 3.77–5.28)
RBC MORPH BLD: NORMAL
SAO2 % BLDCOA: 43.9 % (ref 92–99)
SAO2 % BLDCOA: 55.6 % (ref 92–99)
SAO2 % BLDCOA: 58.5 % (ref 92–99)
SAO2 % BLDCOA: 97.6 % (ref 92–99)
SAO2 % BLDCOA: 99 % (ref 92–99)
SODIUM SERPL-SCNC: 140 MMOL/L (ref 136–145)
SODIUM SERPL-SCNC: 143 MMOL/L (ref 136–145)
TOTAL RATE: 16 BREATHS/MINUTE
TOTAL RATE: 20 BREATHS/MINUTE
TOTAL RATE: 20 BREATHS/MINUTE
TOTAL RATE: 22 BREATHS/MINUTE
TOTAL RATE: 25 BREATHS/MINUTE
UNIT  ABO: NORMAL
UNIT  RH: NORMAL
VENTILATOR MODE: ABNORMAL
WBC # BLD AUTO: 12.27 10*3/MM3 (ref 3.4–10.8)
WBC # BLD AUTO: 15.97 10*3/MM3 (ref 3.4–10.8)
WBC # BLD AUTO: 17.19 10*3/MM3 (ref 3.4–10.8)
WBC MORPH BLD: NORMAL

## 2021-04-28 PROCEDURE — 83735 ASSAY OF MAGNESIUM: CPT | Performed by: THORACIC SURGERY (CARDIOTHORACIC VASCULAR SURGERY)

## 2021-04-28 PROCEDURE — 71045 X-RAY EXAM CHEST 1 VIEW: CPT

## 2021-04-28 PROCEDURE — 94799 UNLISTED PULMONARY SVC/PX: CPT

## 2021-04-28 PROCEDURE — 99024 POSTOP FOLLOW-UP VISIT: CPT | Performed by: THORACIC SURGERY (CARDIOTHORACIC VASCULAR SURGERY)

## 2021-04-28 PROCEDURE — 25010000002 MAGNESIUM SULFATE 2 GM/50ML SOLUTION: Performed by: THORACIC SURGERY (CARDIOTHORACIC VASCULAR SURGERY)

## 2021-04-28 PROCEDURE — P9016 RBC LEUKOCYTES REDUCED: HCPCS

## 2021-04-28 PROCEDURE — 25010000002 FUROSEMIDE PER 20 MG: Performed by: THORACIC SURGERY (CARDIOTHORACIC VASCULAR SURGERY)

## 2021-04-28 PROCEDURE — 25010000002 MORPHINE PER 10 MG: Performed by: THORACIC SURGERY (CARDIOTHORACIC VASCULAR SURGERY)

## 2021-04-28 PROCEDURE — 86900 BLOOD TYPING SEROLOGIC ABO: CPT

## 2021-04-28 PROCEDURE — 85610 PROTHROMBIN TIME: CPT | Performed by: THORACIC SURGERY (CARDIOTHORACIC VASCULAR SURGERY)

## 2021-04-28 PROCEDURE — 25010000003 MILRINONE LACTATE IN DEXTROSE 20-5 MG/100ML-% SOLUTION: Performed by: THORACIC SURGERY (CARDIOTHORACIC VASCULAR SURGERY)

## 2021-04-28 PROCEDURE — 82962 GLUCOSE BLOOD TEST: CPT

## 2021-04-28 PROCEDURE — 25010000002 LIDOCAINE PER 10 MG: Performed by: THORACIC SURGERY (CARDIOTHORACIC VASCULAR SURGERY)

## 2021-04-28 PROCEDURE — 25010000003 MEPERIDINE PER 100 MG: Performed by: THORACIC SURGERY (CARDIOTHORACIC VASCULAR SURGERY)

## 2021-04-28 PROCEDURE — 94003 VENT MGMT INPAT SUBQ DAY: CPT

## 2021-04-28 PROCEDURE — 85027 COMPLETE CBC AUTOMATED: CPT | Performed by: THORACIC SURGERY (CARDIOTHORACIC VASCULAR SURGERY)

## 2021-04-28 PROCEDURE — 85007 BL SMEAR W/DIFF WBC COUNT: CPT | Performed by: THORACIC SURGERY (CARDIOTHORACIC VASCULAR SURGERY)

## 2021-04-28 PROCEDURE — 93010 ELECTROCARDIOGRAM REPORT: CPT | Performed by: INTERNAL MEDICINE

## 2021-04-28 PROCEDURE — P9041 ALBUMIN (HUMAN),5%, 50ML: HCPCS | Performed by: THORACIC SURGERY (CARDIOTHORACIC VASCULAR SURGERY)

## 2021-04-28 PROCEDURE — 25010000002 ENOXAPARIN PER 10 MG: Performed by: THORACIC SURGERY (CARDIOTHORACIC VASCULAR SURGERY)

## 2021-04-28 PROCEDURE — 93005 ELECTROCARDIOGRAM TRACING: CPT | Performed by: THORACIC SURGERY (CARDIOTHORACIC VASCULAR SURGERY)

## 2021-04-28 PROCEDURE — 94640 AIRWAY INHALATION TREATMENT: CPT

## 2021-04-28 PROCEDURE — 80048 BASIC METABOLIC PNL TOTAL CA: CPT | Performed by: NURSE PRACTITIONER

## 2021-04-28 PROCEDURE — 25010000002 EPINEPHRINE 1 MG/ML SOLUTION 30 ML VIAL: Performed by: THORACIC SURGERY (CARDIOTHORACIC VASCULAR SURGERY)

## 2021-04-28 PROCEDURE — 97162 PT EVAL MOD COMPLEX 30 MIN: CPT

## 2021-04-28 PROCEDURE — 25010000002 CALCIUM GLUCONATE-NACL 1-0.675 GM/50ML-% SOLUTION: Performed by: THORACIC SURGERY (CARDIOTHORACIC VASCULAR SURGERY)

## 2021-04-28 PROCEDURE — 80069 RENAL FUNCTION PANEL: CPT | Performed by: THORACIC SURGERY (CARDIOTHORACIC VASCULAR SURGERY)

## 2021-04-28 PROCEDURE — 36430 TRANSFUSION BLD/BLD COMPNT: CPT

## 2021-04-28 PROCEDURE — 99232 SBSQ HOSP IP/OBS MODERATE 35: CPT | Performed by: INTERNAL MEDICINE

## 2021-04-28 PROCEDURE — 82803 BLOOD GASES ANY COMBINATION: CPT

## 2021-04-28 PROCEDURE — 25010000002 CALCIUM GLUCONATE-NACL 1-0.675 GM/50ML-% SOLUTION: Performed by: NURSE PRACTITIONER

## 2021-04-28 PROCEDURE — 87040 BLOOD CULTURE FOR BACTERIA: CPT | Performed by: THORACIC SURGERY (CARDIOTHORACIC VASCULAR SURGERY)

## 2021-04-28 PROCEDURE — 25010000003 INSULIN REGULAR HUMAN PER 5 UNITS: Performed by: THORACIC SURGERY (CARDIOTHORACIC VASCULAR SURGERY)

## 2021-04-28 PROCEDURE — 85027 COMPLETE CBC AUTOMATED: CPT | Performed by: NURSE PRACTITIONER

## 2021-04-28 PROCEDURE — 25010000003 CEFAZOLIN IN DEXTROSE 2-4 GM/100ML-% SOLUTION: Performed by: THORACIC SURGERY (CARDIOTHORACIC VASCULAR SURGERY)

## 2021-04-28 PROCEDURE — 85025 COMPLETE CBC W/AUTO DIFF WBC: CPT | Performed by: THORACIC SURGERY (CARDIOTHORACIC VASCULAR SURGERY)

## 2021-04-28 PROCEDURE — 82330 ASSAY OF CALCIUM: CPT | Performed by: THORACIC SURGERY (CARDIOTHORACIC VASCULAR SURGERY)

## 2021-04-28 PROCEDURE — 25010000002 ALBUMIN HUMAN 5% PER 50 ML: Performed by: THORACIC SURGERY (CARDIOTHORACIC VASCULAR SURGERY)

## 2021-04-28 PROCEDURE — 97110 THERAPEUTIC EXERCISES: CPT

## 2021-04-28 RX ORDER — FUROSEMIDE 10 MG/ML
20 INJECTION INTRAMUSCULAR; INTRAVENOUS ONCE
Status: COMPLETED | OUTPATIENT
Start: 2021-04-28 | End: 2021-04-28

## 2021-04-28 RX ORDER — IPRATROPIUM BROMIDE AND ALBUTEROL SULFATE 2.5; .5 MG/3ML; MG/3ML
3 SOLUTION RESPIRATORY (INHALATION)
Status: DISCONTINUED | OUTPATIENT
Start: 2021-04-28 | End: 2021-05-05 | Stop reason: HOSPADM

## 2021-04-28 RX ORDER — POTASSIUM CHLORIDE 750 MG/1
20 TABLET, FILM COATED, EXTENDED RELEASE ORAL ONCE
Status: COMPLETED | OUTPATIENT
Start: 2021-04-28 | End: 2021-04-28

## 2021-04-28 RX ORDER — CALCIUM GLUCONATE 20 MG/ML
2 INJECTION, SOLUTION INTRAVENOUS ONCE
Status: COMPLETED | OUTPATIENT
Start: 2021-04-28 | End: 2021-04-28

## 2021-04-28 RX ORDER — CALCIUM GLUCONATE 20 MG/ML
1 INJECTION, SOLUTION INTRAVENOUS ONCE
Status: COMPLETED | OUTPATIENT
Start: 2021-04-28 | End: 2021-04-28

## 2021-04-28 RX ORDER — BUMETANIDE 0.25 MG/ML
1 INJECTION INTRAMUSCULAR; INTRAVENOUS ONCE
Status: COMPLETED | OUTPATIENT
Start: 2021-04-28 | End: 2021-04-28

## 2021-04-28 RX ADMIN — MUPIROCIN 1 APPLICATION: 20 OINTMENT TOPICAL at 20:45

## 2021-04-28 RX ADMIN — POTASSIUM CHLORIDE 20 MEQ: 750 TABLET, EXTENDED RELEASE ORAL at 09:33

## 2021-04-28 RX ADMIN — HYDROCODONE BITARTRATE AND ACETAMINOPHEN 1 TABLET: 10; 325 TABLET ORAL at 08:27

## 2021-04-28 RX ADMIN — CHLORHEXIDINE GLUCONATE 15 ML: 1.2 RINSE ORAL at 08:27

## 2021-04-28 RX ADMIN — ENOXAPARIN SODIUM 40 MG: 40 INJECTION SUBCUTANEOUS at 18:33

## 2021-04-28 RX ADMIN — LIDOCAINE HYDROCHLORIDE 2 MG/MIN: 4 INJECTION, SOLUTION INTRAVENOUS at 12:23

## 2021-04-28 RX ADMIN — MUPIROCIN 1 APPLICATION: 20 OINTMENT TOPICAL at 08:28

## 2021-04-28 RX ADMIN — ATORVASTATIN CALCIUM 40 MG: 20 TABLET, FILM COATED ORAL at 20:45

## 2021-04-28 RX ADMIN — SODIUM BICARBONATE 50 MEQ: 84 INJECTION, SOLUTION INTRAVENOUS at 15:15

## 2021-04-28 RX ADMIN — CHLORHEXIDINE GLUCONATE 15 ML: 1.2 RINSE ORAL at 20:45

## 2021-04-28 RX ADMIN — CEFAZOLIN SODIUM 2 G: 2 INJECTION, SOLUTION INTRAVENOUS at 07:30

## 2021-04-28 RX ADMIN — FUROSEMIDE 20 MG: 10 INJECTION, SOLUTION INTRAMUSCULAR; INTRAVENOUS at 05:59

## 2021-04-28 RX ADMIN — HYDROCODONE BITARTRATE AND ACETAMINOPHEN 1 TABLET: 10; 325 TABLET ORAL at 23:55

## 2021-04-28 RX ADMIN — EPINEPHRINE 0.05 MCG/KG/MIN: 1 INJECTION INTRAMUSCULAR; INTRAVENOUS; SUBCUTANEOUS at 12:20

## 2021-04-28 RX ADMIN — IPRATROPIUM BROMIDE AND ALBUTEROL SULFATE 3 ML: 2.5; .5 SOLUTION RESPIRATORY (INHALATION) at 19:50

## 2021-04-28 RX ADMIN — CALCIUM GLUCONATE 1 G: 20 INJECTION, SOLUTION INTRAVENOUS at 05:59

## 2021-04-28 RX ADMIN — DOCUSATE SODIUM 50MG AND SENNOSIDES 8.6MG 2 TABLET: 8.6; 5 TABLET, FILM COATED ORAL at 08:27

## 2021-04-28 RX ADMIN — MAGNESIUM SULFATE HEPTAHYDRATE 2 G: 2 INJECTION, SOLUTION INTRAVENOUS at 08:28

## 2021-04-28 RX ADMIN — MEPERIDINE HYDROCHLORIDE 12.5 MG: 25 INJECTION INTRAMUSCULAR; INTRAVENOUS; SUBCUTANEOUS at 00:07

## 2021-04-28 RX ADMIN — PANTOPRAZOLE SODIUM 40 MG: 40 TABLET, DELAYED RELEASE ORAL at 08:27

## 2021-04-28 RX ADMIN — ACETAMINOPHEN ORAL SOLUTION 650 MG: 325 SOLUTION ORAL at 04:02

## 2021-04-28 RX ADMIN — DOCUSATE SODIUM 50MG AND SENNOSIDES 8.6MG 2 TABLET: 8.6; 5 TABLET, FILM COATED ORAL at 20:45

## 2021-04-28 RX ADMIN — SODIUM CHLORIDE 3.6 UNITS/HR: 9 INJECTION, SOLUTION INTRAVENOUS at 21:11

## 2021-04-28 RX ADMIN — ACETAMINOPHEN 650 MG: 325 TABLET, FILM COATED ORAL at 21:14

## 2021-04-28 RX ADMIN — BUMETANIDE 1 MG: 0.25 INJECTION INTRAMUSCULAR; INTRAVENOUS at 09:34

## 2021-04-28 RX ADMIN — ALBUMIN HUMAN 250 ML: 0.05 INJECTION, SOLUTION INTRAVENOUS at 01:08

## 2021-04-28 RX ADMIN — CEFAZOLIN SODIUM 2 G: 2 INJECTION, SOLUTION INTRAVENOUS at 21:10

## 2021-04-28 RX ADMIN — ACETAMINOPHEN 650 MG: 325 TABLET, FILM COATED ORAL at 16:31

## 2021-04-28 RX ADMIN — MORPHINE SULFATE 2 MG: 2 INJECTION, SOLUTION INTRAMUSCULAR; INTRAVENOUS at 03:02

## 2021-04-28 RX ADMIN — CALCIUM GLUCONATE 2 G: 20 INJECTION, SOLUTION INTRAVENOUS at 15:15

## 2021-04-28 RX ADMIN — CALCIUM GLUCONATE 2 G: 20 INJECTION, SOLUTION INTRAVENOUS at 08:28

## 2021-04-28 RX ADMIN — IPRATROPIUM BROMIDE AND ALBUTEROL SULFATE 3 ML: 2.5; .5 SOLUTION RESPIRATORY (INHALATION) at 15:19

## 2021-04-28 RX ADMIN — CEFAZOLIN SODIUM 2 G: 2 INJECTION, SOLUTION INTRAVENOUS at 14:45

## 2021-04-28 RX ADMIN — MILRINONE LACTATE IN DEXTROSE 0.25 MCG/KG/MIN: 200 INJECTION, SOLUTION INTRAVENOUS at 04:04

## 2021-04-28 RX ADMIN — ASPIRIN 81 MG: 81 TABLET, FILM COATED ORAL at 08:27

## 2021-04-29 ENCOUNTER — APPOINTMENT (OUTPATIENT)
Dept: GENERAL RADIOLOGY | Facility: HOSPITAL | Age: 71
End: 2021-04-29

## 2021-04-29 ENCOUNTER — APPOINTMENT (OUTPATIENT)
Dept: CARDIOLOGY | Facility: HOSPITAL | Age: 71
End: 2021-04-29

## 2021-04-29 LAB
ABO GROUP BLD: NORMAL
ANION GAP SERPL CALCULATED.3IONS-SCNC: 11.9 MMOL/L (ref 5–15)
ANION GAP SERPL CALCULATED.3IONS-SCNC: 9.4 MMOL/L (ref 5–15)
AORTIC DIMENSIONLESS INDEX: 0.8 (DI)
ATMOSPHERIC PRESS: 745 MMHG
BASE EXCESS BLDV CALC-SCNC: -2.4 MMOL/L (ref -2–2)
BDY SITE: ABNORMAL
BH BB BLOOD EXPIRATION DATE: NORMAL
BH BB BLOOD TYPE BARCODE: 5100
BH BB DISPENSE STATUS: NORMAL
BH BB PRODUCT CODE: NORMAL
BH BB UNIT NUMBER: NORMAL
BH CV ECHO MEAS - AI DEC SLOPE: 211 CM/SEC^2
BH CV ECHO MEAS - AI MAX PG: 40.4 MMHG
BH CV ECHO MEAS - AI MAX VEL: 318 CM/SEC
BH CV ECHO MEAS - AI P1/2T: 441.4 MSEC
BH CV ECHO MEAS - AO MAX PG (FULL): 2.5 MMHG
BH CV ECHO MEAS - AO MAX PG: 6.1 MMHG
BH CV ECHO MEAS - AO MEAN PG (FULL): 1 MMHG
BH CV ECHO MEAS - AO MEAN PG: 3 MMHG
BH CV ECHO MEAS - AO V2 MAX: 123 CM/SEC
BH CV ECHO MEAS - AO V2 MEAN: 81.1 CM/SEC
BH CV ECHO MEAS - AO V2 VTI: 19.4 CM
BH CV ECHO MEAS - ASC AORTA: 2.9 CM
BH CV ECHO MEAS - AVA(I,A): 2 CM^2
BH CV ECHO MEAS - AVA(I,D): 2 CM^2
BH CV ECHO MEAS - AVA(V,A): 2 CM^2
BH CV ECHO MEAS - AVA(V,D): 2 CM^2
BH CV ECHO MEAS - BSA(HAYCOCK): 1.7 M^2
BH CV ECHO MEAS - BSA: 1.6 M^2
BH CV ECHO MEAS - BZI_BMI: 29.1 KILOGRAMS/M^2
BH CV ECHO MEAS - BZI_METRIC_HEIGHT: 152.4 CM
BH CV ECHO MEAS - BZI_METRIC_WEIGHT: 67.6 KG
BH CV ECHO MEAS - EDV(CUBED): 117.6 ML
BH CV ECHO MEAS - EDV(MOD-SP2): 152 ML
BH CV ECHO MEAS - EDV(MOD-SP4): 131 ML
BH CV ECHO MEAS - EDV(TEICH): 112.8 ML
BH CV ECHO MEAS - EF(CUBED): 49.6 %
BH CV ECHO MEAS - EF(MOD-BP): 31.3 %
BH CV ECHO MEAS - EF(MOD-SP2): 36.2 %
BH CV ECHO MEAS - EF(MOD-SP4): 28.2 %
BH CV ECHO MEAS - EF(TEICH): 41.6 %
BH CV ECHO MEAS - ESV(CUBED): 59.3 ML
BH CV ECHO MEAS - ESV(MOD-SP2): 97 ML
BH CV ECHO MEAS - ESV(MOD-SP4): 94 ML
BH CV ECHO MEAS - ESV(TEICH): 65.9 ML
BH CV ECHO MEAS - FS: 20.4 %
BH CV ECHO MEAS - IVS/LVPW: 1.2
BH CV ECHO MEAS - IVSD: 1.1 CM
BH CV ECHO MEAS - LAT PEAK E' VEL: 8 CM/SEC
BH CV ECHO MEAS - LV DIASTOLIC VOL/BSA (35-75): 79.5 ML/M^2
BH CV ECHO MEAS - LV MASS(C)D: 179.8 GRAMS
BH CV ECHO MEAS - LV MASS(C)DI: 109.2 GRAMS/M^2
BH CV ECHO MEAS - LV MAX PG: 3.6 MMHG
BH CV ECHO MEAS - LV MEAN PG: 2 MMHG
BH CV ECHO MEAS - LV SYSTOLIC VOL/BSA (12-30): 57.1 ML/M^2
BH CV ECHO MEAS - LV V1 MAX: 94.8 CM/SEC
BH CV ECHO MEAS - LV V1 MEAN: 65.7 CM/SEC
BH CV ECHO MEAS - LV V1 VTI: 15.1 CM
BH CV ECHO MEAS - LVIDD: 4.9 CM
BH CV ECHO MEAS - LVIDS: 3.9 CM
BH CV ECHO MEAS - LVLD AP2: 8.1 CM
BH CV ECHO MEAS - LVLD AP4: 8.4 CM
BH CV ECHO MEAS - LVLS AP2: 8.4 CM
BH CV ECHO MEAS - LVLS AP4: 7.8 CM
BH CV ECHO MEAS - LVOT AREA (M): 2.5 CM^2
BH CV ECHO MEAS - LVOT AREA: 2.5 CM^2
BH CV ECHO MEAS - LVOT DIAM: 1.8 CM
BH CV ECHO MEAS - LVPWD: 0.93 CM
BH CV ECHO MEAS - MED PEAK E' VEL: 7.3 CM/SEC
BH CV ECHO MEAS - MV A DUR: 0.09 SEC
BH CV ECHO MEAS - MV A MAX VEL: 88.6 CM/SEC
BH CV ECHO MEAS - MV DEC SLOPE: 756 CM/SEC^2
BH CV ECHO MEAS - MV DEC TIME: 203 SEC
BH CV ECHO MEAS - MV E MAX VEL: 151 CM/SEC
BH CV ECHO MEAS - MV E/A: 1.7
BH CV ECHO MEAS - MV MAX PG: 10.8 MMHG
BH CV ECHO MEAS - MV MEAN PG: 4 MMHG
BH CV ECHO MEAS - MV P1/2T MAX VEL: 170 CM/SEC
BH CV ECHO MEAS - MV P1/2T: 65.9 MSEC
BH CV ECHO MEAS - MV V2 MAX: 164 CM/SEC
BH CV ECHO MEAS - MV V2 MEAN: 91.7 CM/SEC
BH CV ECHO MEAS - MV V2 VTI: 37.1 CM
BH CV ECHO MEAS - MVA P1/2T LCG: 1.3 CM^2
BH CV ECHO MEAS - MVA(P1/2T): 3.3 CM^2
BH CV ECHO MEAS - MVA(VTI): 1 CM^2
BH CV ECHO MEAS - PA ACC TIME: 0.06 SEC
BH CV ECHO MEAS - PA MAX PG: 2.9 MMHG
BH CV ECHO MEAS - PA PR(ACCEL): 52.9 MMHG
BH CV ECHO MEAS - PA V2 MAX: 85.2 CM/SEC
BH CV ECHO MEAS - SI(CUBED): 35.4 ML/M^2
BH CV ECHO MEAS - SI(LVOT): 23.3 ML/M^2
BH CV ECHO MEAS - SI(MOD-SP2): 33.4 ML/M^2
BH CV ECHO MEAS - SI(MOD-SP4): 22.5 ML/M^2
BH CV ECHO MEAS - SI(TEICH): 28.5 ML/M^2
BH CV ECHO MEAS - SV(CUBED): 58.3 ML
BH CV ECHO MEAS - SV(LVOT): 38.4 ML
BH CV ECHO MEAS - SV(MOD-SP2): 55 ML
BH CV ECHO MEAS - SV(MOD-SP4): 37 ML
BH CV ECHO MEAS - SV(TEICH): 46.9 ML
BH CV ECHO MEAS - TR MAX PG: 34 MMHG
BH CV ECHO MEAS - TR MAX VEL: 291 CM/SEC
BH CV ECHO MEASUREMENTS AVERAGE E/E' RATIO: 19.74
BH CV XLRA - RV BASE: 3.5 CM
BH CV XLRA - RV LENGTH: 6.2 CM
BH CV XLRA - RV MID: 2.9 CM
BH CV XLRA - TDI S': 8.4 CM/SEC
BLD GP AB SCN SERPL QL: NEGATIVE
BUN SERPL-MCNC: 21 MG/DL (ref 8–23)
BUN SERPL-MCNC: 24 MG/DL (ref 8–23)
BUN/CREAT SERPL: 23.3 (ref 7–25)
BUN/CREAT SERPL: 27 (ref 7–25)
CALCIUM SPEC-SCNC: 8.4 MG/DL (ref 8.6–10.5)
CALCIUM SPEC-SCNC: 8.8 MG/DL (ref 8.6–10.5)
CHLORIDE SERPL-SCNC: 105 MMOL/L (ref 98–107)
CHLORIDE SERPL-SCNC: 106 MMOL/L (ref 98–107)
CO2 SERPL-SCNC: 20.1 MMOL/L (ref 22–29)
CO2 SERPL-SCNC: 21.6 MMOL/L (ref 22–29)
CREAT SERPL-MCNC: 0.89 MG/DL (ref 0.57–1)
CREAT SERPL-MCNC: 0.9 MG/DL (ref 0.57–1)
CROSSMATCH INTERPRETATION: NORMAL
CYTO UR: NORMAL
DEPRECATED RDW RBC AUTO: 45.3 FL (ref 37–54)
DEPRECATED RDW RBC AUTO: 46 FL (ref 37–54)
ERYTHROCYTE [DISTWIDTH] IN BLOOD BY AUTOMATED COUNT: 15.2 % (ref 12.3–15.4)
ERYTHROCYTE [DISTWIDTH] IN BLOOD BY AUTOMATED COUNT: 15.4 % (ref 12.3–15.4)
GAS FLOW AIRWAY: 2 LPM
GFR SERPL CREATININE-BSD FRML MDRD: 61 ML/MIN/1.73
GFR SERPL CREATININE-BSD FRML MDRD: 62 ML/MIN/1.73
GLUCOSE BLDC GLUCOMTR-MCNC: 118 MG/DL (ref 70–130)
GLUCOSE BLDC GLUCOMTR-MCNC: 120 MG/DL (ref 70–130)
GLUCOSE BLDC GLUCOMTR-MCNC: 124 MG/DL (ref 70–130)
GLUCOSE BLDC GLUCOMTR-MCNC: 129 MG/DL (ref 70–130)
GLUCOSE BLDC GLUCOMTR-MCNC: 130 MG/DL (ref 70–130)
GLUCOSE BLDC GLUCOMTR-MCNC: 131 MG/DL (ref 70–130)
GLUCOSE BLDC GLUCOMTR-MCNC: 132 MG/DL (ref 70–130)
GLUCOSE BLDC GLUCOMTR-MCNC: 139 MG/DL (ref 70–130)
GLUCOSE BLDC GLUCOMTR-MCNC: 76 MG/DL (ref 70–130)
GLUCOSE SERPL-MCNC: 117 MG/DL (ref 65–99)
GLUCOSE SERPL-MCNC: 130 MG/DL (ref 65–99)
HCO3 BLDV-SCNC: 22.3 MMOL/L (ref 22–28)
HCT VFR BLD AUTO: 26.8 % (ref 34–46.6)
HCT VFR BLD AUTO: 27.3 % (ref 34–46.6)
HCT VFR BLD AUTO: 36.2 % (ref 34–46.6)
HGB BLD-MCNC: 11.9 G/DL (ref 12–15.9)
HGB BLD-MCNC: 8.9 G/DL (ref 12–15.9)
HGB BLD-MCNC: 9 G/DL (ref 12–15.9)
IRON 24H UR-MRATE: 12 MCG/DL (ref 37–145)
IRON SATN MFR SERPL: 5 % (ref 20–50)
LAB AP CASE REPORT: NORMAL
LEFT ATRIUM VOLUME INDEX: 26.9 ML/M2
LV EF 2D ECHO EST: 31 %
MAGNESIUM SERPL-MCNC: 2 MG/DL (ref 1.6–2.4)
MAXIMAL PREDICTED HEART RATE: 147 BPM
MCH RBC QN AUTO: 27.7 PG (ref 26.6–33)
MCH RBC QN AUTO: 27.8 PG (ref 26.6–33)
MCHC RBC AUTO-ENTMCNC: 33 G/DL (ref 31.5–35.7)
MCHC RBC AUTO-ENTMCNC: 33.2 G/DL (ref 31.5–35.7)
MCV RBC AUTO: 83.5 FL (ref 79–97)
MCV RBC AUTO: 84.3 FL (ref 79–97)
MODALITY: ABNORMAL
PATH REPORT.FINAL DX SPEC: NORMAL
PATH REPORT.GROSS SPEC: NORMAL
PCO2 BLDV: 37.2 MM HG (ref 41–51)
PH BLDV: 7.39 PH UNITS (ref 7.31–7.41)
PLATELET # BLD AUTO: 172 10*3/MM3 (ref 140–450)
PLATELET # BLD AUTO: 217 10*3/MM3 (ref 140–450)
PMV BLD AUTO: 10.4 FL (ref 6–12)
PMV BLD AUTO: 10.5 FL (ref 6–12)
PO2 BLDV: 27.8 MM HG (ref 35–45)
POTASSIUM SERPL-SCNC: 3.8 MMOL/L (ref 3.5–5.2)
POTASSIUM SERPL-SCNC: 4.2 MMOL/L (ref 3.5–5.2)
POTASSIUM SERPL-SCNC: 4.4 MMOL/L (ref 3.5–5.2)
QT INTERVAL: 343 MS
RBC # BLD AUTO: 3.21 10*6/MM3 (ref 3.77–5.28)
RBC # BLD AUTO: 3.24 10*6/MM3 (ref 3.77–5.28)
RH BLD: POSITIVE
SAO2 % BLDCOA: 51.7 % (ref 92–99)
SODIUM SERPL-SCNC: 136 MMOL/L (ref 136–145)
SODIUM SERPL-SCNC: 138 MMOL/L (ref 136–145)
STRESS TARGET HR: 125 BPM
T&S EXPIRATION DATE: NORMAL
TIBC SERPL-MCNC: 234 MCG/DL (ref 298–536)
TOTAL RATE: 18 BREATHS/MINUTE
TRANSFERRIN SERPL-MCNC: 157 MG/DL (ref 200–360)
UNIT  ABO: NORMAL
UNIT  RH: NORMAL
WBC # BLD AUTO: 15.75 10*3/MM3 (ref 3.4–10.8)
WBC # BLD AUTO: 18.02 10*3/MM3 (ref 3.4–10.8)

## 2021-04-29 PROCEDURE — 93005 ELECTROCARDIOGRAM TRACING: CPT | Performed by: THORACIC SURGERY (CARDIOTHORACIC VASCULAR SURGERY)

## 2021-04-29 PROCEDURE — 25010000002 AMIODARONE IN DEXTROSE 5% 150-4.21 MG/100ML-% SOLUTION: Performed by: THORACIC SURGERY (CARDIOTHORACIC VASCULAR SURGERY)

## 2021-04-29 PROCEDURE — 85014 HEMATOCRIT: CPT

## 2021-04-29 PROCEDURE — 94799 UNLISTED PULMONARY SVC/PX: CPT

## 2021-04-29 PROCEDURE — 25010000002 MORPHINE PER 10 MG: Performed by: THORACIC SURGERY (CARDIOTHORACIC VASCULAR SURGERY)

## 2021-04-29 PROCEDURE — 86900 BLOOD TYPING SEROLOGIC ABO: CPT | Performed by: NURSE PRACTITIONER

## 2021-04-29 PROCEDURE — 84132 ASSAY OF SERUM POTASSIUM: CPT | Performed by: THORACIC SURGERY (CARDIOTHORACIC VASCULAR SURGERY)

## 2021-04-29 PROCEDURE — 86900 BLOOD TYPING SEROLOGIC ABO: CPT

## 2021-04-29 PROCEDURE — 86850 RBC ANTIBODY SCREEN: CPT | Performed by: NURSE PRACTITIONER

## 2021-04-29 PROCEDURE — P9016 RBC LEUKOCYTES REDUCED: HCPCS

## 2021-04-29 PROCEDURE — 84466 ASSAY OF TRANSFERRIN: CPT | Performed by: NURSE PRACTITIONER

## 2021-04-29 PROCEDURE — 36430 TRANSFUSION BLD/BLD COMPNT: CPT

## 2021-04-29 PROCEDURE — 80048 BASIC METABOLIC PNL TOTAL CA: CPT | Performed by: THORACIC SURGERY (CARDIOTHORACIC VASCULAR SURGERY)

## 2021-04-29 PROCEDURE — 25010000003 CEFAZOLIN IN DEXTROSE 2-4 GM/100ML-% SOLUTION: Performed by: THORACIC SURGERY (CARDIOTHORACIC VASCULAR SURGERY)

## 2021-04-29 PROCEDURE — 86901 BLOOD TYPING SEROLOGIC RH(D): CPT | Performed by: NURSE PRACTITIONER

## 2021-04-29 PROCEDURE — 94640 AIRWAY INHALATION TREATMENT: CPT

## 2021-04-29 PROCEDURE — 25010000002 AMIODARONE IN DEXTROSE 5% 360-4.14 MG/200ML-% SOLUTION: Performed by: THORACIC SURGERY (CARDIOTHORACIC VASCULAR SURGERY)

## 2021-04-29 PROCEDURE — 71045 X-RAY EXAM CHEST 1 VIEW: CPT

## 2021-04-29 PROCEDURE — 25010000002 METOCLOPRAMIDE PER 10 MG: Performed by: NURSE PRACTITIONER

## 2021-04-29 PROCEDURE — 99232 SBSQ HOSP IP/OBS MODERATE 35: CPT | Performed by: INTERNAL MEDICINE

## 2021-04-29 PROCEDURE — 99024 POSTOP FOLLOW-UP VISIT: CPT | Performed by: NURSE PRACTITIONER

## 2021-04-29 PROCEDURE — 85014 HEMATOCRIT: CPT | Performed by: THORACIC SURGERY (CARDIOTHORACIC VASCULAR SURGERY)

## 2021-04-29 PROCEDURE — 25010000002 ENOXAPARIN PER 10 MG: Performed by: THORACIC SURGERY (CARDIOTHORACIC VASCULAR SURGERY)

## 2021-04-29 PROCEDURE — 85018 HEMOGLOBIN: CPT | Performed by: THORACIC SURGERY (CARDIOTHORACIC VASCULAR SURGERY)

## 2021-04-29 PROCEDURE — 93010 ELECTROCARDIOGRAM REPORT: CPT | Performed by: INTERNAL MEDICINE

## 2021-04-29 PROCEDURE — 25010000002 LIDOCAINE PER 10 MG: Performed by: THORACIC SURGERY (CARDIOTHORACIC VASCULAR SURGERY)

## 2021-04-29 PROCEDURE — 93306 TTE W/DOPPLER COMPLETE: CPT

## 2021-04-29 PROCEDURE — 83540 ASSAY OF IRON: CPT | Performed by: NURSE PRACTITIONER

## 2021-04-29 PROCEDURE — 82803 BLOOD GASES ANY COMBINATION: CPT

## 2021-04-29 PROCEDURE — 25010000002 ONDANSETRON PER 1 MG: Performed by: THORACIC SURGERY (CARDIOTHORACIC VASCULAR SURGERY)

## 2021-04-29 PROCEDURE — 86923 COMPATIBILITY TEST ELECTRIC: CPT

## 2021-04-29 PROCEDURE — 93306 TTE W/DOPPLER COMPLETE: CPT | Performed by: INTERNAL MEDICINE

## 2021-04-29 PROCEDURE — 85018 HEMOGLOBIN: CPT

## 2021-04-29 PROCEDURE — 85027 COMPLETE CBC AUTOMATED: CPT | Performed by: NURSE PRACTITIONER

## 2021-04-29 PROCEDURE — 25010000003 MILRINONE LACTATE IN DEXTROSE 20-5 MG/100ML-% SOLUTION: Performed by: THORACIC SURGERY (CARDIOTHORACIC VASCULAR SURGERY)

## 2021-04-29 PROCEDURE — 25010000002 PERFLUTREN (DEFINITY) 8.476 MG IN SODIUM CHLORIDE (PF) 0.9 % 10 ML INJECTION: Performed by: THORACIC SURGERY (CARDIOTHORACIC VASCULAR SURGERY)

## 2021-04-29 PROCEDURE — 80069 RENAL FUNCTION PANEL: CPT | Performed by: THORACIC SURGERY (CARDIOTHORACIC VASCULAR SURGERY)

## 2021-04-29 PROCEDURE — 97110 THERAPEUTIC EXERCISES: CPT

## 2021-04-29 PROCEDURE — 80048 BASIC METABOLIC PNL TOTAL CA: CPT | Performed by: NURSE PRACTITIONER

## 2021-04-29 PROCEDURE — 82947 ASSAY GLUCOSE BLOOD QUANT: CPT

## 2021-04-29 PROCEDURE — 83735 ASSAY OF MAGNESIUM: CPT | Performed by: THORACIC SURGERY (CARDIOTHORACIC VASCULAR SURGERY)

## 2021-04-29 PROCEDURE — 82962 GLUCOSE BLOOD TEST: CPT

## 2021-04-29 PROCEDURE — 85027 COMPLETE CBC AUTOMATED: CPT | Performed by: THORACIC SURGERY (CARDIOTHORACIC VASCULAR SURGERY)

## 2021-04-29 PROCEDURE — 25010000002 MAGNESIUM SULFATE 2 GM/50ML SOLUTION: Performed by: THORACIC SURGERY (CARDIOTHORACIC VASCULAR SURGERY)

## 2021-04-29 RX ORDER — FERROUS SULFATE 325(65) MG
325 TABLET ORAL
Status: DISCONTINUED | OUTPATIENT
Start: 2021-04-30 | End: 2021-05-05 | Stop reason: HOSPADM

## 2021-04-29 RX ORDER — POTASSIUM CHLORIDE 750 MG/1
20 TABLET, FILM COATED, EXTENDED RELEASE ORAL ONCE
Status: DISCONTINUED | OUTPATIENT
Start: 2021-04-29 | End: 2021-04-29

## 2021-04-29 RX ORDER — NICOTINE POLACRILEX 4 MG
15 LOZENGE BUCCAL
Status: DISCONTINUED | OUTPATIENT
Start: 2021-04-29 | End: 2021-05-03

## 2021-04-29 RX ORDER — POTASSIUM CHLORIDE 1.5 G/1.77G
20 POWDER, FOR SOLUTION ORAL
Status: DISCONTINUED | OUTPATIENT
Start: 2021-04-29 | End: 2021-04-30

## 2021-04-29 RX ORDER — BUMETANIDE 0.25 MG/ML
1 INJECTION INTRAMUSCULAR; INTRAVENOUS ONCE
Status: COMPLETED | OUTPATIENT
Start: 2021-04-29 | End: 2021-04-29

## 2021-04-29 RX ORDER — GUAIFENESIN 600 MG/1
1200 TABLET, EXTENDED RELEASE ORAL EVERY 12 HOURS SCHEDULED
Status: DISCONTINUED | OUTPATIENT
Start: 2021-04-29 | End: 2021-05-05 | Stop reason: HOSPADM

## 2021-04-29 RX ORDER — BUMETANIDE 0.25 MG/ML
2 INJECTION INTRAMUSCULAR; INTRAVENOUS ONCE
Status: DISCONTINUED | OUTPATIENT
Start: 2021-04-29 | End: 2021-04-29

## 2021-04-29 RX ORDER — BUMETANIDE 0.25 MG/ML
1 INJECTION INTRAMUSCULAR; INTRAVENOUS EVERY 12 HOURS
Status: DISCONTINUED | OUTPATIENT
Start: 2021-04-29 | End: 2021-04-29

## 2021-04-29 RX ORDER — POTASSIUM CHLORIDE 750 MG/1
20 TABLET, FILM COATED, EXTENDED RELEASE ORAL 2 TIMES DAILY WITH MEALS
Status: DISCONTINUED | OUTPATIENT
Start: 2021-04-29 | End: 2021-04-29

## 2021-04-29 RX ORDER — BUMETANIDE 0.25 MG/ML
2 INJECTION INTRAMUSCULAR; INTRAVENOUS EVERY 8 HOURS
Status: DISCONTINUED | OUTPATIENT
Start: 2021-04-29 | End: 2021-04-29

## 2021-04-29 RX ORDER — INSULIN LISPRO 100 [IU]/ML
0-9 INJECTION, SOLUTION INTRAVENOUS; SUBCUTANEOUS
Status: DISCONTINUED | OUTPATIENT
Start: 2021-04-29 | End: 2021-05-03

## 2021-04-29 RX ORDER — METOCLOPRAMIDE HYDROCHLORIDE 5 MG/ML
5 INJECTION INTRAMUSCULAR; INTRAVENOUS EVERY 6 HOURS
Status: COMPLETED | OUTPATIENT
Start: 2021-04-29 | End: 2021-04-30

## 2021-04-29 RX ORDER — BUMETANIDE 0.25 MG/ML
2 INJECTION INTRAMUSCULAR; INTRAVENOUS EVERY 12 HOURS
Status: DISCONTINUED | OUTPATIENT
Start: 2021-04-29 | End: 2021-04-29

## 2021-04-29 RX ORDER — DEXTROSE MONOHYDRATE 25 G/50ML
25 INJECTION, SOLUTION INTRAVENOUS
Status: DISCONTINUED | OUTPATIENT
Start: 2021-04-29 | End: 2021-05-03

## 2021-04-29 RX ORDER — TRAMADOL HYDROCHLORIDE 50 MG/1
25 TABLET ORAL EVERY 6 HOURS PRN
Status: DISCONTINUED | OUTPATIENT
Start: 2021-04-29 | End: 2021-05-05 | Stop reason: HOSPADM

## 2021-04-29 RX ORDER — ACETYLCYSTEINE 200 MG/ML
3 SOLUTION ORAL; RESPIRATORY (INHALATION)
Status: DISCONTINUED | OUTPATIENT
Start: 2021-04-29 | End: 2021-05-05 | Stop reason: HOSPADM

## 2021-04-29 RX ORDER — METOCLOPRAMIDE HYDROCHLORIDE 5 MG/ML
5 INJECTION INTRAMUSCULAR; INTRAVENOUS EVERY 6 HOURS
Status: DISCONTINUED | OUTPATIENT
Start: 2021-04-29 | End: 2021-04-29

## 2021-04-29 RX ORDER — POTASSIUM CHLORIDE 750 MG/1
20 TABLET, FILM COATED, EXTENDED RELEASE ORAL
Status: DISCONTINUED | OUTPATIENT
Start: 2021-04-29 | End: 2021-04-29

## 2021-04-29 RX ORDER — METOLAZONE 2.5 MG/1
2.5 TABLET ORAL ONCE
Status: COMPLETED | OUTPATIENT
Start: 2021-04-29 | End: 2021-04-29

## 2021-04-29 RX ORDER — MAGNESIUM SULFATE HEPTAHYDRATE 40 MG/ML
2 INJECTION, SOLUTION INTRAVENOUS ONCE
Status: COMPLETED | OUTPATIENT
Start: 2021-04-29 | End: 2021-04-29

## 2021-04-29 RX ADMIN — HYDROCODONE BITARTRATE AND ACETAMINOPHEN 1 TABLET: 5; 325 TABLET ORAL at 09:13

## 2021-04-29 RX ADMIN — HYDROCODONE BITARTRATE AND ACETAMINOPHEN 1 TABLET: 10; 325 TABLET ORAL at 05:22

## 2021-04-29 RX ADMIN — IPRATROPIUM BROMIDE AND ALBUTEROL SULFATE 3 ML: 2.5; .5 SOLUTION RESPIRATORY (INHALATION) at 07:26

## 2021-04-29 RX ADMIN — ONDANSETRON 4 MG: 2 INJECTION INTRAMUSCULAR; INTRAVENOUS at 09:53

## 2021-04-29 RX ADMIN — GUAIFENESIN 1200 MG: 600 TABLET, EXTENDED RELEASE ORAL at 20:48

## 2021-04-29 RX ADMIN — ATORVASTATIN CALCIUM 40 MG: 20 TABLET, FILM COATED ORAL at 20:48

## 2021-04-29 RX ADMIN — ACETYLCYSTEINE 3 ML: 200 SOLUTION ORAL; RESPIRATORY (INHALATION) at 07:58

## 2021-04-29 RX ADMIN — TRAMADOL HYDROCHLORIDE 25 MG: 50 TABLET ORAL at 09:27

## 2021-04-29 RX ADMIN — POTASSIUM CHLORIDE 20 MEQ: 1.5 POWDER, FOR SOLUTION ORAL at 19:00

## 2021-04-29 RX ADMIN — ENOXAPARIN SODIUM 40 MG: 40 INJECTION SUBCUTANEOUS at 18:06

## 2021-04-29 RX ADMIN — MILRINONE LACTATE IN DEXTROSE 0.25 MCG/KG/MIN: 200 INJECTION, SOLUTION INTRAVENOUS at 11:59

## 2021-04-29 RX ADMIN — HYDROCODONE BITARTRATE AND ACETAMINOPHEN 2 TABLET: 5; 325 TABLET ORAL at 18:20

## 2021-04-29 RX ADMIN — ACETYLCYSTEINE 3 ML: 200 SOLUTION ORAL; RESPIRATORY (INHALATION) at 19:35

## 2021-04-29 RX ADMIN — IPRATROPIUM BROMIDE AND ALBUTEROL SULFATE 3 ML: 2.5; .5 SOLUTION RESPIRATORY (INHALATION) at 15:11

## 2021-04-29 RX ADMIN — IPRATROPIUM BROMIDE AND ALBUTEROL SULFATE 3 ML: 2.5; .5 SOLUTION RESPIRATORY (INHALATION) at 19:34

## 2021-04-29 RX ADMIN — CHLORHEXIDINE GLUCONATE 15 ML: 1.2 RINSE ORAL at 20:48

## 2021-04-29 RX ADMIN — HYDROCODONE BITARTRATE AND ACETAMINOPHEN 2 TABLET: 5; 325 TABLET ORAL at 22:31

## 2021-04-29 RX ADMIN — BUMETANIDE 2 MG: 0.25 INJECTION, SOLUTION INTRAMUSCULAR; INTRAVENOUS at 14:53

## 2021-04-29 RX ADMIN — BUMETANIDE 1 MG: 0.25 INJECTION INTRAMUSCULAR; INTRAVENOUS at 07:00

## 2021-04-29 RX ADMIN — CHLORHEXIDINE GLUCONATE 15 ML: 1.2 RINSE ORAL at 08:31

## 2021-04-29 RX ADMIN — POTASSIUM CHLORIDE 20 MEQ: 750 TABLET, EXTENDED RELEASE ORAL at 14:54

## 2021-04-29 RX ADMIN — AMIODARONE HYDROCHLORIDE 1 MG/MIN: 1.8 INJECTION, SOLUTION INTRAVENOUS at 19:31

## 2021-04-29 RX ADMIN — METOLAZONE 2.5 MG: 2.5 TABLET ORAL at 14:07

## 2021-04-29 RX ADMIN — MORPHINE SULFATE 1 MG: 2 INJECTION, SOLUTION INTRAMUSCULAR; INTRAVENOUS at 11:35

## 2021-04-29 RX ADMIN — PERFLUTREN 2 ML: 6.52 INJECTION, SUSPENSION INTRAVENOUS at 12:53

## 2021-04-29 RX ADMIN — CEFAZOLIN SODIUM 2 G: 2 INJECTION, SOLUTION INTRAVENOUS at 06:00

## 2021-04-29 RX ADMIN — MUPIROCIN 1 APPLICATION: 20 OINTMENT TOPICAL at 20:48

## 2021-04-29 RX ADMIN — METOCLOPRAMIDE HYDROCHLORIDE 5 MG: 5 INJECTION INTRAMUSCULAR; INTRAVENOUS at 14:08

## 2021-04-29 RX ADMIN — MUPIROCIN 1 APPLICATION: 20 OINTMENT TOPICAL at 08:32

## 2021-04-29 RX ADMIN — BUMETANIDE 1 MG/HR: 0.25 INJECTION INTRAMUSCULAR; INTRAVENOUS at 16:34

## 2021-04-29 RX ADMIN — AMIODARONE HYDROCHLORIDE 150 MG: 1.5 INJECTION, SOLUTION INTRAVENOUS at 19:31

## 2021-04-29 RX ADMIN — GUAIFENESIN 1200 MG: 600 TABLET, EXTENDED RELEASE ORAL at 08:31

## 2021-04-29 RX ADMIN — IPRATROPIUM BROMIDE AND ALBUTEROL SULFATE 3 ML: 2.5; .5 SOLUTION RESPIRATORY (INHALATION) at 10:27

## 2021-04-29 RX ADMIN — HYDROCODONE BITARTRATE AND ACETAMINOPHEN 1 TABLET: 5; 325 TABLET ORAL at 10:21

## 2021-04-29 RX ADMIN — ASPIRIN 81 MG: 81 TABLET, FILM COATED ORAL at 08:31

## 2021-04-29 RX ADMIN — METOCLOPRAMIDE HYDROCHLORIDE 5 MG: 5 INJECTION INTRAMUSCULAR; INTRAVENOUS at 19:42

## 2021-04-29 RX ADMIN — MAGNESIUM SULFATE HEPTAHYDRATE 2 G: 2 INJECTION, SOLUTION INTRAVENOUS at 18:42

## 2021-04-29 RX ADMIN — PANTOPRAZOLE SODIUM 40 MG: 40 TABLET, DELAYED RELEASE ORAL at 06:00

## 2021-04-29 RX ADMIN — LIDOCAINE HYDROCHLORIDE 2 MG/MIN: 4 INJECTION, SOLUTION INTRAVENOUS at 06:45

## 2021-04-29 RX ADMIN — POTASSIUM CHLORIDE 20 MEQ: 750 TABLET, EXTENDED RELEASE ORAL at 08:31

## 2021-04-29 RX ADMIN — BUMETANIDE 1 MG: 0.25 INJECTION INTRAMUSCULAR; INTRAVENOUS at 08:31

## 2021-04-29 RX ADMIN — DOCUSATE SODIUM 50MG AND SENNOSIDES 8.6MG 2 TABLET: 8.6; 5 TABLET, FILM COATED ORAL at 20:48

## 2021-04-29 RX ADMIN — MORPHINE SULFATE 1 MG: 2 INJECTION, SOLUTION INTRAMUSCULAR; INTRAVENOUS at 19:42

## 2021-04-29 RX ADMIN — HYDROCODONE BITARTRATE AND ACETAMINOPHEN 1 TABLET: 10; 325 TABLET ORAL at 14:25

## 2021-04-30 ENCOUNTER — APPOINTMENT (OUTPATIENT)
Dept: GENERAL RADIOLOGY | Facility: HOSPITAL | Age: 71
End: 2021-04-30

## 2021-04-30 LAB
ALBUMIN SERPL-MCNC: 3.6 G/DL (ref 3.5–5.2)
ANION GAP SERPL CALCULATED.3IONS-SCNC: 10.5 MMOL/L (ref 5–15)
ANION GAP SERPL CALCULATED.3IONS-SCNC: 9.1 MMOL/L (ref 5–15)
BH BB BLOOD EXPIRATION DATE: NORMAL
BH BB BLOOD EXPIRATION DATE: NORMAL
BH BB BLOOD TYPE BARCODE: 5100
BH BB BLOOD TYPE BARCODE: 5100
BH BB DISPENSE STATUS: NORMAL
BH BB DISPENSE STATUS: NORMAL
BH BB PRODUCT CODE: NORMAL
BH BB PRODUCT CODE: NORMAL
BH BB UNIT NUMBER: NORMAL
BH BB UNIT NUMBER: NORMAL
BUN SERPL-MCNC: 26 MG/DL (ref 8–23)
BUN SERPL-MCNC: 27 MG/DL (ref 8–23)
BUN/CREAT SERPL: 23 (ref 7–25)
BUN/CREAT SERPL: 24.3 (ref 7–25)
CA-I BLD-MCNC: 4.8 MG/DL (ref 4.6–5.4)
CA-I SERPL ISE-MCNC: 1.2 MMOL/L (ref 1.15–1.35)
CALCIUM SPEC-SCNC: 8.2 MG/DL (ref 8.6–10.5)
CALCIUM SPEC-SCNC: 8.4 MG/DL (ref 8.6–10.5)
CHLORIDE SERPL-SCNC: 104 MMOL/L (ref 98–107)
CHLORIDE SERPL-SCNC: 106 MMOL/L (ref 98–107)
CO2 SERPL-SCNC: 20.9 MMOL/L (ref 22–29)
CO2 SERPL-SCNC: 21.5 MMOL/L (ref 22–29)
CREAT SERPL-MCNC: 1.11 MG/DL (ref 0.57–1)
CREAT SERPL-MCNC: 1.13 MG/DL (ref 0.57–1)
CROSSMATCH INTERPRETATION: NORMAL
CROSSMATCH INTERPRETATION: NORMAL
DEPRECATED RDW RBC AUTO: 46.5 FL (ref 37–54)
ERYTHROCYTE [DISTWIDTH] IN BLOOD BY AUTOMATED COUNT: 15.2 % (ref 12.3–15.4)
GFR SERPL CREATININE-BSD FRML MDRD: 47 ML/MIN/1.73
GFR SERPL CREATININE-BSD FRML MDRD: 48 ML/MIN/1.73
GLUCOSE BLDC GLUCOMTR-MCNC: 123 MG/DL (ref 70–130)
GLUCOSE BLDC GLUCOMTR-MCNC: 126 MG/DL (ref 70–130)
GLUCOSE BLDC GLUCOMTR-MCNC: 176 MG/DL (ref 70–130)
GLUCOSE BLDC GLUCOMTR-MCNC: 212 MG/DL (ref 70–130)
GLUCOSE SERPL-MCNC: 120 MG/DL (ref 65–99)
GLUCOSE SERPL-MCNC: 140 MG/DL (ref 65–99)
HCT VFR BLD AUTO: 37.1 % (ref 34–46.6)
HGB BLD-MCNC: 12.4 G/DL (ref 12–15.9)
MAGNESIUM SERPL-MCNC: 2.4 MG/DL (ref 1.6–2.4)
MCH RBC QN AUTO: 28.3 PG (ref 26.6–33)
MCHC RBC AUTO-ENTMCNC: 33.4 G/DL (ref 31.5–35.7)
MCV RBC AUTO: 84.7 FL (ref 79–97)
PHOSPHATE SERPL-MCNC: 3.4 MG/DL (ref 2.5–4.5)
PLATELET # BLD AUTO: 139 10*3/MM3 (ref 140–450)
PMV BLD AUTO: 10.3 FL (ref 6–12)
POTASSIUM SERPL-SCNC: 3.6 MMOL/L (ref 3.5–5.2)
POTASSIUM SERPL-SCNC: 3.9 MMOL/L (ref 3.5–5.2)
POTASSIUM SERPL-SCNC: 4.2 MMOL/L (ref 3.5–5.2)
QT INTERVAL: 292 MS
QT INTERVAL: 399 MS
RBC # BLD AUTO: 4.38 10*6/MM3 (ref 3.77–5.28)
SODIUM SERPL-SCNC: 136 MMOL/L (ref 136–145)
SODIUM SERPL-SCNC: 136 MMOL/L (ref 136–145)
UNIT  ABO: NORMAL
UNIT  ABO: NORMAL
UNIT  RH: NORMAL
UNIT  RH: NORMAL
WBC # BLD AUTO: 13.08 10*3/MM3 (ref 3.4–10.8)

## 2021-04-30 PROCEDURE — 71045 X-RAY EXAM CHEST 1 VIEW: CPT

## 2021-04-30 PROCEDURE — 99232 SBSQ HOSP IP/OBS MODERATE 35: CPT | Performed by: INTERNAL MEDICINE

## 2021-04-30 PROCEDURE — 25010000003 MILRINONE LACTATE IN DEXTROSE 20-5 MG/100ML-% SOLUTION: Performed by: THORACIC SURGERY (CARDIOTHORACIC VASCULAR SURGERY)

## 2021-04-30 PROCEDURE — 25010000002 CALCIUM GLUCONATE-NACL 1-0.675 GM/50ML-% SOLUTION: Performed by: NURSE PRACTITIONER

## 2021-04-30 PROCEDURE — 80048 BASIC METABOLIC PNL TOTAL CA: CPT | Performed by: THORACIC SURGERY (CARDIOTHORACIC VASCULAR SURGERY)

## 2021-04-30 PROCEDURE — 85027 COMPLETE CBC AUTOMATED: CPT | Performed by: THORACIC SURGERY (CARDIOTHORACIC VASCULAR SURGERY)

## 2021-04-30 PROCEDURE — 94799 UNLISTED PULMONARY SVC/PX: CPT

## 2021-04-30 PROCEDURE — 93010 ELECTROCARDIOGRAM REPORT: CPT | Performed by: INTERNAL MEDICINE

## 2021-04-30 PROCEDURE — 82962 GLUCOSE BLOOD TEST: CPT

## 2021-04-30 PROCEDURE — 25010000002 ENOXAPARIN PER 10 MG: Performed by: NURSE PRACTITIONER

## 2021-04-30 PROCEDURE — 25010000002 MORPHINE PER 10 MG: Performed by: NURSE PRACTITIONER

## 2021-04-30 PROCEDURE — 99024 POSTOP FOLLOW-UP VISIT: CPT | Performed by: NURSE PRACTITIONER

## 2021-04-30 PROCEDURE — 83735 ASSAY OF MAGNESIUM: CPT | Performed by: THORACIC SURGERY (CARDIOTHORACIC VASCULAR SURGERY)

## 2021-04-30 PROCEDURE — 25010000002 MAGNESIUM SULFATE 2 GM/50ML SOLUTION: Performed by: NURSE PRACTITIONER

## 2021-04-30 PROCEDURE — 84132 ASSAY OF SERUM POTASSIUM: CPT | Performed by: THORACIC SURGERY (CARDIOTHORACIC VASCULAR SURGERY)

## 2021-04-30 PROCEDURE — 25010000002 METOCLOPRAMIDE PER 10 MG: Performed by: NURSE PRACTITIONER

## 2021-04-30 PROCEDURE — 25010000002 AMIODARONE IN DEXTROSE 5% 360-4.14 MG/200ML-% SOLUTION: Performed by: THORACIC SURGERY (CARDIOTHORACIC VASCULAR SURGERY)

## 2021-04-30 PROCEDURE — 93005 ELECTROCARDIOGRAM TRACING: CPT | Performed by: THORACIC SURGERY (CARDIOTHORACIC VASCULAR SURGERY)

## 2021-04-30 PROCEDURE — 25010000002 AMIODARONE IN DEXTROSE 5% 150-4.21 MG/100ML-% SOLUTION

## 2021-04-30 PROCEDURE — 82330 ASSAY OF CALCIUM: CPT | Performed by: THORACIC SURGERY (CARDIOTHORACIC VASCULAR SURGERY)

## 2021-04-30 PROCEDURE — 63710000001 INSULIN LISPRO (HUMAN) PER 5 UNITS: Performed by: NURSE PRACTITIONER

## 2021-04-30 RX ORDER — POTASSIUM CHLORIDE 1.5 G/1.77G
40 POWDER, FOR SOLUTION ORAL
Status: DISCONTINUED | OUTPATIENT
Start: 2021-04-30 | End: 2021-05-02

## 2021-04-30 RX ORDER — POTASSIUM CHLORIDE 750 MG/1
20 TABLET, FILM COATED, EXTENDED RELEASE ORAL 2 TIMES DAILY WITH MEALS
Status: DISCONTINUED | OUTPATIENT
Start: 2021-04-30 | End: 2021-04-30

## 2021-04-30 RX ORDER — AMIODARONE HYDROCHLORIDE 200 MG/1
300 TABLET ORAL EVERY 12 HOURS SCHEDULED
Status: COMPLETED | OUTPATIENT
Start: 2021-04-30 | End: 2021-05-04

## 2021-04-30 RX ORDER — MAGNESIUM SULFATE HEPTAHYDRATE 40 MG/ML
2 INJECTION, SOLUTION INTRAVENOUS ONCE
Status: COMPLETED | OUTPATIENT
Start: 2021-04-30 | End: 2021-04-30

## 2021-04-30 RX ORDER — DIGOXIN 0.25 MG/ML
250 INJECTION INTRAMUSCULAR; INTRAVENOUS ONCE
Status: DISCONTINUED | OUTPATIENT
Start: 2021-04-30 | End: 2021-05-05 | Stop reason: HOSPADM

## 2021-04-30 RX ORDER — CALCIUM GLUCONATE 20 MG/ML
2 INJECTION, SOLUTION INTRAVENOUS ONCE
Status: COMPLETED | OUTPATIENT
Start: 2021-04-30 | End: 2021-04-30

## 2021-04-30 RX ADMIN — GUAIFENESIN 1200 MG: 600 TABLET, EXTENDED RELEASE ORAL at 21:09

## 2021-04-30 RX ADMIN — ACETYLCYSTEINE 3 ML: 200 SOLUTION ORAL; RESPIRATORY (INHALATION) at 07:46

## 2021-04-30 RX ADMIN — POTASSIUM CHLORIDE 20 MEQ: 1.5 POWDER, FOR SOLUTION ORAL at 08:40

## 2021-04-30 RX ADMIN — MUPIROCIN 1 APPLICATION: 20 OINTMENT TOPICAL at 08:40

## 2021-04-30 RX ADMIN — ATORVASTATIN CALCIUM 40 MG: 20 TABLET, FILM COATED ORAL at 21:09

## 2021-04-30 RX ADMIN — IPRATROPIUM BROMIDE AND ALBUTEROL SULFATE 3 ML: 2.5; .5 SOLUTION RESPIRATORY (INHALATION) at 11:54

## 2021-04-30 RX ADMIN — AMIODARONE HYDROCHLORIDE 300 MG: 200 TABLET ORAL at 21:09

## 2021-04-30 RX ADMIN — INSULIN LISPRO 4 UNITS: 100 INJECTION, SOLUTION INTRAVENOUS; SUBCUTANEOUS at 18:52

## 2021-04-30 RX ADMIN — TRAMADOL HYDROCHLORIDE 25 MG: 50 TABLET ORAL at 05:51

## 2021-04-30 RX ADMIN — AMIODARONE HYDROCHLORIDE 0.5 MG/MIN: 1.8 INJECTION, SOLUTION INTRAVENOUS at 01:01

## 2021-04-30 RX ADMIN — AMIODARONE HYDROCHLORIDE 300 MG: 200 TABLET ORAL at 13:55

## 2021-04-30 RX ADMIN — ENOXAPARIN SODIUM 40 MG: 40 INJECTION SUBCUTANEOUS at 18:52

## 2021-04-30 RX ADMIN — METOCLOPRAMIDE HYDROCHLORIDE 5 MG: 5 INJECTION INTRAMUSCULAR; INTRAVENOUS at 01:38

## 2021-04-30 RX ADMIN — BUMETANIDE 0.5 MG/HR: 0.25 INJECTION INTRAMUSCULAR; INTRAVENOUS at 19:36

## 2021-04-30 RX ADMIN — DOCUSATE SODIUM 50MG AND SENNOSIDES 8.6MG 2 TABLET: 8.6; 5 TABLET, FILM COATED ORAL at 21:10

## 2021-04-30 RX ADMIN — CHLORHEXIDINE GLUCONATE 15 ML: 1.2 RINSE ORAL at 21:09

## 2021-04-30 RX ADMIN — ACETYLCYSTEINE 3 ML: 200 SOLUTION ORAL; RESPIRATORY (INHALATION) at 20:27

## 2021-04-30 RX ADMIN — PANTOPRAZOLE SODIUM 40 MG: 40 TABLET, DELAYED RELEASE ORAL at 05:51

## 2021-04-30 RX ADMIN — MILRINONE LACTATE IN DEXTROSE 0.25 MCG/KG/MIN: 200 INJECTION, SOLUTION INTRAVENOUS at 01:01

## 2021-04-30 RX ADMIN — METOPROLOL TARTRATE 25 MG: 25 TABLET, FILM COATED ORAL at 08:50

## 2021-04-30 RX ADMIN — MUPIROCIN 1 APPLICATION: 20 OINTMENT TOPICAL at 21:10

## 2021-04-30 RX ADMIN — HYDROCODONE BITARTRATE AND ACETAMINOPHEN 2 TABLET: 5; 325 TABLET ORAL at 16:01

## 2021-04-30 RX ADMIN — ASPIRIN 81 MG: 81 TABLET, FILM COATED ORAL at 08:53

## 2021-04-30 RX ADMIN — MAGNESIUM SULFATE HEPTAHYDRATE 2 G: 2 INJECTION, SOLUTION INTRAVENOUS at 08:39

## 2021-04-30 RX ADMIN — FERROUS SULFATE TAB 325 MG (65 MG ELEMENTAL FE) 325 MG: 325 (65 FE) TAB at 10:08

## 2021-04-30 RX ADMIN — IPRATROPIUM BROMIDE AND ALBUTEROL SULFATE 3 ML: 2.5; .5 SOLUTION RESPIRATORY (INHALATION) at 20:27

## 2021-04-30 RX ADMIN — POTASSIUM CHLORIDE 40 MEQ: 1.5 POWDER, FOR SOLUTION ORAL at 18:52

## 2021-04-30 RX ADMIN — AMIODARONE HYDROCHLORIDE 150 MG: 1.5 INJECTION, SOLUTION INTRAVENOUS at 09:04

## 2021-04-30 RX ADMIN — BUMETANIDE 1 MG/HR: 0.25 INJECTION INTRAMUSCULAR; INTRAVENOUS at 01:02

## 2021-04-30 RX ADMIN — POTASSIUM CHLORIDE 40 MEQ: 1.5 POWDER, FOR SOLUTION ORAL at 05:16

## 2021-04-30 RX ADMIN — CHLORHEXIDINE GLUCONATE 15 ML: 1.2 RINSE ORAL at 08:40

## 2021-04-30 RX ADMIN — MORPHINE SULFATE 2 MG: 2 INJECTION, SOLUTION INTRAMUSCULAR; INTRAVENOUS at 21:10

## 2021-04-30 RX ADMIN — HYDROCODONE BITARTRATE AND ACETAMINOPHEN 2 TABLET: 5; 325 TABLET ORAL at 10:55

## 2021-04-30 RX ADMIN — IPRATROPIUM BROMIDE AND ALBUTEROL SULFATE 3 ML: 2.5; .5 SOLUTION RESPIRATORY (INHALATION) at 07:41

## 2021-04-30 RX ADMIN — CALCIUM GLUCONATE 2 G: 20 INJECTION, SOLUTION INTRAVENOUS at 08:38

## 2021-04-30 RX ADMIN — GUAIFENESIN 1200 MG: 600 TABLET, EXTENDED RELEASE ORAL at 08:40

## 2021-04-30 RX ADMIN — HYDROCODONE BITARTRATE AND ACETAMINOPHEN 2 TABLET: 5; 325 TABLET ORAL at 03:15

## 2021-05-01 LAB
ANION GAP SERPL CALCULATED.3IONS-SCNC: 14.8 MMOL/L (ref 5–15)
BASE EXCESS BLDA CALC-SCNC: -3 MMOL/L (ref -5–5)
BUN SERPL-MCNC: 34 MG/DL (ref 8–23)
BUN/CREAT SERPL: 30.9 (ref 7–25)
CA-I BLDA-SCNC: ABNORMAL MMOL/L
CALCIUM SPEC-SCNC: 8.6 MG/DL (ref 8.6–10.5)
CHLORIDE SERPL-SCNC: 97 MMOL/L (ref 98–107)
CO2 BLDA-SCNC: 24 MMOL/L (ref 24–29)
CO2 SERPL-SCNC: 24.2 MMOL/L (ref 22–29)
CREAT SERPL-MCNC: 1.1 MG/DL (ref 0.57–1)
DEPRECATED RDW RBC AUTO: 45.7 FL (ref 37–54)
ERYTHROCYTE [DISTWIDTH] IN BLOOD BY AUTOMATED COUNT: 15.1 % (ref 12.3–15.4)
GFR SERPL CREATININE-BSD FRML MDRD: 49 ML/MIN/1.73
GLUCOSE BLDC GLUCOMTR-MCNC: 112 MG/DL (ref 70–130)
GLUCOSE BLDC GLUCOMTR-MCNC: 118 MG/DL (ref 70–130)
GLUCOSE BLDC GLUCOMTR-MCNC: 124 MG/DL (ref 70–130)
GLUCOSE BLDC GLUCOMTR-MCNC: 182 MG/DL (ref 70–130)
GLUCOSE BLDC GLUCOMTR-MCNC: 96 MG/DL (ref 70–130)
GLUCOSE SERPL-MCNC: 74 MG/DL (ref 65–99)
HCO3 BLDA-SCNC: 22.5 MMOL/L (ref 22–26)
HCT VFR BLD AUTO: 39.4 % (ref 34–46.6)
HCT VFR BLDA CALC: 21 % (ref 38–51)
HGB BLD-MCNC: 13.1 G/DL (ref 12–15.9)
HGB BLDA-MCNC: 7.1 G/DL (ref 12–17)
MCH RBC QN AUTO: 28 PG (ref 26.6–33)
MCHC RBC AUTO-ENTMCNC: 33.2 G/DL (ref 31.5–35.7)
MCV RBC AUTO: 84.2 FL (ref 79–97)
PCO2 BLDA: 39.8 MM HG (ref 35–45)
PH BLDA: 7.36 PH UNITS (ref 7.35–7.6)
PLATELET # BLD AUTO: 129 10*3/MM3 (ref 140–450)
PMV BLD AUTO: 10.3 FL (ref 6–12)
PO2 BLDA: 77 MMHG (ref 80–105)
POTASSIUM BLDA-SCNC: >9 MMOL/L (ref 3.5–4.9)
POTASSIUM SERPL-SCNC: 3.1 MMOL/L (ref 3.5–5.2)
POTASSIUM SERPL-SCNC: 4 MMOL/L (ref 3.5–5.2)
QT INTERVAL: 446 MS
RBC # BLD AUTO: 4.68 10*6/MM3 (ref 3.77–5.28)
SAO2 % BLDA: 95 % (ref 95–98)
SODIUM SERPL-SCNC: 136 MMOL/L (ref 136–145)
WBC # BLD AUTO: 11.08 10*3/MM3 (ref 3.4–10.8)

## 2021-05-01 PROCEDURE — 63710000001 INSULIN LISPRO (HUMAN) PER 5 UNITS: Performed by: NURSE PRACTITIONER

## 2021-05-01 PROCEDURE — 99232 SBSQ HOSP IP/OBS MODERATE 35: CPT | Performed by: NURSE PRACTITIONER

## 2021-05-01 PROCEDURE — 80048 BASIC METABOLIC PNL TOTAL CA: CPT | Performed by: NURSE PRACTITIONER

## 2021-05-01 PROCEDURE — 25010000002 ENOXAPARIN PER 10 MG: Performed by: NURSE PRACTITIONER

## 2021-05-01 PROCEDURE — 93005 ELECTROCARDIOGRAM TRACING: CPT | Performed by: THORACIC SURGERY (CARDIOTHORACIC VASCULAR SURGERY)

## 2021-05-01 PROCEDURE — 85027 COMPLETE CBC AUTOMATED: CPT | Performed by: NURSE PRACTITIONER

## 2021-05-01 PROCEDURE — 84132 ASSAY OF SERUM POTASSIUM: CPT | Performed by: THORACIC SURGERY (CARDIOTHORACIC VASCULAR SURGERY)

## 2021-05-01 PROCEDURE — 82962 GLUCOSE BLOOD TEST: CPT

## 2021-05-01 PROCEDURE — 94799 UNLISTED PULMONARY SVC/PX: CPT

## 2021-05-01 PROCEDURE — 97110 THERAPEUTIC EXERCISES: CPT

## 2021-05-01 PROCEDURE — 93010 ELECTROCARDIOGRAM REPORT: CPT | Performed by: INTERNAL MEDICINE

## 2021-05-01 RX ORDER — BUMETANIDE 0.25 MG/ML
2 INJECTION INTRAMUSCULAR; INTRAVENOUS 4 TIMES DAILY
Status: DISCONTINUED | OUTPATIENT
Start: 2021-05-01 | End: 2021-05-02

## 2021-05-01 RX ORDER — POTASSIUM CHLORIDE 750 MG/1
40 TABLET, FILM COATED, EXTENDED RELEASE ORAL ONCE
Status: COMPLETED | OUTPATIENT
Start: 2021-05-01 | End: 2021-05-01

## 2021-05-01 RX ADMIN — AMIODARONE HYDROCHLORIDE 300 MG: 200 TABLET ORAL at 08:27

## 2021-05-01 RX ADMIN — MUPIROCIN 1 APPLICATION: 20 OINTMENT TOPICAL at 08:28

## 2021-05-01 RX ADMIN — DOCUSATE SODIUM 50MG AND SENNOSIDES 8.6MG 2 TABLET: 8.6; 5 TABLET, FILM COATED ORAL at 08:28

## 2021-05-01 RX ADMIN — POTASSIUM CHLORIDE 40 MEQ: 1.5 POWDER, FOR SOLUTION ORAL at 08:28

## 2021-05-01 RX ADMIN — HYDROCODONE BITARTRATE AND ACETAMINOPHEN 1 TABLET: 10; 325 TABLET ORAL at 20:44

## 2021-05-01 RX ADMIN — METOPROLOL TARTRATE 25 MG: 25 TABLET, FILM COATED ORAL at 20:47

## 2021-05-01 RX ADMIN — IPRATROPIUM BROMIDE AND ALBUTEROL SULFATE 3 ML: 2.5; .5 SOLUTION RESPIRATORY (INHALATION) at 20:03

## 2021-05-01 RX ADMIN — FERROUS SULFATE TAB 325 MG (65 MG ELEMENTAL FE) 325 MG: 325 (65 FE) TAB at 08:28

## 2021-05-01 RX ADMIN — IPRATROPIUM BROMIDE AND ALBUTEROL SULFATE 3 ML: 2.5; .5 SOLUTION RESPIRATORY (INHALATION) at 14:44

## 2021-05-01 RX ADMIN — SODIUM CHLORIDE, PRESERVATIVE FREE 30 ML: 5 INJECTION INTRAVENOUS at 20:45

## 2021-05-01 RX ADMIN — ASPIRIN 81 MG: 81 TABLET, FILM COATED ORAL at 08:28

## 2021-05-01 RX ADMIN — POTASSIUM CHLORIDE 40 MEQ: 1.5 POWDER, FOR SOLUTION ORAL at 11:54

## 2021-05-01 RX ADMIN — HYDROCODONE BITARTRATE AND ACETAMINOPHEN 2 TABLET: 5; 325 TABLET ORAL at 08:28

## 2021-05-01 RX ADMIN — MUPIROCIN 1 APPLICATION: 20 OINTMENT TOPICAL at 20:44

## 2021-05-01 RX ADMIN — BUMETANIDE 2 MG: 0.25 INJECTION INTRAMUSCULAR; INTRAVENOUS at 18:35

## 2021-05-01 RX ADMIN — GUAIFENESIN 1200 MG: 600 TABLET, EXTENDED RELEASE ORAL at 08:28

## 2021-05-01 RX ADMIN — POTASSIUM CHLORIDE 40 MEQ: 750 TABLET, EXTENDED RELEASE ORAL at 11:54

## 2021-05-01 RX ADMIN — IPRATROPIUM BROMIDE AND ALBUTEROL SULFATE 3 ML: 2.5; .5 SOLUTION RESPIRATORY (INHALATION) at 11:06

## 2021-05-01 RX ADMIN — CHLORHEXIDINE GLUCONATE 15 ML: 1.2 RINSE ORAL at 20:44

## 2021-05-01 RX ADMIN — GUAIFENESIN 1200 MG: 600 TABLET, EXTENDED RELEASE ORAL at 20:44

## 2021-05-01 RX ADMIN — AMIODARONE HYDROCHLORIDE 300 MG: 200 TABLET ORAL at 20:44

## 2021-05-01 RX ADMIN — DOCUSATE SODIUM 50MG AND SENNOSIDES 8.6MG 2 TABLET: 8.6; 5 TABLET, FILM COATED ORAL at 20:44

## 2021-05-01 RX ADMIN — HYDROCODONE BITARTRATE AND ACETAMINOPHEN 1 TABLET: 10; 325 TABLET ORAL at 03:29

## 2021-05-01 RX ADMIN — INSULIN LISPRO 2 UNITS: 100 INJECTION, SOLUTION INTRAVENOUS; SUBCUTANEOUS at 11:55

## 2021-05-01 RX ADMIN — METOPROLOL TARTRATE 25 MG: 25 TABLET, FILM COATED ORAL at 08:28

## 2021-05-01 RX ADMIN — BUMETANIDE 2 MG: 0.25 INJECTION INTRAMUSCULAR; INTRAVENOUS at 20:43

## 2021-05-01 RX ADMIN — TRAMADOL HYDROCHLORIDE 25 MG: 50 TABLET ORAL at 01:35

## 2021-05-01 RX ADMIN — ATORVASTATIN CALCIUM 40 MG: 20 TABLET, FILM COATED ORAL at 20:44

## 2021-05-01 RX ADMIN — BUMETANIDE 2 MG: 0.25 INJECTION INTRAMUSCULAR; INTRAVENOUS at 11:55

## 2021-05-01 RX ADMIN — ACETYLCYSTEINE 3 ML: 200 SOLUTION ORAL; RESPIRATORY (INHALATION) at 20:02

## 2021-05-01 RX ADMIN — HYDROCODONE BITARTRATE AND ACETAMINOPHEN 1 TABLET: 10; 325 TABLET ORAL at 15:54

## 2021-05-01 RX ADMIN — CHLORHEXIDINE GLUCONATE 15 ML: 1.2 RINSE ORAL at 08:28

## 2021-05-01 RX ADMIN — ENOXAPARIN SODIUM 40 MG: 40 INJECTION SUBCUTANEOUS at 18:35

## 2021-05-02 LAB
ANION GAP SERPL CALCULATED.3IONS-SCNC: 12.9 MMOL/L (ref 5–15)
BUN SERPL-MCNC: 46 MG/DL (ref 8–23)
BUN/CREAT SERPL: 38.3 (ref 7–25)
CALCIUM SPEC-SCNC: 8.4 MG/DL (ref 8.6–10.5)
CHLORIDE SERPL-SCNC: 96 MMOL/L (ref 98–107)
CO2 SERPL-SCNC: 27.1 MMOL/L (ref 22–29)
CREAT SERPL-MCNC: 1.2 MG/DL (ref 0.57–1)
DEPRECATED RDW RBC AUTO: 50.9 FL (ref 37–54)
ERYTHROCYTE [DISTWIDTH] IN BLOOD BY AUTOMATED COUNT: 15.5 % (ref 12.3–15.4)
GFR SERPL CREATININE-BSD FRML MDRD: 44 ML/MIN/1.73
GLUCOSE BLDC GLUCOMTR-MCNC: 105 MG/DL (ref 70–130)
GLUCOSE BLDC GLUCOMTR-MCNC: 106 MG/DL (ref 70–130)
GLUCOSE BLDC GLUCOMTR-MCNC: 137 MG/DL (ref 70–130)
GLUCOSE BLDC GLUCOMTR-MCNC: 174 MG/DL (ref 70–130)
GLUCOSE SERPL-MCNC: 95 MG/DL (ref 65–99)
HCT VFR BLD AUTO: 40.6 % (ref 34–46.6)
HGB BLD-MCNC: 12.8 G/DL (ref 12–15.9)
MCH RBC QN AUTO: 28.3 PG (ref 26.6–33)
MCHC RBC AUTO-ENTMCNC: 31.5 G/DL (ref 31.5–35.7)
MCV RBC AUTO: 89.6 FL (ref 79–97)
PLATELET # BLD AUTO: 176 10*3/MM3 (ref 140–450)
PMV BLD AUTO: 9.6 FL (ref 6–12)
POTASSIUM SERPL-SCNC: 3.2 MMOL/L (ref 3.5–5.2)
QT INTERVAL: 403 MS
QT INTERVAL: 455 MS
RBC # BLD AUTO: 4.53 10*6/MM3 (ref 3.77–5.28)
SODIUM SERPL-SCNC: 136 MMOL/L (ref 136–145)
WBC # BLD AUTO: 9.03 10*3/MM3 (ref 3.4–10.8)

## 2021-05-02 PROCEDURE — 94799 UNLISTED PULMONARY SVC/PX: CPT

## 2021-05-02 PROCEDURE — 97116 GAIT TRAINING THERAPY: CPT

## 2021-05-02 PROCEDURE — 97530 THERAPEUTIC ACTIVITIES: CPT

## 2021-05-02 PROCEDURE — 80048 BASIC METABOLIC PNL TOTAL CA: CPT | Performed by: NURSE PRACTITIONER

## 2021-05-02 PROCEDURE — 25010000002 ENOXAPARIN PER 10 MG: Performed by: NURSE PRACTITIONER

## 2021-05-02 PROCEDURE — 63710000001 DIPHENHYDRAMINE PER 50 MG: Performed by: NURSE PRACTITIONER

## 2021-05-02 PROCEDURE — 99232 SBSQ HOSP IP/OBS MODERATE 35: CPT | Performed by: NURSE PRACTITIONER

## 2021-05-02 PROCEDURE — 82962 GLUCOSE BLOOD TEST: CPT

## 2021-05-02 PROCEDURE — 93005 ELECTROCARDIOGRAM TRACING: CPT | Performed by: THORACIC SURGERY (CARDIOTHORACIC VASCULAR SURGERY)

## 2021-05-02 PROCEDURE — 85027 COMPLETE CBC AUTOMATED: CPT | Performed by: THORACIC SURGERY (CARDIOTHORACIC VASCULAR SURGERY)

## 2021-05-02 PROCEDURE — 93010 ELECTROCARDIOGRAM REPORT: CPT | Performed by: INTERNAL MEDICINE

## 2021-05-02 RX ORDER — DIPHENHYDRAMINE HCL 25 MG
25 CAPSULE ORAL EVERY 6 HOURS PRN
Status: DISCONTINUED | OUTPATIENT
Start: 2021-05-02 | End: 2021-05-05 | Stop reason: HOSPADM

## 2021-05-02 RX ORDER — BUMETANIDE 2 MG/1
4 TABLET ORAL 2 TIMES DAILY
Status: DISCONTINUED | OUTPATIENT
Start: 2021-05-02 | End: 2021-05-04

## 2021-05-02 RX ORDER — POTASSIUM CHLORIDE 750 MG/1
40 TABLET, FILM COATED, EXTENDED RELEASE ORAL
Status: DISCONTINUED | OUTPATIENT
Start: 2021-05-02 | End: 2021-05-05 | Stop reason: HOSPADM

## 2021-05-02 RX ADMIN — DOCUSATE SODIUM 50MG AND SENNOSIDES 8.6MG 2 TABLET: 8.6; 5 TABLET, FILM COATED ORAL at 09:05

## 2021-05-02 RX ADMIN — HYDROCODONE BITARTRATE AND ACETAMINOPHEN 1 TABLET: 10; 325 TABLET ORAL at 09:06

## 2021-05-02 RX ADMIN — POTASSIUM CHLORIDE 40 MEQ: 750 TABLET, EXTENDED RELEASE ORAL at 18:27

## 2021-05-02 RX ADMIN — DOCUSATE SODIUM 50MG AND SENNOSIDES 8.6MG 2 TABLET: 8.6; 5 TABLET, FILM COATED ORAL at 20:27

## 2021-05-02 RX ADMIN — FERROUS SULFATE TAB 325 MG (65 MG ELEMENTAL FE) 325 MG: 325 (65 FE) TAB at 09:06

## 2021-05-02 RX ADMIN — BISACODYL 10 MG: 5 TABLET ORAL at 21:05

## 2021-05-02 RX ADMIN — ATORVASTATIN CALCIUM 40 MG: 20 TABLET, FILM COATED ORAL at 20:28

## 2021-05-02 RX ADMIN — POTASSIUM CHLORIDE 40 MEQ: 1.5 POWDER, FOR SOLUTION ORAL at 09:06

## 2021-05-02 RX ADMIN — MUPIROCIN 1 APPLICATION: 20 OINTMENT TOPICAL at 20:28

## 2021-05-02 RX ADMIN — ASPIRIN 81 MG: 81 TABLET, FILM COATED ORAL at 09:06

## 2021-05-02 RX ADMIN — GUAIFENESIN 1200 MG: 600 TABLET, EXTENDED RELEASE ORAL at 09:06

## 2021-05-02 RX ADMIN — METOPROLOL TARTRATE 25 MG: 25 TABLET, FILM COATED ORAL at 20:27

## 2021-05-02 RX ADMIN — DIPHENHYDRAMINE HYDROCHLORIDE 25 MG: 25 CAPSULE ORAL at 20:27

## 2021-05-02 RX ADMIN — ACETYLCYSTEINE 3 ML: 200 SOLUTION ORAL; RESPIRATORY (INHALATION) at 07:15

## 2021-05-02 RX ADMIN — IPRATROPIUM BROMIDE AND ALBUTEROL SULFATE 3 ML: 2.5; .5 SOLUTION RESPIRATORY (INHALATION) at 14:21

## 2021-05-02 RX ADMIN — BUMETANIDE 4 MG: 2 TABLET ORAL at 11:29

## 2021-05-02 RX ADMIN — AMIODARONE HYDROCHLORIDE 300 MG: 200 TABLET ORAL at 09:06

## 2021-05-02 RX ADMIN — HYDROCODONE BITARTRATE AND ACETAMINOPHEN 2 TABLET: 5; 325 TABLET ORAL at 16:08

## 2021-05-02 RX ADMIN — HYDROCODONE BITARTRATE AND ACETAMINOPHEN 2 TABLET: 5; 325 TABLET ORAL at 20:28

## 2021-05-02 RX ADMIN — POTASSIUM CHLORIDE 40 MEQ: 750 TABLET, EXTENDED RELEASE ORAL at 11:29

## 2021-05-02 RX ADMIN — IPRATROPIUM BROMIDE AND ALBUTEROL SULFATE 3 ML: 2.5; .5 SOLUTION RESPIRATORY (INHALATION) at 19:20

## 2021-05-02 RX ADMIN — CHLORHEXIDINE GLUCONATE 15 ML: 1.2 RINSE ORAL at 20:27

## 2021-05-02 RX ADMIN — BUMETANIDE 4 MG: 2 TABLET ORAL at 20:27

## 2021-05-02 RX ADMIN — AMIODARONE HYDROCHLORIDE 300 MG: 200 TABLET ORAL at 20:27

## 2021-05-02 RX ADMIN — ENOXAPARIN SODIUM 40 MG: 40 INJECTION SUBCUTANEOUS at 22:28

## 2021-05-02 RX ADMIN — GUAIFENESIN 1200 MG: 600 TABLET, EXTENDED RELEASE ORAL at 20:27

## 2021-05-02 RX ADMIN — METOPROLOL TARTRATE 25 MG: 25 TABLET, FILM COATED ORAL at 11:29

## 2021-05-02 RX ADMIN — ACETYLCYSTEINE 3 ML: 200 SOLUTION ORAL; RESPIRATORY (INHALATION) at 19:19

## 2021-05-02 RX ADMIN — BUMETANIDE 2 MG: 0.25 INJECTION INTRAMUSCULAR; INTRAVENOUS at 09:06

## 2021-05-02 RX ADMIN — IPRATROPIUM BROMIDE AND ALBUTEROL SULFATE 3 ML: 2.5; .5 SOLUTION RESPIRATORY (INHALATION) at 07:15

## 2021-05-02 RX ADMIN — HYDROCODONE BITARTRATE AND ACETAMINOPHEN 1 TABLET: 10; 325 TABLET ORAL at 01:38

## 2021-05-03 ENCOUNTER — APPOINTMENT (OUTPATIENT)
Dept: GENERAL RADIOLOGY | Facility: HOSPITAL | Age: 71
End: 2021-05-03

## 2021-05-03 LAB
ANION GAP SERPL CALCULATED.3IONS-SCNC: 15 MMOL/L (ref 5–15)
BACTERIA SPEC AEROBE CULT: NORMAL
BACTERIA SPEC AEROBE CULT: NORMAL
BASOPHILS # BLD AUTO: 0.06 10*3/MM3 (ref 0–0.2)
BASOPHILS NFR BLD AUTO: 0.6 % (ref 0–1.5)
BUN SERPL-MCNC: 53 MG/DL (ref 8–23)
BUN/CREAT SERPL: 37.3 (ref 7–25)
CALCIUM SPEC-SCNC: 9 MG/DL (ref 8.6–10.5)
CHLORIDE SERPL-SCNC: 94 MMOL/L (ref 98–107)
CO2 SERPL-SCNC: 28 MMOL/L (ref 22–29)
CREAT SERPL-MCNC: 1.42 MG/DL (ref 0.57–1)
DEPRECATED RDW RBC AUTO: 48.7 FL (ref 37–54)
EOSINOPHIL # BLD AUTO: 0.58 10*3/MM3 (ref 0–0.4)
EOSINOPHIL NFR BLD AUTO: 5.7 % (ref 0.3–6.2)
ERYTHROCYTE [DISTWIDTH] IN BLOOD BY AUTOMATED COUNT: 15.5 % (ref 12.3–15.4)
GFR SERPL CREATININE-BSD FRML MDRD: 36 ML/MIN/1.73
GLUCOSE BLDC GLUCOMTR-MCNC: 106 MG/DL (ref 70–130)
GLUCOSE BLDC GLUCOMTR-MCNC: 107 MG/DL (ref 70–130)
GLUCOSE BLDC GLUCOMTR-MCNC: 94 MG/DL (ref 70–130)
GLUCOSE SERPL-MCNC: 102 MG/DL (ref 65–99)
HCT VFR BLD AUTO: 43.2 % (ref 34–46.6)
HGB BLD-MCNC: 14.1 G/DL (ref 12–15.9)
IMM GRANULOCYTES # BLD AUTO: 0.05 10*3/MM3 (ref 0–0.05)
IMM GRANULOCYTES NFR BLD AUTO: 0.5 % (ref 0–0.5)
LYMPHOCYTES # BLD AUTO: 1.04 10*3/MM3 (ref 0.7–3.1)
LYMPHOCYTES NFR BLD AUTO: 10.2 % (ref 19.6–45.3)
MCH RBC QN AUTO: 28.2 PG (ref 26.6–33)
MCHC RBC AUTO-ENTMCNC: 32.6 G/DL (ref 31.5–35.7)
MCV RBC AUTO: 86.4 FL (ref 79–97)
MONOCYTES # BLD AUTO: 1.08 10*3/MM3 (ref 0.1–0.9)
MONOCYTES NFR BLD AUTO: 10.6 % (ref 5–12)
NEUTROPHILS NFR BLD AUTO: 7.36 10*3/MM3 (ref 1.7–7)
NEUTROPHILS NFR BLD AUTO: 72.4 % (ref 42.7–76)
NRBC BLD AUTO-RTO: 0 /100 WBC (ref 0–0.2)
PLATELET # BLD AUTO: 245 10*3/MM3 (ref 140–450)
PMV BLD AUTO: 9.7 FL (ref 6–12)
POTASSIUM SERPL-SCNC: 3.8 MMOL/L (ref 3.5–5.2)
RBC # BLD AUTO: 5 10*6/MM3 (ref 3.77–5.28)
SODIUM SERPL-SCNC: 137 MMOL/L (ref 136–145)
WBC # BLD AUTO: 10.17 10*3/MM3 (ref 3.4–10.8)

## 2021-05-03 PROCEDURE — 94799 UNLISTED PULMONARY SVC/PX: CPT

## 2021-05-03 PROCEDURE — 99024 POSTOP FOLLOW-UP VISIT: CPT | Performed by: THORACIC SURGERY (CARDIOTHORACIC VASCULAR SURGERY)

## 2021-05-03 PROCEDURE — 25010000002 ENOXAPARIN PER 10 MG: Performed by: NURSE PRACTITIONER

## 2021-05-03 PROCEDURE — 82962 GLUCOSE BLOOD TEST: CPT

## 2021-05-03 PROCEDURE — 71045 X-RAY EXAM CHEST 1 VIEW: CPT

## 2021-05-03 PROCEDURE — 94760 N-INVAS EAR/PLS OXIMETRY 1: CPT

## 2021-05-03 PROCEDURE — 99232 SBSQ HOSP IP/OBS MODERATE 35: CPT | Performed by: INTERNAL MEDICINE

## 2021-05-03 PROCEDURE — 80048 BASIC METABOLIC PNL TOTAL CA: CPT | Performed by: NURSE PRACTITIONER

## 2021-05-03 PROCEDURE — 63710000001 DIPHENHYDRAMINE PER 50 MG: Performed by: NURSE PRACTITIONER

## 2021-05-03 PROCEDURE — 85025 COMPLETE CBC W/AUTO DIFF WBC: CPT | Performed by: THORACIC SURGERY (CARDIOTHORACIC VASCULAR SURGERY)

## 2021-05-03 PROCEDURE — 97530 THERAPEUTIC ACTIVITIES: CPT

## 2021-05-03 RX ORDER — WARFARIN SODIUM 2 MG/1
2 TABLET ORAL
Status: DISCONTINUED | OUTPATIENT
Start: 2021-05-04 | End: 2021-05-05 | Stop reason: HOSPADM

## 2021-05-03 RX ORDER — METOPROLOL SUCCINATE 50 MG/1
50 TABLET, EXTENDED RELEASE ORAL
Status: DISCONTINUED | OUTPATIENT
Start: 2021-05-03 | End: 2021-05-05 | Stop reason: HOSPADM

## 2021-05-03 RX ADMIN — ATORVASTATIN CALCIUM 40 MG: 20 TABLET, FILM COATED ORAL at 21:03

## 2021-05-03 RX ADMIN — ENOXAPARIN SODIUM 40 MG: 40 INJECTION SUBCUTANEOUS at 17:06

## 2021-05-03 RX ADMIN — ACETYLCYSTEINE 3 ML: 200 SOLUTION ORAL; RESPIRATORY (INHALATION) at 22:06

## 2021-05-03 RX ADMIN — AMIODARONE HYDROCHLORIDE 300 MG: 200 TABLET ORAL at 21:04

## 2021-05-03 RX ADMIN — HYDROCODONE BITARTRATE AND ACETAMINOPHEN 2 TABLET: 5; 325 TABLET ORAL at 02:57

## 2021-05-03 RX ADMIN — FERROUS SULFATE TAB 325 MG (65 MG ELEMENTAL FE) 325 MG: 325 (65 FE) TAB at 08:35

## 2021-05-03 RX ADMIN — POTASSIUM CHLORIDE 40 MEQ: 750 TABLET, EXTENDED RELEASE ORAL at 17:06

## 2021-05-03 RX ADMIN — DOCUSATE SODIUM 50MG AND SENNOSIDES 8.6MG 2 TABLET: 8.6; 5 TABLET, FILM COATED ORAL at 21:04

## 2021-05-03 RX ADMIN — IPRATROPIUM BROMIDE AND ALBUTEROL SULFATE 3 ML: 2.5; .5 SOLUTION RESPIRATORY (INHALATION) at 12:01

## 2021-05-03 RX ADMIN — IPRATROPIUM BROMIDE AND ALBUTEROL SULFATE 3 ML: 2.5; .5 SOLUTION RESPIRATORY (INHALATION) at 22:05

## 2021-05-03 RX ADMIN — MUPIROCIN 1 APPLICATION: 20 OINTMENT TOPICAL at 21:06

## 2021-05-03 RX ADMIN — IPRATROPIUM BROMIDE AND ALBUTEROL SULFATE 3 ML: 2.5; .5 SOLUTION RESPIRATORY (INHALATION) at 07:40

## 2021-05-03 RX ADMIN — GUAIFENESIN 1200 MG: 600 TABLET, EXTENDED RELEASE ORAL at 21:03

## 2021-05-03 RX ADMIN — AMIODARONE HYDROCHLORIDE 300 MG: 200 TABLET ORAL at 08:35

## 2021-05-03 RX ADMIN — POTASSIUM CHLORIDE 40 MEQ: 750 TABLET, EXTENDED RELEASE ORAL at 08:35

## 2021-05-03 RX ADMIN — BUMETANIDE 4 MG: 2 TABLET ORAL at 21:04

## 2021-05-03 RX ADMIN — BUMETANIDE 4 MG: 2 TABLET ORAL at 08:35

## 2021-05-03 RX ADMIN — ACETYLCYSTEINE 3 ML: 200 SOLUTION ORAL; RESPIRATORY (INHALATION) at 07:43

## 2021-05-03 RX ADMIN — BISACODYL 10 MG: 10 SUPPOSITORY RECTAL at 02:05

## 2021-05-03 RX ADMIN — HYDROCODONE BITARTRATE AND ACETAMINOPHEN 2 TABLET: 5; 325 TABLET ORAL at 12:40

## 2021-05-03 RX ADMIN — HYDROCODONE BITARTRATE AND ACETAMINOPHEN 2 TABLET: 5; 325 TABLET ORAL at 22:22

## 2021-05-03 RX ADMIN — ASPIRIN 81 MG: 81 TABLET, FILM COATED ORAL at 08:35

## 2021-05-03 RX ADMIN — GUAIFENESIN 1200 MG: 600 TABLET, EXTENDED RELEASE ORAL at 08:36

## 2021-05-03 RX ADMIN — HYDROCODONE BITARTRATE AND ACETAMINOPHEN 2 TABLET: 5; 325 TABLET ORAL at 17:09

## 2021-05-03 RX ADMIN — DIPHENHYDRAMINE HYDROCHLORIDE 25 MG: 25 CAPSULE ORAL at 05:22

## 2021-05-03 RX ADMIN — METOPROLOL SUCCINATE 50 MG: 50 TABLET, EXTENDED RELEASE ORAL at 10:57

## 2021-05-04 LAB
ANION GAP SERPL CALCULATED.3IONS-SCNC: 13.6 MMOL/L (ref 5–15)
BASOPHILS # BLD AUTO: 0.05 10*3/MM3 (ref 0–0.2)
BASOPHILS NFR BLD AUTO: 0.5 % (ref 0–1.5)
BUN SERPL-MCNC: 41 MG/DL (ref 8–23)
BUN/CREAT SERPL: 39 (ref 7–25)
CALCIUM SPEC-SCNC: 8.7 MG/DL (ref 8.6–10.5)
CHLORIDE SERPL-SCNC: 97 MMOL/L (ref 98–107)
CO2 SERPL-SCNC: 26.4 MMOL/L (ref 22–29)
CREAT SERPL-MCNC: 1.05 MG/DL (ref 0.57–1)
DEPRECATED RDW RBC AUTO: 48.8 FL (ref 37–54)
EOSINOPHIL # BLD AUTO: 0.86 10*3/MM3 (ref 0–0.4)
EOSINOPHIL NFR BLD AUTO: 8.4 % (ref 0.3–6.2)
ERYTHROCYTE [DISTWIDTH] IN BLOOD BY AUTOMATED COUNT: 15.3 % (ref 12.3–15.4)
GFR SERPL CREATININE-BSD FRML MDRD: 51 ML/MIN/1.73
GLUCOSE SERPL-MCNC: 95 MG/DL (ref 65–99)
HCT VFR BLD AUTO: 40.6 % (ref 34–46.6)
HGB BLD-MCNC: 13.2 G/DL (ref 12–15.9)
IMM GRANULOCYTES # BLD AUTO: 0.06 10*3/MM3 (ref 0–0.05)
IMM GRANULOCYTES NFR BLD AUTO: 0.6 % (ref 0–0.5)
INR PPP: 1.57 (ref 0.9–1.1)
LYMPHOCYTES # BLD AUTO: 1.07 10*3/MM3 (ref 0.7–3.1)
LYMPHOCYTES NFR BLD AUTO: 10.5 % (ref 19.6–45.3)
MCH RBC QN AUTO: 28.3 PG (ref 26.6–33)
MCHC RBC AUTO-ENTMCNC: 32.5 G/DL (ref 31.5–35.7)
MCV RBC AUTO: 87.1 FL (ref 79–97)
MONOCYTES # BLD AUTO: 0.99 10*3/MM3 (ref 0.1–0.9)
MONOCYTES NFR BLD AUTO: 9.7 % (ref 5–12)
NEUTROPHILS NFR BLD AUTO: 7.16 10*3/MM3 (ref 1.7–7)
NEUTROPHILS NFR BLD AUTO: 70.3 % (ref 42.7–76)
NRBC BLD AUTO-RTO: 0 /100 WBC (ref 0–0.2)
PLATELET # BLD AUTO: 193 10*3/MM3 (ref 140–450)
PMV BLD AUTO: 9.5 FL (ref 6–12)
POTASSIUM SERPL-SCNC: 3.5 MMOL/L (ref 3.5–5.2)
PROTHROMBIN TIME: 18.6 SECONDS (ref 11.7–14.2)
RBC # BLD AUTO: 4.66 10*6/MM3 (ref 3.77–5.28)
SODIUM SERPL-SCNC: 137 MMOL/L (ref 136–145)
WBC # BLD AUTO: 10.19 10*3/MM3 (ref 3.4–10.8)

## 2021-05-04 PROCEDURE — 94799 UNLISTED PULMONARY SVC/PX: CPT

## 2021-05-04 PROCEDURE — 94762 N-INVAS EAR/PLS OXIMTRY CONT: CPT

## 2021-05-04 PROCEDURE — 63710000001 DIPHENHYDRAMINE PER 50 MG: Performed by: NURSE PRACTITIONER

## 2021-05-04 PROCEDURE — 25010000002 ENOXAPARIN PER 10 MG: Performed by: NURSE PRACTITIONER

## 2021-05-04 PROCEDURE — 80048 BASIC METABOLIC PNL TOTAL CA: CPT | Performed by: NURSE PRACTITIONER

## 2021-05-04 PROCEDURE — 97530 THERAPEUTIC ACTIVITIES: CPT

## 2021-05-04 PROCEDURE — 99024 POSTOP FOLLOW-UP VISIT: CPT | Performed by: THORACIC SURGERY (CARDIOTHORACIC VASCULAR SURGERY)

## 2021-05-04 PROCEDURE — 99232 SBSQ HOSP IP/OBS MODERATE 35: CPT | Performed by: INTERNAL MEDICINE

## 2021-05-04 PROCEDURE — 85610 PROTHROMBIN TIME: CPT | Performed by: NURSE PRACTITIONER

## 2021-05-04 PROCEDURE — 85025 COMPLETE CBC W/AUTO DIFF WBC: CPT | Performed by: THORACIC SURGERY (CARDIOTHORACIC VASCULAR SURGERY)

## 2021-05-04 RX ORDER — BISACODYL 10 MG
10 SUPPOSITORY, RECTAL RECTAL ONCE
Status: DISCONTINUED | OUTPATIENT
Start: 2021-05-04 | End: 2021-05-05 | Stop reason: HOSPADM

## 2021-05-04 RX ORDER — LISINOPRIL 2.5 MG/1
2.5 TABLET ORAL
Status: DISCONTINUED | OUTPATIENT
Start: 2021-05-04 | End: 2021-05-05 | Stop reason: HOSPADM

## 2021-05-04 RX ADMIN — GUAIFENESIN 1200 MG: 600 TABLET, EXTENDED RELEASE ORAL at 20:39

## 2021-05-04 RX ADMIN — ACETAMINOPHEN 650 MG: 325 TABLET, FILM COATED ORAL at 20:38

## 2021-05-04 RX ADMIN — IPRATROPIUM BROMIDE AND ALBUTEROL SULFATE 3 ML: 2.5; .5 SOLUTION RESPIRATORY (INHALATION) at 12:14

## 2021-05-04 RX ADMIN — POTASSIUM CHLORIDE 40 MEQ: 750 TABLET, EXTENDED RELEASE ORAL at 12:24

## 2021-05-04 RX ADMIN — DOCUSATE SODIUM 50MG AND SENNOSIDES 8.6MG 2 TABLET: 8.6; 5 TABLET, FILM COATED ORAL at 20:38

## 2021-05-04 RX ADMIN — METOPROLOL SUCCINATE 50 MG: 50 TABLET, EXTENDED RELEASE ORAL at 08:36

## 2021-05-04 RX ADMIN — DIPHENHYDRAMINE HYDROCHLORIDE 25 MG: 25 CAPSULE ORAL at 20:37

## 2021-05-04 RX ADMIN — MUPIROCIN 1 APPLICATION: 20 OINTMENT TOPICAL at 20:37

## 2021-05-04 RX ADMIN — POTASSIUM CHLORIDE 40 MEQ: 750 TABLET, EXTENDED RELEASE ORAL at 08:36

## 2021-05-04 RX ADMIN — AMIODARONE HYDROCHLORIDE 300 MG: 200 TABLET ORAL at 20:39

## 2021-05-04 RX ADMIN — AMIODARONE HYDROCHLORIDE 300 MG: 200 TABLET ORAL at 08:36

## 2021-05-04 RX ADMIN — IPRATROPIUM BROMIDE AND ALBUTEROL SULFATE 3 ML: 2.5; .5 SOLUTION RESPIRATORY (INHALATION) at 19:52

## 2021-05-04 RX ADMIN — LISINOPRIL 2.5 MG: 2.5 TABLET ORAL at 10:13

## 2021-05-04 RX ADMIN — GUAIFENESIN 1200 MG: 600 TABLET, EXTENDED RELEASE ORAL at 08:36

## 2021-05-04 RX ADMIN — WARFARIN 2 MG: 2 TABLET ORAL at 17:24

## 2021-05-04 RX ADMIN — DOCUSATE SODIUM 50MG AND SENNOSIDES 8.6MG 2 TABLET: 8.6; 5 TABLET, FILM COATED ORAL at 08:36

## 2021-05-04 RX ADMIN — ATORVASTATIN CALCIUM 40 MG: 20 TABLET, FILM COATED ORAL at 20:38

## 2021-05-04 RX ADMIN — ENOXAPARIN SODIUM 40 MG: 40 INJECTION SUBCUTANEOUS at 17:23

## 2021-05-04 RX ADMIN — ACETYLCYSTEINE 3 ML: 200 SOLUTION ORAL; RESPIRATORY (INHALATION) at 19:51

## 2021-05-04 RX ADMIN — FERROUS SULFATE TAB 325 MG (65 MG ELEMENTAL FE) 325 MG: 325 (65 FE) TAB at 08:37

## 2021-05-04 RX ADMIN — ASPIRIN 81 MG: 81 TABLET, FILM COATED ORAL at 08:37

## 2021-05-04 RX ADMIN — BISACODYL 10 MG: 10 SUPPOSITORY RECTAL at 13:21

## 2021-05-05 ENCOUNTER — NURSE TRIAGE (OUTPATIENT)
Dept: CALL CENTER | Facility: HOSPITAL | Age: 71
End: 2021-05-05

## 2021-05-05 ENCOUNTER — APPOINTMENT (OUTPATIENT)
Dept: GENERAL RADIOLOGY | Facility: HOSPITAL | Age: 71
End: 2021-05-05

## 2021-05-05 VITALS
OXYGEN SATURATION: 94 % | WEIGHT: 141.4 LBS | HEIGHT: 61 IN | DIASTOLIC BLOOD PRESSURE: 69 MMHG | TEMPERATURE: 97.9 F | SYSTOLIC BLOOD PRESSURE: 116 MMHG | RESPIRATION RATE: 18 BRPM | BODY MASS INDEX: 26.7 KG/M2 | HEART RATE: 67 BPM

## 2021-05-05 PROBLEM — I25.118 CORONARY ARTERY DISEASE OF NATIVE ARTERY OF NATIVE HEART WITH STABLE ANGINA PECTORIS (HCC): Status: ACTIVE | Noted: 2021-05-05

## 2021-05-05 PROBLEM — I48.91 ATRIAL FIBRILLATION WITH RVR: Status: ACTIVE | Noted: 2021-05-05

## 2021-05-05 LAB
ANION GAP SERPL CALCULATED.3IONS-SCNC: 11.2 MMOL/L (ref 5–15)
BASOPHILS # BLD AUTO: 0.03 10*3/MM3 (ref 0–0.2)
BASOPHILS NFR BLD AUTO: 0.2 % (ref 0–1.5)
BUN SERPL-MCNC: 32 MG/DL (ref 8–23)
BUN/CREAT SERPL: 38.6 (ref 7–25)
CALCIUM SPEC-SCNC: 8.6 MG/DL (ref 8.6–10.5)
CHLORIDE SERPL-SCNC: 99 MMOL/L (ref 98–107)
CO2 SERPL-SCNC: 26.8 MMOL/L (ref 22–29)
CREAT SERPL-MCNC: 0.83 MG/DL (ref 0.57–1)
DEPRECATED RDW RBC AUTO: 51.9 FL (ref 37–54)
EOSINOPHIL # BLD AUTO: 0.79 10*3/MM3 (ref 0–0.4)
EOSINOPHIL NFR BLD AUTO: 5.9 % (ref 0.3–6.2)
ERYTHROCYTE [DISTWIDTH] IN BLOOD BY AUTOMATED COUNT: 15.8 % (ref 12.3–15.4)
GFR SERPL CREATININE-BSD FRML MDRD: 67 ML/MIN/1.73
GLUCOSE SERPL-MCNC: 100 MG/DL (ref 65–99)
HCT VFR BLD AUTO: 40.5 % (ref 34–46.6)
HGB BLD-MCNC: 12.6 G/DL (ref 12–15.9)
IMM GRANULOCYTES # BLD AUTO: 0.09 10*3/MM3 (ref 0–0.05)
IMM GRANULOCYTES NFR BLD AUTO: 0.7 % (ref 0–0.5)
INR PPP: 1.69 (ref 0.9–1.1)
LYMPHOCYTES # BLD AUTO: 1.25 10*3/MM3 (ref 0.7–3.1)
LYMPHOCYTES NFR BLD AUTO: 9.3 % (ref 19.6–45.3)
MCH RBC QN AUTO: 27.8 PG (ref 26.6–33)
MCHC RBC AUTO-ENTMCNC: 31.1 G/DL (ref 31.5–35.7)
MCV RBC AUTO: 89.2 FL (ref 79–97)
MONOCYTES # BLD AUTO: 1.25 10*3/MM3 (ref 0.1–0.9)
MONOCYTES NFR BLD AUTO: 9.3 % (ref 5–12)
NEUTROPHILS NFR BLD AUTO: 10.07 10*3/MM3 (ref 1.7–7)
NEUTROPHILS NFR BLD AUTO: 74.6 % (ref 42.7–76)
NRBC BLD AUTO-RTO: 0 /100 WBC (ref 0–0.2)
PLATELET # BLD AUTO: 251 10*3/MM3 (ref 140–450)
PMV BLD AUTO: 9.3 FL (ref 6–12)
POTASSIUM SERPL-SCNC: 3.7 MMOL/L (ref 3.5–5.2)
PROTHROMBIN TIME: 19.7 SECONDS (ref 11.7–14.2)
RBC # BLD AUTO: 4.54 10*6/MM3 (ref 3.77–5.28)
SODIUM SERPL-SCNC: 137 MMOL/L (ref 136–145)
WBC # BLD AUTO: 13.48 10*3/MM3 (ref 3.4–10.8)

## 2021-05-05 PROCEDURE — 99238 HOSP IP/OBS DSCHRG MGMT 30/<: CPT | Performed by: NURSE PRACTITIONER

## 2021-05-05 PROCEDURE — 94799 UNLISTED PULMONARY SVC/PX: CPT

## 2021-05-05 PROCEDURE — 80048 BASIC METABOLIC PNL TOTAL CA: CPT | Performed by: NURSE PRACTITIONER

## 2021-05-05 PROCEDURE — 71045 X-RAY EXAM CHEST 1 VIEW: CPT

## 2021-05-05 PROCEDURE — 97530 THERAPEUTIC ACTIVITIES: CPT

## 2021-05-05 PROCEDURE — 99024 POSTOP FOLLOW-UP VISIT: CPT | Performed by: THORACIC SURGERY (CARDIOTHORACIC VASCULAR SURGERY)

## 2021-05-05 PROCEDURE — 85610 PROTHROMBIN TIME: CPT | Performed by: NURSE PRACTITIONER

## 2021-05-05 PROCEDURE — 85025 COMPLETE CBC W/AUTO DIFF WBC: CPT | Performed by: THORACIC SURGERY (CARDIOTHORACIC VASCULAR SURGERY)

## 2021-05-05 PROCEDURE — 94618 PULMONARY STRESS TESTING: CPT

## 2021-05-05 RX ORDER — LISINOPRIL 2.5 MG/1
2.5 TABLET ORAL
Qty: 30 TABLET | Refills: 5 | Status: SHIPPED | OUTPATIENT
Start: 2021-05-06 | End: 2021-09-28

## 2021-05-05 RX ORDER — METOPROLOL SUCCINATE 50 MG/1
50 TABLET, EXTENDED RELEASE ORAL NIGHTLY
Qty: 30 TABLET | Refills: 11 | Status: SHIPPED | OUTPATIENT
Start: 2021-05-05 | End: 2022-03-28 | Stop reason: SDUPTHER

## 2021-05-05 RX ORDER — WARFARIN SODIUM 2 MG/1
TABLET ORAL
Qty: 60 TABLET | Refills: 6 | Status: SHIPPED | OUTPATIENT
Start: 2021-05-05 | End: 2021-05-10 | Stop reason: SDUPTHER

## 2021-05-05 RX ORDER — HYDROCODONE BITARTRATE AND ACETAMINOPHEN 5; 325 MG/1; MG/1
1 TABLET ORAL EVERY 4 HOURS PRN
Qty: 42 TABLET | Refills: 0 | Status: SHIPPED | OUTPATIENT
Start: 2021-05-05 | End: 2021-05-12

## 2021-05-05 RX ORDER — BUMETANIDE 2 MG/1
2 TABLET ORAL DAILY
Status: DISCONTINUED | OUTPATIENT
Start: 2021-05-05 | End: 2021-05-05

## 2021-05-05 RX ORDER — FUROSEMIDE 40 MG/1
40 TABLET ORAL DAILY
Status: DISCONTINUED | OUTPATIENT
Start: 2021-05-05 | End: 2021-05-05 | Stop reason: HOSPADM

## 2021-05-05 RX ORDER — AMIODARONE HYDROCHLORIDE 200 MG/1
200 TABLET ORAL DAILY
Qty: 30 TABLET | Refills: 0 | Status: SHIPPED | OUTPATIENT
Start: 2021-05-05 | End: 2021-05-12 | Stop reason: SDUPTHER

## 2021-05-05 RX ADMIN — MUPIROCIN 1 APPLICATION: 20 OINTMENT TOPICAL at 08:46

## 2021-05-05 RX ADMIN — FERROUS SULFATE TAB 325 MG (65 MG ELEMENTAL FE) 325 MG: 325 (65 FE) TAB at 08:45

## 2021-05-05 RX ADMIN — METOPROLOL SUCCINATE 50 MG: 50 TABLET, EXTENDED RELEASE ORAL at 08:44

## 2021-05-05 RX ADMIN — ACETYLCYSTEINE 3 ML: 200 SOLUTION ORAL; RESPIRATORY (INHALATION) at 08:14

## 2021-05-05 RX ADMIN — ACETAMINOPHEN 650 MG: 325 TABLET, FILM COATED ORAL at 05:13

## 2021-05-05 RX ADMIN — LISINOPRIL 2.5 MG: 2.5 TABLET ORAL at 08:44

## 2021-05-05 RX ADMIN — FUROSEMIDE 40 MG: 40 TABLET ORAL at 11:17

## 2021-05-05 RX ADMIN — POTASSIUM CHLORIDE 40 MEQ: 750 TABLET, EXTENDED RELEASE ORAL at 08:45

## 2021-05-05 RX ADMIN — ASPIRIN 81 MG: 81 TABLET, FILM COATED ORAL at 08:45

## 2021-05-05 RX ADMIN — GUAIFENESIN 1200 MG: 600 TABLET, EXTENDED RELEASE ORAL at 08:45

## 2021-05-05 RX ADMIN — IPRATROPIUM BROMIDE AND ALBUTEROL SULFATE 3 ML: 2.5; .5 SOLUTION RESPIRATORY (INHALATION) at 08:07

## 2021-05-05 RX ADMIN — POTASSIUM CHLORIDE 40 MEQ: 750 TABLET, EXTENDED RELEASE ORAL at 11:17

## 2021-05-05 RX ADMIN — IPRATROPIUM BROMIDE AND ALBUTEROL SULFATE 3 ML: 2.5; .5 SOLUTION RESPIRATORY (INHALATION) at 13:49

## 2021-05-05 NOTE — TELEPHONE ENCOUNTER
"    Reason for Disposition  • Health Information question, no triage required and triager able to answer question    Additional Information  • Negative: [1] Caller is not with the adult (patient) AND [2] reporting urgent symptoms  • Negative: Lab result questions  • Negative: Medication questions  • Negative: Caller can't be reached by phone  • Negative: Caller has already spoken to PCP or another triager  • Negative: RN needs further essential information from caller in order to complete triage  • Negative: Requesting regular office appointment  • Negative: [1] Caller requesting NON-URGENT health information AND [2] PCP's office is the best resource    Answer Assessment - Initial Assessment Questions  1. REASON FOR CALL or QUESTION: \"What is your reason for calling today?\" or \"How can I best help you?\" or \"What question do you have that I can help answer?\"      They are wanting to know what medications she should take tonight. Explained this from AVS. She should take atorvastatin, trazadone, and coumadin tonight.    Protocols used: INFORMATION ONLY CALL - NO TRIAGE-ADULT-      "

## 2021-05-06 ENCOUNTER — NURSE TRIAGE (OUTPATIENT)
Dept: CALL CENTER | Facility: HOSPITAL | Age: 71
End: 2021-05-06

## 2021-05-06 ENCOUNTER — ANTICOAGULATION VISIT (OUTPATIENT)
Dept: PHARMACY | Facility: HOSPITAL | Age: 71
End: 2021-05-06

## 2021-05-06 ENCOUNTER — READMISSION MANAGEMENT (OUTPATIENT)
Dept: CALL CENTER | Facility: HOSPITAL | Age: 71
End: 2021-05-06

## 2021-05-06 DIAGNOSIS — I25.118 CORONARY ARTERY DISEASE OF NATIVE ARTERY OF NATIVE HEART WITH STABLE ANGINA PECTORIS (HCC): ICD-10-CM

## 2021-05-06 DIAGNOSIS — I48.91 ATRIAL FIBRILLATION WITH RVR (HCC): Primary | ICD-10-CM

## 2021-05-06 LAB — INR PPP: 2

## 2021-05-06 RX ORDER — FUROSEMIDE 40 MG/1
40 TABLET ORAL DAILY
Qty: 30 TABLET | Refills: 3 | Status: SHIPPED | OUTPATIENT
Start: 2021-05-06 | End: 2021-08-16

## 2021-05-06 NOTE — OUTREACH NOTE
Prep Survey      Responses   [facility] facility patient discharged from?  [place]   Is LACE score < 7 ?  No   Emergency Room discharge w/ pulse ox?  No   Eligibility  Readm Mgmt   Discharge diagnosis  CHF   Does the patient have one of the following disease processes/diagnoses(primary or secondary)?  CHF   Does the patient have Home health ordered?  Yes   What is the Home health agency?   [facility]   Is there a DME ordered?  Yes   What DME was ordered?  O2   Comments regarding appointments  per AVS   Medication alerts for this patient  see AVS   Prep survey completed?  Yes          Laverne Knox RN

## 2021-05-06 NOTE — TELEPHONE ENCOUNTER
"AVS reviewed with caller, questions answered.  Reason for Disposition  • Health Information question, no triage required and triager able to answer question    Additional Information  • Negative: [1] Caller is not with the adult (patient) AND [2] reporting urgent symptoms  • Negative: Lab result questions  • Negative: Medication questions  • Negative: Caller can't be reached by phone  • Negative: Caller has already spoken to PCP or another triager  • Negative: RN needs further essential information from caller in order to complete triage  • Negative: Requesting regular office appointment  • Negative: [1] Caller requesting NON-URGENT health information AND [2] PCP's office is the best resource  • Negative: General information question, no triage required and triager able to answer question    Answer Assessment - Initial Assessment Questions  1. REASON FOR CALL or QUESTION: \"What is your reason for calling today?\" or \"How can I best help you?\" or \"What question do you have that I can help answer?\"      I have a question about my discharge instructions.    Protocols used: INFORMATION ONLY CALL - NO TRIAGE-ADULT-    "

## 2021-05-06 NOTE — PROGRESS NOTES
Anticoagulation Clinic Progress Note    Anticoagulation Summary  As of 2021    INR goal:  2.0-3.0   TTR:  --   INR used for dosin.00 (2021)   Warfarin maintenance plan:  No maintenance plan   Next INR check:  5/10/2021   Target end date:      Indications    Atrial fibrillation with RVR (CMS/[facility]) [I48.91]  Coronary artery disease of native artery of native heart with stable angina pectoris (CMS/[facility]) [I25.118]             Anticoagulation Episode Summary     INR check location:      Preferred lab:      Send INR reminders to:  [facility]    Comments:  Home health to draw INR       Anticoagulation Care Providers     Provider Role Specialty Phone number    Justokatlyn Lyssa PATTERSON, APRN Referring Cardiology [phone]        Findings:  Warfarin 2 mg given in hospital on 21 (first dose). Pt reports she did not take warfarin when she got home last night (21), but she did take 2 mg this morning already.     INR History:  Anticoagulation Monitoring 2021   INR - 2.00   INR Date - 2021   INR Goal 2.0-3.0 2.0-3.0   Last Week Total - 2 mg   Next Week Total - 4 mg   Sun - 1 mg ()   Mon - -   Tu - -   Wed - -   Thu - 2 mg ()   Fri -  ()   Sat - 1 mg ()   Visit Report - -       Plan:  1. INR is Therapeutic today- see above in Anticoagulation Summary. With INR already 2.0 after a single dose of warfarin 2 mg, there is concern that the current dose of warfarin will lead to a supratherapeutic INR if it is continued.   Provided instructions to Rissa with [employer] to Change their warfarin regimen- see above in Anticoagulation Summary. Also provided instructions directly to patient.   2. Follow up in 4 days      Grzegorz Ellis RPH

## 2021-05-09 VITALS
HEART RATE: 93 BPM | HEIGHT: 60 IN | SYSTOLIC BLOOD PRESSURE: 114 MMHG | WEIGHT: 143 LBS | DIASTOLIC BLOOD PRESSURE: 78 MMHG | TEMPERATURE: 98.2 F | BODY MASS INDEX: 28.07 KG/M2 | OXYGEN SATURATION: 97 %

## 2021-05-09 VITALS
SYSTOLIC BLOOD PRESSURE: 110 MMHG | WEIGHT: 153.25 LBS | OXYGEN SATURATION: 95 % | DIASTOLIC BLOOD PRESSURE: 72 MMHG | HEART RATE: 93 BPM | TEMPERATURE: 97.5 F

## 2021-05-09 VITALS
OXYGEN SATURATION: 97 % | DIASTOLIC BLOOD PRESSURE: 72 MMHG | HEIGHT: 60 IN | BODY MASS INDEX: 28.51 KG/M2 | SYSTOLIC BLOOD PRESSURE: 124 MMHG | WEIGHT: 145.25 LBS | TEMPERATURE: 98 F | HEART RATE: 68 BPM

## 2021-05-09 VITALS
DIASTOLIC BLOOD PRESSURE: 92 MMHG | BODY MASS INDEX: 25.89 KG/M2 | TEMPERATURE: 97.5 F | WEIGHT: 137 LBS | SYSTOLIC BLOOD PRESSURE: 140 MMHG | HEART RATE: 109 BPM | OXYGEN SATURATION: 92 %

## 2021-05-10 ENCOUNTER — OFFICE VISIT CONVERTED (OUTPATIENT)
Dept: FAMILY MEDICINE CLINIC | Facility: CLINIC | Age: 71
End: 2021-05-10
Attending: NURSE PRACTITIONER

## 2021-05-10 ENCOUNTER — ANTICOAGULATION VISIT (OUTPATIENT)
Dept: PHARMACY | Facility: HOSPITAL | Age: 71
End: 2021-05-10

## 2021-05-10 DIAGNOSIS — I48.91 ATRIAL FIBRILLATION WITH RVR (HCC): Primary | ICD-10-CM

## 2021-05-10 DIAGNOSIS — I25.118 CORONARY ARTERY DISEASE OF NATIVE ARTERY OF NATIVE HEART WITH STABLE ANGINA PECTORIS (HCC): ICD-10-CM

## 2021-05-10 LAB — INR PPP: 2.5

## 2021-05-10 RX ORDER — AMIODARONE HYDROCHLORIDE 200 MG/1
200 TABLET ORAL DAILY
Qty: 90 TABLET | OUTPATIENT
Start: 2021-05-10

## 2021-05-10 RX ORDER — WARFARIN SODIUM 1 MG/1
TABLET ORAL
Qty: 30 TABLET | Refills: 1 | Status: SHIPPED | OUTPATIENT
Start: 2021-05-10 | End: 2021-06-08 | Stop reason: SDUPTHER

## 2021-05-10 NOTE — PROGRESS NOTES
Anticoagulation Clinic Progress Note    Anticoagulation Summary  As of 5/10/2021    INR goal:  2.0-3.0   TTR:  --   INR used for dosin.50 (5/10/2021)   Warfarin maintenance plan:  No maintenance plan   Next INR check:  2021   Target end date:      Indications    Atrial fibrillation with RVR (CMS/[facility]) [I48.91]  Coronary artery disease of native artery of native heart with stable angina pectoris (CMS/[facility]) [I25.118]             Anticoagulation Episode Summary     INR check location:      Preferred lab:      Send INR reminders to:  [facility] [place]    Comments:  Home health to draw INR       Anticoagulation Care Providers     Provider Role Specialty Phone number    Cathie FelipeKENNETH Referring Cardiology [phone]        Findings:  Already took today's 1-mg dose.     INR History:  Anticoagulation Monitoring 2021 2021 5/10/2021   INR - 2.00 2.50   INR Date - 2021 5/10/2021   INR Goal 2.0-3.0 2.0-3.0 2.0-3.0   Last Week Total - 2 mg 6 mg   Next Week Total - 4 mg 2.5 mg   Sun - 1 mg () -   Mon - - 1 mg (5/10)   Tue - - 0.5 mg ()   Wed - - 1 mg ()   Thu - 2 mg () -   Fri - Hold () -   Sat - 1 mg () -   Visit Report - - -       Plan:  1. INR is Therapeutic today- see above in Anticoagulation Summary.   Provided instructions to Cadence with [facility] to Change their warfarin regimen- see above in Anticoagulation Summary.  2. Follow up in 3 days      Grzegorz Ellis RPH

## 2021-05-11 ENCOUNTER — READMISSION MANAGEMENT (OUTPATIENT)
Dept: CALL CENTER | Facility: HOSPITAL | Age: 71
End: 2021-05-11

## 2021-05-11 NOTE — OUTREACH NOTE
CHF Week 1 Survey      Responses   [facility] patient discharged from?  [place]   Does the patient have one of the following disease processes/diagnoses(primary or secondary)?  CHF   CHF Week 1 attempt successful?  No   Unsuccessful attempts  Attempt 1   Discharge diagnosis  CHF   Is patient permission given to speak with other caregiver?  Yes   List who call center can speak with            Hui Prabhakar RN

## 2021-05-12 ENCOUNTER — NURSE TRIAGE (OUTPATIENT)
Dept: CALL CENTER | Facility: HOSPITAL | Age: 71
End: 2021-05-12

## 2021-05-12 ENCOUNTER — OFFICE VISIT (OUTPATIENT)
Dept: CARDIOLOGY | Facility: CLINIC | Age: 71
End: 2021-05-12

## 2021-05-12 VITALS
SYSTOLIC BLOOD PRESSURE: 120 MMHG | WEIGHT: 137.4 LBS | BODY MASS INDEX: 25.28 KG/M2 | HEIGHT: 62 IN | DIASTOLIC BLOOD PRESSURE: 70 MMHG | HEART RATE: 97 BPM

## 2021-05-12 DIAGNOSIS — I25.5 ISCHEMIC CARDIOMYOPATHY: ICD-10-CM

## 2021-05-12 DIAGNOSIS — Z95.2 S/P MVR (MITRAL VALVE REPLACEMENT): ICD-10-CM

## 2021-05-12 DIAGNOSIS — I50.23 ACUTE ON CHRONIC SYSTOLIC CONGESTIVE HEART FAILURE, NYHA CLASS 2 (HCC): ICD-10-CM

## 2021-05-12 DIAGNOSIS — Z95.1 S/P CABG X 3: Primary | ICD-10-CM

## 2021-05-12 DIAGNOSIS — I48.0 PAF (PAROXYSMAL ATRIAL FIBRILLATION) (HCC): ICD-10-CM

## 2021-05-12 PROCEDURE — 99214 OFFICE O/P EST MOD 30 MIN: CPT | Performed by: NURSE PRACTITIONER

## 2021-05-12 PROCEDURE — 93000 ELECTROCARDIOGRAM COMPLETE: CPT | Performed by: NURSE PRACTITIONER

## 2021-05-12 RX ORDER — AMIODARONE HYDROCHLORIDE 200 MG/1
200 TABLET ORAL DAILY
Qty: 30 TABLET | Refills: 1 | Status: SHIPPED | OUTPATIENT
Start: 2021-05-12 | End: 2021-05-12

## 2021-05-12 RX ORDER — ATORVASTATIN CALCIUM 40 MG/1
40 TABLET, FILM COATED ORAL NIGHTLY
Qty: 90 TABLET | Refills: 2 | Status: SHIPPED | OUTPATIENT
Start: 2021-05-12 | End: 2021-05-13 | Stop reason: SDUPTHER

## 2021-05-12 RX ORDER — AMIODARONE HYDROCHLORIDE 200 MG/1
100 TABLET ORAL DAILY
Qty: 30 TABLET | Refills: 1
Start: 2021-05-12 | End: 2021-05-13 | Stop reason: SDUPTHER

## 2021-05-12 NOTE — PROGRESS NOTES
Date of Office Visit: 21  Encounter Provider: KENNETH Muñoz  Place of Service: [facility] CARDIOLOGY  Patient Name: Rachael South  :1950    Chief Complaint   Patient presents with   • Acute systolic congestive heart failure due to valvular dise   • Post-op Open Heart Surgery   • Follow-up   :     HPI: Rachael South is a 71 y.o. female  with history of systolic heart failure, dental abscess, mitral regurgitation, carotid artery stenosis, and former tobacco use.     She is followed by Dr. Nelson. I will visit with her for the first time today and have reviewed her medical record.      In 2020 when she was found to have moderately decreased left ventricular systolic function, grade 2 diastolic dysfunction, moderate aortic valve regurgitation, calcified mitral valve with moderate to severe mitral valve regurgitation.  There was no evidence of endocarditis.  Transesophageal echocardiogram which showed ejection fraction 41-45%, negative saline test, mild aortic valve regurgitation, moderate to severe mitral regurgitation with no evidence of endocarditis.  She had a recent dental abscess and leukocytosis was treated with vancomycin and Rocephin pending the work-up.  She remained afebrile throughout hospitalization.  Blood cultures were negative.  Inflammatory markers were unremarkable.  Antibiotics were ultimately discontinued.  There was concern for an embolic myocardial infarction as her troponin persisted between 0.6-0.8.  Cardiac catheterization was recommended but patient refused.  She was advised to remain on anticoagulant until she followed up with her cardiologist in her hometown..  She then presented to the emergency department with c/o continued dyspnea on exertion.  There was no lower extremity edema. proBNP was elevated at 3320.  Chest x-ray on 2021 showed cardiomegaly with vascular congestion. She was given IV lasix 40 mg x 1.      At her 1  week follow-up she continues to complain of dyspnea on exertion.  She felt bad.  I set her up for right and left heart catheterization.  She is found to have severe multivessel coronary artery disease, moderate to severe mitral regurgitation, ischemic cardiomyopathy and moderate pulmonary hypertension.  She was referred to CT surgery regarding CABG and possible mitral valve repair.  Carotid duplex showed mild right internal carotid artery stenosis with moderate left internal carotid artery stenosis.  She ultimately had CABG x3 with a LIMA to the LAD and reverse individual saphenous vein graft to the PDA and obtuse marginal 1,  Mitral valve replacement with a 29 mm Gallardo 2 porcine prosthesis, and endoscopic vein harvest of the right lower extremity on 4/27/2021.She had some ventricular ectopy and was placed on Lidocaine drip briefly. This was stopped and she was maintained on epi and primacor. She required a bumex drip for diuresis. On POD #3 she had atrial fibrillation and was started on IV amiodarone and changed to oral. She maintained SR.  She continued to progress well. She does meet criteria for overnight oxygen.  She is on warfarin for A. fib and referred to the anticoagulation clinic  Repeat echo April 29, 2021 showed ejection fraction 31%, mild reduced right ventricular systolic function, mild to moderate aortic valve regurgitation with calcified aortic valve, bioprosthetic mitral valve present with no significant regurgitation or stenosis.  There was mild pulmonary hypertension present with small pericardial effusion.      She presents today for hospital discharge follow-up.  She is accompanied by her  and reports that she did not receive atorvastatin or amiodarone at discharge.  She has had some occasional palpitations which lasts just a few moments.  She is walking 10 minutes 3 times a day and has no shortness of breath with that.  She has a little shortness of breath at night but that is much  "better than prior to admission.  She still wears oxygen at night.  She plans to transition from home health services to cardiac rehab at [facility].  Her main pain complaint is getting comfortable at night.  She has been avoiding taking her narcotic pain medication.    No Known Allergies        Family and social history reviewed.     ROS  All other systems were reviewed and are negative          Objective:     Vitals:    05/12/21 1323   BP: 120/70   BP Location: Left arm   Patient Position: Sitting   Pulse: 97   Weight: 62.3 kg (137 lb 6.4 oz)   Height: 157.5 cm (62\")     Body mass index is 25.13 kg/m².    PHYSICAL EXAM:  Constitutional:       General: Not in acute distress.     Appearance: Well-developed. Not diaphoretic.   HENT:      Head: Normocephalic.   Pulmonary:      Effort: Pulmonary effort is normal. No respiratory distress.      Breath sounds: Normal breath sounds. No wheezing. No rhonchi. No rales.   Cardiovascular:      Normal rate. Regular rhythm.   Pulses:     Radial: 2+ bilaterally.  Skin:     General: Skin is warm and dry. There is no cyanosis.      Findings: No rash.   Neurological:      Mental Status: Alert and oriented to person, place, and time.   Psychiatric:         Behavior: Behavior normal.         Thought Content: Thought content normal.         Judgment: Judgment normal.       midsternal incision D/C/I well approximated. No drainage  Epigastric puncture sites scabbed with reddened borders no drainage   LLE incision dry clean intact reddened borders no drainage    ECG 12 Lead    Date/Time: 5/12/2021 4:44 PM  Performed by: Cathie Felipe APRN  Authorized by: Cathie Felipe APRN   Comparison: compared with previous ECG   Similar to previous ECG  Rhythm: sinus rhythm  Ectopy: multifocal PVCs  Rate: normal              Current Outpatient Medications   Medication Sig Dispense Refill   • albuterol sulfate  (90 Base) MCG/ACT inhaler Inhale 2 puffs Every 4 (Four) Hours As Needed for " Wheezing.     • aspirin 81 MG EC tablet Take 1 tablet by mouth Daily. 30 tablet 0   • ferrous sulfate 325 (65 FE) MG EC tablet Take 1 tablet by mouth Daily With Breakfast. 30 tablet 0   • furosemide (LASIX) 40 MG tablet Take 1 tablet by mouth Daily. 30 tablet 3   • HYDROcodone-acetaminophen (NORCO) 5-325 MG per tablet Take 1 tablet by mouth Every 4 (Four) Hours As Needed for Moderate Pain  for up to 7 days. 42 tablet 0   • lisinopril (PRINIVIL,ZESTRIL) 2.5 MG tablet Take 1 tablet by mouth Daily. 30 tablet 5   • metoprolol succinate XL (TOPROL-XL) 50 MG 24 hr tablet Take 1 tablet by mouth Every Night. 30 tablet 11   • potassium chloride (KLOR-CON) 8 MEQ CR tablet      • traZODone (DESYREL) 50 MG tablet Take 50 mg by mouth Every Night.     • warfarin (COUMADIN) 1 MG tablet Take one tablet by mouth daily or as directed.  Indications: Atrial Fibrillation 30 tablet 1   • amiodarone (PACERONE) 200 MG tablet Take 0.5 tablets by mouth Daily. 30 tablet 1   • atorvastatin (LIPITOR) 40 MG tablet Take 1 tablet by mouth Every Night. 90 tablet 2     No current facility-administered medications for this visit.     Assessment:       Diagnosis Plan   1. S/P CABG x 3     2. Ischemic cardiomyopathy     3. Acute on chronic systolic congestive heart failure, NYHA class 2 (CMS/[facility])     4. S/P MVR (mitral valve replacement)     5. PAF (paroxysmal atrial fibrillation) (CMS/[facility])          Orders Placed This Encounter   Procedures   • ECG 12 Lead     This order was created via procedure documentation     Order Specific Question:   Release to patient     Answer:   Immediate         Plan:       1. 71 year old female with multivessel coronary artery disease status post coronary artery bypass grafting times 3 April 2021, severe left ventricular systolic dysfunction with an ejection fraction approximately 30- 35%%  -Expected postoperative course.  She is ambulating 10 minutes 3 times a day and plans to move that up to 15 minutes.  She currently  has home health services and plans to transition to cardiac rehab at [facility].  We will fax the referral there  2.    Severe mitral regurgitation status post mitral valve replacement with a porcine prostheses at the time of CABG April 2021 seated well on follow-up echo April 2021  3.   Ischemic cardiomyopathy ejection fraction approximately 30%-optimize medical therapy.  She is on Toprol-XL, lisinopril 2.5 mg daily, furosemide.  She is following her weights which have been stable.  4. Dental abscess 02/2021-she was treated for endocarditis but ultimately there was no evidence of vegetative process with RITA and negative blood cultures  5.    History of sinus tachycardia continue beta-blocker  6.  Embolic event/non-STEMI was previously on Xarelto but now maintained on warfarin  7.  Mild aortic valve regurgitation  8.  Ongoing nicotine use-she has quit smoking.  I commended her for this  9.   Carotid artery stenosis-moderate on the left and mild on the right April 2021  10.  Postoperative atrial fibrillation after open heart surgery we will send in amiodarone again.  She will take 200 mg daily and decreased to 100 mg after 1 week.  Continue warfarin and she is currently following with INR clinic however INR is being checked by home health      Follow-up in 4 weeks call with questions or concerns.            It has been a pleasure to participate in this patient's care.      Thank you,  KENNETH Muñoz      **I used Dragon to dictate this note:**

## 2021-05-12 NOTE — TELEPHONE ENCOUNTER
"Ms South is calling regarding her prescription for Pacerone.  [facility] on 05/05/2021 for 30 tablets.  They have the new prescription sent today but insurance will not pay.  Ms. South advised to check with her physician regarding this.      Additional Information  • Negative: Drug overdose and triager unable to answer question  • Negative: Caller requesting information unrelated to medicine  • Negative: Caller requesting a prescription for Strep throat and has a positive culture result  • Negative: Rash while taking a medication or within 3 days of stopping it  • Negative: Immunization reaction suspected  • Negative: [1] Asthma and [2] having symptoms of asthma (cough, wheezing, etc.)  • Negative: [1] Influenza symptoms AND [2] anti-viral med prescription request, such as Tamiflu  • Negative: [1] Symptom of illness (e.g., headache, abdominal pain, earache, vomiting) AND [2] more than mild  • Negative: MORE THAN A DOUBLE DOSE of a prescription or over-the-counter (OTC) drug  • Negative: [1] DOUBLE DOSE (an extra dose or lesser amount) of over-the-counter (OTC) drug AND [2] any symptoms (e.g., dizziness, nausea, pain, sleepiness)  • Negative: [1] DOUBLE DOSE (an extra dose or lesser amount) of prescription drug AND [2] any symptoms (e.g., dizziness, nausea, pain, sleepiness)    Answer Assessment - Initial Assessment Questions  1.   NAME of MEDICATION: \"What medicine are you calling about?\"      Calling regarding Pacerone.    2.   QUESTION: \"What is your question?\"      She is asking about prescription today.    3.   PRESCRIBING HCP: \"Who prescribed it?\" Reason: if prescribed by specialist, call should be referred to that group.      Cathie Felipe, APRN    4. SYMPTOMS: \"Do you have any symptoms?\"      Unknown    5. SEVERITY: If symptoms are present, ask \"Are they mild, moderate or severe?\"      Unknown, new " "medication.    6.  PREGNANCY:  \"Is there any chance that you are pregnant?\" \"When was your last menstrual period?\"      No.    Protocols used: MEDICATION QUESTION CALL-ADULT-      "

## 2021-05-13 ENCOUNTER — TELEPHONE (OUTPATIENT)
Dept: CARDIOLOGY | Facility: CLINIC | Age: 71
End: 2021-05-13

## 2021-05-13 ENCOUNTER — ANTICOAGULATION VISIT (OUTPATIENT)
Dept: PHARMACY | Facility: HOSPITAL | Age: 71
End: 2021-05-13

## 2021-05-13 DIAGNOSIS — I48.91 ATRIAL FIBRILLATION WITH RVR (HCC): Primary | ICD-10-CM

## 2021-05-13 DIAGNOSIS — I25.118 CORONARY ARTERY DISEASE OF NATIVE ARTERY OF NATIVE HEART WITH STABLE ANGINA PECTORIS (HCC): ICD-10-CM

## 2021-05-13 LAB — INR PPP: 2.7

## 2021-05-13 RX ORDER — AMIODARONE HYDROCHLORIDE 200 MG/1
100 TABLET ORAL DAILY
Qty: 30 TABLET | Refills: 1 | Status: SHIPPED | OUTPATIENT
Start: 2021-05-13 | End: 2021-06-10 | Stop reason: SDUPTHER

## 2021-05-13 RX ORDER — ATORVASTATIN CALCIUM 40 MG/1
40 TABLET, FILM COATED ORAL NIGHTLY
Qty: 90 TABLET | Refills: 2 | Status: SHIPPED | OUTPATIENT
Start: 2021-05-13 | End: 2022-03-28 | Stop reason: SDUPTHER

## 2021-05-13 NOTE — PROGRESS NOTES
Anticoagulation Clinic Progress Note  Anticoagulation Summary  As of 2021    INR goal:  2.0-3.0   TTR:  --   INR used for dosin.70 (2021)   Warfarin maintenance plan:  No maintenance plan   Next INR check:  2021   Target end date:      Indications    Atrial fibrillation with RVR (CMS/[facility]) [I48.91]  Coronary artery disease of native artery of native heart with stable angina pectoris (CMS/[facility]) [I25.118]             Anticoagulation Episode Summary     INR check location:      Preferred lab:      Send INR reminders to:   [facility] [place]    Comments:  Home health to draw INR       Anticoagulation Care Providers     Provider Role Specialty Phone number    Cathie Felipe APRN Referring Cardiology [phone]        Clinic Interview:  Patient Findings     Positives:  Change in medications    Negatives:  Signs/symptoms of thrombosis, Signs/symptoms of bleeding,   Laboratory test error suspected, Change in health, Change in alcohol use,   Change in activity, Upcoming invasive procedure, Emergency department   visit, Upcoming dental procedure, Missed doses, Extra doses, Change in   diet/appetite, Hospital admission, Bruising, Other complaints    Comments:  Resumed Amiodarone  that RANDOLPH Vila / patient notes she   was off for ~6 days.  Cardiology notes indicate plan for 1 week of   Amiodarone 200mg daily followed by 100mg daily.  All info per RANDOLPH Vila      Clinical Outcomes     Negatives:  Major bleeding event, Thromboembolic event,   Anticoagulation-related hospital admission, Anticoagulation-related ED   visit, Anticoagulation-related fatality    Comments:  Resumed Amiodarone  that RANDOLPH Vila / patient notes she   was off for ~6 days.  Cardiology notes indicate plan for 1 week of   Amiodarone 200mg daily followed by 100mg daily.  All info per RANDOLPH Vila      INR History:  Anticoagulation Monitoring 2021 5/10/2021 2021   INR 2.00 2.50 2.70   INR Date 2021  5/10/2021 5/13/2021   INR Goal 2.0-3.0 2.0-3.0 2.0-3.0   Last Week Total 2 mg 6 mg 6.5 mg   Next Week Total 4 mg 2.5 mg 3.5 mg   Sun 1 mg (5/9) - 1 mg (5/16)   Mon - 1 mg (5/10) -   Tue - 0.5 mg (5/11) -   Wed - 1 mg (5/12) -   Thu 2 mg (5/6) - 1 mg (5/13)   Fri Hold (5/7) - 1 mg (5/14)   Sat 1 mg (5/8) - 0.5 mg (5/15)   Visit Report - - -   Some recent data might be hidden     Plan:  1. INR is Therapeutic today- see above in Anticoagulation Summary.   Will instruct Rachael South via [employer]  TERESA Vila to Continue their warfarin regimen- see above in Anticoagulation Summary.  2. Follow up on Monday 5/17  3. They have been instructed to call if any changes in medications, doses, concerns, etc. Patient expresses understanding and has no further questions at this time.    Kory Rosas, PharmD

## 2021-05-13 NOTE — TELEPHONE ENCOUNTER
Faxed order for Cardiac Rehab at [facility].  Fax# [phone].  Faxed confirmation received./ JOSEMANUEL

## 2021-05-14 ENCOUNTER — READMISSION MANAGEMENT (OUTPATIENT)
Dept: CALL CENTER | Facility: HOSPITAL | Age: 71
End: 2021-05-14

## 2021-05-14 NOTE — OUTREACH NOTE
CHF Week 1 Survey      Responses   [facility] patient discharged from?  [place]   Does the patient have one of the following disease processes/diagnoses(primary or secondary)?  CHF   CHF Week 1 attempt successful?  Yes   Call start time  1445   Call end time  1449   Discharge diagnosis  CHF   Meds reviewed with patient/caregiver?  Yes   Is the patient having any side effects they believe may be caused by any medication additions or changes?  No   Does the patient have all medications ordered at discharge?  Yes   Is the patient taking all medications as directed (includes completed medication regime)?  Yes   Does the patient have a primary care provider?   Yes   Does the patient have an appointment with their PCP within 7 days of discharge?  Yes   Has the patient kept scheduled appointments due by today?  Yes   What is the Home health agency?   [facility]   Has home health visited the patient within 72 hours of discharge?  Yes   Home health comments  RN and PT  visits   Pulse Ox monitoring  Intermittent   Pulse Ox device source  Patient   O2 Sat comments  mid 90's on RA, has PRN O2 if needed   O2 Sat: education provided  Sat levels, When to seek care   Psychosocial issues?  No   Did the patient receive a copy of their discharge instructions?  Yes   Nursing interventions  Reviewed instructions with patient   What is the patient's perception of their health status since discharge?  Improving   Nursing interventions  Nurse provided patient education   Is the patient weighing daily?  Yes   Does the patient have scales?  Yes   Is the patient able to teach back Heart Failure diet management?  Yes   Is the patient able to teach back Heart Failure Zones?  Yes   Is the patient able to teach back signs and symptoms of worsening condition? (i.e. weight gain, shortness of air, etc.)  Yes   Is the patient/caregiver able to teach back the hierarchy of who to call/visit for symptoms/problems? PCP, Specialist, Home  health nurse, Urgent Care, ED, 911  Yes   Additional teach back comments  states breathing WNL's, free of edema    CHF Week 1 call completed?  Yes          Frannie Amezquita RN

## 2021-05-17 ENCOUNTER — ANTICOAGULATION VISIT (OUTPATIENT)
Dept: PHARMACY | Facility: HOSPITAL | Age: 71
End: 2021-05-17

## 2021-05-17 DIAGNOSIS — I25.118 CORONARY ARTERY DISEASE OF NATIVE ARTERY OF NATIVE HEART WITH STABLE ANGINA PECTORIS (HCC): ICD-10-CM

## 2021-05-17 DIAGNOSIS — I48.91 ATRIAL FIBRILLATION WITH RVR (HCC): Primary | ICD-10-CM

## 2021-05-17 LAB — INR PPP: 2.1

## 2021-05-17 NOTE — PROGRESS NOTES
Anticoagulation Clinic Progress Note    Anticoagulation Summary  As of 2021    INR goal:  2.0-3.0   TTR:  100.0 % (2 d)   INR used for dosin.10 (2021)   Warfarin maintenance plan:  0.5 mg every Sat; 1 mg all other days   Weekly warfarin total:  6.5 mg   Plan last modified:  Grzegorz Ellis RPH (2021)   Next INR check:  2021   Target end date:      Indications    Atrial fibrillation with RVR (CMS/[facility]) [I48.91]  Coronary artery disease of native artery of native heart with stable angina pectoris (CMS/[facility]) [I25.118]             Anticoagulation Episode Summary     INR check location:      Preferred lab:      Send INR reminders to:   [facility]    Comments:  Home health to draw INR       Anticoagulation Care Providers     Provider Role Specialty Phone number    Cathie Felipe KENNETH Referring Cardiology [phone]          INR History:  Anticoagulation Monitoring 5/10/2021 2021 2021   INR 2.50 2.70 2.10   INR Date 5/10/2021 2021 2021   INR Goal 2.0-3.0 2.0-3.0 2.0-3.0   Last Week Total 6 mg 6.5 mg 6 mg   Next Week Total 2.5 mg 3.5 mg 6.5 mg   Sun - 1 mg () -   Mon 1 mg (5/10) - 1 mg   Tue 0.5 mg () - 1 mg   Wed 1 mg () - 1 mg   Thu - 1 mg () -   Fri - 1 mg () -   Sat - 0.5 mg (5/15) -   Visit Report - - -   Some recent data might be hidden       Plan:  1. INR is Therapeutic today- see above in Anticoagulation Summary.   Provided instructions to Cadence with [facility] to Change their warfarin regimen- see above in Anticoagulation Summary.  2. Follow up in 3 days      Grzegorz Ellis RPH

## 2021-05-20 ENCOUNTER — ANTICOAGULATION VISIT (OUTPATIENT)
Dept: PHARMACY | Facility: HOSPITAL | Age: 71
End: 2021-05-20

## 2021-05-20 DIAGNOSIS — I25.118 CORONARY ARTERY DISEASE OF NATIVE ARTERY OF NATIVE HEART WITH STABLE ANGINA PECTORIS (HCC): ICD-10-CM

## 2021-05-20 DIAGNOSIS — I48.91 ATRIAL FIBRILLATION WITH RVR (HCC): Primary | ICD-10-CM

## 2021-05-20 LAB — INR PPP: 2.3

## 2021-05-20 NOTE — PROGRESS NOTES
Anticoagulation Clinic Progress Note  Anticoagulation Summary  As of 2021    INR goal:  2.0-3.0   TTR:  100.0 % (5 d)   INR used for dosin.30 (2021)   Warfarin maintenance plan:  0.5 mg every Sat; 1 mg all other days   Weekly warfarin total:  6.5 mg   No change documented:  Kory Rosas, PharmD   Plan last modified:  Grzegorz Ellis RPH (2021)   Next INR check:  2021   Target end date:      Indications    Atrial fibrillation with RVR (CMS/[facility]) [I48.91]  Coronary artery disease of native artery of native heart with stable angina pectoris (CMS/[facility]) [I25.118]             Anticoagulation Episode Summary     INR check location:      Preferred lab:      Send INR reminders to:  [facility]    Comments:  Home health to draw INR       Anticoagulation Care Providers     Provider Role Specialty Phone number    Cathie Felipe APRN Referring Cardiology [phone]        Clinic Interview:  Patient Findings     Negatives:  Signs/symptoms of thrombosis, Signs/symptoms of bleeding,   Laboratory test error suspected, Change in health, Change in alcohol use,   Change in activity, Upcoming invasive procedure, Emergency department   visit, Upcoming dental procedure, Missed doses, Extra doses, Change in   medications, Change in diet/appetite, Hospital admission, Bruising, Other   complaints    Comments:  Info per TERESA hicks AmL-3 GCSs      Clinical Outcomes     Negatives:  Major bleeding event, Thromboembolic event,   Anticoagulation-related hospital admission, Anticoagulation-related ED   visit, Anticoagulation-related fatality    Comments:  Info per TERESA hicks L-3 GCSs      INR History:  Anticoagulation Monitoring 2021   INR 2.70 2.10 2.30   INR Date 2021   INR Goal 2.0-3.0 2.0-3.0 2.0-3.0   Trend - - Same   Last Week Total 6.5 mg 6 mg 6.5 mg   Next Week Total 3.5 mg 6.5 mg 6.5 mg   Sun 1 mg () - 1 mg   Mon - 1 mg -   Tue - 1 mg -    Wed - 1 mg -   Thu 1 mg (5/13) - 1 mg   Fri 1 mg (5/14) - 1 mg   Sat 0.5 mg (5/15) - 0.5 mg   Visit Report - - -   Some recent data might be hidden     Plan:  1. INR is Therapeutic today- see above in Anticoagulation Summary.   Will instruct Rachael South via Christopher Barron ([phone])to Continue their warfarin regimen- see above in Anticoagulation Summary.  2. Follow up on Monday 5/24  3. They have been instructed to call if any changes in medications, doses, concerns, etc. Patient expresses understanding and has no further questions at this time.    Kory Rosas, HelenaD

## 2021-05-24 ENCOUNTER — ANTICOAGULATION VISIT (OUTPATIENT)
Dept: PHARMACY | Facility: HOSPITAL | Age: 71
End: 2021-05-24

## 2021-05-24 DIAGNOSIS — I25.118 CORONARY ARTERY DISEASE OF NATIVE ARTERY OF NATIVE HEART WITH STABLE ANGINA PECTORIS (HCC): ICD-10-CM

## 2021-05-24 DIAGNOSIS — I48.91 ATRIAL FIBRILLATION WITH RVR (HCC): Primary | ICD-10-CM

## 2021-05-24 LAB — INR PPP: 5.2

## 2021-05-24 NOTE — PROGRESS NOTES
Anticoagulation Clinic Progress Note    Anticoagulation Summary  As of 2021    INR goal:  2.0-3.0   TTR:  66.3 % (1.3 wk)   INR used for dosin.20 (2021)   Warfarin maintenance plan:  0.5 mg every Sat; 1 mg all other days   Weekly warfarin total:  6.5 mg   Plan last modified:  Grzegorz Ellis RPH (2021)   Next INR check:  2021   Target end date:      Indications    Atrial fibrillation with RVR (CMS/[facility]) [I48.91]  Coronary artery disease of native artery of native heart with stable angina pectoris (CMS/[facility]) [I25.118]             Anticoagulation Episode Summary     INR check location:      Preferred lab:      Send INR reminders to:   EFREM [facility] [place]    Comments:  Home health to draw INR       Anticoagulation Care Providers     Provider Role Specialty Phone number    Destinee KENNETH Brand Referring Cardiology [phone]          INR History:  Anticoagulation Monitoring 2021   INR 2.10 2.30 5.   INR Date 2021   INR Goal 2.0-3.0 2.0-3.0 2.0-3.0   Trend - Same Same   Last Week Total 6 mg 6.5 mg 6.5 mg   Next Week Total 6.5 mg 6.5 mg 4.5 mg   Sun - 1 mg -   Mon 1 mg - Hold ()   Tue 1 mg - Hold ()   Wed 1 mg - -   Thu - 1 mg -   Fri - 1 mg -   Sat - 0.5 mg -   Visit Report - - -   Some recent data might be hidden       Plan:  1. INR is Supratherapeutic today- see above in Anticoagulation Summary.   Provided instructions to patient and to Beatrice with [facility] to HOLD their warfarin regimen- see above in Anticoagulation Summary.  2. Follow up in 2 days  3. Pt is to seek immediate medical attention if s/sx of bleeding develop or fall occurs.       Grzegorz Ellis RPH

## 2021-05-25 ENCOUNTER — READMISSION MANAGEMENT (OUTPATIENT)
Dept: CALL CENTER | Facility: HOSPITAL | Age: 71
End: 2021-05-25

## 2021-05-25 NOTE — OUTREACH NOTE
CHF Week 2 Survey      Responses   [facility] patient discharged from?  [place]   Does the patient have one of the following disease processes/diagnoses(primary or secondary)?  CHF   Week 2 attempt successful?  Yes   Call start time  1416   Call end time  1427   Discharge diagnosis  CHF   Person spoke with today (if not patient) and relationship     Meds reviewed with patient/caregiver?  Yes   Is the patient taking all medications as directed (includes completed medication regime)?  Yes   Medication comments  holding coumadin for today, she reports.    Has the patient kept scheduled appointments due by today?  Yes   What is the Home health agency?   [facility]   Pulse Ox monitoring  Intermittent   Pulse Ox device source  Patient   O2 Sat comments  mid 90's on RA, has PRN O2 if needed   O2 Sat: education provided  Sat levels   Psychosocial issues?  No   Comments  Pt c/o pain in the incision when her breasts hand down, if she holds them up the incision feels better. Suggested to get a sports bra or liyah with bra shelf. Her  will get her a sports bra today. No s/sx of infection at incision. Pt does c/o SOA and edema in LE but says this is normal for her.    What is the patient's perception of their health status since discharge?  Worsening   Is the patient weighing daily?  Yes   Is the patient able to teach back Heart Failure diet management?  Yes   Is the patient able to teach back signs and symptoms of worsening condition? (i.e. weight gain, shortness of air, etc.)  Yes   If the patient is a current smoker, are they able to teach back resources for cessation?  Not a smoker   CHF Week 2 call completed?  Yes          Hui Prabhakar RN

## 2021-05-26 ENCOUNTER — HOSPITAL ENCOUNTER (OUTPATIENT)
Dept: URGENT CARE | Facility: CLINIC | Age: 71
Discharge: HOME | End: 2021-05-26
Attending: FAMILY MEDICINE

## 2021-05-26 ENCOUNTER — ANTICOAGULATION VISIT (OUTPATIENT)
Dept: PHARMACY | Facility: HOSPITAL | Age: 71
End: 2021-05-26

## 2021-05-26 DIAGNOSIS — I48.91 ATRIAL FIBRILLATION WITH RVR (HCC): Primary | ICD-10-CM

## 2021-05-26 DIAGNOSIS — I25.118 CORONARY ARTERY DISEASE OF NATIVE ARTERY OF NATIVE HEART WITH STABLE ANGINA PECTORIS (HCC): ICD-10-CM

## 2021-05-26 LAB — INR PPP: 4.3

## 2021-05-26 NOTE — PROGRESS NOTES
Anticoagulation Clinic Progress Note    Anticoagulation Summary  As of 2021    INR goal:  2.0-3.0   TTR:  54.2 % (1.6 wk)   INR used for dosin.30 (2021)   Warfarin maintenance plan:  0.5 mg every Sat; 1 mg all other days   Weekly warfarin total:  6.5 mg   Plan last modified:  Grzegorz Ellis RPH (2021)   Next INR check:  2021   Target end date:      Indications    Atrial fibrillation with RVR (CMS/[facility]) [I48.91]  Coronary artery disease of native artery of native heart with stable angina pectoris (CMS/[facility]) [I25.118]             Anticoagulation Episode Summary     INR check location:      Preferred lab:      [facility]    Comments:  Home health to draw INR       Anticoagulation Care Providers     Provider Role Specialty Phone number    DestineeCathie APRN Referring Cardiology [phone]        Findings:   Held warfarin past 2 days as instructed. Denies any s/sx of bleeding. Being evaluated for possible PNA; nurse will update us if antibiotic is started.     INR History:  Anticoagulation Monitoring 2021   INR 2.30 5.20 4.30   INR Date 2021   INR Goal 2.0-3.0 2.0-3.0 2.0-3.0   Trend Same Same Same   Last Week Total 6.5 mg 6.5 mg 4.5 mg   Next Week Total 6.5 mg 4.5 mg 4.5 mg   Sun 1 mg - -   Mon - Hold () -   Tue - Hold () -   Wed - - Hold ()   Thu 1 mg - Hold ()   Fri 1 mg - -   Sat 0.5 mg - -   Visit Report - - -   Some recent data might be hidden       Plan:  1. INR is Supratherapeutic today- see above in Anticoagulation Summary.   Provided instructions to Beatrice with [facility] to HOLD their warfarin regimen- see above in Anticoagulation Summary.  2. Follow up in 2 days      Grzegorz Ellis RPH

## 2021-06-02 ENCOUNTER — ANTICOAGULATION VISIT (OUTPATIENT)
Dept: PHARMACY | Facility: HOSPITAL | Age: 71
End: 2021-06-02

## 2021-06-02 ENCOUNTER — READMISSION MANAGEMENT (OUTPATIENT)
Dept: CALL CENTER | Facility: HOSPITAL | Age: 71
End: 2021-06-02

## 2021-06-02 DIAGNOSIS — I25.118 CORONARY ARTERY DISEASE OF NATIVE ARTERY OF NATIVE HEART WITH STABLE ANGINA PECTORIS (HCC): ICD-10-CM

## 2021-06-02 DIAGNOSIS — I48.91 ATRIAL FIBRILLATION WITH RVR (HCC): Primary | ICD-10-CM

## 2021-06-02 LAB — INR PPP: 1.5

## 2021-06-02 NOTE — PROGRESS NOTES
Anticoagulation Clinic Progress Note    Anticoagulation Summary  As of 2021    INR goal:  2.0-3.0   TTR:  47.0 % (2.6 wk)   INR used for dosin.50 (2021)   Warfarin maintenance plan:  0.5 mg every Sat; 1 mg all other days   Weekly warfarin total:  6.5 mg   No change documented:  Grzegorz Ellis RPH   Plan last modified:  Grzegorz Ellis RPH (2021)   Next INR check:  2021   Target end date:      Indications    Atrial fibrillation with RVR (CMS/[facility]) [I48.91]  Coronary artery disease of native artery of native heart with stable angina pectoris (CMS/[facility]) [I25.118]             Anticoagulation Episode Summary     INR check location:      Preferred lab:      Send INR reminders to:  ADRIANA HERRERA [facility]    Comments:        Anticoagulation Care Providers     Provider Role Specialty Phone number    Destinee KENNETH Brand Referring Cardiology [phone]        Findings:  Reports she was admitted to hospital - where she was found to have fluid on lungs. No other details were provided except that she was discharged on cefdinir 300 mg BID x 7 days (2-3 days left).     INR History:  Anticoagulation Monitoring 2021   INR 5.20 4.30 1.50   INR Date 2021   INR Goal 2.0-3.0 2.0-3.0 2.0-3.0   Trend Same Same Same   Last Week Total 6.5 mg 4.5 mg 3.5 mg   Next Week Total 4.5 mg 4.5 mg 6.5 mg   Sun - - 1 mg   Mon Hold () - -   Tue Hold () - -   Wed - Hold () 1 mg   Thu - Hold () 1 mg   Fri - - 1 mg   Sat - - 0.5 mg   Visit Report - - -   Some recent data might be hidden       Plan:  1. INR is Subtherapeutic today- see above in Anticoagulation Summary.   Provided instructions to Beatrice with [facility] to Continue their warfarin regimen- see above in Anticoagulation Summary.  2. Follow up in 5 days      Grzegorz Ellis RPH

## 2021-06-02 NOTE — OUTREACH NOTE
CHF Week 3 Survey      Responses   [facility] patient discharged from?  [place]   Does the patient have one of the following disease processes/diagnoses(primary or secondary)?  CHF   Week 3 attempt successful?  No   Revoke  Readmitted          Helen Mo LPN

## 2021-06-04 ENCOUNTER — OFFICE VISIT CONVERTED (OUTPATIENT)
Dept: FAMILY MEDICINE CLINIC | Facility: CLINIC | Age: 71
End: 2021-06-04
Attending: NURSE PRACTITIONER

## 2021-06-05 NOTE — PROGRESS NOTES
Progress Note      Patient Name: Rachael South   Patient ID: [id number]   Sex: Female   YOB: 1950        Visit Date: June 4, 2021    Provider: KENNETH Ann   Location: [facility]   Location Address: [address] [place] [address] [place]  [address]   Location Phone: [phone]          Chief Complaint     hospital follow up       History Of Present Illness  Rachael South is a 71 year old /White female who presents for evaluation and treatment of:      hospital follow up for pleural effusion - pt states she went to the ER for SOB of diminished lung sounds and was found to have bilateral pleural effusions, she had 2Liters of fluid drained from each side and was discharged home that evening - pt states since then she has been breathing better, not needing oxygen as much - she is following low sodium diet and trying to eat only less than 7% sodium per serving     Pt states while she was there at [facility] and met with Cardiology and Pulmonology   Goes to see Pulmonologist, Dr. Ludwig, on June 11, 2021 in [facility]  Sees Cardiology, Dr. Canales, on June 29, 2021    Pt would like to have a home portal oxygen concentrator to use as needed and when away from home       Past Medical History  Disease Name Date Onset Notes   Anemia 03/30/2021 --    CHF (congestive heart failure) 03/30/2021 --    Hyperlipemia --  --    Insomnia --  --          Past Surgical History  Procedure Name Date Notes   Rotator Cuff repair --  --          Medication List  Name Date Started Instructions   amiodarone 200 mg oral tablet 05/05/2021 take 1 tablet (200 mg) by oral route once daily for 30 days   aspirin 81 mg oral tablet,delayed release (/EC) 03/24/2021 take 1 tablet (81 mg) by oral route once daily for 30 days   atorvastatin 40 mg oral tablet 05/13/2021 take 1 tablet (40 mg) by oral route once daily for 90 days   ferrous gluconate 236 mg (27 mg iron) oral tablet 04/01/2021 take  1 tablet by oral route daily for 90 days   furosemide 40 mg oral tablet  take 1 tablet (40 mg) by oral route once daily   hydrocodone-acetaminophen 5-325 mg oral tablet 05/05/2021 take 1 tablet by oral route every 4 hours as needed for pain   lisinopril 2.5 mg oral tablet 05/05/2021 take 1 tablet (2.5 mg) by oral route once daily   metoprolol succinate 50 mg oral tablet extended release 24 hr  take 1 tablet (50 mg) by oral route once daily   potassium chloride 8 mEq oral tablet extended release 04/01/2021 take 1 tablet (8 meq) by oral route once daily for 90 days   Proventil HFA 90 mcg/actuation inhalation HFA aerosol inhaler 03/24/2021 inhale 2 puffs (180 mcg) by inhalation route every 4 hours as needed   trazodone 50 mg oral tablet  take 1 tablet (50 mg) by oral route once daily at bedtime   warfarin 2 mg oral tablet 05/05/2021 take 1 tablet (2 mg) by oral route once daily         Allergy List  Allergen Name Date Reaction Notes   NO KNOWN DRUG ALLERGIES --  --  --        Allergies Reconciled  Family Medical History  Disease Name Relative/Age Notes   Heart Disease Mother/   --    Hypertension Mother/  Son/   --    Cancer, Unspecified Sister/   --    Diabetes mellitus, type II Daughter/   --          Social History  Finding Status Start/Stop Quantity Notes   Tobacco Former --/-- --  quit 03-11-21         Review of Systems  · Constitutional  o Admits  o : weight loss  o Denies  o : fever, headache, chills, body aches  · Cardiovascular  o Denies  o : chest pain, palpitations  · Respiratory  o Denies  o : shortness of breath, wheezing, cough      Vitals  Date Time BP Position Site L\R Cuff Size HR RR TEMP (F) WT  HT  BMI kg/m2 BSA m2 O2 Sat FR L/min FiO2 HC       06/04/2021 12:46 /80 Sitting    76 - R  97.3 129lbs 0oz    96 %            Physical Examination  · Constitutional  o Appearance  o : well developed, well-nourished, in no acute distress  · Eyes  o Conjunctivae  o : conjunctivae normal  o Pupils and  Irises  o : pupils equal and round, pupils reactive to light bilaterally  · Neck  o Inspection/Palpation  o : supple  o Thyroid  o : no thyromegaly  · Respiratory  o Respiratory Effort  o : breathing unlabored  o Auscultation of Lungs  o : clear to ascultation  · Cardiovascular  o Heart  o :   § Auscultation of Heart  § : regular rate and rhythm  o Peripheral Vascular System  o :   § Extremities  § : no edema  · Lymphatic  o Neck  o : no lymphadenopathy present  · Musculoskeletal  o General  o :   § General Musculoskeletal  § : Muscle tone, strength, and development grossly normal.  · Skin and Subcutaneous Tissue  o General Inspection  o : sternotomy scar healing appropriately   · Neurologic  o Gait and Station  o :   § Gait Screening  § : normal gait  · Psychiatric  o Mood and Affect  o : mood normal, affect appropriate          Assessment  · CHF (congestive heart failure)     428.0/I50.9  · Pleural effusion     511.9/J90      Plan  · Orders  o ACO-39: Current medications updated and reviewed (1159F, ) - - 06/04/2021  o Chest xray 2 views [facility] Preferred View (42213) - 511.9/J90 - 06/04/2021   bilateral mod-mod lg pleural effusions  · Medications  o Medications have been Reconciled  o Transition of Care or Provider Policy  · Instructions  o Patient was educated/instructed on their diagnosis, treatment and medications prior to discharge from the clinic today.  · Disposition  o Follow up as needed.     Walk test with oxygen as low as 88 at rest.  Follow up with Pulmonology as planned and notify of recent CXR for further review.             Electronically Signed by: KENNETH Ann -Author on June 4, 2021 03:03:18 PM

## 2021-06-05 NOTE — PROGRESS NOTES
Progress Note      Patient Name: Rachael South   Patient ID: [id number]   Sex: Female   YOB: 1950        Visit Date: May 10, 2021    Provider: KENNETH Ann   Location: [facility]   Location Address: [address] [place] [address]   [place]  [address]   Location Phone: [phone]          Chief Complaint  · Follow up office visit within 7 calender days of discharge from inpatient status (high complexity).     TCM for Heart surgery       History Of Present Illness  FOLLOW UP OFFICE VISIT WITHIN 7 CALENDER DAYS OF INPATIENT STATUS (SEVERE COMPLEXITY)  Rachael South presents to office for follow up post discharge from inpatient status within 7 calender days. Patient was contacted within 2 business days via phone conversation. Documentation of that phone contact is present in the patient's electronic chart. Patient was admitted to an inpatient faciliity on 04/21/2021 and discharged on 05/05/2021 due to: acute systolic congestive heart failure, CAD, and angina   Admitting MD: Rishi Wooten   Her discharge summary has been reviewed and placed in the patient's electronic chart.   Patient's problem list is: Anemia, CHF (congestive heart failure), Hyperlipemia, and Insomnia   Patient's medication list has been updated/reconcilled from discharge summary. Medication list is: amiodarone 200 mg oral tablet, aspirin 81 mg oral tablet,delayed release (DR/EC), atorvastatin 40 mg oral tablet, ferrous gluconate 236 mg (27 mg iron) oral tablet, furosemide 40 mg oral tablet, hydrocodone-acetaminophen 5-325 mg oral tablet, lisinopril 2.5 mg oral tablet, metoprolol succinate 50 mg oral tablet extended release 24 hr, potassium chloride 8 mEq oral tablet extended release, Proventil HFA 90 mcg/actuation inhalation HFA aerosol inhaler, trazodone 50 mg oral tablet, and warfarin 2 mg oral tablet   Rachael South is a 71 year old /White female who presents for evaluation and  treatment of:      pt was admitted and had open heart surgery with triple bypass with valve replacement     Home Health PT came to do an evaluation today     She is doing light activity as tolerated     Wears a splint when she coughs - she is using her inspiratory spirometer    She was told to take her Lasix once daily - but if she takes once daily she will get some swelling of the feet - notes improvement with feet propped - denies pain in calves     Insomnia: trazodone isn't helping with sleep - pt takes Tylenol PM as needed       Past Medical History  Disease Name Date Onset Notes   Anemia 03/30/2021 --    CHF (congestive heart failure) 03/30/2021 --    Hyperlipemia --  --    Insomnia --  --          Past Surgical History  Procedure Name Date Notes   Rotator Cuff repair --  --          Medication List  Name Date Started Instructions   amiodarone 200 mg oral tablet 05/05/2021 take 1 tablet (200 mg) by oral route once daily for 30 days   aspirin 81 mg oral tablet,delayed release (DR/EC) 03/24/2021 take 1 tablet (81 mg) by oral route once daily for 30 days   atorvastatin 40 mg oral tablet 03/24/2021 take 1 tablet (40 mg) by oral route once daily   ferrous gluconate 236 mg (27 mg iron) oral tablet 04/01/2021 take 1 tablet by oral route daily for 90 days   furosemide 40 mg oral tablet  take 1 tablet (40 mg) by oral route once daily   hydrocodone-acetaminophen 5-325 mg oral tablet 05/05/2021 take 1 tablet by oral route every 4 hours as needed for pain   lisinopril 2.5 mg oral tablet 05/05/2021 take 1 tablet (2.5 mg) by oral route once daily   metoprolol succinate 50 mg oral tablet extended release 24 hr  take 1 tablet (50 mg) by oral route once daily   potassium chloride 8 mEq oral tablet extended release 04/01/2021 take 1 tablet (8 meq) by oral route once daily for 90 days   Proventil HFA 90 mcg/actuation inhalation HFA aerosol inhaler 03/24/2021 inhale 2 puffs (180 mcg) by inhalation route every 4 hours as needed  "  trazodone 50 mg oral tablet  take 1 tablet (50 mg) by oral route once daily at bedtime   warfarin 2 mg oral tablet 05/05/2021 take 1 tablet (2 mg) by oral route once daily         Allergy List  Allergen Name Date Reaction Notes   NO KNOWN DRUG ALLERGIES --  --  --        Allergies Reconciled  Family Medical History  Disease Name Relative/Age Notes   Heart Disease Mother/   --    Hypertension Mother/  Son/   --    Cancer, Unspecified Sister/   --    Diabetes mellitus, type II Daughter/   --          Social History  Finding Status Start/Stop Quantity Notes   Tobacco Former --/-- --  quit 03-11-21         Review of Systems  · Constitutional  o Denies  o : fever, weight gain, weight loss, malaise/fatigue  · Cardiovascular  o Admits  o : lower extremity edema  o Denies  o : palpitation, chest pain, claudication  · Respiratory  o Admits  o : cough  o Denies  o : shortness of breath, hemoptysis, wheezing, productive cough  · Gastrointestinal  o Admits  o : mild constipation so tries to avoid hydrocodone  o Denies  o : nausea, vomiting, diarrhea, abdominal pain  · Neurologic  o Denies  o : unsteady gait, weakness, dizziness, H/A      Vitals  Date Time BP Position Site L\R Cuff Size HR RR TEMP (F) WT  HT  BMI kg/m2 BSA m2 O2 Sat FR L/min FiO2 HC       05/10/2021 02:43 /70 Sitting    100 - R  96.9 139lbs 8oz 5'  0.5\" 26.8 1.64 92 %  21%          Physical Examination  · Constitutional  o Appearance  o : well developed, well-nourished, in no acute distress  · Eyes  o Conjunctivae  o : conjunctivae normal  o Pupils and Irises  o : pupils equal and round, pupils reactive to light bilaterally  · Neck  o Inspection/Palpation  o : supple  o Thyroid  o : no thyromegaly  · Respiratory  o Respiratory Effort  o : breathing unlabored, scar noted the length of the sterum  o Inspection of Chest  o :   § Thorax  § : clavicles stable with no crepitus  o Auscultation of Lungs  o : clear to ascultation  · Cardiovascular  o Heart  o : "   § Auscultation of Heart  § : regular rate and rhythm  o Peripheral Vascular System  o :   § Extremities  § : no edema  · Lymphatic  o Neck  o : no lymphadenopathy present  · Musculoskeletal  o General  o :   § General Musculoskeletal  § : No joint swelling or deformity. Muscle tone, strength, and development grossly normal.  · Skin and Subcutaneous Tissue  o General Inspection  o : bruising to the left lower leg   · Neurologic  o Gait and Station  o :   § Gait Screening  § : normal gait  · Psychiatric  o Mood and Affect  o : mood normal, affect appropriate              Assessment  · Anemia     285.9/D64.9  · CHF (congestive heart failure)     428.0/I50.9  · Hyperlipemia     272.4/E78.5  · Insomnia     780.52/G47.00  · Hospital discharge follow-up     V67.59/Z09      Plan  · Orders  o Discharge medications reconciled with the current medication list (1111F) - - 05/10/2021  o ACO-39: Current medications updated and reviewed (1159F, ) - - 05/10/2021  · Medications  o Medications have been Reconciled  o Transition of Care or Provider Policy  · Instructions  o Patient discharge summary has been reviewed and placed in patient's electronic medical record.  o Patient received a phone call from my office within 2 business days of discharge from inpatient status, and documented within the patient's chart.  o Also patient was seen (face to face) for follow up evaluation within 7 calender days of discharge from inpatient status for a high complexity issue.  o Patient was educated on their diagnosis, treatment and any medication changes while being evaluated today.  o Patient was educated/instructed on their diagnosis, treatment and medications prior to discharge from the clinic today.  o May take plain Mucinex to help with cough  · Disposition  o Follow up as needed.            Electronically Signed by: KENNETH Ann -Author on May 10, 2021 04:29:43 PM

## 2021-06-07 LAB — INR PPP: 1.2

## 2021-06-08 ENCOUNTER — ANTICOAGULATION VISIT (OUTPATIENT)
Dept: PHARMACY | Facility: HOSPITAL | Age: 71
End: 2021-06-08

## 2021-06-08 DIAGNOSIS — I48.91 ATRIAL FIBRILLATION WITH RVR (HCC): Primary | ICD-10-CM

## 2021-06-08 DIAGNOSIS — I25.118 CORONARY ARTERY DISEASE OF NATIVE ARTERY OF NATIVE HEART WITH STABLE ANGINA PECTORIS (HCC): ICD-10-CM

## 2021-06-08 RX ORDER — WARFARIN SODIUM 1 MG/1
TABLET ORAL
Qty: 30 TABLET | Refills: 1 | Status: SHIPPED | OUTPATIENT
Start: 2021-06-08 | End: 2021-06-10 | Stop reason: SDUPTHER

## 2021-06-08 NOTE — PROGRESS NOTES
Anticoagulation Clinic Progress Note    Anticoagulation Summary  As of 2021    INR goal:  2.0-3.0   TTR:  27.2 % (3.3 wk)   INR used for dosin.20 (2021)   Warfarin maintenance plan:  0.5 mg every Sat; 1 mg all other days   Weekly warfarin total:  6.5 mg   Plan last modified:  Grzegorz Ellis RP (2021)   Next INR check:  6/10/2021   Target end date:      Indications    Atrial fibrillation with RVR (CMS/[facility]) [I48.91]  Coronary artery disease of native artery of native heart with stable angina pectoris (CMS/[facility]) [I25.118]             Anticoagulation Episode Summary     INR check location:      Preferred lab:      Send INR reminders to:   EFREM HERRERA [facility] [place]    Comments:        Anticoagulation Care Providers     Provider Role Specialty Phone number    Cathie Felipe APRN Referring Cardiology [phone]          INR History:  Anticoagulation Monitoring 2021   INR 4.30 1.50 1.20   INR Date 2021   INR Goal 2.0-3.0 2.0-3.0 2.0-3.0   Trend Same Same Same   Last Week Total 4.5 mg 3.5 mg 6.5 mg   Next Week Total 4.5 mg 6.5 mg 7.5 mg   Sun - 1 mg -   Mon - - -   Tue - - 1.5 mg ()   Wed Hold () 1 mg 1.5 mg ()   Thu Hold () 1 mg -   Fri - 1 mg -   Sat - 0.5 mg -   Visit Report - - -   Some recent data might be hidden       Plan:  1. INR is Subtherapeutic today- see above in Anticoagulation Summary.   [facility] today and tomorrow's warfarin dose to 1.5mg - see above in Anticoagulation Summary.  2. Follow upon Thursday 6/10      Letha Salinas [facility]

## 2021-06-09 ENCOUNTER — OFFICE VISIT (OUTPATIENT)
Dept: CARDIAC SURGERY | Facility: CLINIC | Age: 71
End: 2021-06-09

## 2021-06-09 VITALS
RESPIRATION RATE: 20 BRPM | HEIGHT: 62 IN | HEART RATE: 80 BPM | BODY MASS INDEX: 23.96 KG/M2 | TEMPERATURE: 97.5 F | DIASTOLIC BLOOD PRESSURE: 77 MMHG | OXYGEN SATURATION: 98 % | SYSTOLIC BLOOD PRESSURE: 130 MMHG | WEIGHT: 130.2 LBS

## 2021-06-09 DIAGNOSIS — Z95.1 S/P CABG X 3: Primary | ICD-10-CM

## 2021-06-09 DIAGNOSIS — Z95.2 S/P MVR (MITRAL VALVE REPLACEMENT): ICD-10-CM

## 2021-06-09 PROCEDURE — 99024 POSTOP FOLLOW-UP VISIT: CPT | Performed by: NURSE PRACTITIONER

## 2021-06-09 NOTE — PROGRESS NOTES
"CARDIOVASCULAR SURGERY FOLLOW-UP PROGRESS NOTE  Chief Complaint: Postop follow-up        HPI:   Dear Dr. Ryder, KENNETH Roldan and colleagues:    It was nice to see Rachael South in follow up 6 weeks after surgery.  As you know, she is a 71 y.o. female with CAD, mitral incompetence who underwent CABG x3 with LIMA and tissue MVR on 4/27/2021 with Dr. Gregg. She did well postoperatively and continues to do well.  She did have atrial fibrillation that was converted with amiodarone during her hospitalization, she has continued to take Coumadin which is being titrated per local cardiology group.  She does state that she is changing over to Dr. Canales as he is closer to where she lives.  She comes in today complaining of nothing.  Her activity level has been good, she is due for release from home health services and is going to start cardiac rehab after that.  From a surgical standpoint, the sternal incision is well approximated without erythema, edema, or drainage.  The sternum is stable to palpation, and the patient denies any popping or clicking with deep inspiration or coughing.      Physical Exam:         /77 (BP Location: Left arm, Patient Position: Sitting, Cuff Size: Adult)   Pulse 80   Temp 97.5 °F (36.4 °C) (Oral)   Resp 20   Ht 157.5 cm (62\")   Wt 59.1 kg (130 lb 3.2 oz)   SpO2 98%   BMI 23.81 kg/m²   Heart:  regular rate and rhythm, S1, S2 normal, no murmur, click, rub or gallop  Lungs:  clear to auscultation bilaterally  Extremities:  no edema  Incision(s):  mid chest healing well, right leg healing well, sternum stable    Assessment/Plan:     S/P CABG and MVR. Overall, she is doing well.    Post-op atrial fibrillation    Keep incisions clean and dry  OK to begin cardiac rehab  Follow-up as scheduled with cardiology  Follow-up as scheduled with PCP  Follow-up with CT surgery prn    Continue lifting restriction of 10 lbs until 6 weeks and 50 lbs until 12 weeks from the date of surgery, no " excessive jarring motions or twisting motions until 12 weeks from the date of surgery    Return to clinic if any signs or symptoms of infection or sternal instability develop       Thank you for allowing me to participate in the care of your patient.    Regards,  KENNETH Young  Current outpatient and discharge medications have been reconciled for the patient.  Reviewed by: KENNETH Young

## 2021-06-09 NOTE — PROGRESS NOTES
"CARDIOVASCULAR SURGERY FOLLOW-UP PROGRESS NOTE  Chief Complaint: ***        HPI:   Dear Dr. Ryder, KENNETH Roldan and colleagues:    It was nice to see Rachael South in follow up *** after surgery.  As you know, she is a 71 y.o. female with *** who underwent ***. She did well postoperatively and continues to do well. She comes in today complaining of {[facility]HS AMB CTS POST-OP COMPLAINTS:52963}.  Her activity level has been {good/fair/poor:03582}.  From a surgical standpoint, the sternal incision is well approximated without erythema, edema, or drainage.  The sternum is stable to palpation, and the patient denies any popping or clicking with deep inspiration or coughing.      Physical Exam:         /77 (BP Location: Left arm, Patient Position: Sitting, Cuff Size: Adult)   Pulse 80   Temp 97.5 °F (36.4 °C) (Oral)   Resp 20   Ht 157.5 cm (62\")   Wt 59.1 kg (130 lb 3.2 oz)   SpO2 98%   BMI 23.81 kg/m²   Heart:  {S CTS HEART EXAM:41553}  Lungs:  {S CTS LUNG EXAM:93441}  Extremities:  {EXAM; EXTREMITY:82833}  Incision(s):  {S AMB EXAM; CTS INCISION:16370}    Assessment/Plan:     S/P {AHS AMB CV CARDIAC PROCEDURES:31193}. Overall, she is doing well.    {AHS AMB CTS POST-OP CONDITION:05141}    {AHS AMB CTS POST-OP INSTRUCTIONS:16967}    Continue lifting restriction of 10 lbs until 6 weeks and 50 lbs until 12 weeks from the date of surgery, no excessive jarring motions or twisting motions until 12 weeks from the date of surgery    Return to clinic if any signs or symptoms of infection or sternal instability develop       Thank you for allowing me to participate in the care of your patient.    Regards,  KENNETH Young  Current outpatient and discharge medications have been reconciled for the patient.  Reviewed by: KENNETH Young    "

## 2021-06-09 NOTE — PATIENT INSTRUCTIONS
Continue lifting restriction of 10 lbs until 6 weeks and 50 lbs until 12 weeks from the date of surgery, no excessive jarring motions or twisting motions until 12 weeks from the date of surgery

## 2021-06-10 ENCOUNTER — ANTICOAGULATION VISIT (OUTPATIENT)
Dept: PHARMACY | Facility: HOSPITAL | Age: 71
End: 2021-06-10

## 2021-06-10 DIAGNOSIS — I25.118 CORONARY ARTERY DISEASE OF NATIVE ARTERY OF NATIVE HEART WITH STABLE ANGINA PECTORIS (HCC): ICD-10-CM

## 2021-06-10 DIAGNOSIS — I48.91 ATRIAL FIBRILLATION WITH RVR (HCC): Primary | ICD-10-CM

## 2021-06-10 LAB — INR PPP: 1.4

## 2021-06-10 RX ORDER — AMIODARONE HYDROCHLORIDE 200 MG/1
100 TABLET ORAL DAILY
Qty: 30 TABLET | Refills: 1 | Status: SHIPPED | OUTPATIENT
Start: 2021-06-10 | End: 2022-05-25

## 2021-06-10 RX ORDER — WARFARIN SODIUM 1 MG/1
TABLET ORAL
Qty: 30 TABLET | Refills: 1 | Status: SHIPPED | OUTPATIENT
Start: 2021-06-10 | End: 2021-07-07

## 2021-06-10 NOTE — TELEPHONE ENCOUNTER
You typically do not refill this prescription and this was sent to us by the [facility]. Verifying with you that you want to start refilling these medications. As seen in the message, patient states they have less than a 3day supply.

## 2021-06-10 NOTE — PROGRESS NOTES
"Anticoagulation Clinic Progress Note  Anticoagulation Summary  As of 6/10/2021    INR goal:  2.0-3.0   TTR:  24.0 % (3.7 wk)   INR used for dosin.40 (6/10/2021)   Warfarin maintenance plan:  0.5 mg every Sat; 1 mg all other days   Weekly warfarin total:  6.5 mg   Plan last modified:  Grzegorz Ellis RPH (2021)   Next INR check:  2021   Target end date:      Indications    Atrial fibrillation with RVR (CMS/[facility]) [I48.91]  Coronary artery disease of native artery of native heart with stable angina pectoris (CMS/[facility]) [I25.118]             Anticoagulation Episode Summary     INR check location:      Preferred lab:      Send INR reminders to:  [facility]    Comments:        Anticoagulation Care Providers     Provider Role Specialty Phone number    Cathie Felipe APRN Referring Cardiology [phone]        Clinic Interview:  Patient Findings     Negatives:  Signs/symptoms of thrombosis, Signs/symptoms of bleeding,   Laboratory test error suspected, Change in health, Change in alcohol use,   Change in activity, Upcoming invasive procedure, Emergency department   visit, Upcoming dental procedure, Missed doses, Extra doses, Change in   medications, Change in diet/appetite, Hospital admission, Bruising, Other   complaints    Comments:  Info per TERESA Barron with [employer] ([phone]).  Denies any   bleeding or missed doses.  Patient reportedly feels much better than she   did prior to hospitalization.  \"Good\" appetite with heart healthy diet and   increased fruits and vegetables.        Clinical Outcomes     Negatives:  Major bleeding event, Thromboembolic event,   Anticoagulation-related hospital admission, Anticoagulation-related ED   visit, Anticoagulation-related fatality    Comments:  Info per TERESA Barron with [employer] ([phone]).  Denies any   recent bleeding or missed doses.  Patient reportedly feels much better than she   did prior to hospitalization.  \"Good\" appetite with heart healthy " diet and   increased fruits and vegetables.       INR History:  Anticoagulation Monitoring 6/2/2021 6/8/2021 6/10/2021   INR 1.50 1.20 1.40   INR Date 6/2/2021 6/7/2021 6/10/2021   INR Goal 2.0-3.0 2.0-3.0 2.0-3.0   Trend Same Same Same   Last Week Total 3.5 mg 6.5 mg 7.5 mg   Next Week Total 6.5 mg 7.5 mg 7.5 mg   Sun 1 mg - 1 mg   Mon - - -   Tue - 1.5 mg (6/8) -   Wed 1 mg 1.5 mg (6/9) -   Thu 1 mg - 1.5 mg (6/10)   Fri 1 mg - 1 mg   Sat 0.5 mg - 1 mg (6/12)   Visit Report - - -   Some recent data might be hidden     Plan:  1. INR is Subtherapeutic today (increased some from recent last visit, had a few days of held doses around hospitalization per clinic records)- see above in Anticoagulation Summary.   Will instruct Rachael South via TERESA Barron to Change their warfarin regimen- see above in Anticoagulation Summary.  Plan for 1.5mg today, 1mg Friday, 1mg Saturday, and 1mg again on Sunday.  2. Follow up with INR on Monday 6/14.    3. They have been instructed to call if any changes in medications, doses, concerns, etc. Patient expresses understanding and has no further questions at this time.    Kory Rosas, Helena[employer]

## 2021-06-10 NOTE — TELEPHONE ENCOUNTER
Caller: Rachael South    Relationship: Self    Best call back number: [phone]     Medication needed:   Requested Prescriptions     Pending Prescriptions Disp Refills   • warfarin (COUMADIN) 1 MG tablet 30 tablet 1     Sig: Take 1/2 tablet (0.5mg) on Saturday AND take one tablet (1mg) all other days or as directed by the [facility].  Indications: Atrial Fibrillation   • amiodarone (PACERONE) 200 MG tablet 30 tablet 1     Sig: Take 0.5 tablets by mouth Daily.       When do you need the refill by: ASAP    What additional details did the patient provide when requesting the medication:     Does the patient have less than a 3 day supply:  [x] Yes  [] No    What is the patient's preferred pharmacy: [id number]  [place] - [address] AT [facility] - [phone] [facility] [phone] FX

## 2021-06-11 ENCOUNTER — OFFICE VISIT (OUTPATIENT)
Dept: PULMONOLOGY | Facility: CLINIC | Age: 71
End: 2021-06-11

## 2021-06-11 VITALS
SYSTOLIC BLOOD PRESSURE: 101 MMHG | OXYGEN SATURATION: 97 % | TEMPERATURE: 97.2 F | DIASTOLIC BLOOD PRESSURE: 83 MMHG | HEIGHT: 62 IN | BODY MASS INDEX: 23.74 KG/M2 | HEART RATE: 75 BPM | RESPIRATION RATE: 17 BRPM | WEIGHT: 129 LBS

## 2021-06-11 DIAGNOSIS — J90 PLEURAL EFFUSION: ICD-10-CM

## 2021-06-11 DIAGNOSIS — Z87.891 HISTORY OF NICOTINE DEPENDENCE: ICD-10-CM

## 2021-06-11 DIAGNOSIS — I48.91 ATRIAL FIBRILLATION WITH RVR (HCC): Primary | ICD-10-CM

## 2021-06-11 DIAGNOSIS — Z12.2 ENCOUNTER FOR SCREENING FOR LUNG CANCER: ICD-10-CM

## 2021-06-11 DIAGNOSIS — J44.9 CHRONIC OBSTRUCTIVE PULMONARY DISEASE, UNSPECIFIED COPD TYPE (HCC): ICD-10-CM

## 2021-06-11 DIAGNOSIS — I25.10 CORONARY ARTERY DISEASE INVOLVING NATIVE CORONARY ARTERY OF NATIVE HEART, ANGINA PRESENCE UNSPECIFIED: ICD-10-CM

## 2021-06-11 DIAGNOSIS — I50.22 CHRONIC SYSTOLIC CONGESTIVE HEART FAILURE (HCC): ICD-10-CM

## 2021-06-11 DIAGNOSIS — R06.09 DYSPNEA ON EXERTION: ICD-10-CM

## 2021-06-11 DIAGNOSIS — J30.9 ALLERGIC RHINITIS, UNSPECIFIED SEASONALITY, UNSPECIFIED TRIGGER: ICD-10-CM

## 2021-06-11 DIAGNOSIS — R09.81 NASAL CONGESTION: ICD-10-CM

## 2021-06-11 PROCEDURE — 99214 OFFICE O/P EST MOD 30 MIN: CPT | Performed by: NURSE PRACTITIONER

## 2021-06-11 NOTE — PROGRESS NOTES
Primary Care Provider  Candida Ryder APRN     Referring Provider  No ref. provider found     Chief Complaint  COPD    Subjective          Rachael South presents to [facility]  History of Present Illness  Rachael South is a 71 y.o. female patient recently admitted to the hospital from 5/26/2021 to 5/28/2021.  Her admitting diagnosis was bilateral pleural effusions.  She was seen by Dr. Ludwig at this time.    Patient has a history of chronic systolic heart failure with reduced ejection fraction, chronic hypoxic respiratory failure, coronary artery disease with recent CABG at [facility], hypertension, and COPD.  During her hospital stay she did have bilateral thoracentesis performed by Dr. Ludwig.  He removed 1300 mL of dark hemorrhagic fluid from the right lung and 1600 mL of dark red hemorrhagic fluid from the left lung.  Patient states that since his procedure her shortness of breath has improved greatly.  She did recently see her primary care doctor last week and was ordered a repeat chest x-ray.  This did show bilateral small pleural effusions consistent with the chest x-ray performed before her discharge.  She does have a cough but it is mostly dry.  Occasionally she will have some postnasal drip and nasal congestion as well but does not take allergy medication at this time.  She also has a an albuterol inhaler that she can use as needed, but she states that she rarely needs to use this.  She also refuses vaccines at this time.  Patient is a former smoker of 50 years, she smoked about a pack a day and quit in March 2021.  Patient recently had a CT scan at [facility] in [place] in March 2021.  We will enroll patient to begin her low-dose lung cancer screenings in March 2022.  Patient is able to form her all her ADLs without difficulty.  Patient has no complaints at this time.      Her history of smoking is    Tobacco Use: Medium  Risk   • Smoking Tobacco Use: Former Smoker   • Smokeless Tobacco Use: Never Used   .    Review of Systems   Constitutional: Negative for chills, fatigue, fever, unexpected weight gain and unexpected weight loss.   HENT: Negative for congestion (Nasal), hearing loss, mouth sores, nosebleeds, postnasal drip, sore throat and trouble swallowing.    Eyes: Negative for blurred vision and visual disturbance.   Respiratory: Negative for apnea, cough, shortness of breath and wheezing.         Negative for Hemoptysis     Cardiovascular: Negative for chest pain, palpitations and leg swelling.   Gastrointestinal: Negative for abdominal pain, constipation, diarrhea, nausea, vomiting and GERD.        Negative for Jaundice  Negative for Bloating  Negative for Melena   Musculoskeletal: Negative for joint swelling and myalgias.        Negative for Joint pain  Negative for Joint stiffness   Skin: Negative for color change.        Negative for cyanosis   Neurological: Negative for syncope, weakness, numbness and headache.      Sleep: Negative for Excessive daytime sleep  Negative for for morning headaches  Negative for Snoring    Family History   Problem Relation Age of Onset   • Heart failure Mother    • Heart disease Brother         blue baby   • Diabetes type II Grandchild    • Heart attack Father         Social History     Socioeconomic History   • Marital status:      Spouse name: Not on file   • Number of children: Not on file   • Years of education: Not on file   • Highest education level: Not on file   Tobacco Use   • Smoking status: Former Smoker     Packs/day: 0.50     Years: 45.00     Pack years: 22.50     Types: Cigarettes     Quit date: 3/9/2021     Years since quittin.2   • Smokeless tobacco: Never Used   • Tobacco comment: caffeine use - 1/2 cup coffee daily    Vaping Use   • Vaping Use: Never used   Substance and Sexual Activity   • Alcohol use: Never   • Drug use: Never   • Sexual activity: Defer         Past Medical History:   Diagnosis Date   • CHF (congestive heart failure) (CMS/[facility])    • COPD (chronic obstructive pulmonary disease) (CMS/[facility])    • Hyperlipidemia    • Mitral regurgitation    • PONV (postoperative nausea and vomiting)           There is no immunization history on file for this patient.      No Known Allergies       Current Outpatient Medications:   •  albuterol sulfate  (90 Base) MCG/ACT inhaler, Inhale 2 puffs Every 4 (Four) Hours As Needed for Wheezing., Disp: , Rfl:   •  aspirin 81 MG EC tablet, Take 1 tablet by mouth Daily., Disp: 30 tablet, Rfl: 0  •  atorvastatin (LIPITOR) 40 MG tablet, Take 1 tablet by mouth Every Night., Disp: 90 tablet, Rfl: 2  •  ferrous sulfate 325 (65 FE) MG EC tablet, Take 1 tablet by mouth Daily With Breakfast., Disp: 30 tablet, Rfl: 0  •  furosemide (LASIX) 40 MG tablet, Take 1 tablet by mouth Daily., Disp: 30 tablet, Rfl: 3  •  lisinopril (PRINIVIL,ZESTRIL) 2.5 MG tablet, Take 1 tablet by mouth Daily., Disp: 30 tablet, Rfl: 5  •  metoprolol succinate XL (TOPROL-XL) 50 MG 24 hr tablet, Take 1 tablet by mouth Every Night., Disp: 30 tablet, Rfl: 11  •  potassium chloride (KLOR-CON) 8 MEQ CR tablet, , Disp: , Rfl:   •  warfarin (COUMADIN) 1 MG tablet, Take 1/2 tablet (0.5mg) on Saturday AND take one tablet (1mg) all other days or as directed by the [facility].  Indications: Atrial Fibrillation, Disp: 30 tablet, Rfl: 1  •  amiodarone (PACERONE) 200 MG tablet, Take 0.5 tablets by mouth Daily., Disp: 30 tablet, Rfl: 1     Objective   Physical Exam  Constitutional:       General: She is not in acute distress.     Appearance: Normal appearance. She is normal weight.   HENT:      Right Ear: Hearing normal.      Left Ear: Hearing normal.      Nose: No nasal tenderness or congestion.      Mouth/Throat:      Mouth: Mucous membranes are moist. No oral lesions.   Eyes:      Extraocular Movements: Extraocular movements intact.      Pupils: Pupils are equal,  "round, and reactive to light.   Neck:      Thyroid: No thyroid mass or thyromegaly.   Cardiovascular:      Rate and Rhythm: Normal rate and regular rhythm.      Pulses: Normal pulses.      Heart sounds: Normal heart sounds. No murmur heard.     Pulmonary:      Effort: Pulmonary effort is normal.      Breath sounds: Normal breath sounds. No wheezing, rhonchi or rales.      Comments: Patient able to speak full sentences without difficulty  Abdominal:      General: Bowel sounds are normal. There is no distension.      Palpations: Abdomen is soft.      Tenderness: There is no abdominal tenderness.   Musculoskeletal:      Cervical back: Neck supple.      Right lower leg: No edema.      Left lower leg: No edema.   Lymphadenopathy:      Cervical: No cervical adenopathy.      Upper Body:      Right upper body: No axillary adenopathy.   Skin:     General: Skin is warm and dry.      Findings: No lesion or rash.   Neurological:      General: No focal deficit present.      Mental Status: She is alert and oriented to person, place, and time.      Cranial Nerves: Cranial nerves are intact.   Psychiatric:         Mood and Affect: Affect normal. Mood is not anxious or depressed.         Vital Signs:   /83 (BP Location: Left arm, Patient Position: Sitting)   Pulse 75   Temp 97.2 °F (36.2 °C) (Temporal)   Resp 17   Ht 157.5 cm (62\")   Wt 58.5 kg (129 lb)   SpO2 97%   BMI 23.59 kg/m²        Result Review :     CBC w/diff    CBC w/Diff 5/26/21 5/26/21 5/27/21 5/28/21    1739 1757     WBC 11.37 (A)  10.49 10.92 (A)   RBC 4.62  4.40 4.19 (A)   Hemoglobin 13.0 13.6 12.2 11.5 (A)   Hematocrit 39.5  37.8 36.3 (A)   MCV 85.5  85.9 86.6   MCH 28.1  27.7 27.4   MCHC 32.9 (A)  32.3 (A) 31.7 (A)   RDW 16.2 (A)  16.3 (A) 16.6 (A)   Platelets 407 (A)  387 374   Neutrophil Rel % 71.1  67.6 64.4   Lymphocyte Rel % 16.9 (A)  19.8 (A) 20.8   Monocyte Rel % 9.4  9.2 10.3 (A)   Eosinophil Rel % 1.6  2.4 3.4   Basophil Rel % 0.6  0.6 0.7 "   (A) Abnormal value            BMP    BMP 5/26/21 5/27/21 5/28/21   Glucose 124 (A) 107 (A)    BUN 16 17    Creatinine 0.83 0.68    Sodium 140 137    Potassium 3.2 (A) 3.5    Chloride 96 (A) 95 (A)    CO2 33 (A) 34 (A)    Calcium 9.5 8.9 9.7 (C)   (A) Abnormal value (C) Corrected value       Comments are available for some flowsheets but are not being displayed.           Data reviewed: Radiologic studies Checks x-ray from 6/4/2021, chest CT from 3/19/2021 and Recent hospitalization notes discharge summary   Procedures        Assessment and Plan    Diagnoses and all orders for this visit:    1. Atrial fibrillation with RVR (CMS/HCC) (Primary)    2. Coronary artery disease involving native coronary artery of native heart, angina presence unspecified    3. Chronic systolic congestive heart failure (CMS/HCC)    4. Chronic obstructive pulmonary disease, unspecified COPD type (CMS/[facility])  -     Full Pulmonary Function Test With Bronchodilator; Future  -     Alpha - 1 - Antitrypsin Phenotype; Future    5. Pleural effusion    6. Dyspnea on exertion  -     Full Pulmonary Function Test With Bronchodilator; Future    7. Allergic rhinitis, unspecified seasonality, unspecified trigger    8. Nasal congestion    9. Encounter for screening for lung cancer    10. History of nicotine dependence  -     CT chest low dose wo; Future    11.  Instructed patient that if she needs to use her albuterol inhaler 2-3 times a week she needs to let her office know.  She may need to be on a maintenance inhaler at that time.  12.  Patient states that she recently had a 6-minute walk done in her doctor's office.  Her oxygen saturations did not drop at that time.  13.  Congratulated patient on her smoking cessation and encouraged her not to start back smoking.  14.  Follow-up with cardiology as scheduled  15.  Instructed patient to call the office, 911, or go to the emergency room with any new or worsening symptoms      Follow Up   Return in about 3  months (around 2021) for Next scheduled follow up.  Patient was given instructions and counseling regarding her condition or for health maintenance advice. Please see specific information pulled into the AVS if appropriate.        Low-Dose Lung Cancer CT Screening Visit    CHIEF COMPLAINT:    Shared Decision Making  I am discussing tobacco cessation today with Rachael South    SMOKING HISTORY:     Social History     Tobacco Use   Smoking Status Former Smoker   • Packs/day: 0.50   • Years: 45.00   • Pack years: 22.50   • Types: Cigarettes   • Quit date: 3/9/2021   • Years since quittin.2   Smokeless Tobacco Never Used   Tobacco Comment    caffeine use - 1/2 cup coffee daily        SUBJECTIVE:     Rachael South is a former smoker quitting 3 months ago with a 50 pack year history.  she reports no use of alternate forms of tobacco, electronic cigarettes, marijuana or other substances.  Based on the recommendation of the [place] Preventive Services Task Force, this patient is at high risk for lung cancer and a low-dose CT screening scan is recommended.     The patient has had no hemoptysis, unintentional weight loss or increasing shortness of breath. The patient is asymptomatic and has no signs or symptoms of lung cancer.     Together we discussed the potential benefits and potential harms of being screened for lung cancer including the potential for follow up diagnostic testing, risk for over diagnosis, false positive rate and radiation exposure using the AHRQ: Is Lung Cancer Screening Right for Me Decision Aid (Publication 43-VMC838-70-A, web site www.ahrq.gov). A copy of this decision aid resource has been provided in the after visit summary.  We also reviewed the patient's smoking history and counseled her on the importance and health benefits of maintaining cigarette smoking abstinence.      OBJECTIVE:    /83 (BP Location: Left arm, Patient Position: Sitting)   Pulse 75   Temp 97.2 °F  "(36.2 °C) (Temporal)   Resp 17   Ht 157.5 cm (62\")   Wt 58.5 kg (129 lb)   SpO2 97%   BMI 23.59 kg/m²   General: no distress, alert and oriented  Chest: Lung sounds are clear to auscultation, no wheezes, rales or rhonchi, with symmetric air entry. No respiratory distress  Cardiovascular: RRR with no murmur auscultated      Continued Smoking Abstinence discussion:     We discussed that there are a number of resources and interventions to assist with smoking cessation if needed in the future including the 1-800-Quit Now line.(Included in the decision aid shared with the patient today).   On a scale of zero to ten, the patient rates their motivation to stay quit at a 10 out of 10 today.  The patient is confident that they will never smoke in the future.    Recommendations for continued lung cancer screening:      We discussed the NCCN guidelines for lung cancer screening and the patient verbalized understanding that annual screening is recommended until fifteen years beyond smoking as long as they have no other disease or comorbidity that would prevent them from receiving cancer treatments such as surgery should a lung cancer be detected.  After review of the NCCN guidelines and recommendations for ongoing screening, the patient verbalized understanding of recommendations for follow-up.  The patient has decided to proceed with a Low Dose Lung Cancer Screening CT today.       5 minutes face-to-face spent counseling patient on the continued health benefits of having quit tobacco, maintaining smoking abstinence, smoking cessation strategies and resources, as well as the importance of adherence to annual lung cancer low-dose CT screening.     "

## 2021-06-11 NOTE — PATIENT INSTRUCTIONS
Please review the decision aid used during our discussion regarding the Low dose lung cancer screening visit today.                          COPD and Physical Activity  Chronic obstructive pulmonary disease (COPD) is a long-term (chronic) condition that affects the lungs. COPD is a general term that can be used to describe many different lung problems that cause lung swelling (inflammation) and limit airflow, including chronic bronchitis and emphysema.  The main symptom of COPD is shortness of breath, which makes it harder to do even simple tasks. This can also make it harder to exercise and be active. Talk with your health care provider about treatments to help you breathe better and actions you can take to prevent breathing problems during physical activity.  What are the benefits of exercising with COPD?  Exercising regularly is an important part of a healthy lifestyle. You can still exercise and do physical activities even though you have COPD. Exercise and physical activity improve your shortness of breath by increasing blood flow (circulation). This causes your heart to pump more oxygen through your body. Moderate exercise can improve your:  · Oxygen use.  · Energy level.  · Shortness of breath.  · Strength in your breathing muscles.  · Heart health.  · Sleep.  · Self-esteem and feelings of self-worth.  · Depression, stress, and anxiety levels.  Exercise can benefit everyone with COPD. The severity of your disease may affect how hard you can exercise, especially at first, but everyone can benefit. Talk with your health care provider about how much exercise is safe for you, and which activities and exercises are safe for you.  What actions can I take to prevent breathing problems during physical activity?  · Sign up for a pulmonary rehabilitation program. This type of program may include:  ? Education about lung diseases.  ? Exercise classes that teach you how to exercise and be more active while improving  your breathing. This usually involves:  § Exercise using your lower extremities, such as a stationary bicycle.  § About 30 minutes of exercise, 2 to 5 times per week, for 6 to 12 weeks  § Strength training, such as push ups or leg lifts.  ? Nutrition education.  ? Group classes in which you can talk with others who also have COPD and learn ways to manage stress.  · If you use an oxygen tank, you should use it while you exercise. Work with your health care provider to adjust your oxygen for your physical activity. Your resting flow rate is different from your flow rate during physical activity.  · While you are exercising:  ? Take slow breaths.  ? Pace yourself and do not try to go too fast.  ? Purse your lips while breathing out. Pursing your lips is similar to a kissing or whistling position.  ? If doing exercise that uses a quick burst of effort, such as weight lifting:  § Breathe in before starting the exercise.  § Breathe out during the hardest part of the exercise (such as raising the weights).  Where to find support  You can find support for exercising with COPD from:  · Your health care provider.  · A pulmonary rehabilitation program.  · Your local health department or community health programs.  · Support groups, online or in-person. Your health care provider may be able to recommend support groups.  Where to find more information  You can find more information about exercising with COPD from:  · American Lung Association: lung.org.  · COPD Foundation: copdfoundation.org.  Contact a health care provider if:  · Your symptoms get worse.  · You have chest pain.  · You have nausea.  · You have a fever.  · You have trouble talking or catching your breath.  · You want to start a new exercise program or a new activity.  Summary  · COPD is a general term that can be used to describe many different lung problems that cause lung swelling (inflammation) and limit airflow. This includes chronic bronchitis and  emphysema.  · Exercise and physical activity improve your shortness of breath by increasing blood flow (circulation). This causes your heart to provide more oxygen to your body.  · Contact your health care provider before starting any exercise program or new activity. Ask your health care provider what exercises and activities are safe for you.  This information is not intended to replace advice given to you by your health care provider. Make sure you discuss any questions you have with your health care provider.  Document Revised: 04/08/2020 Document Reviewed: 01/10/2019  Elsevier Patient Education © 2021 [employer] Inc.

## 2021-06-14 ENCOUNTER — ANTICOAGULATION VISIT (OUTPATIENT)
Dept: PHARMACY | Facility: HOSPITAL | Age: 71
End: 2021-06-14

## 2021-06-14 DIAGNOSIS — I25.118 CORONARY ARTERY DISEASE OF NATIVE ARTERY OF NATIVE HEART WITH STABLE ANGINA PECTORIS (HCC): ICD-10-CM

## 2021-06-14 DIAGNOSIS — I48.91 ATRIAL FIBRILLATION WITH RVR (HCC): Primary | ICD-10-CM

## 2021-06-14 LAB — INR PPP: 1.2

## 2021-06-14 NOTE — PROGRESS NOTES
Anticoagulation Clinic Progress Note    Anticoagulation Summary  As of 2021    INR goal:  2.0-3.0   TTR:  20.8 % (1 mo)   INR used for dosin.20 (2021)   Warfarin maintenance plan:  1.5 mg every day   Weekly warfarin total:  10.5 mg   Plan last modified:  Grzegorz Ellis RPH (2021)   Next INR check:  2021   Target end date:      Indications    Atrial fibrillation with RVR (CMS/[facility]) [I48.91]  Coronary artery disease of native artery of native heart with stable angina pectoris (CMS/[facility]) [I25.118]             Anticoagulation Episode Summary     INR check location:      Preferred lab:      Send INR reminders to:   EFREM [facility]ERRERA [facility]    Comments:        Anticoagulation Care Providers     Provider Role Specialty Phone number    Cathie Felipe APRN Referring Cardiology [phone]          INR History:  Anticoagulation Monitoring 2021 6/10/2021 2021   INR 1.20 1.40 1.20   INR Date 2021 6/10/2021 2021   INR Goal 2.0-3.0 2.0-3.0 2.0-3.0   Trend Same Same Up   Last Week Total 6.5 mg 7.5 mg 8.5 mg   Next Week Total 7.5 mg 7.5 mg 11 mg   Sun - 1 mg -   Mon - - 2 mg ()   Tue 1.5 mg () - 1.5 mg   Wed 1.5 mg () - 1.5 mg   Thu - 1.5 mg (6/10) 1.5 mg   Fri - 1 mg -   Sat - 1 mg () -   Visit Report - - -   Some recent data might be hidden       Plan:  1. INR is Subtherapeutic today- see above in Anticoagulation Summary.   Provided instructions to Beatrice with [facility] to Increase their warfarin regimen- see above in Anticoagulation Summary.  2. Follow up in 4 days      Grzegorz Ellis RPH

## 2021-06-17 ENCOUNTER — OFFICE VISIT (OUTPATIENT)
Dept: CARDIOLOGY | Facility: CLINIC | Age: 71
End: 2021-06-17

## 2021-06-17 VITALS
DIASTOLIC BLOOD PRESSURE: 78 MMHG | BODY MASS INDEX: 24.29 KG/M2 | HEART RATE: 77 BPM | WEIGHT: 132 LBS | HEIGHT: 62 IN | SYSTOLIC BLOOD PRESSURE: 118 MMHG

## 2021-06-17 DIAGNOSIS — I25.10 CORONARY ARTERY DISEASE INVOLVING NATIVE CORONARY ARTERY OF NATIVE HEART WITHOUT ANGINA PECTORIS: Primary | ICD-10-CM

## 2021-06-17 PROCEDURE — 93000 ELECTROCARDIOGRAM COMPLETE: CPT | Performed by: INTERNAL MEDICINE

## 2021-06-17 PROCEDURE — 99214 OFFICE O/P EST MOD 30 MIN: CPT | Performed by: INTERNAL MEDICINE

## 2021-06-17 NOTE — PROGRESS NOTES
[facility]      Patient Name: Rachael South  :1950  Age: 71 y.o.  Encounter Provider:  Gabe Nelson Jr, MD      Chief Complaint:   Chief Complaint   Patient presents with   • Atrial Fibrillation         HPI  Rachael South is a 71 y.o. female patient with multivessel coronary artery disease status post CABG, chronic systolic heart failure, paroxysmal atrial fibrillation, hypertension dyslipidemia who presents for initial clinic evaluation with me.  I saw her in the hospital in March due to heart failure symptoms and she was found to have a new drop in ejection fraction to 30 to 35%.  We had a long discussion about ischemic work-up and she refused at that time.  She was found to be in atrial fibrillation with RVR and started on anticoagulation.  She left the hospital and came back to clinic where she was having worsening pulmonary symptoms.  She saw KENNETH Oliver who admitted her to the hospital and a cardiac catheterization was performed next day which showed multivessel coronary artery disease.  She had severe MR secondary to dilated ventricle and was seen in consultation by Dr. Gregg.  She was taken to the OR for three-vessel bypass with bioprosthetic MVR.  She had atrial fibrillation postop and recurrent pleural effusion but was discharged with appropriate follow-up.  She was readmitted to [facility] in late May with heart failure and required thoracentesis.  She is done extremely well since then.  She continues to recover from surgery without chest pain, shortness of air or palpitations.  No dizziness syncope.  No orthopnea, PND or edema.  She had an echocardiogram at [place] where her EF was noted to be persistently depressed however normal bioprosthetic mitral valve function.  She quit smoking in March and remains abstinent.  Family history is reviewed is not pertinent to this clinic visit.      The following portions of the patient's history were reviewed and  "updated as appropriate: allergies, current medications, past family history, past medical history, past social history, past surgical history and problem list.      Review of Systems   Constitutional: Negative for chills and fever.   HENT: Negative for hoarse voice and sore throat.    Eyes: Negative for double vision and photophobia.   Cardiovascular: Positive for dyspnea on exertion. Negative for chest pain, leg swelling, near-syncope, orthopnea, palpitations, paroxysmal nocturnal dyspnea and syncope.   Respiratory: Negative for cough and wheezing.    Skin: Negative for poor wound healing and rash.   Musculoskeletal: Negative for arthritis and joint swelling.   Gastrointestinal: Negative for bloating, abdominal pain, hematemesis and hematochezia.   Neurological: Negative for dizziness and focal weakness.   Psychiatric/Behavioral: Negative for depression and suicidal ideas.       OBJECTIVE:   Vital Signs  Vitals:    06/17/21 1444   BP: 118/78   Pulse: 77     Estimated body mass index is 24.14 kg/m² as calculated from the following:    Height as of this encounter: 157.5 cm (62\").    Weight as of this encounter: 59.9 kg (132 lb).    Vitals reviewed.   Constitutional:       Appearance: Healthy appearance. Not in distress.   Neck:      Vascular: No JVR. JVD normal.   Pulmonary:      Effort: Pulmonary effort is normal.      Breath sounds: Normal breath sounds. No wheezing. No rhonchi. No rales.   Chest:      Chest wall: Not tender to palpatation.      Comments: Sternotomy incision is well-healed with no evidence of drainage or erythema  Cardiovascular:      PMI at left midclavicular line. Normal rate. Regular rhythm. Normal S1. Normal S2.      Murmurs: There is no murmur.      No gallop. No click. No rub.   Pulses:     Intact distal pulses.   Edema:     Peripheral edema absent.   Abdominal:      General: Bowel sounds are normal.      Palpations: Abdomen is soft.      Tenderness: There is no abdominal tenderness.   "   Musculoskeletal: Normal range of motion.         General: No tenderness. Skin:     General: Skin is warm and dry.      Comments: Lower extremity incisions for vein harvest are well-healed with no evidence of discharge or erythema.   Neurological:      General: No focal deficit present.      Mental Status: Alert and oriented to person, place and time.           ECG 12 Lead    Date/Time: 6/17/2021 3:50 PM  Performed by: Gabe Nelson Jr., MD  Authorized by: Gabe Nelson Jr., MD   Comparison: compared with previous ECG from 5/12/2021  Similar to previous ECG  Rhythm: sinus rhythm  Ectopy: unifocal PVCs  Other findings: non-specific ST-T wave changes and left atrial abnormality    Clinical impression: abnormal EKG                  ASSESSMENT:     Coronary artery disease  Mitral regurgitation status post MVR  Paroxysmal atrial fibrillation  History of tobacco abuse  Dyslipidemia  Chronic systolic heart failure    PLAN OF CARE:     1. Coronary artery disease -status post CABG April 2021.  Patient had reaccumulation of pleural effusion status post thoracentesis feeling well since then.  Continue aspirin beta-blocker and statin.  Will need cardiac rehab.  Defer to Dr. Canales for evaluation in [place].  2. Chronic systolic heart failure -currently on low-dose lisinopril and long-acting beta-blocker.  Euvolemic today in clinic.  Will need repeat echocardiogram in 3 months to evaluate recovery of function.  3. Mitral regurgitation -status post MVR with normal prosthetic valve gradients on recent echocardiogram at [facility].  4. Paroxysmal atrial fibrillation -currently on amiodarone which hopefully can be stopped in 3 months.  Continue Coumadin, patient would like consideration for NOAC.  5. Dyslipidemia -repeat lipid profile in 8 to 12 weeks.  6. History of tobacco abuse -remains abstinent since March 2021    Patient cannot get transportation back to [place] on a regular basis and would like to  follow-up with cardiology group in [place].  I will see the patient as needed or sooner if symptoms change and she cannot arrange follow-up in [place].           Discharge Medications          Accurate as of June 17, 2021  3:43 PM. If you have any questions, ask your nurse or doctor.            Continue These Medications      Instructions Start Date   albuterol sulfate  (90 Base) MCG/ACT inhaler  Commonly known as: PROVENTIL HFA;VENTOLIN HFA;PROAIR HFA   2 puffs, Inhalation, Every 4 Hours PRN      amiodarone 200 MG tablet  Commonly known as: PACERONE   100 mg, Oral, Daily      Aspirin Low Dose 81 MG EC tablet  Generic drug: aspirin   81 mg, Oral, Daily      atorvastatin 40 MG tablet  Commonly known as: LIPITOR   40 mg, Oral, Nightly      ferrous sulfate 325 (65 FE) MG EC tablet   325 mg, Oral, Daily With Breakfast      furosemide 40 MG tablet  Commonly known as: LASIX   40 mg, Oral, Daily      lisinopril 2.5 MG tablet  Commonly known as: PRINIVIL,ZESTRIL   2.5 mg, Oral, Every 24 Hours Scheduled      metoprolol succinate XL 50 MG 24 hr tablet  Commonly known as: TOPROL-XL   50 mg, Oral, Nightly      potassium chloride 8 MEQ CR tablet  Commonly known as: KLOR-CON   No dose, route, or frequency recorded.      TYLENOL PM EXTRA STRENGTH PO   Oral, As Needed      warfarin 1 MG tablet  Commonly known as: COUMADIN   Take 1/2 tablet (0.5mg) on Saturday AND take one tablet (1mg) all other days or as directed by the [facility].             Thank you for allowing me to participate in the care of your patient,      Sincerely,   Gabe Nelson Jr, MD  [facility]  06/17/21  15:43 EDT

## 2021-06-18 ENCOUNTER — ANTICOAGULATION VISIT (OUTPATIENT)
Dept: PHARMACY | Facility: HOSPITAL | Age: 71
End: 2021-06-18

## 2021-06-18 DIAGNOSIS — I48.91 ATRIAL FIBRILLATION WITH RVR (HCC): Primary | ICD-10-CM

## 2021-06-18 DIAGNOSIS — I25.118 CORONARY ARTERY DISEASE OF NATIVE ARTERY OF NATIVE HEART WITH STABLE ANGINA PECTORIS (HCC): ICD-10-CM

## 2021-06-18 LAB — INR PPP: 1.3

## 2021-06-18 NOTE — PROGRESS NOTES
Anticoagulation Clinic Progress Note    Anticoagulation Summary  As of 2021    INR goal:  2.0-3.0   TTR:  18.4 % (1.1 mo)   INR used for dosin.30 (2021)   Warfarin maintenance plan:  2 mg every day   Weekly warfarin total:  14 mg   Plan last modified:  Grzegorz Ellis RPH (2021)   Next INR check:  2021   Target end date:      Indications    Atrial fibrillation with RVR (CMS/[facility]) [I48.91]  Coronary artery disease of native artery of native heart with stable angina pectoris (CMS/[facility]) [I25.118]             Anticoagulation Episode Summary     INR check location:      Preferred lab:      [facility]   [facility]    Comments:        Anticoagulation Care Providers     Provider Role Specialty Phone number    Destinee KENNETH Brand Referring Cardiology [phone]          INR History:  Anticoagulation Monitoring 6/10/2021 2021 2021   INR 1.40 1.20 1.30   INR Date 6/10/2021 2021 2021   INR Goal 2.0-3.0 2.0-3.0 2.0-3.0   Trend Same Up Up   Last Week Total 7.5 mg 8.5 mg 9.5 mg   Next Week Total 7.5 mg 11 mg 16 mg   Sun 1 mg - 2 mg   Mon - 2 mg () -   Tue - 1.5 mg -   Wed - 1.5 mg -   Thu 1.5 mg (6/10) 1.5 mg -   Fri 1 mg - 4 mg ()   Sat 1 mg () - 2 mg   Visit Report - - -   Some recent data might be hidden       Plan:  1. INR is Subtherapeutic today- see above in Anticoagulation Summary.   Provided instructions to Beatrice with [facility] to Change their warfarin regimen- see above in Anticoagulation Summary.  2. Follow up in 3 days      Grzegorz Ellis RPH

## 2021-06-21 ENCOUNTER — PATIENT OUTREACH (OUTPATIENT)
Dept: CASE MANAGEMENT | Facility: OTHER | Age: 71
End: 2021-06-21

## 2021-06-21 ENCOUNTER — ANTICOAGULATION VISIT (OUTPATIENT)
Dept: PHARMACY | Facility: HOSPITAL | Age: 71
End: 2021-06-21

## 2021-06-21 DIAGNOSIS — I25.10 CORONARY ARTERY DISEASE INVOLVING NATIVE CORONARY ARTERY OF NATIVE HEART, ANGINA PRESENCE UNSPECIFIED: ICD-10-CM

## 2021-06-21 DIAGNOSIS — I48.91 ATRIAL FIBRILLATION WITH RVR (HCC): Primary | ICD-10-CM

## 2021-06-21 DIAGNOSIS — I50.21 ACUTE SYSTOLIC CONGESTIVE HEART FAILURE DUE TO VALVULAR DISEASE (HCC): Primary | ICD-10-CM

## 2021-06-21 DIAGNOSIS — I25.118 CORONARY ARTERY DISEASE OF NATIVE ARTERY OF NATIVE HEART WITH STABLE ANGINA PECTORIS (HCC): ICD-10-CM

## 2021-06-21 DIAGNOSIS — I38 ACUTE SYSTOLIC CONGESTIVE HEART FAILURE DUE TO VALVULAR DISEASE (HCC): Primary | ICD-10-CM

## 2021-06-21 LAB — INR PPP: 1.8

## 2021-06-21 PROCEDURE — 99490 CHRNC CARE MGMT STAFF 1ST 20: CPT | Performed by: NURSE PRACTITIONER

## 2021-06-21 NOTE — OUTREACH NOTE
"Ambulatory Case Management Note    CCM Interim Update    Called patient today and she reports that she is doing good. Patient has recently seen cardiologist and she is feeling well since CABG.     Patient did report that she stopped smoking in March, following her hospitalizations with CABG, finding of mitral valve regurgitation, Heart failure and copd.     Cardiology has continued her ASA, beta blocker and statin therapy. Also recommending that patient see/do Cardiac rehab in order to build up exercise tolerance. Patient advised to follow up in 3 months for another EKG.     Patient is on Coumadin, and has been having her INR levels drawn by home health. Patient has had trouble with with nurse getting back with her as far as her regimen for the time between INR checks. Will find patient the number to the INR clinic so in case she does not hear from home health, she will have the number to the clinic that is managing her INR.  Patient is hopeful that with moving care to [facility], she may be able to come off of her coumadin and be able to take a medication that does not require monitoring.     Patient is currently following low sodium diet, and is choosing foods that is less than 7% of daily sodium allowance. Overall, patient and  has been trying to eat a healthier diet to avoid cardiac complications. Also mentioned that her  and her have been incorporating more fish into their diet, especially for dinners.     Patient was approved for home oxygen, but is only using it on a PRN basis. With recent pulmonary issues, and having fluid removed, patient is no longer requiring around the clock oxygen therapy.     Patient having her BP, pulse, and o2 sat checked with home health RN is in the home. Today's readings were BP: 131/85, pulse: 84 and 02 sat was 99%.     Patient did call and schedule an appt with provider, as home health nurse mentioned that right side of lung sounded \"diminished\". Patient and  " concerned that this could be a worsening of her recent lung issues.     Care Plan: Heart Failure   Updates made since 6/21/2021 12:00 AM      Problem: HEART FAILURE DIAGNOSIS KNOWLEDGE DEFICIT       Goal: Patient will verbalize understanding of health maintenance       Task: Educate patient on importance of treating and maintaining other conditions Completed 6/21/2021   Responsible User: Jayne Harry RN   Note:    Patient with recent diagnosis of Afib,  CAD, status post CABG, COPD; patient with multiple conditions that could complicate heart failure. Advised patient to maintain compliance with medication regimens.      Problem: FLUID RETENTION/OVERLOAD       Goal: Patient will be able to identify signs and symptoms of fluid retention       Task: Educate patient on daily weight tracking and signs of extremity edema Completed 6/21/2021   Responsible User: Jayne Harry RN      Task: Educate patient on fluid management & how too little or too much affects the heart Completed 6/21/2021   Responsible User: Jayne Harry RN      Task: Instruct patient to take weight every day and call provider for weight gain of 2 lbs overnight or 5 lbs in a week Completed 6/21/2021   Responsible User: Jayne Harry RN      Task: Instruct patient to do a daily self-check for symptoms of extra fluid (shortness of breath, weakness, trouble sleeping, body swelling, or dizziness) Completed 6/21/2021   Responsible User: Jayne Harry RN   Note:    Advised patient to call myself or the office in the event of feeling more short of breath that usual, especially with activity. Advised to keep regular follow ups with pcp, cardiology and pulmonology as well     Problem: SODIUM INTAKE       Goal: Patient will maintain management of sodium consumption       Task: Educate patient on daily sodium intake & how sodium affects the heart    Responsible User: Jayne Harry RN      Task: Instruct patient to eat less salt and watch fluids. No  added salt or no more than 2 grams (2000mgs) of sodium or 2 liters of fluid in a 24-hour period, or follow provider's specific recommendations. Completed 6/21/2021   Responsible User: Jayne Harry RN      Task: Educate patient how to read a nutrition label pointing out servings and serving size Completed 6/21/2021   Responsible User: Jayne Harry RN   Note:    Patient and her  look for foods that are less than 7% of dietary sodium, as recommended by the cardiologist. Both are following lower sodium diet     Task: Educate patient on sodium alternatives    Responsible User: Jayne Harry RN      Problem: HEART FAILURE MEDICATION ADHERENCE       Goal: Consistently take heart failure medication as prescribed       Task: Instruct patient to take all medications as prescribed to help reduce and control symptoms and to call provider for any side effects that prevent taking of medications Completed 6/21/2021   Responsible User: Jayne Harry RN      Task: Emphasize the importance of medication adherence Completed 6/21/2021   Responsible User: Jayne Harry RN      Task: Discuss barriers to medication adherence with patient    Responsible User: Jayne Harry RN      Task: Provide medication education on most common medications for heart failure Completed 6/21/2021   Responsible User: Jayne Harry RN      Task: Discuss ways to manage medications (organization & remembering). Assist patient in setting up daily reminders for meds.    Responsible User: Jayne Harry RN      Task: Educate on possible common side effects and importance of reporting them to care provider Completed 6/21/2021   Responsible User: Jayne Harry RN      Problem: EXERCISE REGIMEN       Goal: Patient will engage in physical activity safely       Task: Educate patient on benefits of exercise for heart failure Completed 6/21/2021   Responsible User: Jayne Harry RN   Note:    Patient advised to stay as active as possible  depending on severity of shortness of breath, exercise exertion, etc.      Task: Educate patient on maintaining safety during exercise activity Completed 6/21/2021   Responsible User: Jayne Harry RN      Task: Educate patient on sexual activity precautions and danger signs    Responsible User: Jayne Harry RN      Task: Educate regarding gradually increasing activity to 20 minutes a day    Responsible User: Jayne Harry RN      Task: Educate patient on maintaining safety during exercise activity; stop if shortness of breath, dizziness, or chest pain develops Completed 6/21/2021   Responsible User: Jayne Harry RN      Problem: HEART FAILURE MAINTENANCE       Goal: Patient will not experience any symptoms of shortness of breath or body swelling over the next 3 months       Task: Work with provider to create an action plan for monitoring and managing CHF    Responsible User: Jayne Harry RN      Task: Schedule regular provider visits for close monitoring of CHF. If patient has not seen PCP within the past 6 months, schedule a follow up appointment    Responsible User: Jayne Harry RN      Task: Educate patient to monitor symptoms and weight changes, restrict sodium intake, take medications as prescribed, and stay physically active Completed 6/21/2021   Responsible User: Jayne Harry RN      Task: Educate patient on smoking cessation Completed 6/21/2021   Responsible User: Jayne Harry RN   Note:    Patient recently quit smoking in March 2021     Problem: HEART FAILURE PROGRESSION AND CARE NEEDS       Goal: Patient will verbalize understanding of heart failure progression       Task: Educate patient on stages of heart failure    Responsible User: Jayne Harry RN      Task: Educate patient regarding symptoms with end stage heart failure    Responsible User: Jayne Harry RN      Task: Educate patient on potential treatments for each stage    Responsible User: Jayne Harry RN      Task:  Educate patient on trajectory of chronic illness    Responsible User: Jayne Harry RN      Task: Evaluate readiness for advanced care planning and Utilize ACP tools within Epic for discussion and documentation    Responsible User: Jayne Harry RN      Task: Educate on palliative care and hospice    Responsible User: Jayne Harry, TERESA Harry RN  Ambulatory Case Management    6/21/2021, 14:54 EDT

## 2021-06-21 NOTE — PROGRESS NOTES
Anticoagulation Clinic Progress Note    Anticoagulation Summary  As of 2021    INR goal:  2.0-3.0   TTR:  16.9 % (1.2 mo)   INR used for dosin.80 (2021)   Warfarin maintenance plan:  2 mg every day   Weekly warfarin total:  14 mg   Plan last modified:  Grzegorz Ellis RPH (2021)   Next INR check:  2021   Target end date:      Indications    Atrial fibrillation with RVR (CMS/[facility]) [I48.91]  Coronary artery disease of native artery of native heart with stable angina pectoris (CMS/[facility]) [I25.118]             Anticoagulation Episode Summary     INR check location:      Preferred lab:      [facility]   [facility]    Comments:        Anticoagulation Care Providers     Provider Role Specialty Phone number    Destinee KENNETH Brand Referring Cardiology [phone]          INR History:  Anticoagulation Monitoring 2021   INR 1.20 1.30 1.80   INR Date 2021   INR Goal 2.0-3.0 2.0-3.0 2.0-3.0   Trend Up Up Same   Last Week Total 8.5 mg 9.5 mg 14.5 mg   Next Week Total 11 mg 16 mg 15 mg   Sun - 2 mg -   Mon 2 mg () - 3 mg ()   Tue 1.5 mg - 2 mg   Wed 1.5 mg - 2 mg   Thu 1.5 mg - 2 mg   Fri - 4 mg () -   Sat - 2 mg -   Visit Report - - -   Some recent data might be hidden       Plan:  1. INR is Subtherapeutic today- see above in Anticoagulation Summary.   Provided instructions to Letha with [facility] to Change their warfarin regimen- see above in Anticoagulation Summary. Also relayed instructions directly to patient.   2. Follow up in 4 days      Grzegorz Ellis RPH

## 2021-06-23 ENCOUNTER — OFFICE VISIT (OUTPATIENT)
Dept: FAMILY MEDICINE CLINIC | Facility: CLINIC | Age: 71
End: 2021-06-23

## 2021-06-23 VITALS
OXYGEN SATURATION: 97 % | BODY MASS INDEX: 24.96 KG/M2 | WEIGHT: 132.2 LBS | DIASTOLIC BLOOD PRESSURE: 70 MMHG | SYSTOLIC BLOOD PRESSURE: 118 MMHG | TEMPERATURE: 97.3 F | HEIGHT: 61 IN | HEART RATE: 79 BPM

## 2021-06-23 DIAGNOSIS — R06.89 ABNORMAL BREATH SOUNDS: Primary | ICD-10-CM

## 2021-06-23 DIAGNOSIS — J90 PLEURAL EFFUSION, BILATERAL: ICD-10-CM

## 2021-06-23 PROCEDURE — 99213 OFFICE O/P EST LOW 20 MIN: CPT | Performed by: NURSE PRACTITIONER

## 2021-06-23 NOTE — PROGRESS NOTES
Chief Complaint  Lung Eval (2 liters of fluid off of both lungs, home health says right lung sounds like bad capacity.)    PHQ-2 Total Score: 0    Subjective        Past Medical History:   Diagnosis Date   • CHF (congestive heart failure) (CMS/[facility])    • COPD (chronic obstructive pulmonary disease) (CMS/[facility])    • Hyperlipidemia    • Hypertension    • Mitral regurgitation    • PONV (postoperative nausea and vomiting)      Social History     Tobacco Use   • Smoking status: Former Smoker     Packs/day: 0.50     Years: 45.00     Pack years: 22.50     Quit date: 3/9/2021     Years since quittin.2   • Smokeless tobacco: Never Used   • Tobacco comment: caffeine use - denies   Vaping Use   • Vaping Use: Never used   Substance Use Topics   • Alcohol use: Never   • Drug use: Never      Current Outpatient Medications on File Prior to Visit   Medication Sig   • albuterol sulfate  (90 Base) MCG/ACT inhaler Inhale 2 puffs Every 4 (Four) Hours As Needed for Wheezing.   • amiodarone (PACERONE) 200 MG tablet Take 0.5 tablets by mouth Daily.   • aspirin 81 MG EC tablet Take 1 tablet by mouth Daily.   • atorvastatin (LIPITOR) 40 MG tablet Take 1 tablet by mouth Every Night.   • diphenhydrAMINE-APAP, sleep, (TYLENOL PM EXTRA STRENGTH PO) Take  by mouth As Needed.   • ferrous sulfate 325 (65 FE) MG EC tablet Take 1 tablet by mouth Daily With Breakfast.   • furosemide (LASIX) 40 MG tablet Take 1 tablet by mouth Daily.   • lisinopril (PRINIVIL,ZESTRIL) 2.5 MG tablet Take 1 tablet by mouth Daily.   • metoprolol succinate XL (TOPROL-XL) 50 MG 24 hr tablet Take 1 tablet by mouth Every Night.   • potassium chloride (KLOR-CON) 8 MEQ CR tablet    • warfarin (COUMADIN) 1 MG tablet Take 1/2 tablet (0.5mg) on Saturday AND take one tablet (1mg) all other days or as directed by the [facility].  Indications: Atrial Fibrillation     No current facility-administered medications on file prior to visit.      No Known Allergies  "  Health Maintenance Due   Topic Date Due   • MAMMOGRAM  Never done   • DXA SCAN  Never done   • COLORECTAL CANCER SCREENING  Never done   • COVID-19 Vaccine (1) Never done   • TDAP/TD VACCINES (1 - Tdap) Never done   • ZOSTER VACCINE (1 of 2) Never done   • Pneumococcal Vaccine 65+ (1 of 1 - PPSV23) Never done   • HEPATITIS C SCREENING  Never done   • ANNUAL WELLNESS VISIT  Never done      Rachael South presents to [facility]  Pt here to follow up from a Home Health visit where the nurse told her that her right lung had diminished breath sounds - pt had a recent hospitalization due to pleural effusion with 2L fluid drawn off each lung - pt had a recent quadruple bypass with mitral valve replacement     Pt is currently on lasix once daily    Denies CP, palpitations, or SOB        Objective   Vital Signs:   /70 (BP Location: Left arm)   Pulse 79   Temp 97.3 °F (36.3 °C)   Ht 154.9 cm (61\")   Wt 60 kg (132 lb 3.2 oz)   SpO2 97%   BMI 24.98 kg/m²     Review of Systems   Constitutional: Negative for chills and fever.   Respiratory: Positive for cough (dry). Negative for chest tightness and shortness of breath.    Cardiovascular: Negative for chest pain, palpitations and leg swelling.   Neurological: Negative for dizziness, weakness, light-headedness and headache.      Physical Exam  Vitals reviewed.   Constitutional:       General: She is not in acute distress.     Appearance: Normal appearance. She is well-developed.   HENT:      Head: Normocephalic and atraumatic.   Eyes:      Conjunctiva/sclera: Conjunctivae normal.      Pupils: Pupils are equal, round, and reactive to light.   Cardiovascular:      Rate and Rhythm: Normal rate and regular rhythm.      Heart sounds: No murmur heard.     Pulmonary:      Effort: Pulmonary effort is normal.      Breath sounds: Examination of the right-lower field reveals decreased breath sounds. Examination of the left-lower field reveals " decreased breath sounds. Decreased breath sounds present. No wheezing or rhonchi.   Musculoskeletal:      Cervical back: Neck supple.      Right lower leg: No edema.      Left lower leg: No edema.   Skin:     General: Skin is warm and dry.   Neurological:      Mental Status: She is alert and oriented to person, place, and time.      Cranial Nerves: No cranial nerve deficit.   Psychiatric:         Mood and Affect: Mood and affect normal.         Behavior: Behavior normal.         Thought Content: Thought content normal.         Judgment: Judgment normal.        Result Review :   The following data was reviewed by: KENNETH Jones on 06/23/2021:    Data reviewed: Radiologic studies CXR: CONCLUSION: No active disease         Assessment and Plan    Diagnoses and all orders for this visit:    1. Abnormal breath sounds (Primary)  -     XR Chest PA & Lateral (In Office)    2. Pleural effusion, bilateral  -     XR Chest PA & Lateral (In Office)        Follow Up   Return if symptoms worsen or fail to improve, for Next scheduled follow up, Recheck.  Patient was given instructions and counseling regarding her condition or for health maintenance advice. Please see specific information pulled into the AVS if appropriate.

## 2021-06-25 ENCOUNTER — ANTICOAGULATION VISIT (OUTPATIENT)
Dept: PHARMACY | Facility: HOSPITAL | Age: 71
End: 2021-06-25

## 2021-06-25 DIAGNOSIS — I48.91 ATRIAL FIBRILLATION WITH RVR (HCC): Primary | ICD-10-CM

## 2021-06-25 DIAGNOSIS — I25.118 CORONARY ARTERY DISEASE OF NATIVE ARTERY OF NATIVE HEART WITH STABLE ANGINA PECTORIS (HCC): ICD-10-CM

## 2021-06-25 LAB — INR PPP: 3.1

## 2021-06-25 NOTE — PROGRESS NOTES
Anticoagulation Clinic Progress Note    Anticoagulation Summary  As of 6/25/2021    INR goal:  2.0-3.0   TTR:  22.7 % (1.4 mo)   INR used for dosing:  3.10 (6/25/2021)   Warfarin maintenance plan:  2 mg every day   Weekly warfarin total:  14 mg   Plan last modified:  Grzegorz Ellis RPH (6/18/2021)   Next INR check:  7/1/2021   Target end date:      Indications    Atrial fibrillation with RVR (CMS/[facility]) [I48.91]  Coronary artery disease of native artery of native heart with stable angina pectoris (CMS/[facility]) [I25.118]             Anticoagulation Episode Summary     INR check location:      Preferred lab:      Send INR reminders to:   [facility]    Comments:        Anticoagulation Care Providers     Provider Role Specialty Phone number    Destinee KENNETH Brand Referring Cardiology [phone]          INR History:  Anticoagulation Monitoring 6/18/2021 6/21/2021 6/25/2021   INR 1.30 1.80 3.10   INR Date 6/18/2021 6/21/2021 6/25/2021   INR Goal 2.0-3.0 2.0-3.0 2.0-3.0   Trend Up Same Same   Last Week Total 9.5 mg 14.5 mg 17 mg   Next Week Total 16 mg 15 mg 13 mg   Sun 2 mg - 2 mg   Mon - 3 mg (6/21) 2 mg   Tue - 2 mg 2 mg   Wed - 2 mg 2 mg   Thu - 2 mg -   Fri 4 mg (6/18) - 1 mg (6/25)   Sat 2 mg - 2 mg   Visit Report - - -   Some recent data might be hidden       Plan:  1. INR is Supratherapeutic today- see above in Anticoagulation Summary.   Provided instructions to Beatrice with [facility] to Change their warfarin regimen- see above in Anticoagulation Summary.  2. Follow up in 6 days      Grzegorz Ellis RPH

## 2021-06-28 ENCOUNTER — PATIENT OUTREACH (OUTPATIENT)
Dept: CASE MANAGEMENT | Facility: OTHER | Age: 71
End: 2021-06-28

## 2021-06-28 DIAGNOSIS — I50.21 ACUTE SYSTOLIC CONGESTIVE HEART FAILURE DUE TO VALVULAR DISEASE (HCC): ICD-10-CM

## 2021-06-28 DIAGNOSIS — I25.10 CORONARY ARTERY DISEASE INVOLVING NATIVE CORONARY ARTERY OF NATIVE HEART, ANGINA PRESENCE UNSPECIFIED: Primary | ICD-10-CM

## 2021-06-28 DIAGNOSIS — I38 ACUTE SYSTOLIC CONGESTIVE HEART FAILURE DUE TO VALVULAR DISEASE (HCC): ICD-10-CM

## 2021-06-28 NOTE — OUTREACH NOTE
Ambulatory Case Management Note    [facility] End of Month Documentation    This Chronic Medical Management Care Plan for Rachael South, 71 y.o. female, has been monitored and managed;reviewed and a new plan of care implemented for the month of June.  A cumulative time of 30  minutes was spent on this patient record this month, including chart review;phone call with patient;electronic communication with primary care provider.    Regarding the patient's problems: has Acute CHF (CMS/[facility]); Dental infection; Anemia; Elevated troponin; Acute systolic congestive heart failure due to valvular disease (CMS/[facility]); CAD (coronary artery disease); Coronary artery disease of native heart with stable angina pectoris (CMS/[facility]); Atrial fibrillation with RVR (CMS/[facility]); Coronary artery disease of native artery of native heart with stable angina pectoris (CMS/[facility]); S/P CABG x 3; and S/P MVR (mitral valve replacement) on their problem list., the following items were addressed: medications;transitions to medical care;referrals to community service providers and any changes can be found within the plan section of the note.  A detailed listing of time spent for chronic care management is tracked within each outreach encounter.  Current medications include:  has a current medication list which includes the following prescription(s): albuterol sulfate hfa, amiodarone, aspirin, atorvastatin, diphenhydramine-apap (sleep), ferrous sulfate, furosemide, lisinopril, metoprolol succinate xl, potassium chloride, and warfarin. and the patient is reported to be patient is compliant with medication protocol,  Medications are reported to be effective.  Regarding these diagnoses, referrals were made to the following provider(s):  N/A.  All notes on chart for PCP to review.    The patient was monitored remotely for activity level;mood & behavior;pain;medications.    The patient's physical needs include:  physical healthcare;medication education.     The  patient's mental support needs include:  increased support;coordination of community providers    The patient's cognitive support needs include:  continued support;health care    The patient's psychosocial support needs include:  continued support;contact information, patient encouraged to call with any health concerns or questions.     The patient's functional needs include: physical healthcare    The patient's environmental needs include:  No data recorded    Refer to previous outreach notes for more information on the areas listed above.    Monthly Billing Diagnoses  (I25.10) Coronary artery disease involving native coronary artery of native heart, angina presence unspecified    (I50.21,  I38) Acute systolic congestive heart failure due to valvular disease (CMS/[facility])    Medications   · Medications have been reconciled    Care Plan progress this month:      Recently Modified Care Plans Updates made since 5/28/2021 12:00 AM     Heart Failure         Problem Priority Last Modified     HEART FAILURE DIAGNOSIS KNOWLEDGE DEFICIT --  6/21/2021  2:17 PM by Jayne Harry RN              Goal Recent Progress Last Modified     Patient will verbalize understanding of health maintenance --  6/21/2021  2:17 PM by Jayne Harry RN              Task Due Date Last Modified     Educate patient on importance of treating and maintaining other conditions --  6/21/2021  2:32 PM by Jayne Harry RN     Patient with recent diagnosis of Afib,  CAD, status post CABG, COPD; patient with multiple conditions that could complicate heart failure. Advised patient to maintain compliance with medication regimens.              Problem Priority Last Modified     FLUID RETENTION/OVERLOAD --  6/21/2021  2:17 PM by Jayne Harry RN              Goal Recent Progress Last Modified     Patient will be able to identify signs and symptoms of fluid retention --  6/21/2021  2:17 PM by Jayne Harry RN              Task Due Date Last Modified      Educate patient on daily weight tracking and signs of extremity edema --  6/21/2021  2:32 PM by Jayne Harry RN        Educate patient on fluid management & how too little or too much affects the heart --  6/21/2021  2:32 PM by Jayne Harry RN        Instruct patient to take weight every day and call provider for weight gain of 2 lbs overnight or 5 lbs in a week --  6/21/2021  2:32 PM by Jayne Harry RN        Instruct patient to do a daily self-check for symptoms of extra fluid (shortness of breath, weakness, trouble sleeping, body swelling, or dizziness) --  6/21/2021  2:34 PM by Jayne Harry RN     Advised patient to call myself or the office in the event of feeling more short of breath that usual, especially with activity. Advised to keep regular follow ups with pcp, cardiology and pulmonology as well             Problem Priority Last Modified     SODIUM INTAKE --  6/21/2021  2:17 PM by Jayne Hrary RN              Goal Recent Progress Last Modified     Patient will maintain management of sodium consumption --  6/21/2021  2:17 PM by Jayne Harry RN              Task Due Date Last Modified     Educate patient on daily sodium intake & how sodium affects the heart --  6/21/2021  2:17 PM by Jayne Harry RN        Instruct patient to eat less salt and watch fluids. No added salt or no more than 2 grams (2000mgs) of sodium or 2 liters of fluid in a 24-hour period, or follow provider's specific recommendations. --  6/21/2021  2:34 PM by Jayne Harry RN        Educate patient how to read a nutrition label pointing out servings and serving size --  6/21/2021  2:35 PM by Jayne Harry RN     Patient and her  look for foods that are less than 7% of dietary sodium, as recommended by the cardiologist. Both are following lower sodium diet       Educate patient on sodium alternatives --  6/21/2021  2:17 PM by Jayne Harry RN              Problem Priority Last Modified     HEART FAILURE  MEDICATION ADHERENCE --  6/21/2021  2:17 PM by Jayne Harry RN              Goal Recent Progress Last Modified     Consistently take heart failure medication as prescribed --  6/21/2021  2:17 PM by Jayne Harry RN              Task Due Date Last Modified     Instruct patient to take all medications as prescribed to help reduce and control symptoms and to call provider for any side effects that prevent taking of medications --  6/21/2021  2:35 PM by Jayne Harry RN        Emphasize the importance of medication adherence --  6/21/2021  2:35 PM by Jayne Harry RN        Discuss barriers to medication adherence with patient --  6/21/2021  2:17 PM by Jayne Harry RN        Provide medication education on most common medications for heart failure --  6/21/2021  2:35 PM by Jayne Harry RN        Discuss ways to manage medications (organization & remembering). Assist patient in setting up daily reminders for meds. --  6/21/2021  2:17 PM by Jayne Harry RN        Educate on possible common side effects and importance of reporting them to care provider --  6/21/2021  2:35 PM by Jayne Harry RN              Problem Priority Last Modified     EXERCISE REGIMEN --  6/21/2021  2:17 PM by Jayne Harry RN              Goal Recent Progress Last Modified     Patient will engage in physical activity safely --  6/21/2021  2:17 PM by Jayne Harry RN              Task Due Date Last Modified     Educate patient on benefits of exercise for heart failure --  6/21/2021  2:37 PM by Jayne Harry, TERESA     Patient advised to stay as active as possible depending on severity of shortness of breath, exercise exertion, etc.        Educate patient on maintaining safety during exercise activity --  6/21/2021  2:37 PM by Jayne Harry RN        Educate patient on sexual activity precautions and danger signs --  6/21/2021  2:17 PM by Jayne Harry, TERESA        Educate regarding gradually increasing activity to 20 minutes a  day --  6/21/2021  2:17 PM by Jayne Harry RN        Educate patient on maintaining safety during exercise activity; stop if shortness of breath, dizziness, or chest pain develops --  6/21/2021  2:37 PM by Jayne Harry RN              Problem Priority Last Modified     HEART FAILURE MAINTENANCE --  6/21/2021  2:17 PM by Jayne Harry RN              Goal Recent Progress Last Modified     Patient will not experience any symptoms of shortness of breath or body swelling over the next 3 months --  6/21/2021  2:17 PM by Jayne Harry RN              Task Due Date Last Modified     Work with provider to create an action plan for monitoring and managing CHF --  6/21/2021  2:17 PM by Jayne Harry RN        Schedule regular provider visits for close monitoring of CHF. If patient has not seen PCP within the past 6 months, schedule a follow up appointment --  6/21/2021  2:17 PM by Jayne Harry RN        Educate patient to monitor symptoms and weight changes, restrict sodium intake, take medications as prescribed, and stay physically active --  6/21/2021  2:37 PM by Jayne Harry RN        Educate patient on smoking cessation --  6/21/2021  2:38 PM by Jayne Harry RN     Patient recently quit smoking in March 2021             Problem Priority Last Modified     HEART FAILURE PROGRESSION AND CARE NEEDS --  6/21/2021  2:17 PM by Jayne Harry RN              Goal Recent Progress Last Modified     Patient will verbalize understanding of heart failure progression --  6/21/2021  2:17 PM by Jayne Harry RN              Task Due Date Last Modified     Educate patient on stages of heart failure --  6/21/2021  2:17 PM by Jayne Harry RN        Educate patient regarding symptoms with end stage heart failure --  6/21/2021  2:17 PM by Jayne Harry RN        Educate patient on potential treatments for each stage --  6/21/2021  2:17 PM by Jayne Harry RN        Educate patient on trajectory of chronic  illness --  6/21/2021  2:17 PM by Jayne Harry, RN        Evaluate readiness for advanced care planning and Utilize ACP tools within Epic for discussion and documentation --  6/21/2021  2:17 PM by Jayne Harry, RN        Educate on palliative care and hospice --  6/21/2021  2:17 PM by Jayne Harry, RN                      · Current Specialty Plan of Care Status finalized    Instructions   · Patient was provided an electronic copy of care plan  · CCM services were explained and offered and patient has accepted these services.  · Patient has given their written consent to receive CCM services and understands that this includes the authorization of electronic communication of medical information with the other treating providers.  · Patient understands that they may stop CCM services at any time and these changes will be effective at the end of the calendar month and will effectively revocate the agreement of CCM services.  · Patient understands that only one practitioner can furnish and be paid for CCM services during one calendar month.  Patient also understands that there may be co-payment or deductible fees in association with CCM services.  · Patient will continue with at least monthly follow-up calls with the Nurse Navigator.    Jayne Harry, TERESA  Ambulatory Case Management    6/28/2021, 09:18 EDT

## 2021-06-29 ENCOUNTER — OFFICE VISIT (OUTPATIENT)
Dept: CARDIOLOGY | Facility: CLINIC | Age: 71
End: 2021-06-29

## 2021-06-29 VITALS
DIASTOLIC BLOOD PRESSURE: 81 MMHG | SYSTOLIC BLOOD PRESSURE: 144 MMHG | WEIGHT: 132 LBS | BODY MASS INDEX: 24.29 KG/M2 | HEIGHT: 62 IN

## 2021-06-29 DIAGNOSIS — I25.10 CORONARY ARTERY DISEASE INVOLVING NATIVE CORONARY ARTERY OF NATIVE HEART WITHOUT ANGINA PECTORIS: Primary | ICD-10-CM

## 2021-06-29 DIAGNOSIS — I42.0 ISCHEMIC DILATED CARDIOMYOPATHY (HCC): ICD-10-CM

## 2021-06-29 DIAGNOSIS — I34.0 NONRHEUMATIC MITRAL VALVE REGURGITATION: ICD-10-CM

## 2021-06-29 DIAGNOSIS — I25.5 ISCHEMIC DILATED CARDIOMYOPATHY (HCC): ICD-10-CM

## 2021-06-29 PROCEDURE — 99213 OFFICE O/P EST LOW 20 MIN: CPT | Performed by: SPECIALIST

## 2021-06-29 PROCEDURE — 93000 ELECTROCARDIOGRAM COMPLETE: CPT | Performed by: SPECIALIST

## 2021-06-29 NOTE — PROGRESS NOTES
[facility]  Cardiology progress Note    Patient Name: Rachael South  : 1950    CC: Recent CABG    Subjective   Subjective       HPI:  Rachael South is a 71 y.o. female with history of recent CABG, mitral valve replacement with a bioprosthetic valve.  Was recently in the hospital with CHF feels better.    Review of Systems:   Constitutional no fever,  no weight loss   Skin no rash   Otolaryngeal no difficulty swallowing   Cardiovascular See HPI   Pulmonary no cough, no sputum production   Gastrointestinal no constipation, no diarrhea   Genitourinary no dysuria, no hematuria   Hematologic no easy bruisability, no abnormal bleeding   Musculoskeletal no muscle pain   Neurologic no dizziness, no falls         Personal History     Social History:  reports that she quit smoking about 3 months ago. She has a 22.50 pack-year smoking history. She has never used smokeless tobacco. She reports that she does not drink alcohol and does not use drugs.    Home Medications:  Current Outpatient Medications on File Prior to Visit   Medication Sig   • albuterol sulfate  (90 Base) MCG/ACT inhaler Inhale 2 puffs Every 4 (Four) Hours As Needed for Wheezing.   • amiodarone (PACERONE) 200 MG tablet Take 0.5 tablets by mouth Daily.   • aspirin 81 MG EC tablet Take 1 tablet by mouth Daily.   • atorvastatin (LIPITOR) 40 MG tablet Take 1 tablet by mouth Every Night.   • diphenhydrAMINE-APAP, sleep, (TYLENOL PM EXTRA STRENGTH PO) Take  by mouth As Needed.   • ferrous sulfate 325 (65 FE) MG EC tablet Take 1 tablet by mouth Daily With Breakfast.   • furosemide (LASIX) 40 MG tablet Take 1 tablet by mouth Daily.   • lisinopril (PRINIVIL,ZESTRIL) 2.5 MG tablet Take 1 tablet by mouth Daily.   • metoprolol succinate XL (TOPROL-XL) 50 MG 24 hr tablet Take 1 tablet by mouth Every Night.   • potassium chloride (KLOR-CON) 8 MEQ CR tablet    • warfarin (COUMADIN) 1 MG tablet Take 1/2 tablet (0.5mg) on Saturday AND take one  tablet (1mg) all other days or as directed by the [facility].  Indications: Atrial Fibrillation     No current facility-administered medications on file prior to visit.     Allergies:  No Known Allergies    Objective    Objective       Vitals:   BP: (144)/(81) 144/81  Body mass index is 24.14 kg/m².     Physical Exam:   Constitutional: Awake, alert, No acute distress    Eyes: PERRLA, sclerae anicteric, no conjunctival injection   HENT: NCAT, mucous membranes moist   Neck: Supple, no thyromegaly, no lymphadenopathy, trachea midline   Respiratory: Clear to auscultation bilaterally, nonlabored respirations    Cardiovascular: RRR, no murmurs, rubs, or gallops, palpable pedal pulses bilaterally   Gastrointestinal: Positive bowel sounds, soft, nontender, nondistended   Musculoskeletal: No bilateral ankle edema, no clubbing or cyanosis to extremities   Psychiatric: Appropriate affect, cooperative   Neurologic: Oriented x 3, strength symmetric in all extremities, Cranial Nerves grossly intact to confrontation, speech clear   Skin: No rashes.    Result Review    Result Review:  I have personally reviewed the available results from  [x]  Laboratory  [x]  EKG  [x]  Cardiology  [x]  Medications  [x]  Old records  []  Other:     ECG 12 Lead    Date/Time: 6/29/2021 3:59 PM  Performed by: Marquez Canales MD  Authorized by: Marquez Canales MD   Rhythm: sinus rhythm  Ectopy: unifocal PVCs  QRS axis: normal  Other findings: left ventricular hypertrophy    Clinical impression: abnormal EKG  Comments: Normal sinus rhythm.  PVCs  Left ventricular hypertrophy.              Lab Results   Component Value Date    CHOL 94 04/21/2021     Lab Results   Component Value Date    TRIG 115 04/21/2021    TRIG 156 (H) 03/17/2021     Lab Results   Component Value Date    HDL 31 (L) 04/21/2021    HDL 42 03/17/2021     Lab Results   Component Value Date    LDL 42 04/21/2021     (H) 03/17/2021     Lab Results   Component Value  Date    VLDL 21 04/21/2021    VLDL 31 03/17/2021       Cardiac Catheterization/Vascular Study    Narrative  1.  Severe multivessel coronary artery disease  2.  Moderate to severe mitral regurgitation  3.  Ischemic cardiomyopathy  4.  Moderate pulmonary hypertension     Lab Results   Component Value Date    PROBNP 2,209.0 (H) 04/21/2021         Impression/Plan  1.  Coronary disease/recent CABG: Stable continue aspirin 81 mg once a day.  2.  Presence of bioprosthetic mitral valve: Stable continue metoprolol 50 mg once a day.  3.  Paroxysmal atrial fibrillation: Continue Coumadin for stroke prevention.  Continue amiodarone for rhythm control.  4.  Hyperlipidemia: Continue Lipitor 40 mg once a day check lipid profile next visit.  5.  Ischemic cardiomyopathy/chronic systolic heart failure: Continue Lasix 40 mg once a day.  Continue lisinopril.  Add carvedilol 3.125 mg twice daily.  Follow-up in the office in 3 months.          Electronically signed by Marquez Canales MD, 06/29/21, 3:05 PM EDT.

## 2021-07-01 ENCOUNTER — ANTICOAGULATION VISIT (OUTPATIENT)
Dept: CARDIOLOGY | Facility: CLINIC | Age: 71
End: 2021-07-01

## 2021-07-01 ENCOUNTER — TELEPHONE (OUTPATIENT)
Dept: CARDIOLOGY | Facility: CLINIC | Age: 71
End: 2021-07-01

## 2021-07-01 DIAGNOSIS — I48.91 ATRIAL FIBRILLATION WITH RVR (HCC): Primary | ICD-10-CM

## 2021-07-01 DIAGNOSIS — I25.118 CORONARY ARTERY DISEASE OF NATIVE ARTERY OF NATIVE HEART WITH STABLE ANGINA PECTORIS (HCC): ICD-10-CM

## 2021-07-01 LAB — INR PPP: 3.3

## 2021-07-01 NOTE — PROGRESS NOTES
Today's INR is 3.3 on 2 mg qd. Last INR was 3.10 on 2 mg qd. Called in by Cadence MOORE at [facility] [phone]. Ok to call the pt with results.

## 2021-07-02 ENCOUNTER — TELEPHONE (OUTPATIENT)
Dept: CARDIOLOGY | Facility: CLINIC | Age: 71
End: 2021-07-02

## 2021-07-02 NOTE — TELEPHONE ENCOUNTER
Patient states that the warfarin has become too much of a hassle and Dr. Canales mentioned the we can try this medication.

## 2021-07-06 ENCOUNTER — ANTICOAGULATION VISIT (OUTPATIENT)
Dept: CARDIOLOGY | Facility: CLINIC | Age: 71
End: 2021-07-06

## 2021-07-06 DIAGNOSIS — I48.91 ATRIAL FIBRILLATION WITH RVR (HCC): Primary | ICD-10-CM

## 2021-07-06 DIAGNOSIS — I25.118 CORONARY ARTERY DISEASE OF NATIVE ARTERY OF NATIVE HEART WITH STABLE ANGINA PECTORIS (HCC): ICD-10-CM

## 2021-07-06 LAB — INR PPP: 2.4

## 2021-07-06 NOTE — PROGRESS NOTES
Patient informed of INR results and to cont taking 1/2 MG of warfarin daily for two weeks.     Tried to call the home health nurse number no answer and no VM set up.

## 2021-07-06 NOTE — TELEPHONE ENCOUNTER
Hold warfarin until her INR is less than 2.0 then she can start the eliquis.   Check INR in 2 days.

## 2021-07-07 DIAGNOSIS — I48.0 PAROXYSMAL ATRIAL FIBRILLATION (HCC): Primary | ICD-10-CM

## 2021-07-07 NOTE — TELEPHONE ENCOUNTER
Patient returned call states she will call her pcp to see if they can do a pt/inr test and will call me back with the fax # so I can fax the orders to their office

## 2021-07-07 NOTE — TELEPHONE ENCOUNTER
Patient aware. Please send in eliquis to the pharmacy. Home Health comes and does patient's INR can she get orders to have it checked at the hospital? If so please order an pt/inr test. Thanks

## 2021-07-08 NOTE — TELEPHONE ENCOUNTER
Patient has a MVR. Per Cherrie patient must stay on Warfarin. Pharmacy notified to cancel Eliquis rx and patient aware that changes can not be made and to continue warfarin as directed and check in 2 weeks. Patient voiced understanding.

## 2021-07-09 ENCOUNTER — TELEPHONE (OUTPATIENT)
Dept: CARDIOLOGY | Facility: CLINIC | Age: 71
End: 2021-07-09

## 2021-07-12 ENCOUNTER — PATIENT OUTREACH (OUTPATIENT)
Dept: CASE MANAGEMENT | Facility: OTHER | Age: 71
End: 2021-07-12

## 2021-07-12 ENCOUNTER — PREP FOR SURGERY (OUTPATIENT)
Dept: OTHER | Facility: HOSPITAL | Age: 71
End: 2021-07-12

## 2021-07-12 DIAGNOSIS — I38 ACUTE SYSTOLIC CONGESTIVE HEART FAILURE DUE TO VALVULAR DISEASE (HCC): Primary | ICD-10-CM

## 2021-07-12 DIAGNOSIS — I25.10 CORONARY ARTERY DISEASE INVOLVING NATIVE CORONARY ARTERY OF NATIVE HEART, ANGINA PRESENCE UNSPECIFIED: ICD-10-CM

## 2021-07-12 DIAGNOSIS — I50.21 ACUTE SYSTOLIC CONGESTIVE HEART FAILURE DUE TO VALVULAR DISEASE (HCC): Primary | ICD-10-CM

## 2021-07-12 DIAGNOSIS — Z79.01 LONG TERM (CURRENT) USE OF ANTICOAGULANTS: Primary | ICD-10-CM

## 2021-07-12 PROCEDURE — 99439 CHRNC CARE MGMT STAF EA ADDL: CPT | Performed by: NURSE PRACTITIONER

## 2021-07-12 PROCEDURE — 99490 CHRNC CARE MGMT STAFF 1ST 20: CPT | Performed by: NURSE PRACTITIONER

## 2021-07-12 NOTE — OUTREACH NOTE
Ambulatory Case Management Note    CCM Interim Update    Called patient today and she seems to be doing pretty well right now. Patient has currently been under extreme stress with her recent cardiac happenings and her subsequent CABG. Patient also had a CABG with a mitral valve replacement. Up until that point, patient had not active medications nor any health concerns. This all started following a dental procedure a few months ago.     Patient has quit smoking, she currently had a pack year history of 22.5 years.     Recent EKG with cardiology showed left ventricular hypertrophy, PVC's and a normal sinus rhythm.     Lab wise, patient is unremarkable. Of note, patient has had a significant decrease in her LDL, from  153 to 43 with the help of a statin medication. Most recent ProBNP was 2209.     Patient to follow up with Dr. Canales in 3 months, in September, at which time she will likely be switched to Eliquis. Patient has been informed that she needs to stay on Warfarin for a total  Of 3 months or 90-days.     Patient was very nervous about being discharged from Home Health in a few weeks, as they are doing her routine INR checks. I have been in communication with the Nurse  at Dr. Canales's office, and they have informed me that once she is discharged at the end of the time, she would simply follow-up at the closest diagnostic center to her home, and the results would be routed to her cardiologist, and they would call with their recommendation for on-going therapy.     Patient also to start cardiac rehab this month for strengthening and to establish more endurance. Patient looking forward to monitoring and getting active again since her CABG. Patient is aware of plan to follow up with Diagnostic center following home health d/c. Will be in contact with patient at the end of the month to reminder her of the plan moving forward.     Care Plan: Heart Failure   Updates made since 7/12/2021 12:00 AM       Problem: HEART FAILURE MEDICATION ADHERENCE       Goal: Consistently take heart failure medication as prescribed       Task: Discuss barriers to medication adherence with patient Completed 7/12/2021   Responsible User: Jayne Harry RN   Note:    Working with patient to keep up with medication compliance. Prior to this health event, patient was not taking any medications nor had any health problems. Currently working with patient to establish who she would follow up with following her D/C from HomeHealth at the end of the month. I Have reached out to Cardiology Nurse Navigator to see about INR monitoring and what lab that needed to occur at.      Problem: HEART FAILURE MAINTENANCE       Goal: Patient will not experience any symptoms of shortness of breath or body swelling over the next 3 months       Task: Work with provider to create an action plan for monitoring and managing CHF Completed 7/12/2021   Responsible User: Jayne Harry RN      Task: Schedule regular provider visits for close monitoring of CHF. If patient has not seen PCP within the past 6 months, schedule a follow up appointment Completed 7/12/2021   Responsible User: Jayne Harry RN   Note:    Patient doing well with balancing all of her medical appointments at this time. Patient to keep close contact with myself and pcp office as her meds are changed over the next few months.      Problem: HEART FAILURE PROGRESSION AND CARE NEEDS       Goal: Patient will verbalize understanding of heart failure progression       Task: Educate patient on potential treatments for each stage Completed 7/12/2021   Responsible User: Jayne Harry RN      Task: Educate patient on trajectory of chronic illness Completed 7/12/2021   Responsible User: Jayne Harry RN Tabitha Davis, RN  Ambulatory Case Management    7/12/2021, 14:08 EDT

## 2021-07-12 NOTE — TELEPHONE ENCOUNTER
Received call from Nurse Navigator,[facility].    She stated patient is being discharged from Home Health, who currently draw her INR lab.  Patient will now have to go to a [facility] facility to have her [facility]R drawn.      Patient will need a standing order placed in Epic.  Please place order.

## 2021-07-14 ENCOUNTER — ANTICOAGULATION VISIT (OUTPATIENT)
Dept: CARDIOLOGY | Facility: CLINIC | Age: 71
End: 2021-07-14

## 2021-07-14 DIAGNOSIS — I48.91 ATRIAL FIBRILLATION WITH RVR (HCC): Primary | ICD-10-CM

## 2021-07-14 DIAGNOSIS — I25.118 CORONARY ARTERY DISEASE OF NATIVE ARTERY OF NATIVE HEART WITH STABLE ANGINA PECTORIS (HCC): ICD-10-CM

## 2021-07-14 LAB — INR PPP: 2

## 2021-07-14 NOTE — PROGRESS NOTES
INR - 2.0    Currently taking 1/2mg rotation.  Last checked 7/6/2021 with a result of 2.4.  Testing done with [facility].

## 2021-07-15 VITALS
WEIGHT: 139.5 LBS | DIASTOLIC BLOOD PRESSURE: 70 MMHG | SYSTOLIC BLOOD PRESSURE: 132 MMHG | HEART RATE: 100 BPM | HEIGHT: 60 IN | TEMPERATURE: 96.9 F | BODY MASS INDEX: 27.39 KG/M2 | OXYGEN SATURATION: 92 %

## 2021-07-15 VITALS
WEIGHT: 129 LBS | HEART RATE: 76 BPM | TEMPERATURE: 97.3 F | SYSTOLIC BLOOD PRESSURE: 140 MMHG | OXYGEN SATURATION: 96 % | DIASTOLIC BLOOD PRESSURE: 80 MMHG | BODY MASS INDEX: 23.59 KG/M2

## 2021-07-21 RX ORDER — POTASSIUM CHLORIDE 600 MG/1
TABLET, FILM COATED, EXTENDED RELEASE ORAL
Qty: 90 TABLET | Refills: 0 | Status: SHIPPED | OUTPATIENT
Start: 2021-07-21 | End: 2021-10-17 | Stop reason: SDUPTHER

## 2021-07-26 ENCOUNTER — ANTICOAGULATION VISIT (OUTPATIENT)
Dept: CARDIOLOGY | Facility: CLINIC | Age: 71
End: 2021-07-26

## 2021-07-26 DIAGNOSIS — I25.118 CORONARY ARTERY DISEASE OF NATIVE ARTERY OF NATIVE HEART WITH STABLE ANGINA PECTORIS (HCC): ICD-10-CM

## 2021-07-26 DIAGNOSIS — I48.91 ATRIAL FIBRILLATION WITH RVR (HCC): Primary | ICD-10-CM

## 2021-07-26 LAB — INR PPP: 2.1

## 2021-07-27 ENCOUNTER — OFFICE VISIT (OUTPATIENT)
Dept: CARDIAC REHAB | Facility: HOSPITAL | Age: 71
End: 2021-07-27

## 2021-07-27 VITALS
HEIGHT: 62 IN | SYSTOLIC BLOOD PRESSURE: 160 MMHG | WEIGHT: 134.7 LBS | HEART RATE: 64 BPM | DIASTOLIC BLOOD PRESSURE: 74 MMHG | BODY MASS INDEX: 24.79 KG/M2 | OXYGEN SATURATION: 96 %

## 2021-07-27 DIAGNOSIS — Z95.1 S/P CABG (CORONARY ARTERY BYPASS GRAFT): Primary | ICD-10-CM

## 2021-07-27 PROCEDURE — 93798 PHYS/QHP OP CAR RHAB W/ECG: CPT

## 2021-07-27 RX ORDER — WARFARIN SODIUM 2 MG/1
2 TABLET ORAL
COMMUNITY
End: 2021-11-29 | Stop reason: HOSPADM

## 2021-07-27 RX ORDER — WARFARIN SODIUM 1 MG/1
1 TABLET ORAL TAKE AS DIRECTED
COMMUNITY
End: 2021-11-29 | Stop reason: HOSPADM

## 2021-07-27 NOTE — PROGRESS NOTES
Informed the patient's home health nurse of instructions for the patient to continue the same regimen of warfarin and to repeat labs in two weeks. She voiced understanding.

## 2021-07-27 NOTE — PROGRESS NOTES
Phase II and III Education    Discipline Teaching:  Cardiac Rehab Phase II [x]      Cardiac Rehab Phase III []      Pulmonary Rehab II []      Pulmonary Rehab III []      Peripheral Artery Disease []        Class Given:  Asthma Management []      Beginners Cardiac Rehab []      Beginners Pulmonary Rehab []      CAD I [x]      CAD II []      CHF [x]      Diabetes Mellitus []     Diet & Cholesterol []     Diet Consult []      Energy Conservation []     Exercise [x]     Grocery Store Tour []     Harmonica Therapy []     High Blood Pressure [x]     Living with COPD []     Medication Safety []     Peripheral Artery Disease []     S & S of Infection []      Stress []        Education Topics:  Angina []      Arrhythmias [x]      Asthma Management []      Beginning Cardiac Rehab []      Blood Pressure [x]     Blood Sugar []     Breathing Retrainment []     CHF [x]     Cooking []     Daily Weight []     Diabetes Mellitus []      Diet [x]     Energy Conservation []     Exercise [x]      Foot Care []      Grocery Store Tour []      Heart Disease [x]      Heart Function [x]      Incision Care []     Living with COPD []     Medication Safety []     PAD Symptoms/Treatment []     Reconditioning Exercises []     S & S Infection []     Smoking Consult []     Stress Management []     Test Results []       Teaching Aids:  Video []      PAD Handout []      Pulmonary Workbook []      CAD Teaching Packet [x]      Stress Teaching Packet []     Exercise Teaching Packet [x]      Diet Teaching Packet []      CHF Teaching Packet [x]      Blood Pressure Teaching Packet [x]      Smoking Cessation Packet []      Medication Safety Handout []      Pflex Training []      Diabetes Teaching Packet []      Harmonica Therapy Packet []        Teaching Method:  Discussion [x]      Written Information [x]      Audio Visual []      Demonstration []        Teaching Recipient:  Patient [x]      Family []      Friend []      Legal Guardian []      Primary  Care Giver []      Significant Other []        Barriers To Learning:  None [x]      Auditory []      Cultural []      Emotional []      Financial []      Language []    Mental []      Motivation []      Physical []      Reading Skills []      Mormon []      Verbal/Cognitive []      Visual []      Written/Cognitive []        Teaching Response:  Verified Via Teach back [x]      Return Demo Via Teach Back []      Reinforcement Needed []      Unable to Return Demo []      Unable to Comprehend []      Declines Teaching  []        Additional Education Topics  na    Follow Up Instruction Comment:  na    Chief Complaint  Cardiac Rehab Phase II    Rachael South presents to [facility]IT[facility]    Physical Exam  Cardiovascular:      Rate and Rhythm: Normal rate and regular rhythm.   Pulmonary:      Effort: Pulmonary effort is normal.      Breath sounds: Normal breath sounds.   Skin:     General: Skin is warm and dry.   Neurological:      Mental Status: She is alert.        Result Review :          Assessment and Plan    Diagnoses and all orders for this visit:    1. S/P CABG (coronary artery bypass graft) (Primary)        Bceki Haley RN

## 2021-07-28 ENCOUNTER — PATIENT OUTREACH (OUTPATIENT)
Dept: CASE MANAGEMENT | Facility: OTHER | Age: 71
End: 2021-07-28

## 2021-07-28 DIAGNOSIS — I50.21 ACUTE SYSTOLIC CONGESTIVE HEART FAILURE DUE TO VALVULAR DISEASE (HCC): Primary | ICD-10-CM

## 2021-07-28 DIAGNOSIS — I38 ACUTE SYSTOLIC CONGESTIVE HEART FAILURE DUE TO VALVULAR DISEASE (HCC): Primary | ICD-10-CM

## 2021-07-28 DIAGNOSIS — I48.91 ATRIAL FIBRILLATION WITH RVR (HCC): ICD-10-CM

## 2021-07-28 NOTE — OUTREACH NOTE
Ambulatory Case Management Note    [facility] End of Month Documentation    This Chronic Medical Management Care Plan for Rachael South, 71 y.o. female, has been monitored and managed;reviewed and a new plan of care implemented for the month of July.  A cumulative time of 45 minutes was spent on this patient record this month, including chart review;phone call with patient;electronic communication with primary care provider.    Regarding the patient's problems: has Acute CHF (CMS/[facility]); Dental infection; Anemia; Elevated troponin; Acute systolic congestive heart failure due to valvular disease (CMS/[facility]); CAD (coronary artery disease); Coronary artery disease of native heart with stable angina pectoris (CMS/HCC); Atrial fibrillation with RVR (CMS/[facility]); Coronary artery disease of native artery of native heart with stable angina pectoris (CMS/[facility]); S/P CABG x 3; S/P MVR (mitral valve replacement); Ischemic dilated cardiomyopathy (CMS/[facility]); and Nonrheumatic mitral valve regurgitation on their problem list., the following items were addressed: medications;transitions to medical care;referrals to community service providers and any changes can be found within the plan section of the note.  A detailed listing of time spent for chronic care management is tracked within each outreach encounter.  Current medications include:  has a current medication list which includes the following prescription(s): albuterol sulfate hfa, amiodarone, aspirin, atorvastatin, diphenhydramine-apap (sleep), ferrous sulfate, furosemide, lisinopril, metoprolol succinate xl, potassium chloride, warfarin, and warfarin. and the patient is reported to be patient is compliant with medication protocol,  Medications are reported to be effective.  Regarding these diagnoses, referrals were made to the following provider(s):  N/A.  All notes on chart for PCP to review.    The patient was monitored remotely for activity level;mood &  behavior;pain;medications.    The patient's physical needs include:  physical healthcare;medication education.     The patient's mental support needs include:  increased support;coordination of community providers    The patient's cognitive support needs include:  continued support;health care    The patient's psychosocial support needs include:  continued support;contact information, patient encouraged to call with any health concerns or questions.     The patient's functional needs include: physical healthcare    The patient's environmental needs include:  No data recorded    Care Plan overall comments:  No data recorded    Refer to previous outreach notes for more information on the areas listed above.    Monthly Billing Diagnoses  (I50.21,  I38) Acute systolic congestive heart failure due to valvular disease (CMS/[facility])    (I48.91) Atrial fibrillation with RVR (CMS/[facility])    Medications   · Medications have been reconciled    Care Plan progress this month:      Recently Modified Care Plans Updates made since 6/27/2021 12:00 AM     Heart Failure         Problem Priority Last Modified     HEART FAILURE MEDICATION ADHERENCE --  6/21/2021  2:17 PM by Jayne Harry, TERESA              Goal Recent Progress Last Modified     Consistently take heart failure medication as prescribed --  6/21/2021  2:17 PM by Jayne Harry, RN              Task Due Date Last Modified     Discuss barriers to medication adherence with patient --  7/12/2021  1:28 PM by Jayne Harry, RN     Working with patient to keep up with medication compliance. Prior to this health event, patient was not taking any medications nor had any health problems. Currently working with patient to establish who she would follow up with following her D/C from HomeHealth at the end of the month. I Have reached out to Cardiology Nurse Navigator to see about INR monitoring and what lab that needed to occur at.              Problem Priority Last Modified     HEART  FAILURE MAINTENANCE --  6/21/2021  2:17 PM by Jayne Harry RN              Goal Recent Progress Last Modified     Patient will not experience any symptoms of shortness of breath or body swelling over the next 3 months --  6/21/2021  2:17 PM by Jayne Harry RN              Task Due Date Last Modified     Work with provider to create an action plan for monitoring and managing CHF --  7/12/2021  1:28 PM by Jayne Harry RN        Schedule regular provider visits for close monitoring of CHF. If patient has not seen PCP within the past 6 months, schedule a follow up appointment --  7/12/2021  1:37 PM by Jayne Harry RN     Patient doing well with balancing all of her medical appointments at this time. Patient to keep close contact with myself and pcp office as her meds are changed over the next few months.              Problem Priority Last Modified     HEART FAILURE PROGRESSION AND CARE NEEDS --  6/21/2021  2:17 PM by Jayne Harry RN              Goal Recent Progress Last Modified     Patient will verbalize understanding of heart failure progression --  6/21/2021  2:17 PM by Jayne Harry RN              Task Due Date Last Modified     Educate patient on potential treatments for each stage --  7/12/2021  1:38 PM by Jayne Harry RN        Educate patient on trajectory of chronic illness --  7/12/2021  1:38 PM by Jayne Harry RN                      · Current Specialty Plan of Care Status finalized    Instructions   · Patient was provided an electronic copy of care plan  · CCM services were explained and offered and patient has accepted these services.  · Patient has given their written consent to receive CCM services and understands that this includes the authorization of electronic communication of medical information with the other treating providers.  · Patient understands that they may stop CCM services at any time and these changes will be effective at the end of the calendar month and will  effectively revocate the agreement of CCM services.  · Patient understands that only one practitioner can furnish and be paid for CCM services during one calendar month.  Patient also understands that there may be co-payment or deductible fees in association with CCM services.  · Patient will continue with at least monthly follow-up calls with the Nurse Navigator.    Jayne Harry RN  Ambulatory Case Management    7/28/2021, 13:37 EDT

## 2021-07-29 ENCOUNTER — TREATMENT (OUTPATIENT)
Dept: CARDIAC REHAB | Facility: HOSPITAL | Age: 71
End: 2021-07-29

## 2021-07-29 DIAGNOSIS — Z95.1 S/P CABG (CORONARY ARTERY BYPASS GRAFT): Primary | ICD-10-CM

## 2021-07-29 PROCEDURE — 93798 PHYS/QHP OP CAR RHAB W/ECG: CPT

## 2021-08-03 ENCOUNTER — TREATMENT (OUTPATIENT)
Dept: CARDIAC REHAB | Facility: HOSPITAL | Age: 71
End: 2021-08-03

## 2021-08-03 DIAGNOSIS — Z95.1 S/P CABG (CORONARY ARTERY BYPASS GRAFT): Primary | ICD-10-CM

## 2021-08-03 PROCEDURE — 93798 PHYS/QHP OP CAR RHAB W/ECG: CPT

## 2021-08-04 ENCOUNTER — TELEPHONE (OUTPATIENT)
Dept: FAMILY MEDICINE CLINIC | Facility: CLINIC | Age: 71
End: 2021-08-04

## 2021-08-04 NOTE — TELEPHONE ENCOUNTER
Caller: Rachael South    Relationship to patient: Self    Best call back number: [phone]    Chief complaint: INR     Type of visit: NURSE VISIT     Requested date: 8/5/21    Additional notes:PATIENT STATED NURSE DOES NOT COME TO HOUSE ANYMORE. [facility] DOES NOT SCHEDULE NURSE VISIT. [facility] WAS UNABLE TO WARM TRANSFER.

## 2021-08-05 ENCOUNTER — TREATMENT (OUTPATIENT)
Dept: CARDIAC REHAB | Facility: HOSPITAL | Age: 71
End: 2021-08-05

## 2021-08-05 DIAGNOSIS — Z95.1 S/P CABG (CORONARY ARTERY BYPASS GRAFT): Primary | ICD-10-CM

## 2021-08-05 PROCEDURE — 93798 PHYS/QHP OP CAR RHAB W/ECG: CPT

## 2021-08-06 ENCOUNTER — ANTICOAGULATION VISIT (OUTPATIENT)
Dept: PHARMACY | Facility: HOSPITAL | Age: 71
End: 2021-08-06

## 2021-08-06 ENCOUNTER — OFFICE VISIT (OUTPATIENT)
Dept: FAMILY MEDICINE CLINIC | Facility: CLINIC | Age: 71
End: 2021-08-06

## 2021-08-06 ENCOUNTER — ANTICOAGULATION VISIT (OUTPATIENT)
Dept: FAMILY MEDICINE CLINIC | Facility: CLINIC | Age: 71
End: 2021-08-06

## 2021-08-06 VITALS
HEART RATE: 69 BPM | TEMPERATURE: 96.9 F | HEIGHT: 61 IN | DIASTOLIC BLOOD PRESSURE: 90 MMHG | OXYGEN SATURATION: 93 % | WEIGHT: 135 LBS | BODY MASS INDEX: 25.49 KG/M2 | SYSTOLIC BLOOD PRESSURE: 150 MMHG

## 2021-08-06 DIAGNOSIS — J44.9 CHRONIC OBSTRUCTIVE PULMONARY DISEASE, UNSPECIFIED COPD TYPE (HCC): ICD-10-CM

## 2021-08-06 DIAGNOSIS — I48.91 ATRIAL FIBRILLATION WITH RVR (HCC): Primary | ICD-10-CM

## 2021-08-06 DIAGNOSIS — I25.118 CORONARY ARTERY DISEASE OF NATIVE ARTERY OF NATIVE HEART WITH STABLE ANGINA PECTORIS (HCC): ICD-10-CM

## 2021-08-06 DIAGNOSIS — Z79.01 ANTICOAGULANT LONG-TERM USE: Primary | ICD-10-CM

## 2021-08-06 LAB
INR PPP: 1.7 (ref 0.9–1.1)
INR PPP: 1.7 (ref 2–3)

## 2021-08-06 PROCEDURE — 36416 COLLJ CAPILLARY BLOOD SPEC: CPT | Performed by: NURSE PRACTITIONER

## 2021-08-06 PROCEDURE — 99214 OFFICE O/P EST MOD 30 MIN: CPT | Performed by: NURSE PRACTITIONER

## 2021-08-06 PROCEDURE — 85610 PROTHROMBIN TIME: CPT | Performed by: NURSE PRACTITIONER

## 2021-08-06 NOTE — PROGRESS NOTES
Chief Complaint  Anticoagulation    Subjective          Rachael South presents to [facility]  Patient presents to check INR.    She reports that she had a CABG in April 2021.  She is established with Dr. Martínez, cardiologist, in [place].  She last saw the cardiologist in June and has a follow-up appointment in September.    She has been receiving her INR test from home health until recently when she started to participate in cardiac rehab.  Insurance will no longer pay for home health to check her INR at home.  This is her first visit to this office for INR checking.    She reports that she has been alternating 1 mg and 2 mg of Coumadin for quite some time now.  There has not been a change in her dose recently.  Cardiologist would like for her to change to a different medication but she cannot afford it.  By her report, it appears the cardiologist office is attempting to request patient assistance.    She currently reports no chest pain, no shortness of breath, no exercise intolerance.  No cough and no swelling.  She does experience bruising with minimal pressure.    She states that it has been explained to her about diet impact on her INR.  She has an appointment with a dietitian in September.    She takes her Coumadin at night.  She took 2 mg last night.    Her insurance had also contacted her about getting albuterol through patient assistance program due to her limited ability to pay.  She has paperwork today that needs to be signed.      COPD symptoms are currently stable.  She denies experiencing any persistent coughing, wheezing, or shortness of breath.        Past Medical History:   Diagnosis Date   • CHF (congestive heart failure) (CMS/[facility])    • COPD (chronic obstructive pulmonary disease) (CMS/[facility])    • Hyperlipidemia    • Hypertension    • Mitral regurgitation    • PONV (postoperative nausea and vomiting)        No Known Allergies     Past Surgical History:    Procedure Laterality Date   • CARDIAC CATHETERIZATION     • CARDIAC CATHETERIZATION N/A 2021    Procedure: Right and Left Heart Cath;  Surgeon: Gina Correia MD;  Location: [facility] CATH INVASIVE LOCATION;  Service: Cardiovascular;  Laterality: N/A;   • CARDIAC CATHETERIZATION N/A 2021    Procedure: Coronary angiography;  Surgeon: Gina Correia MD;  Location: [facility] CATH INVASIVE LOCATION;  Service: Cardiovascular;  Laterality: N/A;   • CARDIAC CATHETERIZATION N/A 2021    Procedure: Left ventriculography;  Surgeon: Gina Correia MD;  Location: [facility] CATH INVASIVE LOCATION;  Service: Cardiovascular;  Laterality: N/A;   • CORONARY ARTERY BYPASS GRAFT     • CORONARY ARTERY BYPASS GRAFT WITH MITRAL VALVE REPAIR/REPLACEMENT N/A 2021    Procedure: RITA STERNOTOMY CORONARY ARTERY BYPASS GRAFT TIMES 3 USING LEFT INTERNAL MAMMARY ARTERY AND ENDOSCOPICALLY HARVESTED LEFT GREATER SAPHENOUS VEIN, MITRAL VALVE REPLACEMENT AND PRP;  Surgeon: Rafiq Gregg MD;  Location: [facility];  Service: Cardiothoracic;  Laterality: N/A;   • MITRAL VALVE REPLACEMENT     • ROTATOR CUFF REPAIR Right         Social History     Socioeconomic History   • Marital status:      Spouse name: Not on file   • Number of children: 2   • Years of education: Not on file   • Highest education level: Not on file   Tobacco Use   • Smoking status: Former Smoker     Packs/day: 0.50     Years: 45.00     Pack years: 22.50     Quit date: 3/9/2021     Years since quittin.4   • Smokeless tobacco: Never Used   • Tobacco comment: caffeine use - denies   Vaping Use   • Vaping Use: Never used   Substance and Sexual Activity   • Alcohol use: Never   • Drug use: Never   • Sexual activity: Defer       Family History   Problem Relation Age of Onset   • Heart failure Mother    • Heart disease Brother         blue baby   • Diabetes type II Grandchild    • Heart attack Father         Health Maintenance Due   Topic Date Due   •  "MAMMOGRAM  Never done   • DXA SCAN  Never done   • COLORECTAL CANCER SCREENING  Never done   • Pneumococcal Vaccine 65+ (1 of 2 - PPSV23) Never done   • COVID-19 Vaccine (1) Never done   • TDAP/TD VACCINES (1 - Tdap) Never done   • ZOSTER VACCINE (1 of 2) Never done   • HEPATITIS C SCREENING  Never done   • ANNUAL WELLNESS VISIT  Never done        Last Completed Pap Smear     This patient has no relevant Health Maintenance data.          Last Completed Mammogram     This patient has no relevant Health Maintenance data.          Last Completed Colonoscopy     This patient has no relevant Health Maintenance data.          Current Outpatient Medications on File Prior to Visit   Medication Sig   • albuterol sulfate  (90 Base) MCG/ACT inhaler Inhale 2 puffs Every 4 (Four) Hours As Needed for Wheezing.   • amiodarone (PACERONE) 200 MG tablet Take 0.5 tablets by mouth Daily.   • aspirin 81 MG EC tablet Take 1 tablet by mouth Daily.   • atorvastatin (LIPITOR) 40 MG tablet Take 1 tablet by mouth Every Night.   • diphenhydrAMINE-APAP, sleep, (TYLENOL PM EXTRA STRENGTH PO) Take  by mouth As Needed.   • ferrous sulfate 325 (65 FE) MG EC tablet Take 1 tablet by mouth Daily With Breakfast.   • furosemide (LASIX) 40 MG tablet Take 1 tablet by mouth Daily.   • lisinopril (PRINIVIL,ZESTRIL) 2.5 MG tablet Take 1 tablet by mouth Daily.   • metoprolol succinate XL (TOPROL-XL) 50 MG 24 hr tablet Take 1 tablet by mouth Every Night.   • potassium chloride (KLOR-CON) 8 MEQ CR tablet TAKE 1 TABLET(8 MEQ) BY MOUTH EVERY DAY   • warfarin (COUMADIN) 1 MG tablet Take 1 mg by mouth Every Other Day.   • warfarin (COUMADIN) 2 MG tablet Take 2 mg by mouth Every Other Day.     No current facility-administered medications on file prior to visit.         There is no immunization history on file for this patient.    [facility]  Review of Systems     Objective     /90   Pulse 69   Temp 96.9 °F (36.1 °C)   Ht 154.9 cm (61\")   Wt 61.2 kg " (135 lb)   SpO2 93%   BMI 25.51 kg/m²         Physical Exam  Vitals (BP noted to be elevated today) reviewed.   Constitutional:       Appearance: Normal appearance.   Cardiovascular:      Rate and Rhythm: Normal rate and regular rhythm.      Heart sounds: Normal heart sounds.   Pulmonary:      Effort: Pulmonary effort is normal.      Breath sounds: Normal breath sounds.   Abdominal:      Palpations: Abdomen is soft.   Musculoskeletal:      Right lower leg: No edema.      Left lower leg: No edema.   Skin:     Comments: Small areas of bruising noted to bilateral arms   Neurological:      Mental Status: She is alert.   Psychiatric:         Mood and Affect: Mood normal.         Behavior: Behavior normal.           Result Review :                    POC INR (08/06/2021 10:47)    Protime-INR (07/26/2021)       Assessment and Plan        Diagnoses and all orders for this visit:    1. Anticoagulant long-term use (Primary)  -     POCT INR    2. Chronic obstructive pulmonary disease, unspecified COPD type (CMS/HCC)      Advised to take 2 mg tonight then resume alternating 1 mg/ 2 mg    Paperwork signed to apply for patient assistance for her albuterol through assistance program.        Follow Up     Return in about 1 week (around 8/13/2021).    Patient was given instructions and counseling regarding her condition or for health maintenance advice. Please see specific information pulled into the AVS if appropriate.

## 2021-08-06 NOTE — PROGRESS NOTES
This visit is for documentation purposes only: Ms. South's warfarin is now managed by Dr. Canales's office. No longer following in the [facility]. It has been a pleasure being part of her care.

## 2021-08-09 ENCOUNTER — PATIENT OUTREACH (OUTPATIENT)
Dept: CASE MANAGEMENT | Facility: OTHER | Age: 71
End: 2021-08-09

## 2021-08-09 DIAGNOSIS — I25.10 CORONARY ARTERY DISEASE INVOLVING NATIVE CORONARY ARTERY OF NATIVE HEART, ANGINA PRESENCE UNSPECIFIED: ICD-10-CM

## 2021-08-09 DIAGNOSIS — I38 ACUTE SYSTOLIC CONGESTIVE HEART FAILURE DUE TO VALVULAR DISEASE (HCC): Primary | ICD-10-CM

## 2021-08-09 DIAGNOSIS — I50.21 ACUTE SYSTOLIC CONGESTIVE HEART FAILURE DUE TO VALVULAR DISEASE (HCC): Primary | ICD-10-CM

## 2021-08-09 PROCEDURE — 99439 CHRNC CARE MGMT STAF EA ADDL: CPT | Performed by: NURSE PRACTITIONER

## 2021-08-09 PROCEDURE — 99490 CHRNC CARE MGMT STAFF 1ST 20: CPT | Performed by: NURSE PRACTITIONER

## 2021-08-09 NOTE — OUTREACH NOTE
Ambulatory Case Management Note    CCM Interim Update    Called patient today, and she reports that overall she is doing well. Patient has changed her INR management to our office here, her primary care provider office. Patient was having her INR monitored through cardiology, Dr. Grant office, but she was having trouble with having the results called back to her, after Homehealth nursing would call in the results.     Patient no longer has homehealth, therefore she will report here once weekly for INR checks. Patient being seen in office each week as a visit, so that she gets the results and medication recommendations that day.     Patient has started cardiac rehab, and reports that she is doing well with all of the exercise. She has been able to perform all activities as ordered without shortness of breath or chest pain. Currently she has cardiac rehab twice per week on Tuesday and Thursday.     While on the phone with patient, I did address some care gaps. Patient is currently due for her Dexa scan and mammogram, but patient has asked to address that at a later time, as she is already doing rehab twice per week, plus her recent increase in appointments. She is wanting these tests, just not within the next 2 months related to appts and rehab scheduled. I have asked patient to think about scheduling these appointments around November or December.     Patient has been advised by Cardiology to wait on getting the Covid vaccine. She is going to ask him again about it when she sees him in late September. Patient is also due for her shingles vaccine, her pneumonia vaccine as well as a tetanus (Tdap).     Care Plan: Heart Failure   Updates made since 8/9/2021 12:00 AM      Problem: FLUID RETENTION/OVERLOAD       Goal: Patient will be able to identify signs and symptoms of fluid retention       Task: Instruct patient to do a daily self-check for symptoms of extra fluid (shortness of breath, weakness, trouble sleeping,  body swelling, or dizziness) Completed 8/9/2021   Responsible User: Jayne Harry RN   Note:    Patient is aware of how to check for extra fluid. She is currently doing daily checks for SOB upon awakening, lower extremity edema and also monitoring for shortness of breath with exertion.      Problem: SODIUM INTAKE       Goal: Patient will maintain management of sodium consumption       Task: Educate patient on daily sodium intake & how sodium affects the heart Completed 8/9/2021   Responsible User: Jayne Harry RN   Note:    Patient is currently following a heart healthy diet and will be seeing a dietician soon to help with coumadin management      Problem: HEART FAILURE MEDICATION ADHERENCE       Goal: Consistently take heart failure medication as prescribed       Task: Discuss ways to manage medications (organization & remembering). Assist patient in setting up daily reminders for meds. Completed 8/9/2021   Responsible User: Jayne Harry RN   Note:    Patient doing well with remembering daily medications. Patient has been taking most of her medication at night, as she has a harder time forgetting it then.          Jayne Harry RN  Ambulatory Case Management    8/9/2021, 14:40 EDT

## 2021-08-10 ENCOUNTER — TREATMENT (OUTPATIENT)
Dept: CARDIAC REHAB | Facility: HOSPITAL | Age: 71
End: 2021-08-10

## 2021-08-10 DIAGNOSIS — Z95.1 S/P CABG (CORONARY ARTERY BYPASS GRAFT): Primary | ICD-10-CM

## 2021-08-10 PROCEDURE — 93798 PHYS/QHP OP CAR RHAB W/ECG: CPT

## 2021-08-12 ENCOUNTER — TREATMENT (OUTPATIENT)
Dept: CARDIAC REHAB | Facility: HOSPITAL | Age: 71
End: 2021-08-12

## 2021-08-12 DIAGNOSIS — Z95.1 S/P CABG (CORONARY ARTERY BYPASS GRAFT): Primary | ICD-10-CM

## 2021-08-12 PROCEDURE — 93798 PHYS/QHP OP CAR RHAB W/ECG: CPT

## 2021-08-13 ENCOUNTER — ANTICOAGULATION VISIT (OUTPATIENT)
Dept: FAMILY MEDICINE CLINIC | Facility: CLINIC | Age: 71
End: 2021-08-13

## 2021-08-13 ENCOUNTER — OFFICE VISIT (OUTPATIENT)
Dept: FAMILY MEDICINE CLINIC | Facility: CLINIC | Age: 71
End: 2021-08-13

## 2021-08-13 VITALS
BODY MASS INDEX: 25.43 KG/M2 | HEART RATE: 87 BPM | WEIGHT: 134.7 LBS | HEIGHT: 61 IN | SYSTOLIC BLOOD PRESSURE: 132 MMHG | TEMPERATURE: 96.8 F | DIASTOLIC BLOOD PRESSURE: 80 MMHG | OXYGEN SATURATION: 98 %

## 2021-08-13 DIAGNOSIS — I48.91 ATRIAL FIBRILLATION WITH RVR: Primary | ICD-10-CM

## 2021-08-13 DIAGNOSIS — I25.118 CORONARY ARTERY DISEASE OF NATIVE ARTERY OF NATIVE HEART WITH STABLE ANGINA PECTORIS: ICD-10-CM

## 2021-08-13 DIAGNOSIS — Z79.01 ANTICOAGULANT LONG-TERM USE: Primary | ICD-10-CM

## 2021-08-13 DIAGNOSIS — I48.91 ATRIAL FIBRILLATION, UNSPECIFIED TYPE (HCC): ICD-10-CM

## 2021-08-13 LAB — INR PPP: 2 (ref 0.9–1.1)

## 2021-08-13 PROCEDURE — 36416 COLLJ CAPILLARY BLOOD SPEC: CPT | Performed by: FAMILY MEDICINE

## 2021-08-13 PROCEDURE — 85610 PROTHROMBIN TIME: CPT | Performed by: FAMILY MEDICINE

## 2021-08-13 PROCEDURE — 93793 ANTICOAG MGMT PT WARFARIN: CPT | Performed by: FAMILY MEDICINE

## 2021-08-13 NOTE — PROGRESS NOTES
"Chief Complaint  Anticoagulation (FOLLOW UP INR)    Subjective          Rachael South presents to [facility]  Pt has no excessive bleeding or bruising.    Anticoagulation  This is a chronic problem. The current episode started more than 1 year ago. The problem occurs constantly. The problem has been resolved. Pertinent negatives include no abdominal pain, anorexia, arthralgias, change in bowel habit, chest pain, chills, congestion, coughing, diaphoresis, fatigue, fever, headaches, joint swelling, myalgias, nausea, neck pain, numbness, rash, sore throat, swollen glands, urinary symptoms, vertigo, visual change or vomiting. Treatments tried: ON COUMADIN 1MG AND 2MG EVERY OTHER DAY.       Objective   No Known Allergies    There is no immunization history on file for this patient.    Vital Signs:   Vitals:    08/13/21 1719   BP: 132/80   Pulse: 87   Temp: 96.8 °F (36 °C)   SpO2: 98%   Weight: 61.1 kg (134 lb 11.2 oz)   Height: 154.9 cm (61\")       Physical Exam  Vitals reviewed.   Constitutional:       Appearance: Normal appearance. She is well-developed.   HENT:      Head: Normocephalic and atraumatic.      Right Ear: External ear normal.      Left Ear: External ear normal.      Mouth/Throat:      Pharynx: No oropharyngeal exudate.   Eyes:      Conjunctiva/sclera: Conjunctivae normal.      Pupils: Pupils are equal, round, and reactive to light.   Cardiovascular:      Rate and Rhythm: Normal rate and regular rhythm.      Pulses: Normal pulses.      Heart sounds: Normal heart sounds. No murmur heard.   No friction rub. No gallop.    Pulmonary:      Effort: Pulmonary effort is normal.      Breath sounds: Normal breath sounds. No wheezing or rhonchi.   Abdominal:      General: Bowel sounds are normal. There is no distension.      Palpations: Abdomen is soft.      Tenderness: There is no abdominal tenderness.   Skin:     General: Skin is warm and dry.   Neurological:      Mental Status: " She is alert and oriented to person, place, and time.      Cranial Nerves: No cranial nerve deficit.   Psychiatric:         Mood and Affect: Mood and affect normal.         Behavior: Behavior normal.         Thought Content: Thought content normal.         Judgment: Judgment normal.        Result Review :{Labs  Result Review  Imaging  Med Tab  Media  Procedures :23}                 Assessment and Plan    Diagnoses and all orders for this visit:    1. Anticoagulant long-term use (Primary)  -     POCT INR    2. Atrial fibrillation, unspecified type (CMS/HCC)            Follow Up   Return in about 2 weeks (around 8/27/2021) for Recheck.  Patient was given instructions and counseling regarding her condition or for health maintenance advice. Please see specific information pulled into the AVS if appropriate.   INR is 2.0, goal is 2-3.  Will continue current dose of coumadin and recheck in 2 weeks.

## 2021-08-16 RX ORDER — FUROSEMIDE 40 MG/1
40 TABLET ORAL DAILY
Qty: 30 TABLET | Refills: 3 | Status: SHIPPED | OUTPATIENT
Start: 2021-08-16 | End: 2021-12-14

## 2021-08-17 ENCOUNTER — TREATMENT (OUTPATIENT)
Dept: CARDIAC REHAB | Facility: HOSPITAL | Age: 71
End: 2021-08-17

## 2021-08-17 DIAGNOSIS — Z95.1 S/P CABG (CORONARY ARTERY BYPASS GRAFT): Primary | ICD-10-CM

## 2021-08-17 PROCEDURE — 93798 PHYS/QHP OP CAR RHAB W/ECG: CPT

## 2021-08-19 ENCOUNTER — APPOINTMENT (OUTPATIENT)
Dept: CARDIAC REHAB | Facility: HOSPITAL | Age: 71
End: 2021-08-19

## 2021-08-24 ENCOUNTER — APPOINTMENT (OUTPATIENT)
Dept: CARDIAC REHAB | Facility: HOSPITAL | Age: 71
End: 2021-08-24

## 2021-08-26 ENCOUNTER — APPOINTMENT (OUTPATIENT)
Dept: CARDIAC REHAB | Facility: HOSPITAL | Age: 71
End: 2021-08-26

## 2021-08-27 ENCOUNTER — OFFICE VISIT (OUTPATIENT)
Dept: FAMILY MEDICINE CLINIC | Facility: CLINIC | Age: 71
End: 2021-08-27

## 2021-08-27 ENCOUNTER — ANTICOAGULATION VISIT (OUTPATIENT)
Dept: FAMILY MEDICINE CLINIC | Facility: CLINIC | Age: 71
End: 2021-08-27

## 2021-08-27 VITALS
HEART RATE: 76 BPM | DIASTOLIC BLOOD PRESSURE: 80 MMHG | WEIGHT: 136.2 LBS | SYSTOLIC BLOOD PRESSURE: 126 MMHG | BODY MASS INDEX: 25.73 KG/M2 | OXYGEN SATURATION: 96 % | TEMPERATURE: 96.9 F

## 2021-08-27 DIAGNOSIS — I48.91 ATRIAL FIBRILLATION WITH RVR: Primary | ICD-10-CM

## 2021-08-27 DIAGNOSIS — I25.118 CORONARY ARTERY DISEASE OF NATIVE ARTERY OF NATIVE HEART WITH STABLE ANGINA PECTORIS: ICD-10-CM

## 2021-08-27 DIAGNOSIS — I48.91 ATRIAL FIBRILLATION WITH RVR (HCC): ICD-10-CM

## 2021-08-27 DIAGNOSIS — Z79.01 ANTICOAGULANT LONG-TERM USE: Primary | ICD-10-CM

## 2021-08-27 LAB — INR PPP: 2 (ref 2–3)

## 2021-08-27 PROCEDURE — 85610 PROTHROMBIN TIME: CPT | Performed by: FAMILY MEDICINE

## 2021-08-27 PROCEDURE — 36416 COLLJ CAPILLARY BLOOD SPEC: CPT | Performed by: FAMILY MEDICINE

## 2021-08-27 PROCEDURE — 99213 OFFICE O/P EST LOW 20 MIN: CPT | Performed by: FAMILY MEDICINE

## 2021-08-30 ENCOUNTER — PATIENT OUTREACH (OUTPATIENT)
Dept: CASE MANAGEMENT | Facility: OTHER | Age: 71
End: 2021-08-30

## 2021-08-30 DIAGNOSIS — I38 ACUTE SYSTOLIC CONGESTIVE HEART FAILURE DUE TO VALVULAR DISEASE (HCC): Primary | ICD-10-CM

## 2021-08-30 DIAGNOSIS — I48.91 ATRIAL FIBRILLATION WITH RVR (HCC): ICD-10-CM

## 2021-08-30 DIAGNOSIS — I25.10 CORONARY ARTERY DISEASE INVOLVING NATIVE CORONARY ARTERY OF NATIVE HEART, ANGINA PRESENCE UNSPECIFIED: ICD-10-CM

## 2021-08-30 DIAGNOSIS — I50.21 ACUTE SYSTOLIC CONGESTIVE HEART FAILURE DUE TO VALVULAR DISEASE (HCC): Primary | ICD-10-CM

## 2021-08-30 NOTE — OUTREACH NOTE
Ambulatory Case Management Note    [facility] End of Month Documentation    This Chronic Medical Management Care Plan for Rachael South, 71 y.o. female, has been monitored and managed;reviewed and a new plan of care implemented for the month of August.  A cumulative time of 60  minutes was spent on this patient record this month, including chart review;phone call with patient;electronic communication with primary care provider.    Regarding the patient's problems: has Acute CHF (CMS/[facility]); Dental infection; Anemia; Elevated troponin; Acute systolic congestive heart failure due to valvular disease (CMS/[facility]); CAD (coronary artery disease); Coronary artery disease of native heart with stable angina pectoris (CMS/[facility]); Atrial fibrillation with RVR (CMS/[facility]); Coronary artery disease of native artery of native heart with stable angina pectoris (CMS/[facility]); S/P CABG x 3; S/P MVR (mitral valve replacement); Ischemic dilated cardiomyopathy (CMS/[facility]); Nonrheumatic mitral valve regurgitation; and COPD (chronic obstructive pulmonary disease) (CMS/[facility]) on their problem list., the following items were addressed: medications;transitions to medical care;referrals to community service providers and any changes can be found within the plan section of the note.  A detailed listing of time spent for chronic care management is tracked within each outreach encounter.  Current medications include:  has a current medication list which includes the following prescription(s): albuterol sulfate hfa, amiodarone, aspirin, atorvastatin, diphenhydramine-apap (sleep), ferrous sulfate, furosemide, lisinopril, metoprolol succinate xl, potassium chloride, warfarin, and warfarin. and the patient is reported to be patient is compliant with medication protocol,  Medications are reported to be effective.  Regarding these diagnoses, referrals were made to the following provider(s):  N/A.  All notes on chart for PCP to review.    The patient was monitored  remotely for activity level;mood & behavior;pain;medications.    The patient's physical needs include:  physical healthcare;medication education.     The patient's mental support needs include:  increased support;coordination of community providers    The patient's cognitive support needs include:  continued support;health care    The patient's psychosocial support needs include:  continued support;contact information, patient encouraged to call with any health concerns or questions.     The patient's functional needs include: physical healthcare    The patient's environmental needs include:  N/A    Care Plan overall comments:  No data recorded    Refer to previous outreach notes for more information on the areas listed above.    Monthly Billing Diagnoses  (I50.21,  I38) Acute systolic congestive heart failure due to valvular disease (CMS/[facility])    (I25.10) Coronary artery disease involving native coronary artery of native heart, angina presence unspecified    (I48.91) Atrial fibrillation with RVR (CMS/[facility])    Medications   · Medications have been reconciled    Care Plan progress this month:      Recently Modified Care Plans Updates made since 7/30/2021 12:00 AM     Heart Failure         Problem Priority Last Modified     FLUID RETENTION/OVERLOAD --  6/21/2021  2:17 PM by Jayne Harry RN              Goal Recent Progress Last Modified     Patient will be able to identify signs and symptoms of fluid retention --  6/21/2021  2:17 PM by Jayne Harry RN              Task Due Date Last Modified     Instruct patient to do a daily self-check for symptoms of extra fluid (shortness of breath, weakness, trouble sleeping, body swelling, or dizziness) --  8/9/2021  2:38 PM by Jayne Harry RN     Patient is aware of how to check for extra fluid. She is currently doing daily checks for SOB upon awakening, lower extremity edema and also monitoring for shortness of breath with exertion.              Problem Priority Last  Modified     SODIUM INTAKE --  6/21/2021  2:17 PM by Jayne Harry RN              Goal Recent Progress Last Modified     Patient will maintain management of sodium consumption --  6/21/2021  2:17 PM by Jayne Harry RN              Task Due Date Last Modified     Educate patient on daily sodium intake & how sodium affects the heart --  8/9/2021  2:39 PM by Jayne Harry RN     Patient is currently following a heart healthy diet and will be seeing a dietician soon to help with coumadin management              Problem Priority Last Modified     HEART FAILURE MEDICATION ADHERENCE --  6/21/2021  2:17 PM by Jayne Harry RN              Goal Recent Progress Last Modified     Consistently take heart failure medication as prescribed --  6/21/2021  2:17 PM by Jayne Harry RN              Task Due Date Last Modified     Discuss ways to manage medications (organization & remembering). Assist patient in setting up daily reminders for meds. --  8/9/2021  2:40 PM by Jayne Harry RN     Patient doing well with remembering daily medications. Patient has been taking most of her medication at night, as she has a harder time forgetting it then.                      · Current Specialty Plan of Care Status finalized    Instructions   · Patient was provided an electronic copy of care plan  · CCM services were explained and offered and patient has accepted these services.  · Patient has given their written consent to receive CCM services and understands that this includes the authorization of electronic communication of medical information with the other treating providers.  · Patient understands that they may stop CCM services at any time and these changes will be effective at the end of the calendar month and will effectively revocate the agreement of CCM services.  · Patient understands that only one practitioner can furnish and be paid for CCM services during one calendar month.  Patient also understands that there may  be co-payment or deductible fees in association with CCM services.  · Patient will continue with at least monthly follow-up calls with the Nurse Navigator.    Jayne Harry RN  Ambulatory Case Management    8/30/2021, 11:37 EDT

## 2021-08-31 ENCOUNTER — APPOINTMENT (OUTPATIENT)
Dept: CARDIAC REHAB | Facility: HOSPITAL | Age: 71
End: 2021-08-31

## 2021-09-02 ENCOUNTER — APPOINTMENT (OUTPATIENT)
Dept: CARDIAC REHAB | Facility: HOSPITAL | Age: 71
End: 2021-09-02

## 2021-09-07 ENCOUNTER — APPOINTMENT (OUTPATIENT)
Dept: CARDIAC REHAB | Facility: HOSPITAL | Age: 71
End: 2021-09-07

## 2021-09-09 ENCOUNTER — APPOINTMENT (OUTPATIENT)
Dept: CARDIAC REHAB | Facility: HOSPITAL | Age: 71
End: 2021-09-09

## 2021-09-14 ENCOUNTER — APPOINTMENT (OUTPATIENT)
Dept: CARDIAC REHAB | Facility: HOSPITAL | Age: 71
End: 2021-09-14

## 2021-09-15 ENCOUNTER — APPOINTMENT (OUTPATIENT)
Dept: RESPIRATORY THERAPY | Facility: HOSPITAL | Age: 71
End: 2021-09-15

## 2021-09-16 ENCOUNTER — APPOINTMENT (OUTPATIENT)
Dept: CARDIAC REHAB | Facility: HOSPITAL | Age: 71
End: 2021-09-16

## 2021-09-17 ENCOUNTER — OFFICE VISIT (OUTPATIENT)
Dept: FAMILY MEDICINE CLINIC | Facility: CLINIC | Age: 71
End: 2021-09-17

## 2021-09-17 VITALS
HEIGHT: 61 IN | BODY MASS INDEX: 26.06 KG/M2 | SYSTOLIC BLOOD PRESSURE: 144 MMHG | HEART RATE: 78 BPM | OXYGEN SATURATION: 97 % | WEIGHT: 138 LBS | TEMPERATURE: 97.3 F | DIASTOLIC BLOOD PRESSURE: 90 MMHG

## 2021-09-17 DIAGNOSIS — Z79.01 ANTICOAGULATED: ICD-10-CM

## 2021-09-17 DIAGNOSIS — I48.91 ATRIAL FIBRILLATION WITH RVR (HCC): Primary | ICD-10-CM

## 2021-09-17 PROCEDURE — 99213 OFFICE O/P EST LOW 20 MIN: CPT | Performed by: FAMILY MEDICINE

## 2021-09-17 NOTE — PROGRESS NOTES
"Chief Complaint  Anticoagulation (INR 1.5 alternating 1mg and 2mg )    Subjective          Rachael South presents to [facility]  Anticoagulation  This is a chronic problem. The current episode started more than 1 year ago. The problem occurs constantly. The problem has been unchanged. Pertinent negatives include no abdominal pain, anorexia, arthralgias, change in bowel habit, chest pain, chills, congestion, coughing, diaphoresis, fatigue, fever, headaches, joint swelling, myalgias, nausea, neck pain, numbness, rash, sore throat, swollen glands, urinary symptoms, vertigo, visual change, vomiting or weakness. Nothing aggravates the symptoms. Treatments tried: coumadin.       Objective   No Known Allergies    There is no immunization history on file for this patient.    Vital Signs:   Vitals:    09/17/21 1714   BP: 144/90   Pulse: 78   Temp: 97.3 °F (36.3 °C)   SpO2: 97%   Weight: 62.6 kg (138 lb)   Height: 154.9 cm (61\")       Physical Exam  Vitals reviewed.   Constitutional:       Appearance: Normal appearance. She is well-developed.   HENT:      Head: Normocephalic and atraumatic.      Right Ear: External ear normal.      Left Ear: External ear normal.      Mouth/Throat:      Mouth: Mucous membranes are moist.      Pharynx: Oropharynx is clear. No oropharyngeal exudate or posterior oropharyngeal erythema.   Eyes:      Conjunctiva/sclera: Conjunctivae normal.      Pupils: Pupils are equal, round, and reactive to light.   Cardiovascular:      Rate and Rhythm: Normal rate and regular rhythm.      Pulses: Normal pulses.      Heart sounds: Normal heart sounds. No murmur heard.   No friction rub. No gallop.    Pulmonary:      Effort: Pulmonary effort is normal.      Breath sounds: Normal breath sounds. No wheezing or rhonchi.   Abdominal:      General: Bowel sounds are normal. There is no distension.      Palpations: Abdomen is soft. There is no mass.      Tenderness: There is no " abdominal tenderness. There is no guarding or rebound.      Hernia: No hernia is present.   Musculoskeletal:         General: Normal range of motion.      Cervical back: Normal range of motion and neck supple.   Skin:     General: Skin is warm and dry.      Capillary Refill: Capillary refill takes less than 2 seconds.   Neurological:      Mental Status: She is alert and oriented to person, place, and time.      Cranial Nerves: No cranial nerve deficit.   Psychiatric:         Mood and Affect: Mood and affect normal.         Behavior: Behavior normal.         Thought Content: Thought content normal.         Judgment: Judgment normal.        Result Review :                 Assessment and Plan    Diagnoses and all orders for this visit:    1. Atrial fibrillation with RVR (CMS/HCC) (Primary)    2. Anticoagulated            Follow Up   Return in about 1 week (around 9/24/2021) for Recheck.  Patient was given instructions and counseling regarding her condition or for health maintenance advice. Please see specific information pulled into the AVS if appropriate. INR is 1.5.  Change coumadin to 1mg on Wednesday and Sunday and 2mg all other days.

## 2021-09-21 ENCOUNTER — APPOINTMENT (OUTPATIENT)
Dept: CARDIAC REHAB | Facility: HOSPITAL | Age: 71
End: 2021-09-21

## 2021-09-23 ENCOUNTER — APPOINTMENT (OUTPATIENT)
Dept: CARDIAC REHAB | Facility: HOSPITAL | Age: 71
End: 2021-09-23

## 2021-09-24 ENCOUNTER — OFFICE VISIT (OUTPATIENT)
Dept: FAMILY MEDICINE CLINIC | Facility: CLINIC | Age: 71
End: 2021-09-24

## 2021-09-24 VITALS
OXYGEN SATURATION: 98 % | TEMPERATURE: 97.3 F | WEIGHT: 139 LBS | BODY MASS INDEX: 26.26 KG/M2 | HEART RATE: 81 BPM | DIASTOLIC BLOOD PRESSURE: 86 MMHG | SYSTOLIC BLOOD PRESSURE: 144 MMHG

## 2021-09-24 DIAGNOSIS — Z79.01 CURRENT USE OF LONG TERM ANTICOAGULATION: ICD-10-CM

## 2021-09-24 DIAGNOSIS — I48.91 ATRIAL FIBRILLATION WITH RVR (HCC): Primary | ICD-10-CM

## 2021-09-24 PROCEDURE — 99213 OFFICE O/P EST LOW 20 MIN: CPT | Performed by: FAMILY MEDICINE

## 2021-09-24 NOTE — PROGRESS NOTES
Chief Complaint  Anticoagulation    Subjective          Rachael South presents to [facility]  Pt has h/o a-fib    Anticoagulation  This is a chronic problem. The current episode started more than 1 year ago. The problem occurs constantly. The problem has been gradually improving. Pertinent negatives include no abdominal pain, anorexia, arthralgias, change in bowel habit, chest pain, chills, congestion, coughing, diaphoresis, fatigue, fever, headaches, joint swelling, myalgias, nausea, neck pain, numbness, rash, sore throat, swollen glands, urinary symptoms, vertigo, visual change, vomiting or weakness. Treatments tried: coumadin. The treatment provided moderate relief.       Objective   No Known Allergies    There is no immunization history on file for this patient.    Vital Signs:   Vitals:    09/24/21 1725   BP: 144/86   Pulse: 81   Temp: 97.3 °F (36.3 °C)   SpO2: 98%   Weight: 63 kg (139 lb)       Physical Exam  Vitals reviewed.   Constitutional:       Appearance: Normal appearance. She is well-developed.   HENT:      Head: Normocephalic and atraumatic.      Right Ear: External ear normal.      Left Ear: External ear normal.      Mouth/Throat:      Pharynx: No oropharyngeal exudate.   Eyes:      Conjunctiva/sclera: Conjunctivae normal.      Pupils: Pupils are equal, round, and reactive to light.   Cardiovascular:      Rate and Rhythm: Normal rate and regular rhythm.      Pulses: Normal pulses.      Heart sounds: Normal heart sounds. No murmur heard.   No friction rub. No gallop.    Pulmonary:      Effort: Pulmonary effort is normal.      Breath sounds: Normal breath sounds. No wheezing or rhonchi.   Abdominal:      General: Bowel sounds are normal. There is no distension.      Palpations: Abdomen is soft.      Tenderness: There is no abdominal tenderness.   Skin:     General: Skin is warm and dry.   Neurological:      Mental Status: She is alert and oriented to person, place,  and time.      Cranial Nerves: No cranial nerve deficit.   Psychiatric:         Mood and Affect: Mood and affect normal.         Behavior: Behavior normal.         Thought Content: Thought content normal.         Judgment: Judgment normal.        Result Review :                 Assessment and Plan    Diagnoses and all orders for this visit:    1. Atrial fibrillation with RVR (CMS/HCC) (Primary)  -     Protime-INR; Future    2. Current use of long term anticoagulation  -     Protime-INR; Future            Follow Up   Return in about 1 month (around 10/24/2021) for Recheck.  Patient was given instructions and counseling regarding her condition or for health maintenance advice. Please see specific information pulled into the AVS if appropriate. INR is 1.9.  Goal is 2-3.  Pt was on 1mg on Wednesday and Sunday, 2mg all other days.  Will increase dose to 2mg daily Recheck in 1 week.

## 2021-09-27 PROBLEM — I50.32 DIASTOLIC CHF, CHRONIC: Status: ACTIVE | Noted: 2021-09-27

## 2021-09-27 PROBLEM — I25.10 CORONARY ARTERY DISEASE INVOLVING NATIVE CORONARY ARTERY OF NATIVE HEART WITHOUT ANGINA PECTORIS: Status: ACTIVE | Noted: 2021-05-05

## 2021-09-27 NOTE — PROGRESS NOTES
[facility]  Cardiology progress Note    Patient Name: Rachael South  : 1950    CHIEF COMPLAINT  Coronary artery disease s/p CABG.      Subjective   Subjective     HISTORY OF PRESENT ILLNESS    Rachael South is a 71 y.o. female with history of recent CABG, mitral valve replacement with a bioprosthetic valve.  No chest pain.  Shortness of breath improved.    Review of Systems:   Constitutional no fever,  no weight loss   Skin no rash   Otolaryngeal no difficulty swallowing   Cardiovascular See HPI   Pulmonary no cough, no sputum production   Gastrointestinal no constipation, no diarrhea   Genitourinary no dysuria, no hematuria   Hematologic no easy bruisability, no abnormal bleeding   Musculoskeletal no muscle pain   Neurologic no dizziness, no falls         Personal History     Social History:  reports that she quit smoking about 6 months ago. She has a 22.50 pack-year smoking history. She has never used smokeless tobacco. She reports that she does not drink alcohol and does not use drugs.    Home Medications:  Current Outpatient Medications on File Prior to Visit   Medication Sig   • albuterol sulfate  (90 Base) MCG/ACT inhaler Inhale 2 puffs Every 4 (Four) Hours As Needed for Wheezing.   • amiodarone (PACERONE) 200 MG tablet Take 0.5 tablets by mouth Daily.   • aspirin 81 MG EC tablet Take 1 tablet by mouth Daily.   • atorvastatin (LIPITOR) 40 MG tablet Take 1 tablet by mouth Every Night.   • diphenhydrAMINE-APAP, sleep, (TYLENOL PM EXTRA STRENGTH PO) Take  by mouth As Needed.   • ferrous sulfate 325 (65 FE) MG EC tablet Take 1 tablet by mouth Daily With Breakfast.   • furosemide (LASIX) 40 MG tablet TAKE 1 TABLET BY MOUTH DAILY   • lisinopril (PRINIVIL,ZESTRIL) 2.5 MG tablet Take 1 tablet by mouth Daily.   • metoprolol succinate XL (TOPROL-XL) 50 MG 24 hr tablet Take 1 tablet by mouth Every Night.   • potassium chloride (KLOR-CON) 8 MEQ CR tablet TAKE 1 TABLET(8 MEQ) BY MOUTH  EVERY DAY   • warfarin (COUMADIN) 1 MG tablet Take 1 mg by mouth Every Other Day.   • warfarin (COUMADIN) 2 MG tablet Take 2 mg by mouth Every Other Day.     No current facility-administered medications on file prior to visit.     Allergies:  No Known Allergies    Objective    Objective       Vitals:   Heart Rate:  [76-78] 78  BP: (182-188)/(66-88) 182/88  Body mass index is 25.06 kg/m².     Physical Exam:   Constitutional: Awake, alert, No acute distress    Eyes: PERRLA, sclerae anicteric, no conjunctival injection   HENT: NCAT, mucous membranes moist   Neck: Supple, no thyromegaly, no lymphadenopathy, trachea midline   Respiratory: Clear to auscultation bilaterally, nonlabored respirations    Cardiovascular: RRR, no murmurs or rubs. Palpable pedal pulses bilaterally   Musculoskeletal: No bilateral ankle edema, no cyanosis to extremities   Psychiatric: Appropriate affect, cooperative   Neurologic: Oriented x 3, strength symmetric in all extremities, Cranial Nerves grossly intact to confrontation, speech clear   Skin: No rashes.    Result Review    Result Review:  I have personally reviewed the available results from  [x]  Laboratory  [x]  EKG  [x]  Cardiology  [x]  Medications  [x]  Old records  []  Other:     ECG 12 Lead    Date/Time: 9/28/2021 3:01 PM  Performed by: Marquez Canales MD  Authorized by: Marquez Canales MD   Comparison: compared with previous ECG   Similar to previous ECG  Rhythm: sinus rhythm  QRS axis: left  Other findings: left atrial abnormality    Clinical impression: normal ECG  Comments: Normal sinus rhythm.  No significant changes compared to previous EKG.          Lab Results   Component Value Date    CHOL 94 04/21/2021     Lab Results   Component Value Date    TRIG 115 04/21/2021    TRIG 156 (H) 03/17/2021     Lab Results   Component Value Date    HDL 31 (L) 04/21/2021    HDL 42 03/17/2021     Lab Results   Component Value Date    LDL 42 04/21/2021     (H) 03/17/2021      Lab Results   Component Value Date    VLDL 21 04/21/2021    VLDL 31 03/17/2021         Impression/Plan:  1.  Coronary disease/recent CABG: Stable continue aspirin 81 mg once a day.  2.  Presence of bioprosthetic mitral valve: Stable continue metoprolol 50 mg once a day.  3.  Paroxysmal atrial fibrillation: Continue Coumadin for stroke prevention.  Continue amiodarone for rhythm control.  4.  Hyperlipidemia: Continue Lipitor 40 mg once a day.  Lipid profile reviewed.  5.  Ischemic cardiomyopathy/chronic systolic heart failure: Continue Lasix 40 mg once a day.    Change lisinopril to losartan 50 mg once a day in view of patient's cough.   Continue carvedilol 3.125 mg twice daily.  Repeat echocardiogram to evaluate left ventricular systolic function.  6.  Essential hypertension uncontrolled: Patient complains of occasional cough secondary to lisinopril.  Change lisinopril to losartan 50 mg a day.  Low-salt diet advised.  Monitor blood pressure regularly.        Marquez Canales MD   09/28/21   14:47 EDT

## 2021-09-28 ENCOUNTER — OFFICE VISIT (OUTPATIENT)
Dept: CARDIOLOGY | Facility: CLINIC | Age: 71
End: 2021-09-28

## 2021-09-28 ENCOUNTER — APPOINTMENT (OUTPATIENT)
Dept: CARDIAC REHAB | Facility: HOSPITAL | Age: 71
End: 2021-09-28

## 2021-09-28 VITALS
HEIGHT: 62 IN | BODY MASS INDEX: 25.21 KG/M2 | SYSTOLIC BLOOD PRESSURE: 182 MMHG | WEIGHT: 137 LBS | HEART RATE: 78 BPM | DIASTOLIC BLOOD PRESSURE: 88 MMHG

## 2021-09-28 DIAGNOSIS — I50.32 DIASTOLIC CHF, CHRONIC (HCC): ICD-10-CM

## 2021-09-28 DIAGNOSIS — I10 HYPERTENSION, ESSENTIAL: ICD-10-CM

## 2021-09-28 DIAGNOSIS — I25.10 CORONARY ARTERY DISEASE INVOLVING NATIVE CORONARY ARTERY OF NATIVE HEART WITHOUT ANGINA PECTORIS: Primary | ICD-10-CM

## 2021-09-28 DIAGNOSIS — Z95.1 HX OF CABG: ICD-10-CM

## 2021-09-28 PROCEDURE — 93000 ELECTROCARDIOGRAM COMPLETE: CPT | Performed by: SPECIALIST

## 2021-09-28 PROCEDURE — 99214 OFFICE O/P EST MOD 30 MIN: CPT | Performed by: SPECIALIST

## 2021-09-28 RX ORDER — LOSARTAN POTASSIUM 50 MG/1
50 TABLET ORAL DAILY
Qty: 90 TABLET | Refills: 3 | Status: SHIPPED | OUTPATIENT
Start: 2021-09-28 | End: 2022-02-07 | Stop reason: SDUPTHER

## 2021-09-30 ENCOUNTER — ANTICOAGULATION VISIT (OUTPATIENT)
Dept: FAMILY MEDICINE CLINIC | Facility: CLINIC | Age: 71
End: 2021-09-30

## 2021-09-30 ENCOUNTER — APPOINTMENT (OUTPATIENT)
Dept: CARDIAC REHAB | Facility: HOSPITAL | Age: 71
End: 2021-09-30

## 2021-09-30 DIAGNOSIS — I25.10 CORONARY ARTERY DISEASE INVOLVING NATIVE CORONARY ARTERY OF NATIVE HEART WITHOUT ANGINA PECTORIS: ICD-10-CM

## 2021-09-30 DIAGNOSIS — I48.91 ATRIAL FIBRILLATION WITH RVR (HCC): Primary | ICD-10-CM

## 2021-09-30 LAB
INR PPP: 3.2 (ref 0.9–1.1)
INR PPP: 3.2 (ref 0.9–1.1)

## 2021-09-30 PROCEDURE — 36416 COLLJ CAPILLARY BLOOD SPEC: CPT | Performed by: FAMILY MEDICINE

## 2021-09-30 PROCEDURE — 85610 PROTHROMBIN TIME: CPT | Performed by: FAMILY MEDICINE

## 2021-10-05 ENCOUNTER — APPOINTMENT (OUTPATIENT)
Dept: CARDIAC REHAB | Facility: HOSPITAL | Age: 71
End: 2021-10-05

## 2021-10-07 ENCOUNTER — APPOINTMENT (OUTPATIENT)
Dept: CARDIAC REHAB | Facility: HOSPITAL | Age: 71
End: 2021-10-07

## 2021-10-07 ENCOUNTER — TELEPHONE (OUTPATIENT)
Dept: CARDIOLOGY | Facility: CLINIC | Age: 71
End: 2021-10-07

## 2021-10-07 ENCOUNTER — ANTICOAGULATION VISIT (OUTPATIENT)
Dept: FAMILY MEDICINE CLINIC | Facility: CLINIC | Age: 71
End: 2021-10-07

## 2021-10-07 DIAGNOSIS — I48.91 ATRIAL FIBRILLATION WITH RVR (HCC): Primary | ICD-10-CM

## 2021-10-07 DIAGNOSIS — I25.10 CORONARY ARTERY DISEASE INVOLVING NATIVE CORONARY ARTERY OF NATIVE HEART WITHOUT ANGINA PECTORIS: ICD-10-CM

## 2021-10-07 LAB — INR PPP: 2.4 (ref 2–3)

## 2021-10-07 PROCEDURE — 36416 COLLJ CAPILLARY BLOOD SPEC: CPT | Performed by: FAMILY MEDICINE

## 2021-10-07 PROCEDURE — 85610 PROTHROMBIN TIME: CPT | Performed by: FAMILY MEDICINE

## 2021-10-07 NOTE — TELEPHONE ENCOUNTER
Patient was denied for the assistance program for Eliquis because of income. I called to inform her of the denial. She stated that she would still like to be taking off the Warfarin and ask if there was another medication that she could possibly be on.

## 2021-10-12 ENCOUNTER — APPOINTMENT (OUTPATIENT)
Dept: CARDIAC REHAB | Facility: HOSPITAL | Age: 71
End: 2021-10-12

## 2021-10-12 NOTE — TELEPHONE ENCOUNTER
Patient stated that the Xarelto is $300 after insurance. She stated that she will wait until January to try again when her new insurance starts.

## 2021-10-14 ENCOUNTER — ANTICOAGULATION VISIT (OUTPATIENT)
Dept: FAMILY MEDICINE CLINIC | Facility: CLINIC | Age: 71
End: 2021-10-14

## 2021-10-14 ENCOUNTER — OFFICE VISIT (OUTPATIENT)
Dept: FAMILY MEDICINE CLINIC | Facility: CLINIC | Age: 71
End: 2021-10-14

## 2021-10-14 ENCOUNTER — APPOINTMENT (OUTPATIENT)
Dept: CARDIAC REHAB | Facility: HOSPITAL | Age: 71
End: 2021-10-14

## 2021-10-14 VITALS
HEART RATE: 82 BPM | OXYGEN SATURATION: 96 % | SYSTOLIC BLOOD PRESSURE: 142 MMHG | HEIGHT: 62 IN | BODY MASS INDEX: 26.13 KG/M2 | WEIGHT: 142 LBS | DIASTOLIC BLOOD PRESSURE: 72 MMHG | TEMPERATURE: 97.1 F

## 2021-10-14 DIAGNOSIS — I25.10 CORONARY ARTERY DISEASE INVOLVING NATIVE CORONARY ARTERY OF NATIVE HEART WITHOUT ANGINA PECTORIS: ICD-10-CM

## 2021-10-14 DIAGNOSIS — I48.91 ATRIAL FIBRILLATION WITH RVR (HCC): Primary | ICD-10-CM

## 2021-10-14 DIAGNOSIS — Z79.01 CURRENT USE OF LONG TERM ANTICOAGULATION: Primary | ICD-10-CM

## 2021-10-14 LAB — INR PPP: 2.1 (ref 2–3)

## 2021-10-14 PROCEDURE — 85610 PROTHROMBIN TIME: CPT | Performed by: FAMILY MEDICINE

## 2021-10-14 PROCEDURE — 99213 OFFICE O/P EST LOW 20 MIN: CPT | Performed by: FAMILY MEDICINE

## 2021-10-14 PROCEDURE — 36416 COLLJ CAPILLARY BLOOD SPEC: CPT | Performed by: FAMILY MEDICINE

## 2021-10-14 RX ORDER — RIVAROXABAN 20 MG/1
TABLET, FILM COATED ORAL
COMMUNITY
Start: 2021-10-12 | End: 2021-11-04

## 2021-10-14 NOTE — PROGRESS NOTES
"Chief Complaint  Follow-up    Subjective          Rachael South presents to [facility]  Pt is anticoagulated- INR 2.1- pt is on coumadin 1mg on wed and Sunday and 2mg all other days- no bruising or bleeding      Objective   No Known Allergies    There is no immunization history on file for this patient.    Vital Signs:   Vitals:    10/14/21 1710   BP: 142/72   BP Location: Left arm   Patient Position: Sitting   Cuff Size: Adult   Pulse: 82   Temp: 97.1 °F (36.2 °C)   TempSrc: Temporal   SpO2: 96%   Weight: 64.4 kg (142 lb)   Height: 156.2 cm (61.5\")       Physical Exam  Vitals reviewed.   Constitutional:       Appearance: Normal appearance. She is well-developed.   HENT:      Head: Normocephalic and atraumatic.      Right Ear: External ear normal.      Left Ear: External ear normal.      Mouth/Throat:      Pharynx: No oropharyngeal exudate.   Eyes:      Conjunctiva/sclera: Conjunctivae normal.      Pupils: Pupils are equal, round, and reactive to light.   Cardiovascular:      Rate and Rhythm: Normal rate and regular rhythm.      Pulses: Normal pulses.      Heart sounds: Normal heart sounds. No murmur heard.  No friction rub. No gallop.    Pulmonary:      Effort: Pulmonary effort is normal.      Breath sounds: Normal breath sounds. No wheezing or rhonchi.   Abdominal:      General: Bowel sounds are normal. There is no distension.      Palpations: Abdomen is soft.      Tenderness: There is no abdominal tenderness.   Musculoskeletal:         General: Normal range of motion.   Skin:     General: Skin is warm and dry.   Neurological:      Mental Status: She is alert and oriented to person, place, and time.      Cranial Nerves: No cranial nerve deficit.   Psychiatric:         Mood and Affect: Mood and affect normal.         Behavior: Behavior normal.         Thought Content: Thought content normal.         Judgment: Judgment normal.        Result Review :                 Assessment " and Plan    Diagnoses and all orders for this visit:    1. Current use of long term anticoagulation (Primary)  -     POCT INR    2. Coronary artery disease involving native coronary artery of native heart without angina pectoris            Follow Up   Return in about 2 weeks (around 10/28/2021).  Patient was given instructions and counseling regarding her condition or for health maintenance advice. Please see specific information pulled into the AVS if appropriate.     Will continue coumadin 1mg on wed and Sunday and 2mg all other days and recheck in 2 weeks.

## 2021-10-17 RX ORDER — POTASSIUM CHLORIDE 600 MG/1
TABLET, FILM COATED, EXTENDED RELEASE ORAL
Qty: 90 TABLET | Refills: 0 | Status: SHIPPED | OUTPATIENT
Start: 2021-10-17 | End: 2022-03-28 | Stop reason: SDUPTHER

## 2021-10-19 ENCOUNTER — APPOINTMENT (OUTPATIENT)
Dept: CARDIAC REHAB | Facility: HOSPITAL | Age: 71
End: 2021-10-19

## 2021-10-21 ENCOUNTER — APPOINTMENT (OUTPATIENT)
Dept: CARDIAC REHAB | Facility: HOSPITAL | Age: 71
End: 2021-10-21

## 2021-10-26 ENCOUNTER — APPOINTMENT (OUTPATIENT)
Dept: CARDIAC REHAB | Facility: HOSPITAL | Age: 71
End: 2021-10-26

## 2021-10-28 ENCOUNTER — APPOINTMENT (OUTPATIENT)
Dept: CARDIAC REHAB | Facility: HOSPITAL | Age: 71
End: 2021-10-28

## 2021-11-01 LAB — INR PPP: 2.1 (ref 2–3)

## 2021-11-01 RX ORDER — LISINOPRIL 2.5 MG/1
2.5 TABLET ORAL
Qty: 30 TABLET | Refills: 5 | OUTPATIENT
Start: 2021-11-01

## 2021-11-02 ENCOUNTER — APPOINTMENT (OUTPATIENT)
Dept: CARDIAC REHAB | Facility: HOSPITAL | Age: 71
End: 2021-11-02

## 2021-11-04 ENCOUNTER — ANTICOAGULATION VISIT (OUTPATIENT)
Dept: FAMILY MEDICINE CLINIC | Facility: CLINIC | Age: 71
End: 2021-11-04

## 2021-11-04 ENCOUNTER — APPOINTMENT (OUTPATIENT)
Dept: CARDIAC REHAB | Facility: HOSPITAL | Age: 71
End: 2021-11-04

## 2021-11-04 ENCOUNTER — OFFICE VISIT (OUTPATIENT)
Dept: FAMILY MEDICINE CLINIC | Facility: CLINIC | Age: 71
End: 2021-11-04

## 2021-11-04 VITALS
OXYGEN SATURATION: 99 % | SYSTOLIC BLOOD PRESSURE: 188 MMHG | HEART RATE: 86 BPM | DIASTOLIC BLOOD PRESSURE: 80 MMHG | BODY MASS INDEX: 27.14 KG/M2 | WEIGHT: 146 LBS | TEMPERATURE: 97.1 F

## 2021-11-04 DIAGNOSIS — I25.10 CORONARY ARTERY DISEASE INVOLVING NATIVE CORONARY ARTERY OF NATIVE HEART WITHOUT ANGINA PECTORIS: ICD-10-CM

## 2021-11-04 DIAGNOSIS — I48.91 ATRIAL FIBRILLATION WITH RVR (HCC): Primary | ICD-10-CM

## 2021-11-04 DIAGNOSIS — D68.59 HYPERCOAGULABLE STATE (HCC): ICD-10-CM

## 2021-11-04 LAB — INR PPP: 2.8 (ref 0.9–1.1)

## 2021-11-04 PROCEDURE — 36416 COLLJ CAPILLARY BLOOD SPEC: CPT | Performed by: FAMILY MEDICINE

## 2021-11-04 PROCEDURE — 85610 PROTHROMBIN TIME: CPT | Performed by: FAMILY MEDICINE

## 2021-11-04 PROCEDURE — 99213 OFFICE O/P EST LOW 20 MIN: CPT | Performed by: FAMILY MEDICINE

## 2021-11-04 NOTE — PROGRESS NOTES
Chief Complaint  Atrial Fibrillation (INR check today 2.8. Pt takes 1 mg on tues and thur. 2mg all other days.)    Subjective          Rachael South presents to [facility]  INR 2.8- hypercoagulable due to A-fib  ROS: pt has had no recent fevers, soa, cough, vision changes, headaches, chest pains, palpitations, syncope, dizziness, nausea, vomiting, diarrhea, abdominal pain, rashes, joint pain, depression, anxiety, memory problems, leg swelling.  Pt has no bruising or bleeding      Objective   No Known Allergies    There is no immunization history on file for this patient.    Vital Signs:   Vitals:    11/04/21 1701   BP: (!) 188/80   Pulse: 86   Temp: 97.1 °F (36.2 °C)   SpO2: 99%   Weight: 66.2 kg (146 lb)       Physical Exam  Vitals reviewed.   Constitutional:       Appearance: Normal appearance. She is well-developed.   HENT:      Head: Normocephalic and atraumatic.      Right Ear: External ear normal.      Left Ear: External ear normal.      Mouth/Throat:      Pharynx: No oropharyngeal exudate.   Eyes:      Conjunctiva/sclera: Conjunctivae normal.      Pupils: Pupils are equal, round, and reactive to light.   Cardiovascular:      Rate and Rhythm: Normal rate and regular rhythm.      Pulses: Normal pulses.      Heart sounds: Normal heart sounds. No murmur heard.  No friction rub. No gallop.    Pulmonary:      Effort: Pulmonary effort is normal.      Breath sounds: Normal breath sounds. No wheezing or rhonchi.   Abdominal:      General: Bowel sounds are normal. There is no distension.      Palpations: Abdomen is soft.      Tenderness: There is no abdominal tenderness.   Skin:     General: Skin is warm and dry.      Capillary Refill: Capillary refill takes less than 2 seconds.   Neurological:      General: No focal deficit present.      Mental Status: She is alert and oriented to person, place, and time.      Cranial Nerves: No cranial nerve deficit.   Psychiatric:         Mood  and Affect: Mood and affect normal.         Behavior: Behavior normal.         Thought Content: Thought content normal.         Judgment: Judgment normal.        Result Review :                 Assessment and Plan    Diagnoses and all orders for this visit:    1. Atrial fibrillation with RVR (HCC) (Primary)    2. Hypercoagulable state (HCC)            Follow Up   Return in about 3 weeks (around 11/25/2021) for Recheck INR.  Patient was given instructions and counseling regarding her condition or for health maintenance advice. Please see specific information pulled into the AVS if appropriate.   Pt is on coumadin 1mg tues, and thurs and 2mg all other days.  Recheck INR in 3 weeks.

## 2021-11-09 ENCOUNTER — APPOINTMENT (OUTPATIENT)
Dept: CARDIAC REHAB | Facility: HOSPITAL | Age: 71
End: 2021-11-09

## 2021-11-11 ENCOUNTER — APPOINTMENT (OUTPATIENT)
Dept: CARDIAC REHAB | Facility: HOSPITAL | Age: 71
End: 2021-11-11

## 2021-11-16 ENCOUNTER — APPOINTMENT (OUTPATIENT)
Dept: CARDIAC REHAB | Facility: HOSPITAL | Age: 71
End: 2021-11-16

## 2021-11-18 ENCOUNTER — APPOINTMENT (OUTPATIENT)
Dept: CARDIAC REHAB | Facility: HOSPITAL | Age: 71
End: 2021-11-18

## 2021-11-23 ENCOUNTER — APPOINTMENT (OUTPATIENT)
Dept: CARDIAC REHAB | Facility: HOSPITAL | Age: 71
End: 2021-11-23

## 2021-11-25 ENCOUNTER — APPOINTMENT (OUTPATIENT)
Dept: CARDIAC REHAB | Facility: HOSPITAL | Age: 71
End: 2021-11-25

## 2021-11-26 ENCOUNTER — HOSPITAL ENCOUNTER (INPATIENT)
Facility: HOSPITAL | Age: 71
LOS: 2 days | Discharge: HOME | End: 2021-11-29
Attending: EMERGENCY MEDICINE | Admitting: INTERNAL MEDICINE

## 2021-11-26 DIAGNOSIS — K92.0 HEMATEMESIS WITHOUT NAUSEA: Primary | ICD-10-CM

## 2021-11-26 LAB
ALBUMIN SERPL-MCNC: 4.2 G/DL (ref 3.5–5.2)
ALBUMIN/GLOB SERPL: 1.8 G/DL
ALP SERPL-CCNC: 80 U/L (ref 39–117)
ALT SERPL W P-5'-P-CCNC: 15 U/L (ref 1–33)
ANION GAP SERPL CALCULATED.3IONS-SCNC: 10.9 MMOL/L (ref 5–15)
AST SERPL-CCNC: 19 U/L (ref 1–32)
BASOPHILS # BLD AUTO: 0.08 10*3/MM3 (ref 0–0.2)
BASOPHILS NFR BLD AUTO: 0.4 % (ref 0–1.5)
BILIRUB SERPL-MCNC: 0.4 MG/DL (ref 0–1.2)
BUN SERPL-MCNC: 20 MG/DL (ref 8–23)
BUN/CREAT SERPL: 22.2 (ref 7–25)
CALCIUM SPEC-SCNC: 9.2 MG/DL (ref 8.6–10.5)
CHLORIDE SERPL-SCNC: 97 MMOL/L (ref 98–107)
CO2 SERPL-SCNC: 26.1 MMOL/L (ref 22–29)
CREAT SERPL-MCNC: 0.9 MG/DL (ref 0.57–1)
DEPRECATED RDW RBC AUTO: 38.9 FL (ref 37–54)
EOSINOPHIL # BLD AUTO: 0.14 10*3/MM3 (ref 0–0.4)
EOSINOPHIL NFR BLD AUTO: 0.8 % (ref 0.3–6.2)
ERYTHROCYTE [DISTWIDTH] IN BLOOD BY AUTOMATED COUNT: 11.9 % (ref 12.3–15.4)
GFR SERPL CREATININE-BSD FRML MDRD: 61 ML/MIN/1.73
GLOBULIN UR ELPH-MCNC: 2.3 GM/DL
GLUCOSE SERPL-MCNC: 140 MG/DL (ref 65–99)
HCT VFR BLD AUTO: 34.5 % (ref 34–46.6)
HGB BLD-MCNC: 12.2 G/DL (ref 12–15.9)
IMM GRANULOCYTES # BLD AUTO: 0.07 10*3/MM3 (ref 0–0.05)
IMM GRANULOCYTES NFR BLD AUTO: 0.4 % (ref 0–0.5)
INR PPP: 2.66 (ref 2–3)
LIPASE SERPL-CCNC: 26 U/L (ref 13–60)
LYMPHOCYTES # BLD AUTO: 2.18 10*3/MM3 (ref 0.7–3.1)
LYMPHOCYTES NFR BLD AUTO: 12 % (ref 19.6–45.3)
MCH RBC QN AUTO: 31.9 PG (ref 26.6–33)
MCHC RBC AUTO-ENTMCNC: 35.4 G/DL (ref 31.5–35.7)
MCV RBC AUTO: 90.1 FL (ref 79–97)
MONOCYTES # BLD AUTO: 1.15 10*3/MM3 (ref 0.1–0.9)
MONOCYTES NFR BLD AUTO: 6.3 % (ref 5–12)
NEUTROPHILS NFR BLD AUTO: 14.57 10*3/MM3 (ref 1.7–7)
NEUTROPHILS NFR BLD AUTO: 80.1 % (ref 42.7–76)
NRBC BLD AUTO-RTO: 0 /100 WBC (ref 0–0.2)
PLATELET # BLD AUTO: 313 10*3/MM3 (ref 140–450)
PMV BLD AUTO: 8.6 FL (ref 6–12)
POTASSIUM SERPL-SCNC: 4.4 MMOL/L (ref 3.5–5.2)
PROT SERPL-MCNC: 6.5 G/DL (ref 6–8.5)
PROTHROMBIN TIME: 25.7 SECONDS (ref 9.4–12)
RBC # BLD AUTO: 3.83 10*6/MM3 (ref 3.77–5.28)
SODIUM SERPL-SCNC: 134 MMOL/L (ref 136–145)
WBC NRBC COR # BLD: 18.19 10*3/MM3 (ref 3.4–10.8)

## 2021-11-26 PROCEDURE — 84484 ASSAY OF TROPONIN QUANT: CPT | Performed by: PHYSICIAN ASSISTANT

## 2021-11-26 PROCEDURE — 99284 EMERGENCY DEPT VISIT MOD MDM: CPT

## 2021-11-26 PROCEDURE — 83690 ASSAY OF LIPASE: CPT | Performed by: EMERGENCY MEDICINE

## 2021-11-26 PROCEDURE — 93005 ELECTROCARDIOGRAM TRACING: CPT | Performed by: EMERGENCY MEDICINE

## 2021-11-26 PROCEDURE — 86900 BLOOD TYPING SEROLOGIC ABO: CPT

## 2021-11-26 PROCEDURE — 85025 COMPLETE CBC W/AUTO DIFF WBC: CPT | Performed by: EMERGENCY MEDICINE

## 2021-11-26 PROCEDURE — 80053 COMPREHEN METABOLIC PANEL: CPT | Performed by: EMERGENCY MEDICINE

## 2021-11-26 PROCEDURE — 85610 PROTHROMBIN TIME: CPT | Performed by: EMERGENCY MEDICINE

## 2021-11-26 PROCEDURE — 25010000002 ONDANSETRON PER 1 MG: Performed by: EMERGENCY MEDICINE

## 2021-11-26 PROCEDURE — 86901 BLOOD TYPING SEROLOGIC RH(D): CPT

## 2021-11-26 PROCEDURE — 36415 COLL VENOUS BLD VENIPUNCTURE: CPT

## 2021-11-26 RX ORDER — PANTOPRAZOLE SODIUM 40 MG/10ML
40 INJECTION, POWDER, LYOPHILIZED, FOR SOLUTION INTRAVENOUS ONCE
Status: COMPLETED | OUTPATIENT
Start: 2021-11-26 | End: 2021-11-26

## 2021-11-26 RX ORDER — SODIUM CHLORIDE 0.9 % (FLUSH) 0.9 %
10 SYRINGE (ML) INJECTION AS NEEDED
Status: DISCONTINUED | OUTPATIENT
Start: 2021-11-26 | End: 2021-11-29 | Stop reason: HOSPADM

## 2021-11-26 RX ORDER — ONDANSETRON 2 MG/ML
4 INJECTION INTRAMUSCULAR; INTRAVENOUS ONCE
Status: COMPLETED | OUTPATIENT
Start: 2021-11-26 | End: 2021-11-26

## 2021-11-26 RX ADMIN — PANTOPRAZOLE SODIUM 40 MG: 40 INJECTION, POWDER, FOR SOLUTION INTRAVENOUS at 23:33

## 2021-11-26 RX ADMIN — ONDANSETRON 4 MG: 2 INJECTION INTRAMUSCULAR; INTRAVENOUS at 23:33

## 2021-11-27 ENCOUNTER — ANESTHESIA (OUTPATIENT)
Dept: GASTROENTEROLOGY | Facility: HOSPITAL | Age: 71
End: 2021-11-27

## 2021-11-27 ENCOUNTER — PREP FOR SURGERY (OUTPATIENT)
Dept: OTHER | Facility: HOSPITAL | Age: 71
End: 2021-11-27

## 2021-11-27 ENCOUNTER — ANESTHESIA EVENT (OUTPATIENT)
Dept: GASTROENTEROLOGY | Facility: HOSPITAL | Age: 71
End: 2021-11-27

## 2021-11-27 DIAGNOSIS — K92.0 HEMATEMESIS WITHOUT NAUSEA: Primary | ICD-10-CM

## 2021-11-27 LAB
ABO GROUP BLD: NORMAL
ABO GROUP BLD: NORMAL
ANION GAP SERPL CALCULATED.3IONS-SCNC: 10.1 MMOL/L (ref 5–15)
BASOPHILS # BLD AUTO: 0.04 10*3/MM3 (ref 0–0.2)
BASOPHILS NFR BLD AUTO: 0.3 % (ref 0–1.5)
BLD GP AB SCN SERPL QL: NEGATIVE
BUN SERPL-MCNC: 29 MG/DL (ref 8–23)
BUN/CREAT SERPL: 38.2 (ref 7–25)
CALCIUM SPEC-SCNC: 8.8 MG/DL (ref 8.6–10.5)
CHLORIDE SERPL-SCNC: 97 MMOL/L (ref 98–107)
CO2 SERPL-SCNC: 24.9 MMOL/L (ref 22–29)
CREAT SERPL-MCNC: 0.76 MG/DL (ref 0.57–1)
DEPRECATED RDW RBC AUTO: 38.5 FL (ref 37–54)
DEPRECATED RDW RBC AUTO: 38.6 FL (ref 37–54)
EOSINOPHIL # BLD AUTO: 0 10*3/MM3 (ref 0–0.4)
EOSINOPHIL NFR BLD AUTO: 0 % (ref 0.3–6.2)
ERYTHROCYTE [DISTWIDTH] IN BLOOD BY AUTOMATED COUNT: 11.9 % (ref 12.3–15.4)
ERYTHROCYTE [DISTWIDTH] IN BLOOD BY AUTOMATED COUNT: 12 % (ref 12.3–15.4)
GFR SERPL CREATININE-BSD FRML MDRD: 75 ML/MIN/1.73
GLUCOSE SERPL-MCNC: 124 MG/DL (ref 65–99)
HCT VFR BLD AUTO: 22 % (ref 34–46.6)
HCT VFR BLD AUTO: 22.1 % (ref 34–46.6)
HCT VFR BLD AUTO: 23.6 % (ref 34–46.6)
HCT VFR BLD AUTO: 24.5 % (ref 34–46.6)
HCT VFR BLD AUTO: 24.8 % (ref 34–46.6)
HCT VFR BLD AUTO: 29.4 % (ref 34–46.6)
HCT VFR BLD AUTO: 29.4 % (ref 34–46.6)
HGB BLD-MCNC: 10.6 G/DL (ref 12–15.9)
HGB BLD-MCNC: 10.6 G/DL (ref 12–15.9)
HGB BLD-MCNC: 7.8 G/DL (ref 12–15.9)
HGB BLD-MCNC: 7.8 G/DL (ref 12–15.9)
HGB BLD-MCNC: 8.4 G/DL (ref 12–15.9)
HGB BLD-MCNC: 8.7 G/DL (ref 12–15.9)
HGB BLD-MCNC: 8.8 G/DL (ref 12–15.9)
HOLD SPECIMEN: NORMAL
HOLD SPECIMEN: NORMAL
IMM GRANULOCYTES # BLD AUTO: 0.06 10*3/MM3 (ref 0–0.05)
IMM GRANULOCYTES NFR BLD AUTO: 0.4 % (ref 0–0.5)
INR PPP: 1.1 (ref 2–3)
LYMPHOCYTES # BLD AUTO: 2.46 10*3/MM3 (ref 0.7–3.1)
LYMPHOCYTES NFR BLD AUTO: 16 % (ref 19.6–45.3)
MCH RBC QN AUTO: 31.5 PG (ref 26.6–33)
MCH RBC QN AUTO: 32.1 PG (ref 26.6–33)
MCHC RBC AUTO-ENTMCNC: 35.5 G/DL (ref 31.5–35.7)
MCHC RBC AUTO-ENTMCNC: 36.1 G/DL (ref 31.5–35.7)
MCV RBC AUTO: 88.9 FL (ref 79–97)
MCV RBC AUTO: 89.1 FL (ref 79–97)
MONOCYTES # BLD AUTO: 0.97 10*3/MM3 (ref 0.1–0.9)
MONOCYTES NFR BLD AUTO: 6.3 % (ref 5–12)
NEUTROPHILS NFR BLD AUTO: 11.8 10*3/MM3 (ref 1.7–7)
NEUTROPHILS NFR BLD AUTO: 77 % (ref 42.7–76)
NRBC BLD AUTO-RTO: 0 /100 WBC (ref 0–0.2)
PLATELET # BLD AUTO: 265 10*3/MM3 (ref 140–450)
PLATELET # BLD AUTO: 295 10*3/MM3 (ref 140–450)
PMV BLD AUTO: 8.9 FL (ref 6–12)
PMV BLD AUTO: 9.6 FL (ref 6–12)
POTASSIUM SERPL-SCNC: 4.2 MMOL/L (ref 3.5–5.2)
PROTHROMBIN TIME: 11.4 SECONDS (ref 9.4–12)
RBC # BLD AUTO: 2.79 10*6/MM3 (ref 3.77–5.28)
RBC # BLD AUTO: 3.3 10*6/MM3 (ref 3.77–5.28)
RH BLD: POSITIVE
RH BLD: POSITIVE
SODIUM SERPL-SCNC: 132 MMOL/L (ref 136–145)
T&S EXPIRATION DATE: NORMAL
TROPONIN T SERPL-MCNC: <0.01 NG/ML (ref 0–0.03)
WBC NRBC COR # BLD: 15.33 10*3/MM3 (ref 3.4–10.8)
WBC NRBC COR # BLD: 21.01 10*3/MM3 (ref 3.4–10.8)
WHOLE BLOOD HOLD SPECIMEN: NORMAL
WHOLE BLOOD HOLD SPECIMEN: NORMAL

## 2021-11-27 PROCEDURE — 85025 COMPLETE CBC W/AUTO DIFF WBC: CPT | Performed by: INTERNAL MEDICINE

## 2021-11-27 PROCEDURE — 0W3P8ZZ CONTROL BLEEDING IN GASTROINTESTINAL TRACT, VIA NATURAL OR ARTIFICIAL OPENING ENDOSCOPIC: ICD-10-PCS | Performed by: INTERNAL MEDICINE

## 2021-11-27 PROCEDURE — 84484 ASSAY OF TROPONIN QUANT: CPT | Performed by: PHYSICIAN ASSISTANT

## 2021-11-27 PROCEDURE — 85014 HEMATOCRIT: CPT | Performed by: PHYSICIAN ASSISTANT

## 2021-11-27 PROCEDURE — 94799 UNLISTED PULMONARY SVC/PX: CPT

## 2021-11-27 PROCEDURE — 25010000002 MORPHINE PER 10 MG: Performed by: FAMILY MEDICINE

## 2021-11-27 PROCEDURE — 85014 HEMATOCRIT: CPT | Performed by: INTERNAL MEDICINE

## 2021-11-27 PROCEDURE — 80048 BASIC METABOLIC PNL TOTAL CA: CPT | Performed by: FAMILY MEDICINE

## 2021-11-27 PROCEDURE — 85610 PROTHROMBIN TIME: CPT | Performed by: INTERNAL MEDICINE

## 2021-11-27 PROCEDURE — 0 PHYTONADIONE 10 MG/ML SOLUTION 1 ML AMPULE: Performed by: EMERGENCY MEDICINE

## 2021-11-27 PROCEDURE — 85027 COMPLETE CBC AUTOMATED: CPT | Performed by: FAMILY MEDICINE

## 2021-11-27 PROCEDURE — 25010000002 PHENYLEPHRINE 10 MG/ML SOLUTION: Performed by: ANESTHESIOLOGY

## 2021-11-27 PROCEDURE — 86850 RBC ANTIBODY SCREEN: CPT | Performed by: FAMILY MEDICINE

## 2021-11-27 PROCEDURE — 85018 HEMOGLOBIN: CPT | Performed by: INTERNAL MEDICINE

## 2021-11-27 PROCEDURE — 86901 BLOOD TYPING SEROLOGIC RH(D): CPT | Performed by: FAMILY MEDICINE

## 2021-11-27 PROCEDURE — 86923 COMPATIBILITY TEST ELECTRIC: CPT

## 2021-11-27 PROCEDURE — 86927 PLASMA FRESH FROZEN: CPT

## 2021-11-27 PROCEDURE — 36430 TRANSFUSION BLD/BLD COMPNT: CPT

## 2021-11-27 PROCEDURE — 99291 CRITICAL CARE FIRST HOUR: CPT | Performed by: INTERNAL MEDICINE

## 2021-11-27 PROCEDURE — 99291 CRITICAL CARE FIRST HOUR: CPT | Performed by: PHYSICIAN ASSISTANT

## 2021-11-27 PROCEDURE — 43235 EGD DIAGNOSTIC BRUSH WASH: CPT | Performed by: INTERNAL MEDICINE

## 2021-11-27 PROCEDURE — 86900 BLOOD TYPING SEROLOGIC ABO: CPT | Performed by: FAMILY MEDICINE

## 2021-11-27 PROCEDURE — 85018 HEMOGLOBIN: CPT | Performed by: PHYSICIAN ASSISTANT

## 2021-11-27 PROCEDURE — 25010000002 GLUCAGON (HUMAN RECOMBINANT) 1 MG RECONSTITUTED SOLUTION: Performed by: PHYSICIAN ASSISTANT

## 2021-11-27 PROCEDURE — P9017 PLASMA 1 DONOR FRZ W/IN 8 HR: HCPCS

## 2021-11-27 PROCEDURE — 93010 ELECTROCARDIOGRAM REPORT: CPT | Performed by: INTERNAL MEDICINE

## 2021-11-27 PROCEDURE — 25010000002 PROPOFOL 10 MG/ML EMULSION: Performed by: ANESTHESIOLOGY

## 2021-11-27 PROCEDURE — C1889 IMPLANT/INSERT DEVICE, NOC: HCPCS | Performed by: INTERNAL MEDICINE

## 2021-11-27 PROCEDURE — 99222 1ST HOSP IP/OBS MODERATE 55: CPT | Performed by: INTERNAL MEDICINE

## 2021-11-27 DEVICE — DEV CLIP ENDO RESOLUTION360 CONTRL ROT 235CM: Type: IMPLANTABLE DEVICE | Site: ESOPHAGUS | Status: FUNCTIONAL

## 2021-11-27 RX ORDER — PROMETHAZINE HYDROCHLORIDE 25 MG/1
25 TABLET ORAL ONCE AS NEEDED
Status: DISCONTINUED | OUTPATIENT
Start: 2021-11-27 | End: 2021-11-29 | Stop reason: HOSPADM

## 2021-11-27 RX ORDER — SODIUM CHLORIDE, SODIUM LACTATE, POTASSIUM CHLORIDE, CALCIUM CHLORIDE 600; 310; 30; 20 MG/100ML; MG/100ML; MG/100ML; MG/100ML
INJECTION, SOLUTION INTRAVENOUS CONTINUOUS PRN
Status: DISCONTINUED | OUTPATIENT
Start: 2021-11-27 | End: 2021-11-27 | Stop reason: SURG

## 2021-11-27 RX ORDER — PROPOFOL 10 MG/ML
VIAL (ML) INTRAVENOUS AS NEEDED
Status: DISCONTINUED | OUTPATIENT
Start: 2021-11-27 | End: 2021-11-27 | Stop reason: SURG

## 2021-11-27 RX ORDER — AMIODARONE HYDROCHLORIDE 200 MG/1
100 TABLET ORAL DAILY
Status: DISCONTINUED | OUTPATIENT
Start: 2021-11-27 | End: 2021-11-29 | Stop reason: HOSPADM

## 2021-11-27 RX ORDER — BISACODYL 10 MG
10 SUPPOSITORY, RECTAL RECTAL DAILY PRN
Status: DISCONTINUED | OUTPATIENT
Start: 2021-11-27 | End: 2021-11-29 | Stop reason: HOSPADM

## 2021-11-27 RX ORDER — BISACODYL 5 MG/1
5 TABLET, DELAYED RELEASE ORAL DAILY PRN
Status: DISCONTINUED | OUTPATIENT
Start: 2021-11-27 | End: 2021-11-29 | Stop reason: HOSPADM

## 2021-11-27 RX ORDER — MORPHINE SULFATE 2 MG/ML
2 INJECTION, SOLUTION INTRAMUSCULAR; INTRAVENOUS EVERY 4 HOURS PRN
Status: DISCONTINUED | OUTPATIENT
Start: 2021-11-27 | End: 2021-11-29 | Stop reason: HOSPADM

## 2021-11-27 RX ORDER — ONDANSETRON 2 MG/ML
4 INJECTION INTRAMUSCULAR; INTRAVENOUS ONCE AS NEEDED
Status: DISCONTINUED | OUTPATIENT
Start: 2021-11-27 | End: 2021-11-29 | Stop reason: HOSPADM

## 2021-11-27 RX ORDER — SODIUM CHLORIDE 0.9 % (FLUSH) 0.9 %
10 SYRINGE (ML) INJECTION AS NEEDED
Status: DISCONTINUED | OUTPATIENT
Start: 2021-11-27 | End: 2021-11-29 | Stop reason: HOSPADM

## 2021-11-27 RX ORDER — PROMETHAZINE HYDROCHLORIDE 25 MG/1
25 SUPPOSITORY RECTAL ONCE AS NEEDED
Status: DISCONTINUED | OUTPATIENT
Start: 2021-11-27 | End: 2021-11-29 | Stop reason: HOSPADM

## 2021-11-27 RX ORDER — TRAZODONE HYDROCHLORIDE 50 MG/1
100 TABLET ORAL NIGHTLY
Status: DISCONTINUED | OUTPATIENT
Start: 2021-11-27 | End: 2021-11-29 | Stop reason: HOSPADM

## 2021-11-27 RX ORDER — POLYETHYLENE GLYCOL 3350 17 G/17G
17 POWDER, FOR SOLUTION ORAL DAILY PRN
Status: DISCONTINUED | OUTPATIENT
Start: 2021-11-27 | End: 2021-11-29 | Stop reason: HOSPADM

## 2021-11-27 RX ORDER — AMOXICILLIN 250 MG
2 CAPSULE ORAL 2 TIMES DAILY
Status: DISCONTINUED | OUTPATIENT
Start: 2021-11-27 | End: 2021-11-29 | Stop reason: HOSPADM

## 2021-11-27 RX ORDER — PHENYLEPHRINE HYDROCHLORIDE 10 MG/ML
INJECTION INTRAVENOUS AS NEEDED
Status: DISCONTINUED | OUTPATIENT
Start: 2021-11-27 | End: 2021-11-27 | Stop reason: SURG

## 2021-11-27 RX ORDER — MEPERIDINE HYDROCHLORIDE 25 MG/ML
12.5 INJECTION INTRAMUSCULAR; INTRAVENOUS; SUBCUTANEOUS
Status: DISCONTINUED | OUTPATIENT
Start: 2021-11-27 | End: 2021-11-28

## 2021-11-27 RX ORDER — ACETAMINOPHEN 325 MG/1
650 TABLET ORAL EVERY 4 HOURS PRN
Status: DISCONTINUED | OUTPATIENT
Start: 2021-11-27 | End: 2021-11-29 | Stop reason: HOSPADM

## 2021-11-27 RX ORDER — ATORVASTATIN CALCIUM 40 MG/1
40 TABLET, FILM COATED ORAL NIGHTLY
Status: DISCONTINUED | OUTPATIENT
Start: 2021-11-27 | End: 2021-11-29 | Stop reason: HOSPADM

## 2021-11-27 RX ORDER — ACETAMINOPHEN 650 MG/1
650 SUPPOSITORY RECTAL EVERY 4 HOURS PRN
Status: DISCONTINUED | OUTPATIENT
Start: 2021-11-27 | End: 2021-11-29 | Stop reason: HOSPADM

## 2021-11-27 RX ORDER — SODIUM CHLORIDE 0.9 % (FLUSH) 0.9 %
10 SYRINGE (ML) INJECTION EVERY 12 HOURS SCHEDULED
Status: DISCONTINUED | OUTPATIENT
Start: 2021-11-27 | End: 2021-11-29 | Stop reason: HOSPADM

## 2021-11-27 RX ORDER — OXYCODONE HYDROCHLORIDE 5 MG/1
5 TABLET ORAL
Status: DISCONTINUED | OUTPATIENT
Start: 2021-11-27 | End: 2021-11-29 | Stop reason: HOSPADM

## 2021-11-27 RX ORDER — ACETAMINOPHEN 160 MG/5ML
650 SOLUTION ORAL EVERY 4 HOURS PRN
Status: DISCONTINUED | OUTPATIENT
Start: 2021-11-27 | End: 2021-11-29 | Stop reason: HOSPADM

## 2021-11-27 RX ADMIN — PHYTONADIONE 10 MG: 10 INJECTION, EMULSION INTRAMUSCULAR; INTRAVENOUS; SUBCUTANEOUS at 00:20

## 2021-11-27 RX ADMIN — SODIUM CHLORIDE, PRESERVATIVE FREE 10 ML: 5 INJECTION INTRAVENOUS at 01:49

## 2021-11-27 RX ADMIN — ACETAMINOPHEN 650 MG: 325 TABLET ORAL at 11:44

## 2021-11-27 RX ADMIN — PROPOFOL 20 MG: 10 INJECTION, EMULSION INTRAVENOUS at 08:50

## 2021-11-27 RX ADMIN — PHENYLEPHRINE HYDROCHLORIDE 100 MCG: 10 INJECTION INTRAVENOUS at 08:47

## 2021-11-27 RX ADMIN — GLUCAGON HYDROCHLORIDE 1 MG: 1 INJECTION, POWDER, FOR SOLUTION INTRAMUSCULAR; INTRAVENOUS; SUBCUTANEOUS at 02:00

## 2021-11-27 RX ADMIN — PANTOPRAZOLE SODIUM 8 MG/HR: 40 INJECTION, POWDER, FOR SOLUTION INTRAVENOUS at 09:13

## 2021-11-27 RX ADMIN — PROPOFOL 40 MG: 10 INJECTION, EMULSION INTRAVENOUS at 08:46

## 2021-11-27 RX ADMIN — MORPHINE SULFATE 2 MG: 2 INJECTION, SOLUTION INTRAMUSCULAR; INTRAVENOUS at 01:49

## 2021-11-27 RX ADMIN — PROPOFOL 20 MG: 10 INJECTION, EMULSION INTRAVENOUS at 08:55

## 2021-11-27 RX ADMIN — PANTOPRAZOLE SODIUM 8 MG/HR: 40 INJECTION, POWDER, FOR SOLUTION INTRAVENOUS at 14:38

## 2021-11-27 RX ADMIN — PANTOPRAZOLE SODIUM 8 MG/HR: 40 INJECTION, POWDER, FOR SOLUTION INTRAVENOUS at 19:27

## 2021-11-27 RX ADMIN — SODIUM CHLORIDE, PRESERVATIVE FREE 10 ML: 5 INJECTION INTRAVENOUS at 10:19

## 2021-11-27 RX ADMIN — TRAZODONE HYDROCHLORIDE 100 MG: 50 TABLET ORAL at 20:51

## 2021-11-27 RX ADMIN — ATORVASTATIN CALCIUM 40 MG: 40 TABLET, FILM COATED ORAL at 20:50

## 2021-11-27 RX ADMIN — PANTOPRAZOLE SODIUM 8 MG/HR: 40 INJECTION, POWDER, FOR SOLUTION INTRAVENOUS at 03:19

## 2021-11-27 RX ADMIN — PROPOFOL 30 MG: 10 INJECTION, EMULSION INTRAVENOUS at 08:59

## 2021-11-27 RX ADMIN — PHENYLEPHRINE HYDROCHLORIDE 100 MCG: 10 INJECTION INTRAVENOUS at 08:53

## 2021-11-27 RX ADMIN — AMIODARONE HYDROCHLORIDE 100 MG: 200 TABLET ORAL at 10:16

## 2021-11-27 RX ADMIN — SODIUM CHLORIDE, POTASSIUM CHLORIDE, SODIUM LACTATE AND CALCIUM CHLORIDE: 600; 310; 30; 20 INJECTION, SOLUTION INTRAVENOUS at 08:43

## 2021-11-27 RX ADMIN — SODIUM CHLORIDE 500 ML: 9 INJECTION, SOLUTION INTRAVENOUS at 00:20

## 2021-11-27 RX ADMIN — PHENYLEPHRINE HYDROCHLORIDE 100 MCG: 10 INJECTION INTRAVENOUS at 08:58

## 2021-11-27 NOTE — ANESTHESIA PREPROCEDURE EVALUATION
Anesthesia Evaluation     Patient summary reviewed and Nursing notes reviewed   history of anesthetic complications: PONV  NPO Solid Status: > 8 hours  NPO Liquid Status: > 2 hours           Airway   Mallampati: II  TM distance: >3 FB  Neck ROM: full  No difficulty expected  Dental      Pulmonary - normal exam    breath sounds clear to auscultation  (+) COPD,   Cardiovascular - normal exam  Exercise tolerance: good (4-7 METS)    Rhythm: regular    (+) hypertension, valvular problems/murmurs, CAD, CABG, dysrhythmias, angina, CHF , hyperlipidemia,       Neuro/Psych- negative ROS  GI/Hepatic/Renal/Endo    (+)  GERD, GI bleeding ,     Musculoskeletal (-) negative ROS    Abdominal    Substance History - negative use     OB/GYN negative ob/gyn ROS         Other - negative ROS              Results for AMANDA ROSALES (MRN [id number]) as of 11/27/2021 08:26   Ref. Range 11/27/2021 03:46   Glucose Latest Ref Range: 65 - 99 mg/dL 124 (H)   Sodium Latest Ref Range: 136 - 145 mmol/L 132 (L)   Potassium Latest Ref Range: 3.5 - 5.2 mmol/L 4.2   CO2 Latest Ref Range: 22.0 - 29.0 mmol/L 24.9   Chloride Latest Ref Range: 98 - 107 mmol/L 97 (L)   Anion Gap Latest Ref Range: 5.0 - 15.0 mmol/L 10.1   Creatinine Latest Ref Range: 0.57 - 1.00 mg/dL 0.76   BUN Latest Ref Range: 8 - 23 mg/dL 29 (H)   BUN/Creatinine Ratio Latest Ref Range: 7.0 - 25.0  38.2 (H)   Calcium Latest Ref Range: 8.6 - 10.5 mg/dL 8.8   eGFR Non  Am Latest Ref Range: >60 mL/min/1.73 75     Results for AMANDA ROSALES (MRN [id number]) as of 11/27/2021 08:26   Ref. Range 11/26/2021 23:14   Protime Latest Ref Range: 9.4 - 12.0 Seconds 25.7 (H)   INR Latest Ref Range: 2.00 - 3.00  2.66     Results for AMANDA ROSALES (MRN [id number]) as of 11/27/2021 08:26   Ref. Range 11/27/2021 03:46   Hemoglobin Latest Ref Range: 12.0 - 15.9 g/dL 10.6 (L)   Hematocrit Latest Ref Range: 34.0 - 46.6 % 29.4 (L)     ekg nsr lae q waves inferiorly    IMPRESSION:  RITA  1.   The left ventricular chamber size is normal. The left ventricular wall        thickness is normal.  The left ventricular systolic function is reduced        with an estimated EF of 35-40%.  There is abnormal motion of the        interventricular septum.    2.  Left ventricular diastolic dysfunction.    3.  Normally functioning prosthetic mitral valve.    4.  Mild increase in velocity across the aortic valve with mild aortic valve        regurgitation.    5.  Tricuspid regurgitation with estimated pulmonary artery systolic pressure        of 40-45 mmHg.           Anesthesia Plan    ASA 4     general and MAC   (Patient understands anesthesia not responsible for dental damage.)  intravenous induction     Anesthetic plan, all risks, benefits, and alternatives have been provided, discussed and informed consent has been obtained with: patient.  Use of blood products discussed with patient .   Plan discussed with CRNA.

## 2021-11-28 LAB
ANION GAP SERPL CALCULATED.3IONS-SCNC: 9.6 MMOL/L (ref 5–15)
BASOPHILS # BLD AUTO: 0.04 10*3/MM3 (ref 0–0.2)
BASOPHILS NFR BLD AUTO: 0.6 % (ref 0–1.5)
BH BB BLOOD EXPIRATION DATE: NORMAL
BH BB BLOOD TYPE BARCODE: 8400
BH BB DISPENSE STATUS: NORMAL
BH BB PRODUCT CODE: NORMAL
BH BB UNIT NUMBER: NORMAL
BUN SERPL-MCNC: 18 MG/DL (ref 8–23)
BUN/CREAT SERPL: 22.2 (ref 7–25)
CALCIUM SPEC-SCNC: 8.3 MG/DL (ref 8.6–10.5)
CHLORIDE SERPL-SCNC: 105 MMOL/L (ref 98–107)
CO2 SERPL-SCNC: 24.4 MMOL/L (ref 22–29)
CREAT SERPL-MCNC: 0.81 MG/DL (ref 0.57–1)
DEPRECATED RDW RBC AUTO: 42.9 FL (ref 37–54)
EOSINOPHIL # BLD AUTO: 0.16 10*3/MM3 (ref 0–0.4)
EOSINOPHIL NFR BLD AUTO: 2.3 % (ref 0.3–6.2)
ERYTHROCYTE [DISTWIDTH] IN BLOOD BY AUTOMATED COUNT: 12.7 % (ref 12.3–15.4)
GFR SERPL CREATININE-BSD FRML MDRD: 69 ML/MIN/1.73
GLUCOSE SERPL-MCNC: 101 MG/DL (ref 65–99)
HCT VFR BLD AUTO: 21 % (ref 34–46.6)
HCT VFR BLD AUTO: 22.8 % (ref 34–46.6)
HGB BLD-MCNC: 7.2 G/DL (ref 12–15.9)
HGB BLD-MCNC: 8.2 G/DL (ref 12–15.9)
IMM GRANULOCYTES # BLD AUTO: 0.02 10*3/MM3 (ref 0–0.05)
IMM GRANULOCYTES NFR BLD AUTO: 0.3 % (ref 0–0.5)
LYMPHOCYTES # BLD AUTO: 2.54 10*3/MM3 (ref 0.7–3.1)
LYMPHOCYTES NFR BLD AUTO: 36.7 % (ref 19.6–45.3)
MAGNESIUM SERPL-MCNC: 1.9 MG/DL (ref 1.6–2.4)
MCH RBC QN AUTO: 32 PG (ref 26.6–33)
MCHC RBC AUTO-ENTMCNC: 34.3 G/DL (ref 31.5–35.7)
MCV RBC AUTO: 93.3 FL (ref 79–97)
MONOCYTES # BLD AUTO: 0.48 10*3/MM3 (ref 0.1–0.9)
MONOCYTES NFR BLD AUTO: 6.9 % (ref 5–12)
NEUTROPHILS NFR BLD AUTO: 3.69 10*3/MM3 (ref 1.7–7)
NEUTROPHILS NFR BLD AUTO: 53.2 % (ref 42.7–76)
NRBC BLD AUTO-RTO: 0 /100 WBC (ref 0–0.2)
PHOSPHATE SERPL-MCNC: 2.7 MG/DL (ref 2.5–4.5)
PLATELET # BLD AUTO: 195 10*3/MM3 (ref 140–450)
PMV BLD AUTO: 9.3 FL (ref 6–12)
POTASSIUM SERPL-SCNC: 4.1 MMOL/L (ref 3.5–5.2)
RBC # BLD AUTO: 2.25 10*6/MM3 (ref 3.77–5.28)
SODIUM SERPL-SCNC: 139 MMOL/L (ref 136–145)
UNIT  ABO: NORMAL
UNIT  RH: NORMAL
WBC NRBC COR # BLD: 6.93 10*3/MM3 (ref 3.4–10.8)

## 2021-11-28 PROCEDURE — 83735 ASSAY OF MAGNESIUM: CPT | Performed by: INTERNAL MEDICINE

## 2021-11-28 PROCEDURE — 84100 ASSAY OF PHOSPHORUS: CPT | Performed by: INTERNAL MEDICINE

## 2021-11-28 PROCEDURE — 99233 SBSQ HOSP IP/OBS HIGH 50: CPT | Performed by: INTERNAL MEDICINE

## 2021-11-28 PROCEDURE — 94799 UNLISTED PULMONARY SVC/PX: CPT

## 2021-11-28 PROCEDURE — P9016 RBC LEUKOCYTES REDUCED: HCPCS

## 2021-11-28 PROCEDURE — 36430 TRANSFUSION BLD/BLD COMPNT: CPT

## 2021-11-28 PROCEDURE — 99232 SBSQ HOSP IP/OBS MODERATE 35: CPT | Performed by: INTERNAL MEDICINE

## 2021-11-28 PROCEDURE — 85025 COMPLETE CBC W/AUTO DIFF WBC: CPT | Performed by: INTERNAL MEDICINE

## 2021-11-28 PROCEDURE — 86900 BLOOD TYPING SEROLOGIC ABO: CPT

## 2021-11-28 PROCEDURE — 80048 BASIC METABOLIC PNL TOTAL CA: CPT | Performed by: INTERNAL MEDICINE

## 2021-11-28 RX ORDER — METOPROLOL SUCCINATE 50 MG/1
50 TABLET, EXTENDED RELEASE ORAL
Status: DISCONTINUED | OUTPATIENT
Start: 2021-11-28 | End: 2021-11-29 | Stop reason: HOSPADM

## 2021-11-28 RX ADMIN — PANTOPRAZOLE SODIUM 8 MG/HR: 40 INJECTION, POWDER, FOR SOLUTION INTRAVENOUS at 09:29

## 2021-11-28 RX ADMIN — ACETAMINOPHEN 650 MG: 325 TABLET ORAL at 08:15

## 2021-11-28 RX ADMIN — ATORVASTATIN CALCIUM 40 MG: 40 TABLET, FILM COATED ORAL at 20:50

## 2021-11-28 RX ADMIN — SENNOSIDES AND DOCUSATE SODIUM 2 TABLET: 50; 8.6 TABLET ORAL at 20:50

## 2021-11-28 RX ADMIN — TRAZODONE HYDROCHLORIDE 100 MG: 50 TABLET ORAL at 20:50

## 2021-11-28 RX ADMIN — PANTOPRAZOLE SODIUM 8 MG/HR: 40 INJECTION, POWDER, FOR SOLUTION INTRAVENOUS at 00:19

## 2021-11-28 RX ADMIN — AMIODARONE HYDROCHLORIDE 100 MG: 200 TABLET ORAL at 08:15

## 2021-11-28 RX ADMIN — SODIUM CHLORIDE, PRESERVATIVE FREE 10 ML: 5 INJECTION INTRAVENOUS at 08:15

## 2021-11-28 RX ADMIN — PANTOPRAZOLE SODIUM 8 MG/HR: 40 INJECTION, POWDER, FOR SOLUTION INTRAVENOUS at 04:43

## 2021-11-28 RX ADMIN — PANTOPRAZOLE SODIUM 8 MG/HR: 40 INJECTION, POWDER, FOR SOLUTION INTRAVENOUS at 20:50

## 2021-11-28 RX ADMIN — SENNOSIDES AND DOCUSATE SODIUM 2 TABLET: 50; 8.6 TABLET ORAL at 08:15

## 2021-11-28 RX ADMIN — METOPROLOL SUCCINATE 50 MG: 50 TABLET, EXTENDED RELEASE ORAL at 09:29

## 2021-11-28 RX ADMIN — SODIUM CHLORIDE, PRESERVATIVE FREE 10 ML: 5 INJECTION INTRAVENOUS at 20:51

## 2021-11-29 ENCOUNTER — READMISSION MANAGEMENT (OUTPATIENT)
Dept: CALL CENTER | Facility: HOSPITAL | Age: 71
End: 2021-11-29

## 2021-11-29 VITALS
WEIGHT: 153.22 LBS | OXYGEN SATURATION: 98 % | SYSTOLIC BLOOD PRESSURE: 142 MMHG | BODY MASS INDEX: 28.2 KG/M2 | DIASTOLIC BLOOD PRESSURE: 63 MMHG | TEMPERATURE: 98.5 F | HEIGHT: 62 IN | RESPIRATION RATE: 16 BRPM | HEART RATE: 91 BPM

## 2021-11-29 LAB
ANION GAP SERPL CALCULATED.3IONS-SCNC: 8.6 MMOL/L (ref 5–15)
BASOPHILS # BLD AUTO: 0.04 10*3/MM3 (ref 0–0.2)
BASOPHILS NFR BLD AUTO: 0.5 % (ref 0–1.5)
BH BB BLOOD EXPIRATION DATE: NORMAL
BH BB BLOOD TYPE BARCODE: 5100
BH BB DISPENSE STATUS: NORMAL
BH BB PRODUCT CODE: NORMAL
BH BB UNIT NUMBER: NORMAL
BUN SERPL-MCNC: 13 MG/DL (ref 8–23)
BUN/CREAT SERPL: 17.1 (ref 7–25)
CALCIUM SPEC-SCNC: 8.8 MG/DL (ref 8.6–10.5)
CHLORIDE SERPL-SCNC: 105 MMOL/L (ref 98–107)
CO2 SERPL-SCNC: 25.4 MMOL/L (ref 22–29)
CREAT SERPL-MCNC: 0.76 MG/DL (ref 0.57–1)
CROSSMATCH INTERPRETATION: NORMAL
DEPRECATED RDW RBC AUTO: 41.2 FL (ref 37–54)
EOSINOPHIL # BLD AUTO: 0.32 10*3/MM3 (ref 0–0.4)
EOSINOPHIL NFR BLD AUTO: 4.2 % (ref 0.3–6.2)
ERYTHROCYTE [DISTWIDTH] IN BLOOD BY AUTOMATED COUNT: 12.5 % (ref 12.3–15.4)
GFR SERPL CREATININE-BSD FRML MDRD: 75 ML/MIN/1.73
GLUCOSE SERPL-MCNC: 110 MG/DL (ref 65–99)
HCT VFR BLD AUTO: 23.7 % (ref 34–46.6)
HGB BLD-MCNC: 8.3 G/DL (ref 12–15.9)
IMM GRANULOCYTES # BLD AUTO: 0.04 10*3/MM3 (ref 0–0.05)
IMM GRANULOCYTES NFR BLD AUTO: 0.5 % (ref 0–0.5)
LYMPHOCYTES # BLD AUTO: 2.8 10*3/MM3 (ref 0.7–3.1)
LYMPHOCYTES NFR BLD AUTO: 36.4 % (ref 19.6–45.3)
MAGNESIUM SERPL-MCNC: 1.9 MG/DL (ref 1.6–2.4)
MCH RBC QN AUTO: 32 PG (ref 26.6–33)
MCHC RBC AUTO-ENTMCNC: 35 G/DL (ref 31.5–35.7)
MCV RBC AUTO: 91.5 FL (ref 79–97)
MONOCYTES # BLD AUTO: 0.68 10*3/MM3 (ref 0.1–0.9)
MONOCYTES NFR BLD AUTO: 8.8 % (ref 5–12)
NEUTROPHILS NFR BLD AUTO: 3.82 10*3/MM3 (ref 1.7–7)
NEUTROPHILS NFR BLD AUTO: 49.6 % (ref 42.7–76)
NRBC BLD AUTO-RTO: 0 /100 WBC (ref 0–0.2)
PLATELET # BLD AUTO: 194 10*3/MM3 (ref 140–450)
PMV BLD AUTO: 9.2 FL (ref 6–12)
POTASSIUM SERPL-SCNC: 4 MMOL/L (ref 3.5–5.2)
RBC # BLD AUTO: 2.59 10*6/MM3 (ref 3.77–5.28)
SODIUM SERPL-SCNC: 139 MMOL/L (ref 136–145)
UNIT  ABO: NORMAL
UNIT  RH: NORMAL
WBC NRBC COR # BLD: 7.7 10*3/MM3 (ref 3.4–10.8)

## 2021-11-29 PROCEDURE — 80048 BASIC METABOLIC PNL TOTAL CA: CPT | Performed by: INTERNAL MEDICINE

## 2021-11-29 PROCEDURE — 83735 ASSAY OF MAGNESIUM: CPT | Performed by: INTERNAL MEDICINE

## 2021-11-29 PROCEDURE — 85025 COMPLETE CBC W/AUTO DIFF WBC: CPT | Performed by: INTERNAL MEDICINE

## 2021-11-29 PROCEDURE — 99239 HOSP IP/OBS DSCHRG MGMT >30: CPT | Performed by: INTERNAL MEDICINE

## 2021-11-29 RX ORDER — PANTOPRAZOLE SODIUM 40 MG/1
40 TABLET, DELAYED RELEASE ORAL DAILY
Qty: 30 TABLET | Refills: 0 | Status: SHIPPED | OUTPATIENT
Start: 2021-11-29 | End: 2021-12-14

## 2021-11-29 RX ADMIN — PANTOPRAZOLE SODIUM 8 MG/HR: 40 INJECTION, POWDER, FOR SOLUTION INTRAVENOUS at 02:39

## 2021-11-29 RX ADMIN — PANTOPRAZOLE SODIUM 8 MG/HR: 40 INJECTION, POWDER, FOR SOLUTION INTRAVENOUS at 08:18

## 2021-11-29 RX ADMIN — METOPROLOL SUCCINATE 50 MG: 50 TABLET, EXTENDED RELEASE ORAL at 08:18

## 2021-11-30 ENCOUNTER — TRANSITIONAL CARE MANAGEMENT TELEPHONE ENCOUNTER (OUTPATIENT)
Dept: CALL CENTER | Facility: HOSPITAL | Age: 71
End: 2021-11-30

## 2021-11-30 ENCOUNTER — APPOINTMENT (OUTPATIENT)
Dept: CARDIAC REHAB | Facility: HOSPITAL | Age: 71
End: 2021-11-30

## 2021-11-30 NOTE — OUTREACH NOTE
Call Center TCM Note      Responses   [facility] patient discharged from? [facility]   Does the patient have one of the following disease processes/diagnoses(primary or secondary)? Other   TCM attempt successful? Yes   Call start time 1424   Call end time 1438   Discharge diagnosis Acute blood loss anemiaMucosal tear in the middle third of the esophagus requiring hemostatic clip placement   Medication alerts for this patient Verified that pt is not taking Warfarin as instructed   Meds reviewed with patient/caregiver? Yes   Is the patient having any side effects they believe may be caused by any medication additions or changes? No   Does the patient have all medications ordered at discharge? Yes   Is the patient taking all medications as directed (includes completed medication regime)? Yes   Comments regarding appointments Cardiology 12/14/21   Does the patient have a primary care provider?  Yes   Does the patient have an appointment with their PCP within 7 days of discharge? Yes   Comments regarding PCP Hospital PCP FOLLOW UP APPOINTMENT IS 12/1/21@200pm   Has the patient kept scheduled appointments due by today? N/A   Has home health visited the patient within 72 hours of discharge? N/A   Has all DME been delivered? No   DME comments Patient has oxygen at home but hasn't need in months after she had a thoracentesis in May   Did the patient receive a copy of their discharge instructions? Yes   Nursing interventions Reviewed instructions with patient   What is the patient's perception of their health status since discharge? Returned to baseline/stable  [Pt reports she is doing well--no further issues w/bleeding, will continue to monitor, rev'd s/s.  Pt eager for change to another anticoagulant in [facility] when her insurance will cover.  ]   Is the patient/caregiver able to teach back signs and symptoms related to disease process for when to call PCP? Yes   Is the patient/caregiver able to teach back signs and  symptoms related to disease process for when to call 911? Yes   Additional teach back comments Pt eats heart healthy diet, low sodium meals, patient weighs every day and aware to monitor for 3# weight gain overnight.--denies edema..   TCM call completed? Yes          Sunshine Gorman RN    11/30/2021, 14:38 EST

## 2021-11-30 NOTE — OUTREACH NOTE
Prep Survey      Responses   [facility] patient discharged from? [facility]   Is LACE score < 7 ? No   Emergency Room discharge w/ pulse ox? No   Eligibility [facility]   Date of Admission 11/26/21   Date of Discharge 11/29/21   Discharge diagnosis Acute blood loss anemiaMucosal tear in the middle third of the esophagus requiring hemostatic clip placement   Does the patient have one of the following disease processes/diagnoses(primary or secondary)? Other   Does the patient have Home health ordered? No   Is there a DME ordered? No   Prep survey completed? Yes          Amber Sheikh RN

## 2021-12-01 ENCOUNTER — OFFICE VISIT (OUTPATIENT)
Dept: FAMILY MEDICINE CLINIC | Facility: CLINIC | Age: 71
End: 2021-12-01

## 2021-12-01 VITALS
TEMPERATURE: 97.1 F | SYSTOLIC BLOOD PRESSURE: 162 MMHG | BODY MASS INDEX: 27.42 KG/M2 | DIASTOLIC BLOOD PRESSURE: 80 MMHG | OXYGEN SATURATION: 98 % | HEIGHT: 62 IN | HEART RATE: 107 BPM | WEIGHT: 149 LBS

## 2021-12-01 DIAGNOSIS — Z11.59 NEED FOR HEPATITIS C SCREENING TEST: ICD-10-CM

## 2021-12-01 DIAGNOSIS — Z09 HOSPITAL DISCHARGE FOLLOW-UP: ICD-10-CM

## 2021-12-01 DIAGNOSIS — D62 ACUTE BLOOD LOSS ANEMIA: Primary | ICD-10-CM

## 2021-12-01 DIAGNOSIS — I25.10 CORONARY ARTERY DISEASE INVOLVING NATIVE CORONARY ARTERY OF NATIVE HEART, UNSPECIFIED WHETHER ANGINA PRESENT: ICD-10-CM

## 2021-12-01 DIAGNOSIS — L65.9 THINNING HAIR: ICD-10-CM

## 2021-12-01 PROBLEM — E78.5 HYPERLIPEMIA: Status: ACTIVE | Noted: 2021-12-01

## 2021-12-01 PROBLEM — G47.00 INSOMNIA: Status: ACTIVE | Noted: 2021-12-01

## 2021-12-01 LAB
ALBUMIN SERPL-MCNC: 4.5 G/DL (ref 3.5–5.2)
ALBUMIN/GLOB SERPL: 2 G/DL
ALP SERPL-CCNC: 74 U/L (ref 39–117)
ALT SERPL W P-5'-P-CCNC: 12 U/L (ref 1–33)
ANION GAP SERPL CALCULATED.3IONS-SCNC: 8 MMOL/L (ref 5–15)
AST SERPL-CCNC: 17 U/L (ref 1–32)
BASOPHILS # BLD AUTO: 0.05 10*3/MM3 (ref 0–0.2)
BASOPHILS NFR BLD AUTO: 0.6 % (ref 0–1.5)
BILIRUB SERPL-MCNC: 0.5 MG/DL (ref 0–1.2)
BUN SERPL-MCNC: 7 MG/DL (ref 8–23)
BUN/CREAT SERPL: 8.6 (ref 7–25)
CALCIUM SPEC-SCNC: 10 MG/DL (ref 8.6–10.5)
CHLORIDE SERPL-SCNC: 101 MMOL/L (ref 98–107)
CO2 SERPL-SCNC: 30 MMOL/L (ref 22–29)
CREAT SERPL-MCNC: 0.81 MG/DL (ref 0.57–1)
DEPRECATED RDW RBC AUTO: 44.2 FL (ref 37–54)
EOSINOPHIL # BLD AUTO: 0.29 10*3/MM3 (ref 0–0.4)
EOSINOPHIL NFR BLD AUTO: 3.5 % (ref 0.3–6.2)
ERYTHROCYTE [DISTWIDTH] IN BLOOD BY AUTOMATED COUNT: 12.8 % (ref 12.3–15.4)
GFR SERPL CREATININE-BSD FRML MDRD: 69 ML/MIN/1.73
GLOBULIN UR ELPH-MCNC: 2.3 GM/DL
GLUCOSE SERPL-MCNC: 97 MG/DL (ref 65–99)
HCT VFR BLD AUTO: 30 % (ref 34–46.6)
HGB BLD-MCNC: 9.9 G/DL (ref 12–15.9)
IMM GRANULOCYTES # BLD AUTO: 0.02 10*3/MM3 (ref 0–0.05)
IMM GRANULOCYTES NFR BLD AUTO: 0.2 % (ref 0–0.5)
LYMPHOCYTES # BLD AUTO: 2.04 10*3/MM3 (ref 0.7–3.1)
LYMPHOCYTES NFR BLD AUTO: 24.9 % (ref 19.6–45.3)
MCH RBC QN AUTO: 31.2 PG (ref 26.6–33)
MCHC RBC AUTO-ENTMCNC: 33 G/DL (ref 31.5–35.7)
MCV RBC AUTO: 94.6 FL (ref 79–97)
MONOCYTES # BLD AUTO: 0.57 10*3/MM3 (ref 0.1–0.9)
MONOCYTES NFR BLD AUTO: 7 % (ref 5–12)
NEUTROPHILS NFR BLD AUTO: 5.21 10*3/MM3 (ref 1.7–7)
NEUTROPHILS NFR BLD AUTO: 63.8 % (ref 42.7–76)
NRBC BLD AUTO-RTO: 0 /100 WBC (ref 0–0.2)
PLATELET # BLD AUTO: 301 10*3/MM3 (ref 140–450)
PMV BLD AUTO: 9.7 FL (ref 6–12)
POTASSIUM SERPL-SCNC: 3.9 MMOL/L (ref 3.5–5.2)
PROT SERPL-MCNC: 6.8 G/DL (ref 6–8.5)
RBC # BLD AUTO: 3.17 10*6/MM3 (ref 3.77–5.28)
SODIUM SERPL-SCNC: 139 MMOL/L (ref 136–145)
TSH SERPL DL<=0.05 MIU/L-ACNC: 2.07 UIU/ML (ref 0.27–4.2)
WBC NRBC COR # BLD: 8.18 10*3/MM3 (ref 3.4–10.8)

## 2021-12-01 PROCEDURE — 85025 COMPLETE CBC W/AUTO DIFF WBC: CPT | Performed by: NURSE PRACTITIONER

## 2021-12-01 PROCEDURE — 84443 ASSAY THYROID STIM HORMONE: CPT | Performed by: NURSE PRACTITIONER

## 2021-12-01 PROCEDURE — 86803 HEPATITIS C AB TEST: CPT | Performed by: NURSE PRACTITIONER

## 2021-12-01 PROCEDURE — 80053 COMPREHEN METABOLIC PANEL: CPT | Performed by: NURSE PRACTITIONER

## 2021-12-01 PROCEDURE — 99496 TRANSJ CARE MGMT HIGH F2F 7D: CPT | Performed by: NURSE PRACTITIONER

## 2021-12-01 PROCEDURE — 36415 COLL VENOUS BLD VENIPUNCTURE: CPT | Performed by: NURSE PRACTITIONER

## 2021-12-01 PROCEDURE — 1111F DSCHRG MED/CURRENT MED MERGE: CPT | Performed by: NURSE PRACTITIONER

## 2021-12-01 NOTE — PROGRESS NOTES
Chief Complaint  Transitional Care Management (follow up from hospital)      Subjective        Past Medical History:   Diagnosis Date   • CHF (congestive heart failure) (HCC)    • COPD (chronic obstructive pulmonary disease) (HCC)    • GERD (gastroesophageal reflux disease)    • Hyperlipidemia    • Hypertension    • Mitral regurgitation    • PONV (postoperative nausea and vomiting)      Social History     Tobacco Use   • Smoking status: Former Smoker     Packs/day: 0.50     Years: 45.00     Pack years: 22.50     Start date: 1963     Quit date: 3/9/2021     Years since quittin.7   • Smokeless tobacco: Never Used   • Tobacco comment: caffeine use - denies   Vaping Use   • Vaping Use: Never used   Substance Use Topics   • Alcohol use: Never   • Drug use: Never      Current Outpatient Medications on File Prior to Visit   Medication Sig   • amiodarone (PACERONE) 200 MG tablet Take 0.5 tablets by mouth Daily.   • atorvastatin (LIPITOR) 40 MG tablet Take 1 tablet by mouth Every Night.   • furosemide (LASIX) 40 MG tablet TAKE 1 TABLET BY MOUTH DAILY   • losartan (COZAAR) 50 MG tablet Take 1 tablet by mouth Daily.   • metoprolol succinate XL (TOPROL-XL) 50 MG 24 hr tablet Take 1 tablet by mouth Every Night.   • pantoprazole (Protonix) 40 MG EC tablet Take 1 tablet by mouth Daily for 30 days.   • potassium chloride (KLOR-CON) 8 MEQ CR tablet TAKE 1 TABLET(8 MEQ) BY MOUTH EVERY DAY (Patient taking differently: Take 8 mEq by mouth Daily.)     No current facility-administered medications on file prior to visit.      No Known Allergies   Health Maintenance Due   Topic Date Due   • MAMMOGRAM  Never done   • DXA SCAN  Never done   • COLORECTAL CANCER SCREENING  Never done   • Pneumococcal Vaccine 65+ (1 of 2 - PPSV23) Never done   • COVID-19 Vaccine (1) Never done   • TDAP/TD VACCINES (1 - Tdap) Never done   • ZOSTER VACCINE (1 of 2) Never done   • HEPATITIS C SCREENING  Never done   • ANNUAL WELLNESS VISIT  Never done     "  Rachael South presents to [facility]  Here for hospital discharge follow-up. Pt was eating green beans and potatoes and it got lodged in throat and swallowed and then tore her esophagus and caused bleeding, she was vomiting blood and went to [facility] and was given 1 pint of blood and plasma. Pt was told to discontinue coumadin until Cardiology tells her restart; pt states she may not be restarted on coumadin. She has changed her insurance so she can afford other options. Pt states she is feeling much better.     Transitional Care Management Certification  I certify that the following are true:  Communication was made within 2 business days of discharge.  Complexity of Medical Decision Making is moderate.  Face to face visit occurred within 3 days.    *Note: 37573 is for high complexity patients with a face to face visit within 7 days of discharge.  14928 is for high complexity patients with a face to face on days 8-14 post discharge or medium complexity with face to face visit within 14 days post discharge.    BP elevated in office. Pt complains of thinning hair.     Readmission Assessment:     [facility] discharged from? [facility]  Is LACE score < 7 ? No  Emergency Room discharge w/ pulse ox? No  Eligibility [facility]  Date of Admission 11/26/21  Date of Discharge 11/29/21  Discharge diagnosis Acute blood loss anemiaMucosal tear in the middle third of the esophagus requiring hemostatic clip placement  Does the patient have one of the following disease processes/diagnoses(primary or secondary)? Other  Does the patient have Home health ordered? No  Is there a DME ordered? No  Prep survey completed? Yes           Amber Sheikh RN        Objective   Vital Signs:   /80 (BP Location: Right arm)   Pulse 107   Temp 97.1 °F (36.2 °C)   Ht 157.5 cm (62\")   Wt 67.6 kg (149 lb)   SpO2 98%   BMI 27.25 kg/m²     Review of Systems "   Cardiovascular: Negative for chest pain and palpitations.   Musculoskeletal: Negative for myalgias.   Neurological: Negative for dizziness, light-headedness and headache.      Physical Exam  Vitals reviewed.   Constitutional:       General: She is not in acute distress.     Appearance: Normal appearance. She is well-developed.   HENT:      Head: Normocephalic and atraumatic.   Eyes:      Conjunctiva/sclera: Conjunctivae normal.      Pupils: Pupils are equal, round, and reactive to light.   Cardiovascular:      Rate and Rhythm: Normal rate and regular rhythm.      Heart sounds: Normal heart sounds.   Pulmonary:      Effort: Pulmonary effort is normal.      Breath sounds: Normal breath sounds. No wheezing or rhonchi.   Musculoskeletal:      Cervical back: Neck supple.      Right lower leg: No edema.      Left lower leg: No edema.      Comments: Negative Bay's sign   Skin:     General: Skin is warm and dry.   Neurological:      Mental Status: She is alert and oriented to person, place, and time.      Cranial Nerves: No cranial nerve deficit.   Psychiatric:         Mood and Affect: Mood and affect normal.         Behavior: Behavior normal.         Thought Content: Thought content normal.         Judgment: Judgment normal.        Result Review :   The following data was reviewed by: KENNETH Jones on 12/01/2021:  CBC    CBC 11/27/21 11/27/21 11/27/21 11/27/21 11/27/21 11/27/21 11/27/21 11/27/21 11/28/21 11/29/21    0346 0346 0827 0827 1214 1545 2024 2347     WBC 21.01 (A)   15.33 (A)     6.93 7.70   RBC 3.30 (A)   2.79 (A)     2.25 (A) 2.59 (A)   Hemoglobin 10.6 (A) 10.6 (A) 8.7 (A) 8.8 (A) 8.4 (A) 7.8 (A) 7.8 (A) 8.2 (A) 7.2 (A) 8.3 (A)   Hematocrit 29.4 (A) 29.4 (A) 24.5 (A) 24.8 (A) 23.6 (A) 22.0 (A) 22.1 (A) 22.8 (A) 21.0 (A) 23.7 (A)   MCV 89.1   88.9     93.3 91.5   MCH 32.1   31.5     32.0 32.0   MCHC 36.1 (A)   35.5     34.3 35.0   RDW 11.9 (A)   12.0 (A)     12.7 12.5   Platelets 295   182 765  194   (A) Abnormal value            TSH    TSH 3/17/21   TSH 1.160           Data reviewed: Recent hospitalization notes Discharge summary          Assessment and Plan    Diagnoses and all orders for this visit:    1. Acute blood loss anemia (Primary)  -     CBC Auto Differential    2. Hospital discharge follow-up    3. Need for hepatitis C screening test  -     Hepatitis C Antibody    4. Coronary artery disease involving native coronary artery of native heart, unspecified whether angina present  -     Comprehensive Metabolic Panel    5. Thinning hair  -     TSH Rfx On Abnormal To Free T4        Follow Up   Return in about 3 months (around 3/1/2022), or if symptoms worsen or fail to improve.  Patient was given instructions and counseling regarding her condition or for health maintenance advice. Please see specific information pulled into the AVS if appropriate.

## 2021-12-01 NOTE — PROGRESS NOTES
Venipuncture Blood Specimen Collection  Venipuncture performed in left arm  by Mayi Estevez with good hemostasis. Patient tolerated the procedure well without complications.   12/01/21   Mayi Estevez

## 2021-12-02 ENCOUNTER — APPOINTMENT (OUTPATIENT)
Dept: CARDIAC REHAB | Facility: HOSPITAL | Age: 71
End: 2021-12-02

## 2021-12-02 LAB — HCV AB SER DONR QL: NORMAL

## 2021-12-06 ENCOUNTER — READMISSION MANAGEMENT (OUTPATIENT)
Dept: CALL CENTER | Facility: HOSPITAL | Age: 71
End: 2021-12-06

## 2021-12-06 NOTE — OUTREACH NOTE
Medical Week 2 Survey      Responses   [facility] [place] patient discharged from? [facility]   Does the patient have one of the following disease processes/diagnoses(primary or secondary)? Other   Week 2 attempt successful? Yes   Call start time 1244   Call end time 1245   Meds reviewed with patient/caregiver? Yes   Is the patient having any side effects they believe may be caused by any medication additions or changes? No   Does the patient have all medications ordered at discharge? Yes   Is the patient taking all medications as directed (includes completed medication regime)? Yes   Comments regarding appointments Cardiology 12/14/21   Does the patient have a primary care provider?  Yes   Does the patient have an appointment with their PCP within 7 days of discharge? Yes   Comments regarding PCP Hospital PCP FOLLOW UP APPOINTMENT IS 12/1/21@200pm   Has the patient kept scheduled appointments due by today? Yes   Has home health visited the patient within 72 hours of discharge? N/A   Has all DME been delivered? No   Psychosocial issues? No   Did the patient receive a copy of their discharge instructions? Yes   Nursing interventions Reviewed instructions with patient   What is the patient's perception of their health status since discharge? Improving   Is the patient/caregiver able to teach back signs and symptoms related to disease process for when to call PCP? Yes   Is the patient/caregiver able to teach back signs and symptoms related to disease process for when to call 911? Yes   Is the patient/caregiver able to teach back the hierarchy of who to call/visit for symptoms/problems? PCP, Specialist, Home health nurse, Urgent Care, ED, 911 Yes   If the patient is a current smoker, are they able to teach back resources for cessation? Not a smoker   Week 2 Call Completed? Yes          Toña Haley RN

## 2021-12-07 ENCOUNTER — APPOINTMENT (OUTPATIENT)
Dept: CARDIAC REHAB | Facility: HOSPITAL | Age: 71
End: 2021-12-07

## 2021-12-09 ENCOUNTER — APPOINTMENT (OUTPATIENT)
Dept: CARDIAC REHAB | Facility: HOSPITAL | Age: 71
End: 2021-12-09

## 2021-12-10 ENCOUNTER — PATIENT OUTREACH (OUTPATIENT)
Dept: CASE MANAGEMENT | Facility: OTHER | Age: 71
End: 2021-12-10

## 2021-12-10 DIAGNOSIS — I48.91 ATRIAL FIBRILLATION WITH RVR (HCC): Primary | ICD-10-CM

## 2021-12-10 DIAGNOSIS — I38 ACUTE SYSTOLIC CONGESTIVE HEART FAILURE DUE TO VALVULAR DISEASE (HCC): ICD-10-CM

## 2021-12-10 DIAGNOSIS — I50.21 ACUTE SYSTOLIC CONGESTIVE HEART FAILURE DUE TO VALVULAR DISEASE (HCC): ICD-10-CM

## 2021-12-10 NOTE — OUTREACH NOTE
Ambulatory Case Management Note    CCM Interim Update    Spoke to patient regarding recent hospital stay. States that she is doing well and has no needs right now. They have her holding her blood thinner and she is currently only taking 2 baby aspirin a day for a blood thinner. She has an apt with Cardiology on Tuesday to discuss future management.        There are no recently modified care plans to display for this patient.      Sandra Lantigua RN  Ambulatory Case Management    12/10/2021, 11:11 EST

## 2021-12-14 ENCOUNTER — READMISSION MANAGEMENT (OUTPATIENT)
Dept: CALL CENTER | Facility: HOSPITAL | Age: 71
End: 2021-12-14

## 2021-12-14 ENCOUNTER — OFFICE VISIT (OUTPATIENT)
Dept: CARDIOLOGY | Facility: CLINIC | Age: 71
End: 2021-12-14

## 2021-12-14 ENCOUNTER — APPOINTMENT (OUTPATIENT)
Dept: CARDIAC REHAB | Facility: HOSPITAL | Age: 71
End: 2021-12-14

## 2021-12-14 VITALS
BODY MASS INDEX: 26.63 KG/M2 | SYSTOLIC BLOOD PRESSURE: 120 MMHG | WEIGHT: 145.6 LBS | DIASTOLIC BLOOD PRESSURE: 65 MMHG | HEART RATE: 81 BPM

## 2021-12-14 DIAGNOSIS — Z87.19 HISTORY OF HEMATEMESIS: Primary | ICD-10-CM

## 2021-12-14 DIAGNOSIS — I48.0 PAROXYSMAL ATRIAL FIBRILLATION (HCC): ICD-10-CM

## 2021-12-14 DIAGNOSIS — Z95.2 S/P MVR (MITRAL VALVE REPLACEMENT): ICD-10-CM

## 2021-12-14 PROBLEM — K92.0 HEMATEMESIS WITHOUT NAUSEA: Status: RESOLVED | Noted: 2021-11-27 | Resolved: 2021-12-14

## 2021-12-14 PROBLEM — I50.32 DIASTOLIC CHF, CHRONIC (HCC): Status: RESOLVED | Noted: 2021-09-27 | Resolved: 2021-12-14

## 2021-12-14 PROBLEM — I25.118 CORONARY ARTERY DISEASE OF NATIVE HEART WITH STABLE ANGINA PECTORIS: Status: RESOLVED | Noted: 2021-04-13 | Resolved: 2021-12-14

## 2021-12-14 PROBLEM — I48.91 ATRIAL FIBRILLATION WITH RVR: Status: RESOLVED | Noted: 2021-05-05 | Resolved: 2021-12-14

## 2021-12-14 PROBLEM — I50.21 ACUTE SYSTOLIC CONGESTIVE HEART FAILURE DUE TO VALVULAR DISEASE (HCC): Status: RESOLVED | Noted: 2021-04-13 | Resolved: 2021-12-14

## 2021-12-14 PROBLEM — I50.9 ACUTE CHF: Status: RESOLVED | Noted: 2021-03-19 | Resolved: 2021-12-14

## 2021-12-14 PROBLEM — I38 ACUTE SYSTOLIC CONGESTIVE HEART FAILURE DUE TO VALVULAR DISEASE: Status: RESOLVED | Noted: 2021-04-13 | Resolved: 2021-12-14

## 2021-12-14 PROBLEM — E78.5 HYPERLIPEMIA: Status: RESOLVED | Noted: 2021-12-01 | Resolved: 2021-12-14

## 2021-12-14 PROBLEM — R77.8 ELEVATED TROPONIN: Status: RESOLVED | Noted: 2021-03-19 | Resolved: 2021-12-14

## 2021-12-14 PROBLEM — K04.7 DENTAL INFECTION: Status: RESOLVED | Noted: 2021-03-19 | Resolved: 2021-12-14

## 2021-12-14 PROBLEM — I34.0 NONRHEUMATIC MITRAL VALVE REGURGITATION: Status: RESOLVED | Noted: 2021-06-29 | Resolved: 2021-12-14

## 2021-12-14 PROBLEM — R79.89 ELEVATED TROPONIN: Status: RESOLVED | Noted: 2021-03-19 | Resolved: 2021-12-14

## 2021-12-14 PROBLEM — I25.10 CAD (CORONARY ARTERY DISEASE): Status: RESOLVED | Noted: 2021-04-21 | Resolved: 2021-12-14

## 2021-12-14 PROBLEM — I10 ESSENTIAL HYPERTENSION: Status: ACTIVE | Noted: 2021-12-14

## 2021-12-14 PROBLEM — Z95.1 HX OF CABG: Status: RESOLVED | Noted: 2021-06-09 | Resolved: 2021-12-14

## 2021-12-14 PROCEDURE — 99213 OFFICE O/P EST LOW 20 MIN: CPT | Performed by: NURSE PRACTITIONER

## 2021-12-14 NOTE — OUTREACH NOTE
Medical Week 3 Survey      Responses   [facility] patient discharged from? [facility]   Does the patient have one of the following disease processes/diagnoses(primary or secondary)? Other   Week 3 attempt successful? Yes   Call start time 1656   Call end time 1657   Discharge diagnosis Acute blood loss anemiaMucosal tear in the middle third of the esophagus requiring hemostatic clip placement   Meds reviewed with patient/caregiver? Yes   Is the patient having any side effects they believe may be caused by any medication additions or changes? No   Does the patient have all medications ordered at discharge? Yes   Is the patient taking all medications as directed (includes completed medication regime)? Yes   Does the patient have a primary care provider?  Yes   Does the patient have an appointment with their PCP within 7 days of discharge? Yes   Has the patient kept scheduled appointments due by today? Yes   Psychosocial issues? No   Did the patient receive a copy of their discharge instructions? Yes   Nursing interventions Reviewed instructions with patient   What is the patient's perception of their health status since discharge? Improving   Is the patient/caregiver able to teach back signs and symptoms related to disease process for when to call PCP? Yes   Is the patient/caregiver able to teach back signs and symptoms related to disease process for when to call 911? Yes   Is the patient/caregiver able to teach back the hierarchy of who to call/visit for symptoms/problems? PCP, Specialist, Home health nurse, Urgent Care, ED, 911 Yes   If the patient is a current smoker, are they able to teach back resources for cessation? Not a smoker   Week 3 Call Completed? Yes   Graduated Yes   Did the patient feel the follow up calls were helpful during their recovery period? Yes   Was the number of calls appropriate? Yes   Graduated/Revoked comments Goals met          Justino Camara RN

## 2021-12-14 NOTE — PROGRESS NOTES
Chief Complaint  Coronary Artery Disease and Atrial Fibrillation    Subjective            History of Present Illness  Rachael South is a 71-year-old white/ female patient who presents to the office today for hospital follow-up.  She was hospitalized at [facility] from 11/26/2021 to 11/29/2021 for hematemesis and gastritis.  She underwent EGD which showed mucosal tear in the middle third of the esophagus requiring clip placement.  She was on warfarin therapy for atrial fibrillation and mitral valve replacement (bioprosthetic).  Her warfarin was stopped during hospitalization and upon discharge she was instructed to hold for 2 weeks and to follow-up with cardiology.  She reports that she has been compliant with these instructions.  Her last hemoglobin was 9.9 on 12/1/2021.  She admits that she would like to try an alternative medication to warfarin if she can.  She denies any recurrence of bleeding.  She denies chest pain, shortness of breath, lightheadedness/dizziness, palpitations, or edema.    PMH  Past Medical History:   Diagnosis Date   • Anemia    • CAD s/p CABG 5/5/2021   • CHF (congestive heart failure) ([facility])    • COPD (chronic obstructive pulmonary disease) ([facility])    • Essential hypertension 12/14/2021   • GERD (gastroesophageal reflux disease)    • Hyperlipidemia    • Hypertension    • Ischemic cardiomyopathy/Chronic systolic CHF 6/29/2021   • Ischemic dilated cardiomyopathy 6/29/2021   • Mitral regurgitation    • Paroxysmal atrial fibrillation 12/14/2021   • PONV (postoperative nausea and vomiting)    • S/P MVR (mitral valve replacement) 6/9/2021         ALLERGY  No Known Allergies       SURGICALHX  Past Surgical History:   Procedure Laterality Date   • CARDIAC CATHETERIZATION     • CARDIAC CATHETERIZATION N/A 4/21/2021    Procedure: Right and Left Heart Cath;  Surgeon: Gina Correia MD;  Location: [facility] CATH INVASIVE LOCATION;  Service: Cardiovascular;  Laterality: N/A;   •  CARDIAC CATHETERIZATION N/A 2021    Procedure: Coronary angiography;  Surgeon: Gina Correia MD;  Location: [facility] CATH INVASIVE LOCATION;  Service: Cardiovascular;  Laterality: N/A;   • CARDIAC CATHETERIZATION N/A 2021    Procedure: Left ventriculography;  Surgeon: Gina Correia MD;  Location:  [facility] CATH INVASIVE LOCATION;  Service: Cardiovascular;  Laterality: N/A;   • CORONARY ARTERY BYPASS GRAFT     • CORONARY ARTERY BYPASS GRAFT WITH MITRAL VALVE REPAIR/REPLACEMENT N/A 2021    Procedure: RITA STERNOTOMY CORONARY ARTERY BYPASS GRAFT TIMES 3 USING LEFT INTERNAL MAMMARY ARTERY AND ENDOSCOPICALLY HARVESTED LEFT GREATER SAPHENOUS VEIN, MITRAL VALVE REPLACEMENT AND PRP;  Surgeon: Rafiq Gregg MD;  Location: [facility] OR;  Service: Cardiothoracic;  Laterality: N/A;   • ENDOSCOPY N/A 2021    Procedure: ESOPHAGOGASTRODUODENOSCOPY AT BEDSIDE WITH ENDO CLIPS X3;  Surgeon: Toney Tang MD;  Location: [facility] ENDOSCOPY;  Service: Gastroenterology;  Laterality: N/A;  LARGE HIATAL HERNIA, ODILON MARADIAGA TEAR IN MID-ESOPHAGUS   • MITRAL VALVE REPLACEMENT     • ROTATOR CUFF REPAIR Right           SOC  Social History     Socioeconomic History   • Marital status:    • Number of children: 2   Tobacco Use   • Smoking status: Former Smoker     Packs/day: 0.50     Years: 45.00     Pack years: 22.50     Start date: 1963     Quit date: 3/9/2021     Years since quittin.7   • Smokeless tobacco: Never Used   • Tobacco comment: caffeine use - denies   Vaping Use   • Vaping Use: Never used   Substance and Sexual Activity   • Alcohol use: Never   • Drug use: Never   • Sexual activity: Defer         FAMHX  Family History   Problem Relation Age of Onset   • Heart failure Mother    • Heart disease Brother         blue baby   • Diabetes type II Grandchild    • Heart attack Father         Current outpatient and discharge medications have been reconciled for the patient.  Reviewed by:  KENNETH Milian    MEDSIGONLMARAH  Current Outpatient Medications on File Prior to Visit   Medication Sig   • amiodarone (PACERONE) 200 MG tablet Take 0.5 tablets by mouth Daily.   • atorvastatin (LIPITOR) 40 MG tablet Take 1 tablet by mouth Every Night.   • losartan (COZAAR) 50 MG tablet Take 1 tablet by mouth Daily.   • metoprolol succinate XL (TOPROL-XL) 50 MG 24 hr tablet Take 1 tablet by mouth Every Night.   • potassium chloride (KLOR-CON) 8 MEQ CR tablet TAKE 1 TABLET(8 MEQ) BY MOUTH EVERY DAY (Patient taking differently: Take 8 mEq by mouth Daily.)   • [DISCONTINUED] furosemide (LASIX) 40 MG tablet TAKE 1 TABLET BY MOUTH DAILY   • [DISCONTINUED] pantoprazole (Protonix) 40 MG EC tablet Take 1 tablet by mouth Daily for 30 days.     No current facility-administered medications on file prior to visit.         Objective   /65 (BP Location: Left arm, Patient Position: Sitting, Cuff Size: Adult)   Pulse 81   Wt 66 kg (145 lb 9.6 oz)   BMI 26.63 kg/m²       Physical Exam  HENT:      Head: Normocephalic.   Cardiovascular:      Rate and Rhythm: Normal rate. Rhythm irregular.      Pulses: Normal pulses.      Heart sounds: Normal heart sounds. No murmur heard.      Pulmonary:      Effort: Pulmonary effort is normal.      Breath sounds: Normal breath sounds.   Musculoskeletal:      Cervical back: Neck supple.      Right lower leg: No edema.      Left lower leg: No edema.   Skin:     General: Skin is dry.      Capillary Refill: Capillary refill takes less than 2 seconds.   Neurological:      Mental Status: She is alert and oriented to person, place, and time.   Psychiatric:         Behavior: Behavior normal.       Result Review :   The following data was reviewed by: KENNETH Hernández on 12/14/2021:  proBNP   Date Value Ref Range Status   04/21/2021 2,209.0 (H) 0.0 - 900.0 pg/mL Final     CMP    CMP 12/1/21   Glucose 97   BUN 7 (A)   Creatinine 0.81   eGFR Non African Am 69   Sodium 139   Potassium 3.9    Chloride 101   Calcium 10.0   Albumin 4.50   Total Bilirubin 0.5   Alkaline Phosphatase 74   AST (SGOT) 17   ALT (SGPT) 12   (A) Abnormal value       Comments are available for some flowsheets but are not being displayed.           CBC w/diff    CBC w/Diff 12/1/21   WBC 8.18   RBC 3.17 (A)   Hemoglobin 9.9 (A)   Hematocrit 30.0 (A)   MCV 94.6   MCH 31.2   MCHC 33.0   RDW 12.8   Platelets 301   Neutrophil Rel % 63.8   Immature Granulocyte Rel % 0.2   Lymphocyte Rel % 24.9   Monocyte Rel % 7.0   Eosinophil Rel % 3.5   Basophil Rel % 0.6   (A) Abnormal value             Lab Results   Component Value Date    TSH 2.070 12/01/2021      No results found for: FREET4   No results found for: DDIMERQUANT  Magnesium   Date Value Ref Range Status   11/29/2021 1.9 1.6 - 2.4 mg/dL Final      No results found for: DIGOXIN   Lab Results   Component Value Date    TROPONINT <0.010 11/27/2021           Lipid Panel    Lipid Panel 4/21/21   Total Cholesterol 94   Total Cholesterol    Triglycerides 115   HDL Cholesterol 31 (A)   VLDL Cholesterol 21   LDL Cholesterol  42   LDL/HDL Ratio 1.29   (A) Abnormal value       Comments are available for some flowsheets but are not being displayed.             Results for orders placed during the hospital encounter of 04/21/21    STAT Adult Transthoracic Echo Complete W/ Cont if Necessary Per Protocol    Interpretation Summary  · Calculated left ventricular EF = 31.3% Estimated left ventricular EF = 31% Estimated left ventricular EF was in agreement with the calculated left ventricular EF. Left ventricular systolic function is moderately decreased. Normal left ventricular cavity size and wall thickness noted. Left ventricular diastolic function was indeterminate.  · The right ventricular cavity is borderline dilated. Normal right ventricular wall thickness noted. Mildly reduced right ventricular systolic function noted.  · The right atrial cavity is moderately dilated.  · There is moderate  calcification of the aortic valve. Mild to moderate aortic valve regurgitation is present. The aortic regurgitation is not well visualized and may be underestimated No aortic valve stenosis is present.  · There is a bioprosthetic mitral valve present. 29mm Gallardo porcine The prosthetic mitral valve is normal. There is no significant prosthetic valve stenosis or regurgitation. The valve appears well-seated.  · Moderate tricuspid valve regurgitation is present. Mild pulmonary hypertension is present. The RV systolic pressure is at least estimated to 37 mmHg assuming RA pressure of 3 mmHg. However the RA pressure and RV systolic pressure may be underestimated as the IVC was not well seen.  · There is a small (<1cm) pericardial effusion.       Assessment and Plan    Diagnoses and all orders for this visit:    1. History of hematemesis (Primary)  We will restart her anticoagulation today and check CBC in 1 week to evaluate for acute blood loss.  -     CBC & Differential; Future    2. S/P MVR (mitral valve replacement)  Symptomatically stable at this time, continue to monitor, will start Xarelto 15 mg daily for anticoagulation.    3. Paroxysmal atrial fibrillation  Symptomatically stable at this time, continue amiodarone 100 mg daily and metoprolol 50 mg nightly.  Xarelto for CVA prevention.      Other orders  -     rivaroxaban (Xarelto) 15 MG tablet; Take 1 tablet by mouth Daily With Dinner.  Dispense: 90 tablet; Refill: 3          Follow Up   Return for Follow up with Dr Canales in May as scheduled.    Patient was given instructions and counseling regarding her condition or for health maintenance advice. Please see specific information pulled into the AVS if appropriate.     Rachael South  reports that she quit smoking about 9 months ago. She started smoking about 58 years ago. She has a 22.50 pack-year smoking history. She has never used smokeless tobacco.           Cherrie Franco, APRN  12/14/21  10:25  EST    Dictated Utilizing Dragon Dictation

## 2021-12-14 NOTE — PATIENT INSTRUCTIONS
Vitamin K Foods and Warfarin  Warfarin is a blood thinner (anticoagulant). Anticoagulant medicines help prevent the formation of blood clots. Warfarin works by blocking the activity of vitamin K, which promotes normal blood clotting.  When you take warfarin, problems can occur from suddenly increasing or decreasing the amount of vitamin K that you eat from one day to the next. These problems can occur due to varying levels of warfarin in your blood. Problems may include:  · Blood clots.  · Bleeding.  What are tips for eating the right amount of vitamin K?  To avoid problems when taking warfarin:  · Eat a balanced diet that includes:  ? Fresh fruits and vegetables.  ? Whole grains.  ? Low-fat dairy products.  ? Lean proteins, such as fish, eggs, and lean cuts of meat.  · Keep your intake of vitamin K consistent from day to day. To do this:  ? Avoid eating large amounts of vitamin K one day and low amounts of vitamin K the next day.  ? If you take a multivitamin that contains vitamin K, be sure to take it every day.  ? Know which foods contain vitamin K. Read food labels. Use the lists below to understand serving sizes and the amount of vitamin K in one serving.  · Avoid major changes in your diet. If you are going to change your diet, talk with your health care provider before making changes.  · Work with a nutrition specialist (dietitian) to develop a meal plan that works best for you.  What foods are high in vitamin K?  Foods that are high in vitamin K contain more than 100 mcg (micrograms) per serving. These include:  · Broccoli (cooked from fresh) - ½ cup (78 g) has 110 mcg.  · [place] sprouts (cooked from fresh) - ½ cup (78 g) has 109 mcg.  · Greens, beet (cooked from fresh) - ½ cup (72 g) has 350 mcg.  · Greens, shakila (cooked from fresh) - ½ cup (66 g) has 263 mcg.  · Greens, turnip (cooked from fresh) - ½ cup (72 g) has 265 mcg.  · Green onions or scallions - ½ cup (50 g) has 105 mcg.  · Kale (cooked from  fresh) - ½ cup (68 g) has 536 mcg.  · Parsley (raw) - 10 sprigs (10 g) has 164 mcg.  · Spinach (cooked from fresh) - ½ cup (90 g) has 444 mcg.  · Swiss chard (cooked from fresh) - ½ cup (88 g) has 287 mcg.  What foods have a moderate amount of vitamin K?  Foods that have a moderate amount of vitamin K contain  mcg per serving. These include:  · Asparagus (cooked from fresh) - 4 shen (60 g) have 30 mcg.  · Black-eyed peas (dried) - ½ cup (85 g) has 32 mcg.  · Cabbage (cooked from fresh) - ½ cup (78 g) has 84 mcg.  · Cabbage (raw) - ½ cup (35 g) has 26 mcg.  · Kiwi fruit - 1 medium (69 g) has 27 mcg.  · Lettuce (raw) - 1 cup (36 g) has 45 mcg.  · Okra (cooked from fresh) - ½ cup (80 g) has 32 mcg.  · Prunes (dried) - 5 prunes (47 g) have 25 mcg.  · Watercress (raw) - 1 cup (34 g) has 85 mcg.  What foods are low in vitamin K?  Foods low in vitamin K contain less than 25 mcg per serving. These include:  · Artichoke - 1 medium (128 g) has 18 mcg.  · Avocado - 1 oz (21 g) has 6 mcg.  · Blueberries - ½ cup (73 g) has 14 mcg.  · Carrots (cooked from fresh) - ½ cup (78 g) has 11 mcg.  · Cauliflower (raw) - ½ cup (54 g) has 8 mcg.  · Cucumber with peel (raw) - ½ cup (52 g) has 9 mcg.  · Grapes - ½ cup (76 g) has 12 mcg.  · Mario - 1 medium (207 g) has 9 mcg.  · Mixed nuts - 1 cup (142 g) has 17 mcg.  · Pear - 1 medium (178 g) has 8 mcg.  · Peas (cooked from fresh) - ½ cup (80 g) has 20 mcg.  · Pickled cucumber - 1 spear (65 g) has 11 mcg.  · Sauerkraut (canned) - ½ cup (118 g) has 16 mcg.  · Soybeans (cooked from fresh) - ½ cup (86 g) has 16 mcg.  · Tomato (raw) - 1 medium (123 g) has 10 mcg.  · Tomato sauce (raw) - ½ cup (123 g) has 17 mcg.  What foods do not have vitamin K?  If a food contains less than 5 mcg per serving, it is considered to have no vitamin K. These foods include:  · Bread and cereal products.  · Cheese.  · Eggs.  · Fish and shellfish.  · Meat and poultry.  · Milk and dairy products.  · Seeds, such  as sunflower or pumpkin seeds.  The items listed above may not be a complete list of foods that have high, moderate, and low amounts of vitamin K or do not have vitamin K. Actual amounts of vitamin K may differ depending on processing. Contact a dietitian for more information.  Summary  · Warfarin is an anticoagulant that prevents blood clots by blocking the activity of vitamin K. It is important to monitor the content of vitamin K in your foods and to be consistent with the amount of vitamin K you take in each day.  · Avoid major changes in your diet. If you are going to change your diet, talk with your health care provider before making changes.  This information is not intended to replace advice given to you by your health care provider. Make sure you discuss any questions you have with your health care provider.  Document Revised: 10/06/2020 Document Reviewed: 10/06/2020  Elsevier Patient Education © 2021 [employer]

## 2021-12-15 ENCOUNTER — PATIENT OUTREACH (OUTPATIENT)
Dept: CASE MANAGEMENT | Facility: OTHER | Age: 71
End: 2021-12-15

## 2021-12-15 DIAGNOSIS — I10 ESSENTIAL HYPERTENSION: ICD-10-CM

## 2021-12-15 DIAGNOSIS — I48.91 ATRIAL FIBRILLATION WITH RVR (HCC): Primary | ICD-10-CM

## 2021-12-15 PROCEDURE — 99490 CHRNC CARE MGMT STAFF 1ST 20: CPT | Performed by: NURSE PRACTITIONER

## 2021-12-15 PROCEDURE — 99439 CHRNC CARE MGMT STAF EA ADDL: CPT | Performed by: NURSE PRACTITIONER

## 2021-12-15 NOTE — OUTREACH NOTE
Ambulatory Case Management Note    CCM Interim Update    Patient is doing well after cardiology apt. They started her on Xarelto and did not continue the Coumadin. Patient is happy with this change. She verbalized that she did not understand why she had to be on blood thinners. I educated patient that due to Atrial Fibrillation, she needs her blood to be thinner because if she enters this abnormal heart rate, it can cause her blood to pool in the top chambers of her heart and to clot since it is not moving as it should be. She verbalized understanding after this explanation. She received samples of Xarelto to get her through the beginning of the year when her insurance will cover this better. Will follow up to make sure Xarelto is covered in January. If not, will explore patient assistance with her.     She is due for CBC next week to check Hgb after starting Xarelto.         Care Plan: Heart Failure   Updates made since 12/15/2021 12:00 AM      Problem: HEART FAILURE DIAGNOSIS KNOWLEDGE DEFICIT       Goal: Patient will verbalize understanding of health maintenance Completed 12/15/2021      Problem: HEART FAILURE MEDICATION ADHERENCE Resolved 12/15/2021      Goal: Consistently take heart failure medication as prescribed Completed 12/15/2021      Care Plan: Medication Regimen   Updates made since 12/15/2021 12:00 AM      Problem: MEDICATION ADHERENCE       Goal: Consistently take medications as prescribed       Task: Discuss barriers to medication adherence with patient Completed 12/15/2021   Responsible User: Sandra Lantigua RN      Task: Educate patient on frequency and refill details of meds    Responsible User: Sandra Lantigua RN      Problem: LIMITED FINANCIAL RESOURCES       Goal: Establish options for financial assistance       Task: Discuss financial planning with patient Completed 12/15/2021   Responsible User: Sandra Lantigua RN Dana Bruce, RN  Ambulatory Case Management    12/15/2021, 15:30 EST

## 2021-12-16 ENCOUNTER — APPOINTMENT (OUTPATIENT)
Dept: CARDIAC REHAB | Facility: HOSPITAL | Age: 71
End: 2021-12-16

## 2021-12-21 ENCOUNTER — CLINICAL SUPPORT (OUTPATIENT)
Dept: FAMILY MEDICINE CLINIC | Facility: CLINIC | Age: 71
End: 2021-12-21

## 2021-12-21 ENCOUNTER — APPOINTMENT (OUTPATIENT)
Dept: CARDIAC REHAB | Facility: HOSPITAL | Age: 71
End: 2021-12-21

## 2021-12-21 DIAGNOSIS — Z87.19 HISTORY OF HEMATEMESIS: ICD-10-CM

## 2021-12-21 LAB
BASOPHILS # BLD AUTO: 0.06 10*3/MM3 (ref 0–0.2)
BASOPHILS NFR BLD AUTO: 0.7 % (ref 0–1.5)
DEPRECATED RDW RBC AUTO: 42.6 FL (ref 37–54)
EOSINOPHIL # BLD AUTO: 0.27 10*3/MM3 (ref 0–0.4)
EOSINOPHIL NFR BLD AUTO: 3.1 % (ref 0.3–6.2)
ERYTHROCYTE [DISTWIDTH] IN BLOOD BY AUTOMATED COUNT: 12.8 % (ref 12.3–15.4)
HCT VFR BLD AUTO: 33.7 % (ref 34–46.6)
HGB BLD-MCNC: 11.3 G/DL (ref 12–15.9)
IMM GRANULOCYTES # BLD AUTO: 0.03 10*3/MM3 (ref 0–0.05)
IMM GRANULOCYTES NFR BLD AUTO: 0.3 % (ref 0–0.5)
LYMPHOCYTES # BLD AUTO: 2.42 10*3/MM3 (ref 0.7–3.1)
LYMPHOCYTES NFR BLD AUTO: 28.1 % (ref 19.6–45.3)
MCH RBC QN AUTO: 31 PG (ref 26.6–33)
MCHC RBC AUTO-ENTMCNC: 33.5 G/DL (ref 31.5–35.7)
MCV RBC AUTO: 92.3 FL (ref 79–97)
MONOCYTES # BLD AUTO: 0.73 10*3/MM3 (ref 0.1–0.9)
MONOCYTES NFR BLD AUTO: 8.5 % (ref 5–12)
NEUTROPHILS NFR BLD AUTO: 5.09 10*3/MM3 (ref 1.7–7)
NEUTROPHILS NFR BLD AUTO: 59.3 % (ref 42.7–76)
NRBC BLD AUTO-RTO: 0 /100 WBC (ref 0–0.2)
PLATELET # BLD AUTO: 329 10*3/MM3 (ref 140–450)
PMV BLD AUTO: 9.2 FL (ref 6–12)
RBC # BLD AUTO: 3.65 10*6/MM3 (ref 3.77–5.28)
WBC NRBC COR # BLD: 8.6 10*3/MM3 (ref 3.4–10.8)

## 2021-12-21 PROCEDURE — 36415 COLL VENOUS BLD VENIPUNCTURE: CPT | Performed by: NURSE PRACTITIONER

## 2021-12-21 PROCEDURE — 85025 COMPLETE CBC W/AUTO DIFF WBC: CPT | Performed by: NURSE PRACTITIONER

## 2021-12-21 NOTE — PROGRESS NOTES
Venipuncture Blood Specimen Collection  Venipuncture performed in left arm by Josiane Lei with good hemostasis. Patient tolerated the procedure well without complications.   12/21/21   Josiane Lei

## 2021-12-22 ENCOUNTER — PATIENT OUTREACH (OUTPATIENT)
Dept: CASE MANAGEMENT | Facility: OTHER | Age: 71
End: 2021-12-22

## 2021-12-22 ENCOUNTER — TELEPHONE (OUTPATIENT)
Dept: CARDIOLOGY | Facility: CLINIC | Age: 71
End: 2021-12-22

## 2021-12-22 DIAGNOSIS — I48.91 ATRIAL FIBRILLATION WITH RVR (HCC): Primary | ICD-10-CM

## 2021-12-22 DIAGNOSIS — I10 ESSENTIAL HYPERTENSION: ICD-10-CM

## 2021-12-22 NOTE — OUTREACH NOTE
Ambulatory Case Management Note    Care Evaluation    Questions/Answers      Most Recent Value   Care Gaps Addressed Mammogram,  Other (See Comment)   Mammogram Status Refused   Mammogram Comments Patient refusing mammogram right now. States that she is not worried about breast cancer right now with everything else she's had going on. I educated patient that this is the best way to catch cancer in it's early stages. States understanding and she may get it at a later time. VERÓNICA, RN   Care Gap Comments Patient refuses covid vaccine at this time because she is nervous about it. States that it doesn't do any good because you can still get covid even if you get the vaccine. Educated patient that vaccine greatly reduces chance of hospitalization and death even if covid is contracted. Also educated patient that she would want to consider Moderna or Pfizer with her history of blood clots. Patient verbalizes understanding.        [facility] Interim Update    Patient states she is doing well. No bleeding since starting Xarelto. Reviewed patient CBC labs from yesterday, improvement noted in Hgb and Hct. Patient has not had any changes in stool color or consistency.    Education provided regarding mammogram and covid-19 vaccine.        There are no recently modified care plans to display for this patient.      Sandra Lantigua RN  Ambulatory Case Management    12/22/2021, 09:57 EST

## 2021-12-25 LAB — QT INTERVAL: 375 MS

## 2021-12-27 ENCOUNTER — TELEPHONE (OUTPATIENT)
Dept: CARDIOLOGY | Facility: CLINIC | Age: 71
End: 2021-12-27

## 2021-12-27 NOTE — TELEPHONE ENCOUNTER
INFORMED THE PATIENT THAT THE PRIOR AUTH FOR XARELTO WAS APPROVED BY HER INSURANCE UNTIL 12/31/22.     SHE STATED THAT SHE WILL TRY TO GET THE MEDICATION REFILL 01/01/22 ONCE SHE IS NO LONGER IN THE DONUT HOLE.

## 2021-12-29 ENCOUNTER — HOSPITAL ENCOUNTER (OUTPATIENT)
Dept: RESPIRATORY THERAPY | Facility: HOSPITAL | Age: 71
Discharge: HOME | End: 2021-12-29
Admitting: NURSE PRACTITIONER

## 2021-12-29 DIAGNOSIS — R06.09 DYSPNEA ON EXERTION: ICD-10-CM

## 2021-12-29 DIAGNOSIS — J44.9 CHRONIC OBSTRUCTIVE PULMONARY DISEASE, UNSPECIFIED COPD TYPE (HCC): ICD-10-CM

## 2021-12-29 PROCEDURE — 94060 EVALUATION OF WHEEZING: CPT

## 2021-12-29 PROCEDURE — 94060 EVALUATION OF WHEEZING: CPT | Performed by: INTERNAL MEDICINE

## 2021-12-29 PROCEDURE — 94726 PLETHYSMOGRAPHY LUNG VOLUMES: CPT | Performed by: INTERNAL MEDICINE

## 2021-12-29 PROCEDURE — 94729 DIFFUSING CAPACITY: CPT

## 2021-12-29 PROCEDURE — 94729 DIFFUSING CAPACITY: CPT | Performed by: INTERNAL MEDICINE

## 2021-12-29 PROCEDURE — 94726 PLETHYSMOGRAPHY LUNG VOLUMES: CPT

## 2021-12-29 RX ORDER — ALBUTEROL SULFATE 2.5 MG/3ML
2.5 SOLUTION RESPIRATORY (INHALATION) ONCE
Status: COMPLETED | OUTPATIENT
Start: 2021-12-29 | End: 2021-12-29

## 2021-12-29 RX ADMIN — ALBUTEROL SULFATE 2.5 MG: 2.5 SOLUTION RESPIRATORY (INHALATION) at 11:46

## 2021-12-30 ENCOUNTER — PATIENT OUTREACH (OUTPATIENT)
Dept: CASE MANAGEMENT | Facility: OTHER | Age: 71
End: 2021-12-30

## 2021-12-30 DIAGNOSIS — I10 ESSENTIAL HYPERTENSION: ICD-10-CM

## 2021-12-30 DIAGNOSIS — I48.91 ATRIAL FIBRILLATION WITH RVR (HCC): Primary | ICD-10-CM

## 2021-12-30 NOTE — OUTREACH NOTE
Ambulatory Case Management Note    [facility] End of Month Documentation    This Chronic Medical Management Care Plan for Rachael South, 71 y.o. female, has been a new plan of care implemented; monitored and managed and a new plan of care implemented for the month of December.  A cumulative time of 40  minutes was spent on this patient record this month, including phone call with patient; electronic communication with primary care provider; chart review, Patient discharge from inpatient stay. Labs monitored. Care gaps addressed. Covid vaccine education..    Regarding the patient's problems: has Anemia; CAD s/p CABG; S/P MVR (mitral valve replacement); Ischemic cardiomyopathy/Chronic systolic CHF; COPD (chronic obstructive pulmonary disease) (HCC); Insomnia; Paroxysmal atrial fibrillation; and Essential hypertension on their problem list., the following items were addressed: medical records; medications; changes to medical care, Patient prescribed new medication - Xarelto, questions on why she has to be on blood thinner answered and any changes can be found within the plan section of the note.  A detailed listing of time spent for chronic care management is tracked within each outreach encounter.  Current medications include:  has a current medication list which includes the following prescription(s): amiodarone, atorvastatin, losartan, metoprolol succinate xl, potassium chloride, and rivaroxaban. and the patient is reported to be patient is compliant with medication protocol, Patient received samples of Xarelto to carry her over until the new year when her insurance will cover this medication for her again.,  Medications are reported to be effective.  All notes on chart for PCP to review.    The patient was monitored remotely for medications.    The patient's physical needs include:  medication education.     The patient's mental support needs include:  not applicable    The patient's cognitive support needs include:   medication; continued support    The patient's psychosocial support needs include:  medication management or adherence    The patient's functional needs include: medication education; physical healthcare    The patient's environmental needs include:  not applicable    Care Plan overall comments:  No data recorded    Refer to previous outreach notes for more information on the areas listed above.    Monthly Billing Diagnoses  (I48.91) Atrial fibrillation with RVR (HCC)    (I10) Essential hypertension    Medications   · Medications have been reconciled    Care Plan progress this month:      Recently Modified Care Plans Updates made since 11/29/2021 12:00 AM     Heart Failure         Problem Priority Last Modified     HEART FAILURE DIAGNOSIS KNOWLEDGE DEFICIT --  6/21/2021  2:17 PM by Jayne Harry RN              Goal Recent Progress Last Modified     Patient will verbalize understanding of health maintenance --  12/15/2021  3:23 PM by Sandra Lantigua RN              Problem Priority Last Modified     HEART FAILURE MEDICATION ADHERENCE --  12/15/2021  3:21 PM by Sandra Lantigua RN              Goal Recent Progress Last Modified     Consistently take heart failure medication as prescribed --  12/15/2021  3:21 PM by Sandra Lantigua RN                 Medication Regimen         Problem Priority Last Modified     MEDICATION ADHERENCE --  12/15/2021  3:23 PM by Sandra Lantigua RN              Goal Recent Progress Last Modified     Consistently take medications as prescribed --  12/15/2021  3:23 PM by Sandra Lantigua RN              Task Due Date Last Modified     Discuss barriers to medication adherence with patient --  12/15/2021  3:24 PM by Sandra Lantigua RN        Educate patient on frequency and refill details of meds --  12/15/2021  3:23 PM by Sandra Lantigua RN              Problem Priority Last Modified     LIMITED FINANCIAL RESOURCES --  12/15/2021  3:23 PM by Sandra Lantigua RN              Goal Recent Progress Last Modified      Establish options for financial assistance --  12/15/2021  3:23 PM by Sandra Lantigua RN              Task Due Date Last Modified     Discuss financial planning with patient --  12/15/2021  3:23 PM by Sandra Lantigua RN                      Instructions   · Patient was provided an electronic copy of care plan  · CCM services were explained and offered and patient has accepted these services.  · Patient has given their written consent to receive CCM services and understands that this includes the authorization of electronic communication of medical information with the other treating providers.  · Patient understands that they may stop CCM services at any time and these changes will be effective at the end of the calendar month and will effectively revocate the agreement of CCM services.  · Patient understands that only one practitioner can furnish and be paid for CCM services during one calendar month.  Patient also understands that there may be co-payment or deductible fees in association with CCM services.  · Patient will continue with at least monthly follow-up calls with the Nurse Navigator.    Sandra Lantigua RN  Ambulatory Case Management    12/30/2021, 12:43 EST    There are no recently modified care plans to display for this patient.      Sandra Lantigua RN  Ambulatory Case Management    12/30/2021, 12:43 EST

## 2022-01-05 NOTE — PROGRESS NOTES
Chief Complaint        In patient follow up    History of Present Illness      Rachael South is a 72 y.o. female who presents to [facility] as a new patient for inpatient follow-up.  Patient was inpatient at [facility] 11/26/2021 through 11/29/2021 related to acute blood loss and anemia, mucosal tear in the middle third of the esophagus requiring hemostatic clip placement, on chronic anticoagulant Coumadin for atrial fib and hematemesis.  Patient received 1 unit of FFP as well as vitamin K while inpatient.  He was evaluated by Dr. Tang 11/27 with an EGD displaying mucosal tear in the middle third of the esophagus hemostasis was achieved with a hemostatic clip.  Patient reports since discharge that she has been doing very well.  She has not seen any hematochezia or melena.  Patient denies any pain in the esophagus or abdomen.  Most recent hemoglobin 11.3.  Patient's cardiologist has changed her Coumadin over to Xarelto.  Patient denies fever, nausea, vomiting, weight loss, night sweats, melena, hematochezia, hematemesis.    Patient was admitted to [facility] on 11/26/2021 and discharged on 11/29/2021    EGD: Review of the patient's most recent EGD performed by Dr. Tang on 11/27/2021 bleeding superficial mucosal tear was found in the middle third of the esophagus.  8 mm and the [place] hemostasis was achieved with 3 hemostatic clips were successfully placed.  Medium size hiatal hernia.  Large adherent blood clot found in the middle third of the esophagus.  Hematin was found in the gastric body.     Patient has never had a colonoscopy.  Cologuard last year negative.    Results       Result Review :   The following data was reviewed by: Salome Dowell NP on 01/06/2022     CMP    CMP 11/28/21 11/29/21 12/1/21   Glucose 101 (A) 110 (A) 97   BUN 18 13 7 (A)   Creatinine 0.81 0.76 0.81   eGFR Non African Am 69 75 69   Sodium 139 139 139   Potassium 4.1 4.0 3.9    Chloride 105 105 101   Calcium 8.3 (A) 8.8 10.0   Albumin   4.50   Total Bilirubin   0.5   Alkaline Phosphatase   74   AST (SGOT)   17   ALT (SGPT)   12   (A) Abnormal value       Comments are available for some flowsheets but are not being displayed.           CBC    CBC 11/29/21 12/1/21 12/21/21   WBC 7.70 8.18 8.60   RBC 2.59 (A) 3.17 (A) 3.65 (A)   Hemoglobin 8.3 (A) 9.9 (A) 11.3 (A)   Hematocrit 23.7 (A) 30.0 (A) 33.7 (A)   MCV 91.5 94.6 92.3   MCH 32.0 31.2 31.0   MCHC 35.0 33.0 33.5   RDW 12.5 12.8 12.8   Platelets 194 301 329   (A) Abnormal value                   Past Medical History       Past Medical History:   Diagnosis Date   • Anemia    • CAD s/p CABG 5/5/2021   • CHF (congestive heart failure) ([facility])    • COPD (chronic obstructive pulmonary disease) ([facility])    • Essential hypertension 12/14/2021   • GERD (gastroesophageal reflux disease)    • Hyperlipidemia    • Hypertension    • Ischemic cardiomyopathy/Chronic systolic CHF 6/29/2021   • Ischemic dilated cardiomyopathy 6/29/2021   • Mitral regurgitation    • Paroxysmal atrial fibrillation 12/14/2021   • PONV (postoperative nausea and vomiting)    • S/P MVR (mitral valve replacement) 6/9/2021       Past Surgical History:   Procedure Laterality Date   • CARDIAC CATHETERIZATION     • CARDIAC CATHETERIZATION N/A 4/21/2021    Procedure: Right and Left Heart Cath;  Surgeon: Gina Correia MD;  Location:  [facility]AS[facility] LOCATION;  Service: Cardiovascular;  Laterality: N/A;   • CARDIAC CATHETERIZATION N/A 4/21/2021    Procedure: Coronary angiography;  Surgeon: Gina Correia MD;  Location:  [facility]ATH INVASIVE LOCATION;  Service: Cardiovascular;  Laterality: N/A;   • CARDIAC CATHETERIZATION N/A 4/21/2021    Procedure: Left ventriculography;  Surgeon: Gina Correia MD;  Location:  [facility]ATH [facility]VASIVE LOCATION;  Service: Cardiovascular;  Laterality: N/A;   • CORONARY ARTERY BYPASS GRAFT     • CORONARY ARTERY BYPASS GRAFT WITH MITRAL VALVE  REPAIR/REPLACEMENT N/A 4/27/2021    Procedure: RITA STERNOTOMY CORONARY ARTERY BYPASS GRAFT TIMES 3 USING LEFT INTERNAL MAMMARY ARTERY AND ENDOSCOPICALLY HARVESTED LEFT GREATER SAPHENOUS VEIN, MITRAL VALVE REPLACEMENT AND PRP;  Surgeon: Rafiq Gregg MD;  Location: [facility] MAIN OR;  Service: Cardiothoracic;  Laterality: N/A;   • ENDOSCOPY N/A 11/27/2021    Procedure: ESOPHAGOGASTRODUODENOSCOPY AT BEDSIDE WITH ENDO CLIPS X3;  Surgeon: Toney Tang MD;  Location: [facility] ENDOSCOPY;  Service: Gastroenterology;  Laterality: N/A;  LARGE HIATAL HERNIA, ODILON MARADIAGA TEAR IN MID-ESOPHAGUS   • MITRAL VALVE REPLACEMENT     • ROTATOR CUFF REPAIR Right    • UPPER GASTROINTESTINAL ENDOSCOPY           Current Outpatient Medications:   •  amiodarone (PACERONE) 200 MG tablet, Take 0.5 tablets by mouth Daily., Disp: 30 tablet, Rfl: 1  •  atorvastatin (LIPITOR) 40 MG tablet, Take 1 tablet by mouth Every Night., Disp: 90 tablet, Rfl: 2  •  furosemide (LASIX) 40 MG tablet, Take 40 mg by mouth 2 (Two) Times a Day., Disp: , Rfl:   •  losartan (COZAAR) 50 MG tablet, Take 1 tablet by mouth Daily., Disp: 90 tablet, Rfl: 3  •  metoprolol succinate XL (TOPROL-XL) 50 MG 24 hr tablet, Take 1 tablet by mouth Every Night., Disp: 30 tablet, Rfl: 11  •  potassium chloride (KLOR-CON) 8 MEQ CR tablet, TAKE 1 TABLET(8 MEQ) BY MOUTH EVERY DAY (Patient taking differently: Take 8 mEq by mouth Daily.), Disp: 90 tablet, Rfl: 0  •  rivaroxaban (Xarelto) 15 MG tablet, Take 1 tablet by mouth Daily With Dinner., Disp: 90 tablet, Rfl: 3     No Known Allergies    Family History   Problem Relation Age of Onset   • Heart failure Mother    • Heart disease Brother         blue baby   • Diabetes type II Grandchild    • Heart attack Father    • Colon cancer Neg Hx         Social History     Social History Narrative   • Not on file       Objective       Objective     Vital Signs:   BP (!) 186/84 (BP Location: Right arm, Patient Position: Sitting, Cuff Size:  "Adult)   Pulse 85   Ht 157.5 cm (62\")   Wt 67.6 kg (149 lb 1.6 oz)   SpO2 99%   BMI 27.27 kg/m²     Body mass index is 27.27 kg/m².    Physical Exam  Constitutional:       General: She is not in acute distress.     Appearance: Normal appearance. She is well-developed and normal weight.   Eyes:      Conjunctiva/sclera: Conjunctivae normal.      Pupils: Pupils are equal, round, and reactive to light.      Visual Fields: Right eye visual fields normal and left eye visual fields normal.   Cardiovascular:      Rate and Rhythm: Normal rate and regular rhythm.      Heart sounds: Normal heart sounds.   Pulmonary:      Effort: Pulmonary effort is normal. No retractions.      Breath sounds: Normal breath sounds and air entry.      Comments: Inspection of chest: normal appearance  Abdominal:      General: Bowel sounds are normal.      Palpations: Abdomen is soft.      Tenderness: There is no abdominal tenderness.      Comments: No appreciable hepatosplenomegaly   Musculoskeletal:      Cervical back: Neck supple.      Right lower leg: No edema.      Left lower leg: No edema.   Lymphadenopathy:      Cervical: No cervical adenopathy.   Skin:     Findings: No lesion.      Comments: Turgor normal   Neurological:      Mental Status: She is alert and oriented to person, place, and time.   Psychiatric:         Mood and Affect: Mood and affect normal.              Assessment & Plan          Assessment and Plan    Diagnoses and all orders for this visit:    1. Tear of esophagus, subsequent encounter (Primary)    2. Hematemesis with nausea      72-year-old female presenting to the office today for an inpatient follow-up.  Overall patient is doing really well she has had improvement in her hemoglobin.  Patient denies any worrisome symptoms including fever, nausea, vomiting, epigastric pain, esophageal pain, melena or hematochezia.  Patient will continue with [place] with her primary care for colon screening.  Patient will continue " monitoring CBC with primary care as well.  She will follow-up with us on an as-needed basis.  Patient agreeable to this plan and will call with any questions or concerns.          Follow Up       Follow Up   Return if symptoms worsen or fail to improve.  Patient was given instructions and counseling regarding her condition or for health maintenance advice. Please see specific information pulled into the AVS if appropriate.

## 2022-01-06 ENCOUNTER — OFFICE VISIT (OUTPATIENT)
Dept: GASTROENTEROLOGY | Facility: CLINIC | Age: 72
End: 2022-01-06

## 2022-01-06 VITALS
OXYGEN SATURATION: 99 % | SYSTOLIC BLOOD PRESSURE: 186 MMHG | BODY MASS INDEX: 27.44 KG/M2 | DIASTOLIC BLOOD PRESSURE: 84 MMHG | WEIGHT: 149.1 LBS | HEIGHT: 62 IN | HEART RATE: 85 BPM

## 2022-01-06 DIAGNOSIS — S11.21XD TEAR OF ESOPHAGUS, SUBSEQUENT ENCOUNTER: Primary | ICD-10-CM

## 2022-01-06 DIAGNOSIS — K92.0 HEMATEMESIS WITH NAUSEA: ICD-10-CM

## 2022-01-06 PROCEDURE — 99213 OFFICE O/P EST LOW 20 MIN: CPT | Performed by: NURSE PRACTITIONER

## 2022-01-06 RX ORDER — FUROSEMIDE 40 MG/1
40 TABLET ORAL 2 TIMES DAILY
COMMUNITY
End: 2022-01-10 | Stop reason: SDUPTHER

## 2022-01-07 NOTE — TELEPHONE ENCOUNTER
LOV   -   12/14/21 Cherrie Franco    Next   -   5/10/22  Dr. Canales    Last Labs   -   12/21/21 CBC    12/1/21 CMP, CBC    Chart says 40 MG BID.  This rx states 1 daily.

## 2022-01-10 RX ORDER — FUROSEMIDE 40 MG/1
TABLET ORAL
Qty: 30 TABLET | Refills: 1 | Status: SHIPPED | OUTPATIENT
Start: 2022-01-10 | End: 2022-03-28 | Stop reason: SDUPTHER

## 2022-01-12 ENCOUNTER — OFFICE VISIT (OUTPATIENT)
Dept: PULMONOLOGY | Facility: CLINIC | Age: 72
End: 2022-01-12

## 2022-01-12 VITALS
BODY MASS INDEX: 27.42 KG/M2 | TEMPERATURE: 97.5 F | HEIGHT: 62 IN | OXYGEN SATURATION: 97 % | HEART RATE: 78 BPM | SYSTOLIC BLOOD PRESSURE: 217 MMHG | DIASTOLIC BLOOD PRESSURE: 89 MMHG | WEIGHT: 149 LBS | RESPIRATION RATE: 18 BRPM

## 2022-01-12 DIAGNOSIS — J41.0 SIMPLE CHRONIC BRONCHITIS: Primary | ICD-10-CM

## 2022-01-12 PROBLEM — J42 CHRONIC BRONCHITIS: Status: ACTIVE | Noted: 2022-01-12

## 2022-01-12 PROCEDURE — 99213 OFFICE O/P EST LOW 20 MIN: CPT | Performed by: INTERNAL MEDICINE

## 2022-01-12 NOTE — ASSESSMENT & PLAN NOTE
Patient seems to be doing well at this time she does appear to have chronic bronchitis but does not meet criteria for COPD based on pulmonary function studies    She will need to be seen with us in the office every 6 months    She does have a yearly low-dose lung cancer screening CT scan coming up    At this time her breathing is much improved    Will continue as needed bronchodilator therapies

## 2022-01-12 NOTE — PROGRESS NOTES
"Chief Complaint  Follow-up, COPD, and Cough    Subjective          Rachael South presents to [facility]  History of Present Illness  Pleasant 72-year-old female she is here today for follow-up  Patient well-known to me from the hospital as I performed bilateral thoracentesis on her back in the summertime  Had recent pulmonary function studies that show a reduced forced vital capacity but only mildly reduced  Does have evidence of air trapping and hyperinflation  Has a preserved FEV1/FVC  Does remain smoke-free  Does have mucus production on a daily basis  Objective   Vital Signs:   BP (!) 217/89 (BP Location: Right arm, Patient Position: Sitting, Cuff Size: Adult)   Pulse 78   Temp 97.5 °F (36.4 °C)   Resp 18   Ht 157.5 cm (62\")   Wt 67.6 kg (149 lb)   SpO2 97%   BMI 27.25 kg/m²     Physical Exam   Vital Signs Reviewed  General WDWN, Alert, NAD.    HEENT:  PERRL, EOMI.  OP, nares clear, no sinus tenderness  Neck:  Supple, no JVD, no thyromegaly  Chest:  good aeration, clear to auscultation bilaterally, tympanic to percussion bilaterally, no work of breathing noted  CV: RRR, no MGR  EXT:  no clubbing, no cyanosis, no edema, no joint tenderness  Neuro:  A&Ox3, CN grossly intact,    Result Review :       Data reviewed: Pulmonary function showing air trapping and hyperinflation           Assessment and Plan    Diagnoses and all orders for this visit:    1. Simple chronic bronchitis (HCC) (Primary)  Assessment & Plan:  Patient seems to be doing well at this time she does appear to have chronic bronchitis but does not meet criteria for COPD based on pulmonary function studies    She will need to be seen with us in the office every 6 months    She does have a yearly low-dose lung cancer screening CT scan coming up    At this time her breathing is much improved    Will continue as needed bronchodilator therapies        Follow Up   Return in about 5 months " (around 6/12/2022).  Patient was given instructions and counseling regarding her condition or for health maintenance advice. Please see specific information pulled into the AVS if appropriate.

## 2022-01-17 ENCOUNTER — PATIENT OUTREACH (OUTPATIENT)
Dept: CASE MANAGEMENT | Facility: OTHER | Age: 72
End: 2022-01-17

## 2022-01-17 DIAGNOSIS — I10 ESSENTIAL HYPERTENSION: Primary | ICD-10-CM

## 2022-01-17 PROCEDURE — 99490 CHRNC CARE MGMT STAFF 1ST 20: CPT | Performed by: NURSE PRACTITIONER

## 2022-01-17 NOTE — OUTREACH NOTE
"Ambulatory Case Management Note    [facility] Interim Update    01/19/22  Called patient for blood pressure log. She states that she has taken it numerous times since we spoke on Monday. Her readings are as follows : 178/86, 185/84, 179/83, 177/85, 148/76, 163/79, 161/78, 159/76, 140/71, 133/69, 155/78, 156/77. I scheduled apt for patient to see PCP on Monday, 1/24. She is aware to call the office with concerns. Or to go to the ER with chest pain, dizziness, or blurred vision should these occur.        01/17/22  Performed chart review for patient. Noticed that on 1/12/22 her blood pressure was elevated to 217/89 while at her pulmonology appointment. One week earlier on 1/06/22 at her gastro appointment, it was 186/84. I asked her to check it today while we were on the phone and she states it is 193/80. Patient states that she gets very nervous when she talks about her blood pressure, and she thinks this contributes to the elevated readings. I advised her that these readings are very elevated, and could probably be somewhat caused from anxiety, but not totally. I advised . Candida Ryder, PCP of her readings and that she is compliant with medications listed. PCP states \"Thanks for letting me know. It was normal in December. If she continues to have significantly elevated readings like she has over the past month please have her follow-up in the office to discuss her medications.\"    Patient is scheduled to get her J&J covid vaccine tomorrow. She questions if this is safe for her to do so. We discussed the benefits and risks, especially related to the rare side effect of blood clots that can be associated with J&J. I asked PCP her recommendations with the patients history of CABG and Mitral valve replacement. She states \"In general I usually recommend Moderna or Pfizer due to the shown improved efficacy and extended length of efficacy as compared to the [employer]iliano [employer] Maximiliano. If she is wanting to only receive the 1 vaccine " "instead of doing a 2 part series then she can continue with her plan to receive the J&J.\"      Patient is going to take today to decide if she would like to get J&J or one of the other vaccines available. She will call [facility] office if she would like to receive vaccine there.    I will call patient on Wednesday to receive more blood pressure readings to trend BP.    There are no recently modified care plans to display for this patient.      Sandra Lantigua RN  Ambulatory Case Management    1/17/2022, 14:53 EST    "

## 2022-01-24 ENCOUNTER — OFFICE VISIT (OUTPATIENT)
Dept: FAMILY MEDICINE CLINIC | Facility: CLINIC | Age: 72
End: 2022-01-24

## 2022-01-24 VITALS
HEART RATE: 90 BPM | SYSTOLIC BLOOD PRESSURE: 145 MMHG | OXYGEN SATURATION: 93 % | BODY MASS INDEX: 26.63 KG/M2 | TEMPERATURE: 97.3 F | WEIGHT: 145.6 LBS | DIASTOLIC BLOOD PRESSURE: 70 MMHG

## 2022-01-24 DIAGNOSIS — I10 ESSENTIAL HYPERTENSION: Primary | ICD-10-CM

## 2022-01-24 PROCEDURE — 99214 OFFICE O/P EST MOD 30 MIN: CPT | Performed by: NURSE PRACTITIONER

## 2022-01-24 NOTE — PROGRESS NOTES
Chief Complaint  Hypertension    PHQ-2 Total Score: 0    Subjective        Past Medical History:   Diagnosis Date   • Anemia    • CAD s/p CABG 2021   • CHF (congestive heart failure) ([facility])    • COPD (chronic obstructive pulmonary disease) ([facility])    • Essential hypertension 2021   • GERD (gastroesophageal reflux disease)    • Hyperlipidemia    • Hypertension    • Ischemic cardiomyopathy/Chronic systolic CHF 2021   • Ischemic dilated cardiomyopathy 2021   • Mitral regurgitation    • Paroxysmal atrial fibrillation 2021   • PONV (postoperative nausea and vomiting)    • S/P MVR (mitral valve replacement) 2021     Social History     Tobacco Use   • Smoking status: Former Smoker     Packs/day: 0.50     Years: 45.00     Pack years: 22.50     Start date: 1963     Quit date: 3/9/2021     Years since quittin.8   • Smokeless tobacco: Never Used   • Tobacco comment: caffeine use - denies   Vaping Use   • Vaping Use: Never used   Substance Use Topics   • Alcohol use: Never   • Drug use: Never      Current Outpatient Medications on File Prior to Visit   Medication Sig   • amiodarone (PACERONE) 200 MG tablet Take 0.5 tablets by mouth Daily.   • atorvastatin (LIPITOR) 40 MG tablet Take 1 tablet by mouth Every Night.   • furosemide (LASIX) 40 MG tablet TAKE 1 TABLET BY MOUTH DAILY   • losartan (COZAAR) 50 MG tablet Take 1 tablet by mouth Daily.   • metoprolol succinate XL (TOPROL-XL) 50 MG 24 hr tablet Take 1 tablet by mouth Every Night.   • rivaroxaban (Xarelto) 15 MG tablet Take 1 tablet by mouth Daily With Dinner.   • potassium chloride (KLOR-CON) 8 MEQ CR tablet TAKE 1 TABLET(8 MEQ) BY MOUTH EVERY DAY (Patient taking differently: Take 8 mEq by mouth Daily.)     No current facility-administered medications on file prior to visit.      No Known Allergies   Health Maintenance Due   Topic Date Due   • DXA SCAN  Never done   • COLORECTAL CANCER SCREENING  Never done   • Pneumococcal Vaccine 65+ (1  of 2 - PPSV23) Never done   • TDAP/TD VACCINES (1 - Tdap) Never done   • ZOSTER VACCINE (1 of 2) Never done   • ANNUAL WELLNESS VISIT  Never done      Rachael South presents to [facility]  Pt here to follow up on elevated BP. Pt states she recently was seen by Pulmonology and states she was told she had asthma that was mild and manageable. She is getting a low dose CT scan in March. Her BP was >200/100. She is currently on losartan 50mg in the morning and metoprolol at night. States occasional headaches but denies CP, palpitations. Pt states her home readings fluctuate from 155/78 to 112/62. Pt states she will have elevated BP at office. Pt states she is trying to eat healthy and does not drink coffee, alcohol, use drugs, quit smoking.       Objective   Vital Signs:   /70   Pulse 90   Temp 97.3 °F (36.3 °C)   Wt 66 kg (145 lb 9.6 oz)   SpO2 93%   BMI 26.63 kg/m²     Review of Systems   Respiratory: Negative for shortness of breath and wheezing.    Cardiovascular: Negative for chest pain and palpitations.   Gastrointestinal: Negative for abdominal pain.   Genitourinary: Negative for dysuria.   Musculoskeletal: Negative for back pain.   Neurological: Positive for headache. Negative for dizziness and light-headedness.      Physical Exam  Vitals reviewed.   Constitutional:       General: She is not in acute distress.     Appearance: Normal appearance. She is well-developed.   HENT:      Head: Normocephalic and atraumatic.   Eyes:      Conjunctiva/sclera: Conjunctivae normal.      Pupils: Pupils are equal, round, and reactive to light.   Cardiovascular:      Rate and Rhythm: Normal rate and regular rhythm.      Heart sounds: Normal heart sounds.   Pulmonary:      Effort: Pulmonary effort is normal.      Breath sounds: Normal breath sounds.   Musculoskeletal:      Cervical back: Neck supple.      Right lower leg: No edema.      Left lower leg: No edema.   Skin:     General:  Skin is warm and dry.   Neurological:      Mental Status: She is alert and oriented to person, place, and time.   Psychiatric:         Mood and Affect: Mood and affect normal.         Behavior: Behavior normal.         Thought Content: Thought content normal.         Judgment: Judgment normal.        Result Review :   The following data was reviewed by: KENNETH Jones on 01/24/2022:  CMP    CMP 11/28/21 11/29/21 12/1/21   Glucose 101 (A) 110 (A) 97   BUN 18 13 7 (A)   Creatinine 0.81 0.76 0.81   eGFR Non African Am 69 75 69   Sodium 139 139 139   Potassium 4.1 4.0 3.9   Chloride 105 105 101   Calcium 8.3 (A) 8.8 10.0   Albumin   4.50   Total Bilirubin   0.5   Alkaline Phosphatase   74   AST (SGOT)   17   ALT (SGPT)   12   (A) Abnormal value       Comments are available for some flowsheets but are not being displayed.           TSH    TSH 3/17/21 12/1/21   TSH 1.160 2.070                     Assessment and Plan    Diagnoses and all orders for this visit:    1. Essential hypertension (Primary)    Recheck of blood pressure and left arm was 170/82 and right arm was 160/78.    Instructed patient to increase losartan to 75 mg daily.  Patient to keep a blood pressure log for monitoring blood pressure 1-2 times per day and follow-up in 2 weeks with blood pressure readings.  Patient also instructed to contact cardiologist office to see if she needs to be seen in their office sooner than May 2022.  Patient also instructed to go to the ER with any chest pains palpitations or significant headache.    Follow Up {Instructions Charge Capture  Follow-up Communications :23}  Return in about 2 weeks (around 2/7/2022) for Recheck.  Patient was given instructions and counseling regarding her condition or for health maintenance advice. Please see specific information pulled into the AVS if appropriate.

## 2022-01-31 ENCOUNTER — PATIENT OUTREACH (OUTPATIENT)
Dept: CASE MANAGEMENT | Facility: OTHER | Age: 72
End: 2022-01-31

## 2022-01-31 DIAGNOSIS — I10 ESSENTIAL HYPERTENSION: Primary | ICD-10-CM

## 2022-01-31 DIAGNOSIS — I48.91 ATRIAL FIBRILLATION WITH RVR: ICD-10-CM

## 2022-01-31 NOTE — OUTREACH NOTE
[facility] End of Month Documentation    This Chronic Medical Management Care Plan for Rachael South, 72 y.o. female, has been monitored and managed and a new plan of care implemented for the month of January.  A cumulative time of 37  minutes was spent on this patient record this month, including chart review; phone call with patient; electronic communication with primary care provider, Covid vaccine questions, elevated BP addressed..    Regarding the patient's problems: has Anemia; CAD s/p CABG; S/P MVR (mitral valve replacement); Ischemic cardiomyopathy/Chronic systolic CHF; COPD (chronic obstructive pulmonary disease) (HCC); Insomnia; Paroxysmal atrial fibrillation; Essential hypertension; and Chronic bronchitis (HCC) on their problem list., the following items were addressed: medications and any changes can be found within the plan section of the note.  A detailed listing of time spent for chronic care management is tracked within each outreach encounter.  Current medications include:  has a current medication list which includes the following prescription(s): amiodarone, atorvastatin, furosemide, losartan, metoprolol succinate xl, potassium chloride, and rivaroxaban. and the patient is reported to be patient is compliant with medication protocol,  Medications are reported to be non-effective in controlling symptoms and changes have been made to the medication protocol, Patients blood pressure uncontrolled, changes made at OV with PCP. Will monitor BP closely with medication changes..  Regarding these diagnoses, referrals were made to the following provider(s):  PCP.  All notes on chart for PCP to review.    The patient was monitored remotely for blood pressure; medications.    The patient's physical needs include:  medication education.     The patient's mental support needs include:  not applicable    The patient's cognitive support needs include:  medication    The patient's psychosocial support needs include:   medication management or adherence    The patient's functional needs include: medication education; physical healthcare    The patient's environmental needs include:  not applicable    Care Plan overall comments:  No data recorded    Refer to previous outreach notes for more information on the areas listed above.    Monthly Billing Diagnoses  (I10) Essential hypertension    (I48.91) Atrial fibrillation with RVR (HCC)    Medications   · Medications have been reconciled    Care Plan progress this month:      Recently Modified Care Plans Updates made since 12/31/2021 12:00 AM    No recently modified care plans.          Instructions   · Patient was provided an electronic copy of care plan  · CCM services were explained and offered and patient has accepted these services.  · Patient has given their written consent to receive CCM services and understands that this includes the authorization of electronic communication of medical information with the other treating providers.  · Patient understands that they may stop CCM services at any time and these changes will be effective at the end of the calendar month and will effectively revocate the agreement of CCM services.  · Patient understands that only one practitioner can furnish and be paid for CCM services during one calendar month.  Patient also understands that there may be co-payment or deductible fees in association with CCM services.  · Patient will continue with at least monthly follow-up calls with the Nurse Navigator.    Sandra Lantigua RN  Ambulatory Case Management    1/31/2022, 09:45 EST

## 2022-02-07 ENCOUNTER — OFFICE VISIT (OUTPATIENT)
Dept: FAMILY MEDICINE CLINIC | Facility: CLINIC | Age: 72
End: 2022-02-07

## 2022-02-07 VITALS
DIASTOLIC BLOOD PRESSURE: 90 MMHG | BODY MASS INDEX: 26.89 KG/M2 | TEMPERATURE: 97.3 F | OXYGEN SATURATION: 97 % | SYSTOLIC BLOOD PRESSURE: 170 MMHG | WEIGHT: 147 LBS | HEART RATE: 83 BPM

## 2022-02-07 DIAGNOSIS — I10 ESSENTIAL HYPERTENSION: Primary | ICD-10-CM

## 2022-02-07 PROCEDURE — 99214 OFFICE O/P EST MOD 30 MIN: CPT | Performed by: NURSE PRACTITIONER

## 2022-02-07 RX ORDER — LOSARTAN POTASSIUM 100 MG/1
100 TABLET ORAL DAILY
Qty: 90 TABLET | Refills: 0 | Status: SHIPPED | OUTPATIENT
Start: 2022-02-07 | End: 2022-04-25

## 2022-02-07 RX ORDER — CLONIDINE HYDROCHLORIDE 0.1 MG/1
0.1 TABLET ORAL ONCE
Status: SHIPPED | OUTPATIENT
Start: 2022-02-07

## 2022-02-07 NOTE — PROGRESS NOTES
Chief Complaint  Hypertension    Subjective        Past Medical History:   Diagnosis Date   • Anemia    • CAD s/p CABG 2021   • CHF (congestive heart failure) ([facility])    • COPD (chronic obstructive pulmonary disease) ([facility])    • Essential hypertension 2021   • GERD (gastroesophageal reflux disease)    • Hyperlipidemia    • Hypertension    • Ischemic cardiomyopathy/Chronic systolic CHF 2021   • Ischemic dilated cardiomyopathy 2021   • Mitral regurgitation    • Paroxysmal atrial fibrillation 2021   • PONV (postoperative nausea and vomiting)    • S/P MVR (mitral valve replacement) 2021     Social History     Tobacco Use   • Smoking status: Former Smoker     Packs/day: 0.50     Years: 45.00     Pack years: 22.50     Start date: 1963     Quit date: 3/9/2021     Years since quittin.9   • Smokeless tobacco: Never Used   • Tobacco comment: caffeine use - denies   Vaping Use   • Vaping Use: Never used   Substance Use Topics   • Alcohol use: Never   • Drug use: Never      Current Outpatient Medications on File Prior to Visit   Medication Sig   • amiodarone (PACERONE) 200 MG tablet Take 0.5 tablets by mouth Daily.   • atorvastatin (LIPITOR) 40 MG tablet Take 1 tablet by mouth Every Night.   • furosemide (LASIX) 40 MG tablet TAKE 1 TABLET BY MOUTH DAILY   • metoprolol succinate XL (TOPROL-XL) 50 MG 24 hr tablet Take 1 tablet by mouth Every Night.   • potassium chloride (KLOR-CON) 8 MEQ CR tablet TAKE 1 TABLET(8 MEQ) BY MOUTH EVERY DAY (Patient taking differently: Take 8 mEq by mouth Daily.)   • rivaroxaban (Xarelto) 15 MG tablet Take 1 tablet by mouth Daily With Dinner.   • [DISCONTINUED] losartan (COZAAR) 50 MG tablet Take 1 tablet by mouth Daily.     No current facility-administered medications on file prior to visit.      No Known Allergies   Health Maintenance Due   Topic Date Due   • DXA SCAN  Never done   • COLORECTAL CANCER SCREENING  Never done   • COVID-19 Vaccine (1) Never done   •  Pneumococcal Vaccine 65+ (1 of 2 - PPSV23) Never done   • TDAP/TD VACCINES (1 - Tdap) Never done   • ZOSTER VACCINE (1 of 2) Never done   • ANNUAL WELLNESS VISIT  Never done      Rachael South presents to [facility]  Here to follow up on elevated BP readings. Pt states with certain foods or being upset and BP will be higher. BP elevated in office at 180/108. Home readings show 140's/70's, 150's/80's. Has 2 readings of 125/78 and 117/60. Pt will walk around her house daily for exercise.  Patient reports significant anxiety coming into appointments and states her blood pressure will elevate at appointments.      Objective   Vital Signs:   /90   Pulse 83   Temp 97.3 °F (36.3 °C)   Wt 66.7 kg (147 lb)   SpO2 97%   BMI 26.89 kg/m²     Review of Systems   Respiratory: Negative for shortness of breath.    Cardiovascular: Negative for chest pain, palpitations and leg swelling.   Neurological: Negative for dizziness and headache.      Physical Exam  Vitals reviewed.   Constitutional:       General: She is not in acute distress.     Appearance: Normal appearance. She is well-developed.   HENT:      Head: Normocephalic and atraumatic.   Eyes:      Conjunctiva/sclera: Conjunctivae normal.      Pupils: Pupils are equal, round, and reactive to light.   Cardiovascular:      Rate and Rhythm: Normal rate and regular rhythm.      Heart sounds: Normal heart sounds.   Pulmonary:      Effort: Pulmonary effort is normal.      Breath sounds: Normal breath sounds.   Musculoskeletal:      Cervical back: Neck supple.      Right lower leg: No edema.      Left lower leg: No edema.   Skin:     General: Skin is warm and dry.   Neurological:      Mental Status: She is alert and oriented to person, place, and time.   Psychiatric:         Mood and Affect: Mood and affect normal.         Behavior: Behavior normal.         Thought Content: Thought content normal.         Judgment: Judgment normal.         Result Review :                 Assessment and Plan    Diagnoses and all orders for this visit:    1. Essential hypertension (Primary)  -     losartan (COZAAR) 100 MG tablet; Take 1 tablet by mouth Daily.  Dispense: 90 tablet; Refill: 0  -     cloNIDine (CATAPRES) tablet 0.1 mg      Blood pressure recheck was 200/100; patient was given dose of clonidine in the office and allowed to rest and blood pressure rechecked and down to 170/90.  Patient allowed to leave and told to monitor blood pressure once daily at home.    Follow Up   Return in about 3 months (around 5/7/2022) for Next scheduled follow up.  Patient was given instructions and counseling regarding her condition or for health maintenance advice. Please see specific information pulled into the AVS if appropriate.

## 2022-02-11 ENCOUNTER — PATIENT OUTREACH (OUTPATIENT)
Dept: CASE MANAGEMENT | Facility: OTHER | Age: 72
End: 2022-02-11

## 2022-02-11 DIAGNOSIS — I10 ESSENTIAL HYPERTENSION: Primary | ICD-10-CM

## 2022-02-11 DIAGNOSIS — I50.21 ACUTE SYSTOLIC CONGESTIVE HEART FAILURE DUE TO VALVULAR DISEASE: ICD-10-CM

## 2022-02-11 DIAGNOSIS — I38 ACUTE SYSTOLIC CONGESTIVE HEART FAILURE DUE TO VALVULAR DISEASE: ICD-10-CM

## 2022-02-11 NOTE — OUTREACH NOTE
Ambulatory Case Management Note    CCM Interim Update    Called patient for blood pressure update. States that her medication was increased at her last office visit. She is checking her BP twice daily and keeping a log. Established that I will call her once a week, on Fridays, to get her BP log to see how it has been doing the previous week. Need to ensure that new medication dosage works and catch any trends.     BP log today is 147/73, 145/68, 133/66, 138/64.        There are no recently modified care plans to display for this patient.      Sandra Lantigua RN  Ambulatory Case Management    2/11/2022, 13:42 EST

## 2022-02-18 ENCOUNTER — TELEPHONE (OUTPATIENT)
Dept: CASE MANAGEMENT | Facility: OTHER | Age: 72
End: 2022-02-18

## 2022-02-18 DIAGNOSIS — I10 ESSENTIAL HYPERTENSION: Primary | ICD-10-CM

## 2022-02-18 DIAGNOSIS — I50.21 ACUTE SYSTOLIC CONGESTIVE HEART FAILURE DUE TO VALVULAR DISEASE: ICD-10-CM

## 2022-02-18 DIAGNOSIS — I38 ACUTE SYSTOLIC CONGESTIVE HEART FAILURE DUE TO VALVULAR DISEASE: ICD-10-CM

## 2022-02-25 ENCOUNTER — PATIENT OUTREACH (OUTPATIENT)
Dept: CASE MANAGEMENT | Facility: OTHER | Age: 72
End: 2022-02-25

## 2022-02-25 ENCOUNTER — TELEPHONE (OUTPATIENT)
Dept: CASE MANAGEMENT | Facility: OTHER | Age: 72
End: 2022-02-25

## 2022-02-25 DIAGNOSIS — I38 ACUTE SYSTOLIC CONGESTIVE HEART FAILURE DUE TO VALVULAR DISEASE: ICD-10-CM

## 2022-02-25 DIAGNOSIS — I10 ESSENTIAL HYPERTENSION: Primary | ICD-10-CM

## 2022-02-25 DIAGNOSIS — I50.21 ACUTE SYSTOLIC CONGESTIVE HEART FAILURE DUE TO VALVULAR DISEASE: ICD-10-CM

## 2022-02-25 PROCEDURE — 99439 CHRNC CARE MGMT STAF EA ADDL: CPT | Performed by: NURSE PRACTITIONER

## 2022-02-25 PROCEDURE — 99490 CHRNC CARE MGMT STAFF 1ST 20: CPT | Performed by: NURSE PRACTITIONER

## 2022-02-25 NOTE — OUTREACH NOTE
Ambulatory Case Management Note    CCM Interim Update    Called patient to discuss blood pressure readings. Her blood pressure readings have greatly improved since January. She has been taking her medications as prescribed, decreased drinking caffeinated tea, drinking organic beet juice, walking around her home 50 times per day, and is sure not to add any sodium to her meals. Will follow up again in 2 weeks. Her blood pressure log is as follows:    2-19  150/64, 127/62, 132/65  2-20  142/77, 131/63, 142/68, 119/73  2-21  138/66, 133/64, 126/61, 128/66  2-22  146/72, 117/78, 131/81, 142/74  2-23  141/84, 122/78, 142/82, 126/67  2-24  147/68, 138/63, 126/92, 130/74  2-25  147/42, 134/64        Care Plan: Heart Failure   Updates made since 2/25/2022 12:00 AM      Problem: SODIUM INTAKE       Goal: Patient will maintain management of sodium consumption       Task: Educate patient on sodium alternatives Completed 2/25/2022   Responsible User: Jayne Harry RN      Problem: EXERCISE REGIMEN       Goal: Patient will engage in physical activity safely       Task: Educate regarding gradually increasing activity to 20 minutes a day Completed 2/25/2022   Responsible User: Jayne Harry RN      Problem: HEART FAILURE PROGRESSION AND CARE NEEDS       Goal: Patient will verbalize understanding of heart failure progression       Task: Educate patient on stages of heart failure Completed 2/25/2022   Responsible User: Jayne Harry RN      Care Plan: Medication Regimen   Updates made since 2/25/2022 12:00 AM      Problem: MEDICATION ADHERENCE       Goal: Consistently take medications as prescribed       Task: Educate patient on frequency and refill details of meds Completed 2/25/2022   Responsible User: Sandra Lantigua RN          Sandra Lantigua RN  Ambulatory Case Management    2/25/2022, 14:24 EST

## 2022-02-25 NOTE — OUTREACH NOTE
[facility] End of Month Documentation    This Chronic Medical Management Care Plan for Rachael South, 72 y.o. female, has been monitored and managed and a new plan of care implemented for the month of February.  A cumulative time of 48  minutes was spent on this patient record this month, including chart review; phone call with patient; electronic communication with primary care provider.    Regarding the patient's problems: has Anemia; CAD s/p CABG; S/P MVR (mitral valve replacement); Ischemic cardiomyopathy/Chronic systolic CHF; COPD (chronic obstructive pulmonary disease) (HCC); Insomnia; Paroxysmal atrial fibrillation; Essential hypertension; and Chronic bronchitis (HCC) on their problem list., the following items were addressed: medications and any changes can be found within the plan section of the note.  A detailed listing of time spent for chronic care management is tracked within each outreach encounter.  Current medications include:  has a current medication list which includes the following prescription(s): amiodarone, atorvastatin, furosemide, losartan, metoprolol succinate xl, potassium chloride, and rivaroxaban, and the following Facility-Administered Medications: clonidine. and the patient is reported to be patient is compliant with medication protocol,  Medications are reported to be effective.All notes on chart for PCP to review.    The patient was monitored remotely for blood pressure; medications; activity level.    The patient's physical needs include:  medication education.     The patient's mental support needs include:  not applicable    The patient's cognitive support needs include:  medication    The patient's psychosocial support needs include:  medication management or adherence; continued support    The patient's functional needs include: medication education; physical healthcare    The patient's environmental needs include:  Relies on her  for transportation    Care Plan overall  comments:  No data recorded    Refer to previous outreach notes for more information on the areas listed above.    Monthly Billing Diagnoses  (I10) Essential hypertension    (I50.21,  I38) Acute systolic congestive heart failure due to valvular disease (HCC)    Medications   · Medications have been reconciled    Care Plan progress this month:      Recently Modified Care Plans Updates made since 1/25/2022 12:00 AM     Heart Failure         Problem Priority Last Modified     SODIUM INTAKE --  6/21/2021  2:17 PM by Jayne Harry RN              Goal Recent Progress Last Modified     Patient will maintain management of sodium consumption --  6/21/2021  2:17 PM by Jayne Harry RN              Task Due Date Last Modified     Educate patient on sodium alternatives --  2/25/2022  2:16 PM by Sandra Lantigua RN              Problem Priority Last Modified     EXERCISE REGIMEN --  6/21/2021  2:17 PM by Jayne Harry RN              Goal Recent Progress Last Modified     Patient will engage in physical activity safely --  6/21/2021  2:17 PM by Jayne Harry RN              Task Due Date Last Modified     Educate regarding gradually increasing activity to 20 minutes a day --  2/25/2022  2:16 PM by Sandra Lantigua RN              Problem Priority Last Modified     HEART FAILURE PROGRESSION AND CARE NEEDS --  6/21/2021  2:17 PM by Jayne Harry RN              Goal Recent Progress Last Modified     Patient will verbalize understanding of heart failure progression --  6/21/2021  2:17 PM by Jayne Harry RN              Task Due Date Last Modified     Educate patient on stages of heart failure --  2/25/2022  2:16 PM by Sandra Lantigua, RN                 Medication Regimen         Problem Priority Last Modified     MEDICATION ADHERENCE --  12/15/2021  3:23 PM by Sandra Lantigua, TERESA              Goal Recent Progress Last Modified     Consistently take medications as prescribed --  12/15/2021  3:23 PM by Sandra Lantigua, RN               Task Due Date Last Modified     Educate patient on frequency and refill details of meds --  2/25/2022  2:16 PM by Sandra Lantigua, RN                      Instructions   · Patient was provided an electronic copy of care plan  · CCM services were explained and offered and patient has accepted these services.  · Patient has given their written consent to receive CCM services and understands that this includes the authorization of electronic communication of medical information with the other treating providers.  · Patient understands that they may stop CCM services at any time and these changes will be effective at the end of the calendar month and will effectively revocate the agreement of CCM services.  · Patient understands that only one practitioner can furnish and be paid for CCM services during one calendar month.  Patient also understands that there may be co-payment or deductible fees in association with CCM services.  · Patient will continue with at least monthly follow-up calls with the Nurse Navigator.    Sandra Lantigua RN  Ambulatory Case Management    2/25/2022, 14:40 EST

## 2022-03-02 ENCOUNTER — HOSPITAL ENCOUNTER (OUTPATIENT)
Dept: CT IMAGING | Facility: HOSPITAL | Age: 72
Discharge: HOME | End: 2022-03-02
Admitting: NURSE PRACTITIONER

## 2022-03-02 DIAGNOSIS — Z87.891 HISTORY OF NICOTINE DEPENDENCE: ICD-10-CM

## 2022-03-02 PROCEDURE — 71271 CT THORAX LUNG CANCER SCR C-: CPT

## 2022-03-03 DIAGNOSIS — R91.1 LUNG NODULE: Primary | ICD-10-CM

## 2022-03-16 ENCOUNTER — TELEPHONE (OUTPATIENT)
Dept: CASE MANAGEMENT | Facility: OTHER | Age: 72
End: 2022-03-16

## 2022-03-16 ENCOUNTER — PATIENT OUTREACH (OUTPATIENT)
Dept: CASE MANAGEMENT | Facility: OTHER | Age: 72
End: 2022-03-16

## 2022-03-16 DIAGNOSIS — I10 ESSENTIAL HYPERTENSION: Primary | ICD-10-CM

## 2022-03-16 PROCEDURE — 99490 CHRNC CARE MGMT STAFF 1ST 20: CPT | Performed by: NURSE PRACTITIONER

## 2022-03-16 PROCEDURE — 99439 CHRNC CARE MGMT STAF EA ADDL: CPT | Performed by: NURSE PRACTITIONER

## 2022-03-16 NOTE — OUTREACH NOTE
AMBULATORY CASE MANAGEMENT NOTE    Name and Relationship of Patient/Support Person: Rachael South - Self     CCM Interim Update    Patient is doing well. BP continues to be much improved in last 8 weeks.     Patients BP log is as follows:    3-7  131/78   103/60   138/77   120/94  3-8  133/68   125/68   138/84  3-9   119/71   124/78   130/66   120/79  3-10 126/64   117/71   143/72  3-11  131/70   127/76   140/81   126/79   130/60  3-12  123/66   136/69   130/71   129/68  3-13  136/65   129/63   126/73   136/63  3-14  130/81   129/63   126/73   136/63  3-15  133/76   149/80   141/80   135/66   134/76    Patient encouraged to continue checking blood pressure twice per day. F/u with PCP on 3/28. Will call with any needs.      Social Work Assessment  Questions/Answers    Flowsheet Row Most Recent Value   People in Home spouse   Current Living Arrangements home          Adult Wellness Assessment  Questions/Answers    Flowsheet Row Most Recent Value   Regular Exercise yes   Type of Exercise walking   Current Feelings of Stress somewhat          Adult Patient Profile  Questions/Answers    Flowsheet Row Most Recent Value   Symptoms/Conditions Managed at Home cardiovascular   Barriers to Managing Health none   Cardiovascular Symptoms/Conditions heart failure, hypertension, myocardial infarction, valvular disease   Cardiovascular Management Strategies activity, adequate rest, diet modification, exercise, medication therapy, routine screening, weight management   Cardiovascular Self-Management Outcome 4 (good)   Identifying Health Goals managing weight, be more active, keep illness under control   Taking Medications Not Prescribed no   Missed Doses of Prescribed Medications During Past Week no   Taken Prescribed Medications at Different Time or Schedule During Past Week no   Taken More or Less Medication Than Prescribed no   How Well Do You Speak English? very well   Source of Information patient   Patient Aware of  Diagnosis yes   Anxieties, Fears or Concerns Anxiety related to past healthcare experience after CABG. Patient ended up with bilateral pleural effusions and was unable to breathe. States that [facility] missed this and she has anxiety related to healthcare now.   Health Maintenance Behaviors exercise, healthy diet   Primary Source of Support/Comfort child(km), spouse, sibling(s)   People in Home spouse   Current Living Arrangements home   Resource/Environmental Concerns none          Education Documentation  No documentation found.        JOANNA MEAD  Ambulatory Case Management    3/16/2022, 15:16 EDT

## 2022-03-28 ENCOUNTER — OFFICE VISIT (OUTPATIENT)
Dept: FAMILY MEDICINE CLINIC | Facility: CLINIC | Age: 72
End: 2022-03-28

## 2022-03-28 VITALS
BODY MASS INDEX: 27.42 KG/M2 | HEART RATE: 88 BPM | WEIGHT: 149 LBS | TEMPERATURE: 97.5 F | OXYGEN SATURATION: 97 % | SYSTOLIC BLOOD PRESSURE: 132 MMHG | DIASTOLIC BLOOD PRESSURE: 90 MMHG | HEIGHT: 62 IN

## 2022-03-28 DIAGNOSIS — I48.91 ATRIAL FIBRILLATION WITH RVR: ICD-10-CM

## 2022-03-28 DIAGNOSIS — I10 ESSENTIAL HYPERTENSION: Primary | ICD-10-CM

## 2022-03-28 DIAGNOSIS — Z53.20 COLON CANCER SCREENING DECLINED: ICD-10-CM

## 2022-03-28 DIAGNOSIS — I25.10 CORONARY ARTERY DISEASE INVOLVING NATIVE CORONARY ARTERY OF NATIVE HEART, UNSPECIFIED WHETHER ANGINA PRESENT: ICD-10-CM

## 2022-03-28 PROCEDURE — 99214 OFFICE O/P EST MOD 30 MIN: CPT | Performed by: NURSE PRACTITIONER

## 2022-03-28 RX ORDER — ATORVASTATIN CALCIUM 40 MG/1
40 TABLET, FILM COATED ORAL NIGHTLY
Qty: 90 TABLET | Refills: 1 | Status: SHIPPED | OUTPATIENT
Start: 2022-03-28 | End: 2022-09-19

## 2022-03-28 RX ORDER — POTASSIUM CHLORIDE 600 MG/1
8 TABLET, FILM COATED, EXTENDED RELEASE ORAL DAILY
Qty: 90 TABLET | Refills: 1 | Status: SHIPPED | OUTPATIENT
Start: 2022-03-28 | End: 2022-12-05

## 2022-03-28 RX ORDER — METOPROLOL SUCCINATE 50 MG/1
50 TABLET, EXTENDED RELEASE ORAL NIGHTLY
Qty: 90 TABLET | Refills: 1 | Status: SHIPPED | OUTPATIENT
Start: 2022-03-28 | End: 2022-05-10

## 2022-03-28 RX ORDER — FUROSEMIDE 40 MG/1
40 TABLET ORAL DAILY
Qty: 90 TABLET | Refills: 1 | Status: SHIPPED | OUTPATIENT
Start: 2022-03-28 | End: 2022-09-02

## 2022-03-28 NOTE — PROGRESS NOTES
Chief Complaint  Hypertension (Follow up to blood pressure.)    PHQ-2 Total Score: 0    Subjective        Past Medical History:   Diagnosis Date   • Anemia    • CAD s/p CABG 2021   • CHF (congestive heart failure) ([facility])    • COPD (chronic obstructive pulmonary disease) ([facility])    • Essential hypertension 2021   • GERD (gastroesophageal reflux disease)    • Hyperlipidemia    • Hypertension    • Ischemic cardiomyopathy/Chronic systolic CHF 2021   • Ischemic dilated cardiomyopathy 2021   • Mitral regurgitation    • Paroxysmal atrial fibrillation 2021   • PONV (postoperative nausea and vomiting)    • S/P MVR (mitral valve replacement) 2021     Social History     Tobacco Use   • Smoking status: Former Smoker     Packs/day: 0.50     Years: 45.00     Pack years: 22.50     Start date: 1963     Quit date: 3/9/2021     Years since quittin.0   • Smokeless tobacco: Never Used   • Tobacco comment: caffeine use - denies   Vaping Use   • Vaping Use: Never used   Substance Use Topics   • Alcohol use: Never   • Drug use: Never      Current Outpatient Medications on File Prior to Visit   Medication Sig   • amiodarone (PACERONE) 200 MG tablet Take 0.5 tablets by mouth Daily.   • losartan (COZAAR) 100 MG tablet Take 1 tablet by mouth Daily.   • rivaroxaban (Xarelto) 15 MG tablet Take 1 tablet by mouth Daily With Dinner.   • [DISCONTINUED] atorvastatin (LIPITOR) 40 MG tablet Take 1 tablet by mouth Every Night.   • [DISCONTINUED] furosemide (LASIX) 40 MG tablet TAKE 1 TABLET BY MOUTH DAILY   • [DISCONTINUED] metoprolol succinate XL (TOPROL-XL) 50 MG 24 hr tablet Take 1 tablet by mouth Every Night.   • [DISCONTINUED] potassium chloride (KLOR-CON) 8 MEQ CR tablet TAKE 1 TABLET(8 MEQ) BY MOUTH EVERY DAY (Patient taking differently: Take 8 mEq by mouth Daily.)     Current Facility-Administered Medications on File Prior to Visit   Medication   • cloNIDine (CATAPRES) tablet 0.1 mg      No Known Allergies  "  Health Maintenance Due   Topic Date Due   • DXA SCAN  Never done   • COVID-19 Vaccine (1) Never done   • TDAP/TD VACCINES (1 - Tdap) Never done   • ZOSTER VACCINE (1 of 2) Never done   • Pneumococcal Vaccine 65+ (1 of 1 - PPSV23) Never done   • ANNUAL WELLNESS VISIT  Never done      Rachael South presents to [facility]  Here to follow up on HTN and hx of CAD. Pt states she walks laps around her home every day (50 laps daily), she does laundry and cleans regularly. Pt reports normal home BP readings, she is contacted regularly by Gracia RN with Care Coordination. Pt drinks plenty of water. She eats fish and tuna, no longer eating meat. She is following a low sodium diet. She has a goal to live until 86 year old.     Pt declines colonoscopy.       Objective   Vital Signs:   /90 (BP Location: Left arm)   Pulse 88   Temp 97.5 °F (36.4 °C)   Ht 157.5 cm (62\")   Wt 67.6 kg (149 lb)   SpO2 97%   BMI 27.25 kg/m²     Review of Systems   Respiratory: Negative for shortness of breath.    Cardiovascular: Negative for chest pain and palpitations.   Neurological: Negative for dizziness and headache.      Physical Exam  Vitals reviewed.   Constitutional:       General: She is not in acute distress.     Appearance: Normal appearance. She is well-developed.   HENT:      Head: Normocephalic and atraumatic.   Eyes:      Conjunctiva/sclera: Conjunctivae normal.      Pupils: Pupils are equal, round, and reactive to light.   Cardiovascular:      Rate and Rhythm: Normal rate and regular rhythm.      Heart sounds: Normal heart sounds.   Pulmonary:      Effort: Pulmonary effort is normal.      Breath sounds: Normal breath sounds.   Musculoskeletal:      Cervical back: Neck supple.      Right lower leg: No edema.      Left lower leg: No edema.   Skin:     General: Skin is warm and dry.   Neurological:      Mental Status: She is alert and oriented to person, place, and time.   Psychiatric: "         Mood and Affect: Mood and affect normal.         Behavior: Behavior normal.         Thought Content: Thought content normal.         Judgment: Judgment normal.        Result Review :   The following data was reviewed by: KENNETH Jones on 03/28/2022:  Common labs    Common Labsle 11/29/21 11/29/21 12/1/21 12/1/21 12/21/21    0413 0413 1453 1453    Glucose  110 (A)  97    BUN  13  7 (A)    Creatinine  0.76  0.81    eGFR Non African Am  75  69    Sodium  139  139    Potassium  4.0  3.9    Chloride  105  101    Calcium  8.8  10.0    Albumin    4.50    Total Bilirubin    0.5    Alkaline Phosphatase    74    AST (SGOT)    17    ALT (SGPT)    12    WBC 7.70  8.18  8.60   Hemoglobin 8.3 (A)  9.9 (A)  11.3 (A)   Hematocrit 23.7 (A)  30.0 (A)  33.7 (A)   Platelets 194  301  329   (A) Abnormal value                      Assessment and Plan    Diagnoses and all orders for this visit:    1. Essential hypertension (Primary)  -     furosemide (LASIX) 40 MG tablet; Take 1 tablet by mouth Daily.  Dispense: 90 tablet; Refill: 1  -     metoprolol succinate XL (TOPROL-XL) 50 MG 24 hr tablet; Take 1 tablet by mouth Every Night.  Dispense: 90 tablet; Refill: 1  -     potassium chloride (KLOR-CON) 8 MEQ CR tablet; Take 1 tablet by mouth Daily.  Dispense: 90 tablet; Refill: 1  -     CBC & Differential  -     Comprehensive Metabolic Panel    2. Atrial fibrillation with RVR (HCC)    3. Coronary artery disease involving native coronary artery of native heart, unspecified whether angina present  -     atorvastatin (LIPITOR) 40 MG tablet; Take 1 tablet by mouth Every Night.  Dispense: 90 tablet; Refill: 1  -     Lipid Panel    4. Colon cancer screening declined        Follow Up   Return in about 6 months (around 9/28/2022) for Refills and fasting labs.  Patient was given instructions and counseling regarding her condition or for health maintenance advice. Please see specific information pulled into the AVS if appropriate.

## 2022-03-30 ENCOUNTER — PATIENT OUTREACH (OUTPATIENT)
Dept: CASE MANAGEMENT | Facility: OTHER | Age: 72
End: 2022-03-30

## 2022-03-30 DIAGNOSIS — I50.21 ACUTE SYSTOLIC CONGESTIVE HEART FAILURE DUE TO VALVULAR DISEASE: ICD-10-CM

## 2022-03-30 DIAGNOSIS — I38 ACUTE SYSTOLIC CONGESTIVE HEART FAILURE DUE TO VALVULAR DISEASE: ICD-10-CM

## 2022-03-30 DIAGNOSIS — I10 ESSENTIAL HYPERTENSION: Primary | ICD-10-CM

## 2022-03-30 NOTE — OUTREACH NOTE
[facility] End of Month Documentation    This Chronic Medical Management Care Plan for Rachael South, 72 y.o. female, has been a new plan of care implemented and a new plan of care implemented for the month of March.  A cumulative time of 48  minutes was spent on this patient record this month, including chart review; phone call with patient; electronic communication with primary care provider.    Regarding the patient's problems: has Anemia; CAD s/p CABG; S/P MVR (mitral valve replacement); Ischemic cardiomyopathy/Chronic systolic CHF; COPD (chronic obstructive pulmonary disease) ([facility]); Insomnia; Paroxysmal atrial fibrillation; Essential hypertension; and Chronic bronchitis (HCC) on their problem list., the following items were addressed: medications and any changes can be found within the plan section of the note.  A detailed listing of time spent for chronic care management is tracked within each outreach encounter.  Current medications include:  has a current medication list which includes the following prescription(s): amiodarone, atorvastatin, furosemide, losartan, metoprolol succinate xl, potassium chloride, and rivaroxaban, and the following Facility-Administered Medications: clonidine. and the patient is reported to be patient is compliant with medication protocol,  Medications are reported to be effective.  All notes on chart for PCP to review.    The patient was monitored remotely for blood pressure; weight; activity level; medications.    The patient's physical needs include:  medication education.     The patient's mental support needs include:  not applicable    The patient's cognitive support needs include:  medication    The patient's psychosocial support needs include:  medication management or adherence; continued support    The patient's functional needs include: medication education; physical healthcare    The patient's environmental needs include:  not applicable, Relies on her  for  "transportation    Care Plan overall comments:  No data recorded    Refer to previous outreach notes for more information on the areas listed above.    Monthly Billing Diagnoses  (I10) Essential hypertension    (I50.21,  I38) Acute systolic congestive heart failure due to valvular disease (HCC)    Medications   · Medications have been reconciled    Care Plan progress this month:      Recently Modified Care Plans Updates made since 2/27/2022 12:00 AM     Hypertension (Adult)         Problem Priority Last Modified     ELS HYPERTENSION (HYPERTENSION) (ADULT) --  3/16/2022  3:14 PM by Sandra Lantigua RN              Goal Recent Progress Last Modified     Hypertension Monitored --  3/16/2022  3:14 PM by Sandra Lantigua, RN     Evidence-based guidance:   Promote initial use of ambulatory blood pressure measurements (for 3 days) to rule out \"white-coat\" effect; identify masked hypertension and presence or absence of nocturnal \"dipping\" of blood pressure.    Encourage continued use of home blood pressure monitoring and recording in blood pressure log; include symptoms of hypotension or potential medication side effects in log.   Review blood pressure measurements taken inside and outside of the provider office; establish baseline and monitor trends; compare to target ranges or patient goal.   Share overall cardiovascular risk with patient; encourage changes to lifestyle risk factors, including alcohol consumption, smoking, inadequate exercise, poor dietary habits and stress.    Notes:              Task Due Date Last Modified     Identify and Monitor Blood Pressure Elevation --  3/16/2022  3:14 PM by Sandra Lantigua, RN     Care Management Activities:      - blood pressure equipment and technique reviewed  - blood pressure trends reviewed  - home or ambulatory blood pressure monitoring encouraged      Notes:                Problem Priority Last Modified     ELS DISEASE PROGRESSION (HYPERTENSION) (ADULT) --  3/16/2022  3:14 PM by Grzegorz" TERESA Flores              Goal Recent Progress Last Modified     Disease Progression Prevented or Minimized --  3/16/2022  3:14 PM by Sandra Lantigua RN     Evidence-based guidance:   Tailor lifestyle advice to individual; review progress regularly; give frequent encouragement and respond positively to incremental successes.   Assess for and promote awareness of worsening disease or development of comorbidity.   Prepare patient for laboratory and diagnostic exams based on risk and presentation.   Prepare patient for use of pharmacologic therapy that may include diuretic, beta-blocker, beta-blocker/thiazide combination, angiotensin-converting enzyme inhibitor, renin-angiotensin blocker or calcium-channel blocker.   Expect periodic adjustments to pharmacologic therapy; manage side effects.   Promote a healthy diet that includes primarily plant-based foods, such as fruits, vegetables, whole grains, beans and legumes, low-fat dairy and lean meats.    Consider moderate reduction in sodium intake by avoiding the addition of salt to prepared foods and limiting processed meats, canned soup, frozen meals and salty snacks.    Promote a regular, daily exercise goal of 150 minutes per week of moderate exercise based on tolerance, ability and patient choice; consider referral to physical therapist, community wellness and/or activity program.   Encourage the avoidance of no more than 2 hours per day of sedentary activity, such as recreational screen time.   Review sources of stress; explore current coping strategies and encourage use of mindfulness, yoga, meditation or exercise to manage stress.    Notes:              Task Due Date Last Modified     Alleviate Barriers to Hypertension Treatment --  3/16/2022  3:15 PM by Sandra Lantigua RN     Care Management Activities:      - healthy diet promoted  - reduction of dietary sodium encouraged  - response to pharmacologic therapy monitored      Notes:                Problem Priority Last  Modified     ELS RESISTANT HYPERTENSION (HYPERTENSION) (ADULT) --  3/16/2022  3:14 PM by Sandra Lantigua, RN              Goal Recent Progress Last Modified     Response to Treatment Maximized --  3/16/2022  3:14 PM by Sandra Lantigua, RN     Evidence-based guidance:   Assess patient response to treatment, including presence or absence of medication side effects, degree of blood pressure control and patient satisfaction.   Assess technique (including cuff size and placement), measurement times, condition and calibration of blood pressure cuff set (both at-home and in-office equipment).   Assess factors that may influence response to treatment, including nonadherence to pharmacologic treatment plan, diet or activity changes and/or presence of pain, stress or sleep disturbance.   Screen for signs and symptoms of depression; if present, refer for or complete a comprehensive assessment.   Evaluate social and economic barriers that may affect adherence to treatment plan   Address pharmacologic nonadherence by simplifying dosing regimen, counseling or support by pharmacist, financial assistance, self-monitoring of blood pressure, use of motivational interviewing, voice or text messages.   Encourage behavioral adherence strategies, like habit-based interventions that link medication taking with existing daily routines.   Assess barriers to regular, daily physical activity; support family or support person-oriented activity changes and utilization of community activity or sports program.   Address barriers to dietary changes, especially sodium restriction, with referrals to community programs, like cooking classes, meal services or intensive education when available.   Refer to community-based peer support program or nurse home-visiting program.   Assess for chronic pain; when present add additional goals (Chronic Pain Care Plan Guide) as needed.   Provide frequent follow-up by telephone, telemonitoring, patient-practice portal or  with home visit.   Review alcohol use screen; address using brief intervention beginning with risk that interferes with blood pressure control; refer for treatment when excessive alcohol use is noted.   Screen for obstructive sleep apnea; prepare patient for polysomnography based on risk and presentation and use of noninvasive ventilation to relieve obstructive sleep apnea when present.    Notes:              Task Due Date Last Modified     Facilitate Adherence to Lifestyle Change --  3/16/2022  3:15 PM by Sandra Lantigua RN     Care Management Activities:      - home blood pressure monitoring technique reviewed  - medication adherence assessment completed  - success praised  - support and encouragement provided      Notes:                        Instructions   · Patient was provided an electronic copy of care plan  · CCM services were explained and offered and patient has accepted these services.  · Patient has given their written consent to receive CCM services and understands that this includes the authorization of electronic communication of medical information with the other treating providers.  · Patient understands that they may stop CCM services at any time and these changes will be effective at the end of the calendar month and will effectively revocate the agreement of CCM services.  · Patient understands that only one practitioner can furnish and be paid for CCM services during one calendar month.  Patient also understands that there may be co-payment or deductible fees in association with CCM services.  · Patient will continue with at least monthly follow-up calls with the Nurse Navigator.    SANDRA MEAD  Ambulatory Case Management    3/30/2022, 08:45 EDT

## 2022-04-04 ENCOUNTER — CLINICAL SUPPORT (OUTPATIENT)
Dept: FAMILY MEDICINE CLINIC | Facility: CLINIC | Age: 72
End: 2022-04-04

## 2022-04-04 DIAGNOSIS — I10 ESSENTIAL HYPERTENSION: ICD-10-CM

## 2022-04-04 LAB
ALBUMIN SERPL-MCNC: 4.7 G/DL (ref 3.5–5.2)
ALBUMIN/GLOB SERPL: 2.1 G/DL
ALP SERPL-CCNC: 85 U/L (ref 39–117)
ALT SERPL W P-5'-P-CCNC: 16 U/L (ref 1–33)
ANION GAP SERPL CALCULATED.3IONS-SCNC: 10 MMOL/L (ref 5–15)
AST SERPL-CCNC: 19 U/L (ref 1–32)
BASOPHILS # BLD AUTO: 0.06 10*3/MM3 (ref 0–0.2)
BASOPHILS NFR BLD AUTO: 0.8 % (ref 0–1.5)
BILIRUB SERPL-MCNC: 0.4 MG/DL (ref 0–1.2)
BUN SERPL-MCNC: 16 MG/DL (ref 8–23)
BUN/CREAT SERPL: 16.3 (ref 7–25)
CALCIUM SPEC-SCNC: 9.4 MG/DL (ref 8.6–10.5)
CHLORIDE SERPL-SCNC: 101 MMOL/L (ref 98–107)
CHOLEST SERPL-MCNC: 155 MG/DL (ref 0–200)
CO2 SERPL-SCNC: 27 MMOL/L (ref 22–29)
CREAT SERPL-MCNC: 0.98 MG/DL (ref 0.57–1)
DEPRECATED RDW RBC AUTO: 43.4 FL (ref 37–54)
EGFRCR SERPLBLD CKD-EPI 2021: 60.7 ML/MIN/1.73
EOSINOPHIL # BLD AUTO: 0.25 10*3/MM3 (ref 0–0.4)
EOSINOPHIL NFR BLD AUTO: 3.3 % (ref 0.3–6.2)
ERYTHROCYTE [DISTWIDTH] IN BLOOD BY AUTOMATED COUNT: 13.3 % (ref 12.3–15.4)
GLOBULIN UR ELPH-MCNC: 2.2 GM/DL
GLUCOSE SERPL-MCNC: 100 MG/DL (ref 65–99)
HCT VFR BLD AUTO: 38.7 % (ref 34–46.6)
HDLC SERPL-MCNC: 48 MG/DL (ref 40–60)
HGB BLD-MCNC: 12.9 G/DL (ref 12–15.9)
IMM GRANULOCYTES # BLD AUTO: 0.02 10*3/MM3 (ref 0–0.05)
IMM GRANULOCYTES NFR BLD AUTO: 0.3 % (ref 0–0.5)
LDLC SERPL CALC-MCNC: 79 MG/DL (ref 0–100)
LDLC/HDLC SERPL: 1.54 {RATIO}
LYMPHOCYTES # BLD AUTO: 2.3 10*3/MM3 (ref 0.7–3.1)
LYMPHOCYTES NFR BLD AUTO: 30 % (ref 19.6–45.3)
MCH RBC QN AUTO: 29.7 PG (ref 26.6–33)
MCHC RBC AUTO-ENTMCNC: 33.3 G/DL (ref 31.5–35.7)
MCV RBC AUTO: 89.2 FL (ref 79–97)
MONOCYTES # BLD AUTO: 0.57 10*3/MM3 (ref 0.1–0.9)
MONOCYTES NFR BLD AUTO: 7.4 % (ref 5–12)
NEUTROPHILS NFR BLD AUTO: 4.47 10*3/MM3 (ref 1.7–7)
NEUTROPHILS NFR BLD AUTO: 58.2 % (ref 42.7–76)
NRBC BLD AUTO-RTO: 0 /100 WBC (ref 0–0.2)
PLATELET # BLD AUTO: 300 10*3/MM3 (ref 140–450)
PMV BLD AUTO: 9.3 FL (ref 6–12)
POTASSIUM SERPL-SCNC: 4 MMOL/L (ref 3.5–5.2)
PROT SERPL-MCNC: 6.9 G/DL (ref 6–8.5)
RBC # BLD AUTO: 4.34 10*6/MM3 (ref 3.77–5.28)
SODIUM SERPL-SCNC: 138 MMOL/L (ref 136–145)
TRIGL SERPL-MCNC: 165 MG/DL (ref 0–150)
VLDLC SERPL-MCNC: 28 MG/DL (ref 5–40)
WBC NRBC COR # BLD: 7.67 10*3/MM3 (ref 3.4–10.8)

## 2022-04-04 PROCEDURE — 80053 COMPREHEN METABOLIC PANEL: CPT | Performed by: NURSE PRACTITIONER

## 2022-04-04 PROCEDURE — 80061 LIPID PANEL: CPT | Performed by: NURSE PRACTITIONER

## 2022-04-04 PROCEDURE — 85025 COMPLETE CBC W/AUTO DIFF WBC: CPT | Performed by: NURSE PRACTITIONER

## 2022-04-04 PROCEDURE — 36415 COLL VENOUS BLD VENIPUNCTURE: CPT | Performed by: NURSE PRACTITIONER

## 2022-04-04 NOTE — PROGRESS NOTES
Venipuncture Blood Specimen Collection  Venipuncture performed in LEFT ARM  by Mayi Estevez with good hemostasis. Patient tolerated the procedure well without complications.   04/04/22   Mayi Estevez

## 2022-04-24 DIAGNOSIS — I10 ESSENTIAL HYPERTENSION: ICD-10-CM

## 2022-04-25 ENCOUNTER — PATIENT OUTREACH (OUTPATIENT)
Dept: CASE MANAGEMENT | Facility: OTHER | Age: 72
End: 2022-04-25

## 2022-04-25 DIAGNOSIS — I10 ESSENTIAL HYPERTENSION: Primary | ICD-10-CM

## 2022-04-25 PROCEDURE — 99490 CHRNC CARE MGMT STAFF 1ST 20: CPT | Performed by: NURSE PRACTITIONER

## 2022-04-25 RX ORDER — LOSARTAN POTASSIUM 100 MG/1
100 TABLET ORAL DAILY
Qty: 90 TABLET | Refills: 0 | Status: SHIPPED | OUTPATIENT
Start: 2022-04-25 | End: 2022-05-25

## 2022-04-25 NOTE — OUTREACH NOTE
AMBULATORY CASE MANAGEMENT NOTE    Name and Relationship of Patient/Support Person: Kevin Southia - Self    CCM Interim Update    Called patient for monthly CCM check in. Her blood pressure has been well controlled. States that in the past month, it has not been higher than 140's systolic, but is mainly in the 120-130's. Patient has cardiology apt on 5/10 for MD visit and Echo. She is taking her medications as prescribed and is not due for any refills as of right now.         Education Documentation  Self-Care, taught by Sandra Lantigua, RN at 4/25/2022  1:12 PM.  Learner: Patient  Readiness: Acceptance  Method: Explanation  Response: Verbalizes Understanding    Provider Follow-Up, taught by Sandra Lantigua, RN at 4/25/2022  1:12 PM.  Learner: Patient  Readiness: Acceptance  Method: Explanation  Response: Verbalizes Understanding    Medication Management, taught by Sandra Lantigua, RN at 4/25/2022  1:12 PM.  Learner: Patient  Readiness: Acceptance  Method: Explanation  Response: Verbalizes Understanding    Blood Pressure Monitoring, taught by Sandra Lantigua, RN at 4/25/2022  1:11 PM.  Learner: Patient  Readiness: Acceptance  Method: Explanation  Response: Verbalizes Understanding          SANDRA MEAD  Ambulatory Case Management    4/25/2022, 13:23 EDT

## 2022-04-29 ENCOUNTER — PATIENT OUTREACH (OUTPATIENT)
Dept: CASE MANAGEMENT | Facility: OTHER | Age: 72
End: 2022-04-29

## 2022-04-29 DIAGNOSIS — I50.21 ACUTE SYSTOLIC CONGESTIVE HEART FAILURE DUE TO VALVULAR DISEASE: Primary | ICD-10-CM

## 2022-04-29 DIAGNOSIS — I48.91 ATRIAL FIBRILLATION WITH RVR: ICD-10-CM

## 2022-04-29 DIAGNOSIS — I38 ACUTE SYSTOLIC CONGESTIVE HEART FAILURE DUE TO VALVULAR DISEASE: Primary | ICD-10-CM

## 2022-04-29 NOTE — OUTREACH NOTE
[facility] End of Month Documentation    This Chronic Medical Management Care Plan for Rachael South, 72 y.o. female, has been monitored and managed and a new plan of care implemented for the month of April.  A cumulative time of 22  minutes was spent on this patient record this month, including chart review; phone call with patient.    Regarding the patient's problems: has Anemia; CAD s/p CABG; S/P MVR (mitral valve replacement); Ischemic cardiomyopathy/Chronic systolic CHF; COPD (chronic obstructive pulmonary disease) (HCC); Insomnia; Paroxysmal atrial fibrillation; Essential hypertension; and Chronic bronchitis (HCC) on their problem list., the following items were addressed: medications and any changes can be found within the plan section of the note.  A detailed listing of time spent for chronic care management is tracked within each outreach encounter.  Current medications include:  has a current medication list which includes the following prescription(s): amiodarone, atorvastatin, furosemide, losartan, metoprolol succinate xl, potassium chloride, and rivaroxaban, and the following Facility-Administered Medications: clonidine. and the patient is reported to be patient is compliant with medication protocol,  Medications are reported to be effective. All notes on chart for PCP to review.    The patient was monitored remotely for blood pressure; weight; activity level; medications.    The patient's physical needs include:  medication education; physical healthcare.     The patient's mental support needs include:  not applicable    The patient's cognitive support needs include:  medication    The patient's psychosocial support needs include:  medication management or adherence; continued support    The patient's functional needs include: medication education; physical healthcare    The patient's environmental needs include:  not applicable, Relies on her  for transportation    Care Plan overall comments:  No  "data recorded    Refer to previous outreach notes for more information on the areas listed above.    Monthly Billing Diagnoses  (I50.21,  I38) Acute systolic congestive heart failure due to valvular disease (HCC)    (I48.91) Atrial fibrillation with RVR (HCC)    Medications   · Medications have been reconciled    Care Plan progress this month:      Recently Modified Care Plans Updates made since 3/29/2022 12:00 AM     Hypertension (Adult)         Problem Priority Last Modified     ELS HYPERTENSION (HYPERTENSION) (ADULT) --  3/16/2022  3:14 PM by Sandra Lantigua RN              Goal Recent Progress Last Modified     Hypertension Monitored --  3/16/2022  3:14 PM by Sandra Lantigua RN     Evidence-based guidance:   Promote initial use of ambulatory blood pressure measurements (for 3 days) to rule out \"white-coat\" effect; identify masked hypertension and presence or absence of nocturnal \"dipping\" of blood pressure.    Encourage continued use of home blood pressure monitoring and recording in blood pressure log; include symptoms of hypotension or potential medication side effects in log.   Review blood pressure measurements taken inside and outside of the provider office; establish baseline and monitor trends; compare to target ranges or patient goal.   Share overall cardiovascular risk with patient; encourage changes to lifestyle risk factors, including alcohol consumption, smoking, inadequate exercise, poor dietary habits and stress.    Notes:              Task Due Date Last Modified     Identify and Monitor Blood Pressure Elevation --  4/25/2022  1:21 PM by Sandra Lantigua RN     Care Management Activities:      - blood pressure trends reviewed  - home or ambulatory blood pressure monitoring encouraged      Notes:                Problem Priority Last Modified     ELS DISEASE PROGRESSION (HYPERTENSION) (ADULT) --  3/16/2022  3:14 PM by Sandra Lantigua RN              Goal Recent Progress Last Modified     Disease Progression " Prevented or Minimized --  3/16/2022  3:14 PM by Sandra Lantigua, RN     Evidence-based guidance:   Tailor lifestyle advice to individual; review progress regularly; give frequent encouragement and respond positively to incremental successes.   Assess for and promote awareness of worsening disease or development of comorbidity.   Prepare patient for laboratory and diagnostic exams based on risk and presentation.   Prepare patient for use of pharmacologic therapy that may include diuretic, beta-blocker, beta-blocker/thiazide combination, angiotensin-converting enzyme inhibitor, renin-angiotensin blocker or calcium-channel blocker.   Expect periodic adjustments to pharmacologic therapy; manage side effects.   Promote a healthy diet that includes primarily plant-based foods, such as fruits, vegetables, whole grains, beans and legumes, low-fat dairy and lean meats.    Consider moderate reduction in sodium intake by avoiding the addition of salt to prepared foods and limiting processed meats, canned soup, frozen meals and salty snacks.    Promote a regular, daily exercise goal of 150 minutes per week of moderate exercise based on tolerance, ability and patient choice; consider referral to physical therapist, community wellness and/or activity program.   Encourage the avoidance of no more than 2 hours per day of sedentary activity, such as recreational screen time.   Review sources of stress; explore current coping strategies and encourage use of mindfulness, yoga, meditation or exercise to manage stress.    Notes:              Task Due Date Last Modified     Alleviate Barriers to Hypertension Treatment --  4/25/2022  1:21 PM by Sandra Lantigua, RN     Care Management Activities:      - healthy diet promoted  - patient response to treatment assessed  - reduction of dietary sodium encouraged  - response to pharmacologic therapy monitored      Notes:                Problem Priority Last Modified     ELS RESISTANT HYPERTENSION  (HYPERTENSION) (ADULT) --  3/16/2022  3:14 PM by Sandra Lantigua, RN              Goal Recent Progress Last Modified     Response to Treatment Maximized --  3/16/2022  3:14 PM by Sandra Lantigua, RN     Evidence-based guidance:   Assess patient response to treatment, including presence or absence of medication side effects, degree of blood pressure control and patient satisfaction.   Assess technique (including cuff size and placement), measurement times, condition and calibration of blood pressure cuff set (both at-home and in-office equipment).   Assess factors that may influence response to treatment, including nonadherence to pharmacologic treatment plan, diet or activity changes and/or presence of pain, stress or sleep disturbance.   Screen for signs and symptoms of depression; if present, refer for or complete a comprehensive assessment.   Evaluate social and economic barriers that may affect adherence to treatment plan   Address pharmacologic nonadherence by simplifying dosing regimen, counseling or support by pharmacist, financial assistance, self-monitoring of blood pressure, use of motivational interviewing, voice or text messages.   Encourage behavioral adherence strategies, like habit-based interventions that link medication taking with existing daily routines.   Assess barriers to regular, daily physical activity; support family or support person-oriented activity changes and utilization of community activity or sports program.   Address barriers to dietary changes, especially sodium restriction, with referrals to community programs, like cooking classes, meal services or intensive education when available.   Refer to community-based peer support program or nurse home-visiting program.   Assess for chronic pain; when present add additional goals (Chronic Pain Care Plan Guide) as needed.   Provide frequent follow-up by telephone, telemonitoring, patient-practice portal or with home visit.   Review alcohol use  screen; address using brief intervention beginning with risk that interferes with blood pressure control; refer for treatment when excessive alcohol use is noted.   Screen for obstructive sleep apnea; prepare patient for polysomnography based on risk and presentation and use of noninvasive ventilation to relieve obstructive sleep apnea when present.    Notes:              Task Due Date Last Modified     Facilitate Adherence to Lifestyle Change --  4/25/2022  1:22 PM by Sandra Lantigua RN     Care Management Activities:      - medication adherence assessment completed  - success praised  - support and encouragement provided      Notes:                        Instructions   · Patient was provided an electronic copy of care plan  · CCM services were explained and offered and patient has accepted these services.  · Patient has given their written consent to receive CCM services and understands that this includes the authorization of electronic communication of medical information with the other treating providers.  · Patient understands that they may stop CCM services at any time and these changes will be effective at the end of the calendar month and will effectively revocate the agreement of CCM services.  · Patient understands that only one practitioner can furnish and be paid for CCM services during one calendar month.  Patient also understands that there may be co-payment or deductible fees in association with CCM services.  · Patient will continue with at least monthly follow-up calls with the Nurse Navigator.    SANDRA MEAD  Ambulatory Case Management    4/29/2022, 10:51 EDT

## 2022-05-09 NOTE — PROGRESS NOTES
[facility]  Cardiology progress Note    Patient Name: Rachael South  : 1950    CHIEF COMPLAINT  Coronary artery disease s/p CABG      Subjective   Subjective     HISTORY OF PRESENT ILLNESS    Rachael South is a 72 y.o. female with history of coronary artery disease s/p CABG.  No chest pain.  No palpitations.    Review of Systems:   Constitutional no fever,  no weight loss   Skin no rash   Otolaryngeal no difficulty swallowing   Cardiovascular See HPI   Pulmonary no cough, no sputum production   Gastrointestinal no constipation, no diarrhea   Genitourinary no dysuria, no hematuria   Hematologic no easy bruisability, no abnormal bleeding   Musculoskeletal no muscle pain   Neurologic no dizziness, no falls         Personal History     Social History:  reports that she quit smoking about 14 months ago. She started smoking about 58 years ago. She has a 22.50 pack-year smoking history. She has never used smokeless tobacco. She reports that she does not drink alcohol and does not use drugs.    Home Medications:  Current Outpatient Medications on File Prior to Visit   Medication Sig   • amiodarone (PACERONE) 200 MG tablet Take 0.5 tablets by mouth Daily.   • atorvastatin (LIPITOR) 40 MG tablet Take 1 tablet by mouth Every Night.   • furosemide (LASIX) 40 MG tablet Take 1 tablet by mouth Daily.   • losartan (COZAAR) 100 MG tablet TAKE 1 TABLET BY MOUTH DAILY   • metoprolol succinate XL (TOPROL-XL) 50 MG 24 hr tablet Take 1 tablet by mouth Every Night.   • potassium chloride (KLOR-CON) 8 MEQ CR tablet Take 1 tablet by mouth Daily.   • rivaroxaban (Xarelto) 15 MG tablet Take 1 tablet by mouth Daily With Dinner.     Current Facility-Administered Medications on File Prior to Visit   Medication   • cloNIDine (CATAPRES) tablet 0.1 mg     Allergies:  No Known Allergies    Objective    Objective       Vitals:   Heart Rate:  [74-76] 76  BP: (132-200)/(68-90) 192/84  Body mass index is 27.62 kg/m².      Physical Exam:   Constitutional: Awake, alert, No acute distress    Eyes: PERRLA, sclerae anicteric, no conjunctival injection   HENT: NCAT, mucous membranes moist   Neck: Supple, no thyromegaly, no lymphadenopathy, trachea midline   Respiratory: Clear to auscultation bilaterally, nonlabored respirations    Cardiovascular: RRR, no murmurs or rubs. Palpable pedal pulses bilaterally   Musculoskeletal: No bilateral ankle edema, no cyanosis to extremities   Psychiatric: Appropriate affect, cooperative   Neurologic: Oriented x 3, strength symmetric in all extremities, Cranial Nerves grossly intact to confrontation, speech clear   Skin: No rashes.    Result Review    Result Review:  I have personally reviewed the available results from  [x]  Laboratory  [x]  EKG  [x]  Cardiology  [x]  Medications  [x]  Old records  []  Other:   Procedures  Lab Results   Component Value Date    CHOL 155 04/04/2022    CHOL 94 04/21/2021     Lab Results   Component Value Date    TRIG 165 (H) 04/04/2022    TRIG 115 04/21/2021    TRIG 156 (H) 03/17/2021     Lab Results   Component Value Date    HDL 48 04/04/2022    HDL 31 (L) 04/21/2021    HDL 42 03/17/2021     Lab Results   Component Value Date    LDL 79 04/04/2022    LDL 42 04/21/2021     (H) 03/17/2021     Lab Results   Component Value Date    VLDL 28 04/04/2022    VLDL 21 04/21/2021    VLDL 31 03/17/2021     Results for orders placed in visit on 05/10/22    Adult Transthoracic Echo Complete W/ Cont if Necessary Per Protocol    Interpretation Summary  · Moderate tricuspid valve regurgitation is present.    Normal left ventricular systolic function.  Prosthetic mitral valve functioning normally.  Fibrocalcific aortic valve.  Mild aortic regurgitation.  Moderate tricuspid regurgitation.     Impression/Plan:  1.  Coronary disease/recent CABG: Stable continue aspirin 81 mg once a day.  2.  Presence of bioprosthetic mitral valve: Change Toprol to carvedilol 6.25 mg twice daily.  3.   Paroxysmal atrial fibrillation: Continue Coumadin for stroke prevention.  Continue amiodarone for rhythm control.  4.  Hyperlipidemia: Continue Lipitor 40 mg once a day.  Lipid profile reviewed.  5.  Ischemic cardiomyopathy/chronic systolic heart failure: Continue Lasix 40 mg once a day.   Losartan 50 mg once a day.    Change Toprol to carvedilol 6.25 mg twice daily. Repeat echocardiogram to evaluate left ventricular systolic function.  6.  Essential hypertension uncontrolled: Continue losartan 100 mg once a day.  Change Toprol to carvedilol 6.25 mg BD.  Monitor blood pressure regularly  7.  Chronic diastolic heart failure stable: Continue Lasix 40 mg a day.        Marquez Canales MD   05/10/22   13:19 EDT

## 2022-05-10 ENCOUNTER — OFFICE VISIT (OUTPATIENT)
Dept: CARDIOLOGY | Facility: CLINIC | Age: 72
End: 2022-05-10

## 2022-05-10 VITALS
HEART RATE: 76 BPM | DIASTOLIC BLOOD PRESSURE: 84 MMHG | WEIGHT: 151 LBS | SYSTOLIC BLOOD PRESSURE: 192 MMHG | BODY MASS INDEX: 27.79 KG/M2 | HEIGHT: 62 IN

## 2022-05-10 DIAGNOSIS — I42.0 ISCHEMIC DILATED CARDIOMYOPATHY: ICD-10-CM

## 2022-05-10 DIAGNOSIS — I50.22 SYSTOLIC CHF, CHRONIC: ICD-10-CM

## 2022-05-10 DIAGNOSIS — I48.0 PAROXYSMAL ATRIAL FIBRILLATION: ICD-10-CM

## 2022-05-10 DIAGNOSIS — I25.5 ISCHEMIC DILATED CARDIOMYOPATHY: ICD-10-CM

## 2022-05-10 DIAGNOSIS — I25.10 CORONARY ARTERY DISEASE INVOLVING NATIVE CORONARY ARTERY OF NATIVE HEART WITHOUT ANGINA PECTORIS: Primary | ICD-10-CM

## 2022-05-10 PROCEDURE — 99214 OFFICE O/P EST MOD 30 MIN: CPT | Performed by: SPECIALIST

## 2022-05-10 RX ORDER — CARVEDILOL 6.25 MG/1
6.25 TABLET ORAL 2 TIMES DAILY WITH MEALS
Qty: 180 TABLET | Refills: 3 | Status: SHIPPED | OUTPATIENT
Start: 2022-05-10 | End: 2023-03-13

## 2022-05-16 ENCOUNTER — PATIENT OUTREACH (OUTPATIENT)
Dept: CASE MANAGEMENT | Facility: OTHER | Age: 72
End: 2022-05-16

## 2022-05-16 DIAGNOSIS — I38 ACUTE SYSTOLIC CONGESTIVE HEART FAILURE DUE TO VALVULAR DISEASE: Primary | ICD-10-CM

## 2022-05-16 DIAGNOSIS — I50.21 ACUTE SYSTOLIC CONGESTIVE HEART FAILURE DUE TO VALVULAR DISEASE: Primary | ICD-10-CM

## 2022-05-16 NOTE — OUTREACH NOTE
AMBULATORY CASE MANAGEMENT NOTE    Name and Relationship of Patient/Support Person: Rachael South - Self    CCM Interim Update    Called patient to discuss blood pressures, recent cardio visit, and to see how she is doing. She is busy at this time and is unable to talk. Will call back next week.        Education Documentation  No documentation found.        JOANNA MEAD  Ambulatory Case Management    5/16/2022, 15:07 EDT

## 2022-05-23 ENCOUNTER — PATIENT OUTREACH (OUTPATIENT)
Dept: CASE MANAGEMENT | Facility: OTHER | Age: 72
End: 2022-05-23

## 2022-05-23 DIAGNOSIS — I10 ESSENTIAL HYPERTENSION: Primary | ICD-10-CM

## 2022-05-23 PROCEDURE — 99490 CHRNC CARE MGMT STAFF 1ST 20: CPT | Performed by: NURSE PRACTITIONER

## 2022-05-23 NOTE — OUTREACH NOTE
AMBULATORY CASE MANAGEMENT NOTE    Name and Relationship of Patient/Support Person: Rachael South - Self    CCM Interim Update    Patient doing well post cardiac apt. Med adjustments were made. Patient is now taking Coreg 6.25 bid. She is doing well with this. BP this morning was 125/65 & 129/79. Reviewed upcoming apts with patient.     SDOH updated and reviewed with the patient during this program:  Financial Resource Strain: Low Risk    • Difficulty of Paying Living Expenses: Not very hard      Physical Activity: Insufficiently Active   • Days of Exercise per Week: 3 days   • Minutes of Exercise per Session: 20 min      Social Connections: Socially Integrated   • Frequency of Communication with Friends and Family: More than three times a week   • Frequency of Social Gatherings with Friends and Family: More than three times a week   • Attends Confucianism Services: More than 4 times per year   • Active Member of Clubs or Organizations: Yes   • Attends Club or Organization Meetings: Never   • Marital Status:            Education Documentation  No documentation found.        JOANNA MEAD  Ambulatory Case Management    5/23/2022, 14:56 EDT

## 2022-05-25 DIAGNOSIS — I10 ESSENTIAL HYPERTENSION: ICD-10-CM

## 2022-05-25 RX ORDER — LOSARTAN POTASSIUM 100 MG/1
100 TABLET ORAL DAILY
Qty: 90 TABLET | Refills: 0 | Status: SHIPPED | OUTPATIENT
Start: 2022-05-25 | End: 2022-09-08

## 2022-05-25 RX ORDER — AMIODARONE HYDROCHLORIDE 200 MG/1
TABLET ORAL
Qty: 90 TABLET | Refills: 1 | Status: SHIPPED | OUTPATIENT
Start: 2022-05-25 | End: 2022-09-19

## 2022-05-30 ENCOUNTER — PATIENT OUTREACH (OUTPATIENT)
Dept: CASE MANAGEMENT | Facility: OTHER | Age: 72
End: 2022-05-30

## 2022-05-30 DIAGNOSIS — I10 ESSENTIAL HYPERTENSION: Primary | ICD-10-CM

## 2022-05-30 DIAGNOSIS — I50.21 ACUTE SYSTOLIC CONGESTIVE HEART FAILURE DUE TO VALVULAR DISEASE: ICD-10-CM

## 2022-05-30 DIAGNOSIS — I38 ACUTE SYSTOLIC CONGESTIVE HEART FAILURE DUE TO VALVULAR DISEASE: ICD-10-CM

## 2022-05-30 NOTE — OUTREACH NOTE
[facility] End of Month Documentation    This Chronic Medical Management Care Plan for Rachael South, 72 y.o. female, has been monitored and managed and a new plan of care implemented for the month of May.  A cumulative time of 26  minutes was spent on this patient record this month, including chart review; phone call with patient.    Regarding the patient's problems: has Anemia; CAD s/p CABG; S/P MVR (mitral valve replacement); Ischemic cardiomyopathy/Chronic systolic CHF; COPD (chronic obstructive pulmonary disease) (HCC); Insomnia; Paroxysmal atrial fibrillation; Essential hypertension; and Chronic bronchitis (HCC) on their problem list., the following items were addressed: medications; medical records and any changes can be found within the plan section of the note.  A detailed listing of time spent for chronic care management is tracked within each outreach encounter.  Current medications include:  has a current medication list which includes the following prescription(s): amiodarone, atorvastatin, carvedilol, furosemide, losartan, potassium chloride, and rivaroxaban, and the following Facility-Administered Medications: clonidine. and the patient is reported to be patient is compliant with medication protocol,  Medications are reported to be effective.  All notes on chart for PCP to review.    The patient was monitored remotely for blood pressure; weight; activity level; medications.    The patient's physical needs include:  medication education; physical healthcare.     The patient's mental support needs include:  not applicable    The patient's cognitive support needs include:  medication    The patient's psychosocial support needs include:  medication management or adherence; continued support    The patient's functional needs include: medication education; physical healthcare    The patient's environmental needs include:  not applicable, Relies on her  for transportation    Care Plan overall comments:   "No data recorded    Refer to previous outreach notes for more information on the areas listed above.    Monthly Billing Diagnoses  (I10) Essential hypertension    (I50.21,  I38) Acute systolic congestive heart failure due to valvular disease (HCC)    Medications   · Medications have been reconciled    Care Plan progress this month:      Recently Modified Care Plans Updates made since 4/29/2022 12:00 AM     Hypertension (Adult)         Problem Priority Last Modified     ELS HYPERTENSION (HYPERTENSION) (ADULT) --  3/16/2022  3:14 PM by Sandra Lantigua, RN              Goal Recent Progress Last Modified     Hypertension Monitored --  3/16/2022  3:14 PM by Sandra Lantigua, RN     Evidence-based guidance:   Promote initial use of ambulatory blood pressure measurements (for 3 days) to rule out \"white-coat\" effect; identify masked hypertension and presence or absence of nocturnal \"dipping\" of blood pressure.    Encourage continued use of home blood pressure monitoring and recording in blood pressure log; include symptoms of hypotension or potential medication side effects in log.   Review blood pressure measurements taken inside and outside of the provider office; establish baseline and monitor trends; compare to target ranges or patient goal.   Share overall cardiovascular risk with patient; encourage changes to lifestyle risk factors, including alcohol consumption, smoking, inadequate exercise, poor dietary habits and stress.    Notes:              Task Due Date Last Modified     Identify and Monitor Blood Pressure Elevation --  5/23/2022  2:53 PM by Sandra Lantigua, RN     Care Management Activities:      - blood pressure equipment and technique reviewed  - blood pressure trends reviewed  - home or ambulatory blood pressure monitoring encouraged      Notes:                        Instructions   · Patient was provided an electronic copy of care plan  · CCM services were explained and offered and patient has accepted these " services.  · Patient has given their written consent to receive CCM services and understands that this includes the authorization of electronic communication of medical information with the other treating providers.  · Patient understands that they may stop CCM services at any time and these changes will be effective at the end of the calendar month and will effectively revocate the agreement of CCM services.  · Patient understands that only one practitioner can furnish and be paid for CCM services during one calendar month.  Patient also understands that there may be co-payment or deductible fees in association with CCM services.  · Patient will continue with at least monthly follow-up calls with the Nurse Navigator.    JOANNA MEAD  Ambulatory Case Management    5/30/2022, 07:49 EDT

## 2022-06-14 ENCOUNTER — OFFICE VISIT (OUTPATIENT)
Dept: PULMONOLOGY | Facility: CLINIC | Age: 72
End: 2022-06-14

## 2022-06-14 VITALS
WEIGHT: 149 LBS | SYSTOLIC BLOOD PRESSURE: 146 MMHG | OXYGEN SATURATION: 99 % | RESPIRATION RATE: 18 BRPM | HEART RATE: 80 BPM | DIASTOLIC BLOOD PRESSURE: 104 MMHG | BODY MASS INDEX: 27.42 KG/M2 | HEIGHT: 62 IN

## 2022-06-14 DIAGNOSIS — I10 ESSENTIAL HYPERTENSION: ICD-10-CM

## 2022-06-14 DIAGNOSIS — F17.211 NICOTINE DEPENDENCE, CIGARETTES, IN REMISSION: ICD-10-CM

## 2022-06-14 DIAGNOSIS — I48.0 PAROXYSMAL ATRIAL FIBRILLATION: ICD-10-CM

## 2022-06-14 DIAGNOSIS — R91.1 LUNG NODULE: ICD-10-CM

## 2022-06-14 DIAGNOSIS — R06.09 DYSPNEA ON EXERTION: ICD-10-CM

## 2022-06-14 DIAGNOSIS — J41.0 SIMPLE CHRONIC BRONCHITIS: Primary | ICD-10-CM

## 2022-06-14 PROCEDURE — 99213 OFFICE O/P EST LOW 20 MIN: CPT | Performed by: NURSE PRACTITIONER

## 2022-06-14 NOTE — PROGRESS NOTES
Primary Care Provider  Candida Ryder APRN     Referring Provider  No ref. provider found     Chief Complaint  COPD (5 month f/u )    Subjective          Rachael South presents to [facility]  History of Present Illness  Rachael South is a 72 y.o. female patient of Dr. Ludwig here for management of bronchitis, dyspnea on exertion, pulmonary nodule and tobacco abuse of cigarettes in remission.     Patient states she is doing well since her last visit.  She denies using any antibiotics or steroids for her lungs.  She denies any fevers or chills.  Patient states that her shortness breath is very mild in severity, worse with exertion and improved with rest.  Patient states that her cardiac medications have been changed and she was recently placed on carvedilol.  Patient is not currently on any inhalers and states that her breathing is doing well.  Patient does follow-up with cardiology for paroxysmal atrial fibrillation and hypertension.  Overall, patient has no concerns at this time.  She is to report her ADLs without difficulty.  She refuses vaccines at this time.       Her history of smoking is   Tobacco Use: Medium Risk   • Smoking Tobacco Use: Former Smoker   • Smokeless Tobacco Use: Never Used   .    Review of Systems   Constitutional: Negative for chills, fatigue, fever, unexpected weight gain and unexpected weight loss.   HENT: Negative for congestion (Nasal), hearing loss, mouth sores, nosebleeds, postnasal drip, sore throat and trouble swallowing.    Eyes: Negative for blurred vision and visual disturbance.   Respiratory: Positive for shortness of breath (Mild). Negative for apnea, cough and wheezing.         Negative for Hemoptysis     Cardiovascular: Negative for chest pain, palpitations and leg swelling.   Gastrointestinal: Negative for abdominal pain, constipation, diarrhea, nausea, vomiting and GERD.        Negative for Jaundice  Negative for  Bloating  Negative for Melena   Musculoskeletal: Negative for joint swelling and myalgias.        Negative for Joint pain  Negative for Joint stiffness   Skin: Negative for color change.        Negative for cyanosis   Neurological: Negative for syncope, weakness, numbness and headache.      Sleep: Negative for Excessive daytime sleepiness  Negative for morning headaches  Negative for Snoring    Family History   Problem Relation Age of Onset   • Heart failure Mother    • Heart disease Brother         blue baby   • Diabetes type II Grandchild    • Heart attack Father    • Colon cancer Neg Hx         Social History     Socioeconomic History   • Marital status:    • Number of children: 2   Tobacco Use   • Smoking status: Former Smoker     Packs/day: 0.50     Years: 45.00     Pack years: 22.50     Start date: 1963     Quit date: 3/9/2021     Years since quittin.2   • Smokeless tobacco: Never Used   • Tobacco comment: caffeine use - denies   Vaping Use   • Vaping Use: Never used   Substance and Sexual Activity   • Alcohol use: Never   • Drug use: Never   • Sexual activity: Defer        Past Medical History:   Diagnosis Date   • Anemia    • CAD s/p CABG 2021   • CHF (congestive heart failure) ([facility])    • COPD (chronic obstructive pulmonary disease) ([facility])    • Essential hypertension 2021   • GERD (gastroesophageal reflux disease)    • Hyperlipidemia    • Hypertension    • Ischemic cardiomyopathy/Chronic systolic CHF 2021   • Ischemic dilated cardiomyopathy 2021   • Mitral regurgitation    • Paroxysmal atrial fibrillation 2021   • PONV (postoperative nausea and vomiting)    • S/P MVR (mitral valve replacement) 2021          There is no immunization history on file for this patient.      No Known Allergies       Current Outpatient Medications:   •  amiodarone (PACERONE) 200 MG tablet, TAKE 1 TABLET BY MOUTH DAILY, Disp: 90 tablet, Rfl: 1  •  atorvastatin (LIPITOR) 40 MG tablet,  Take 1 tablet by mouth Every Night., Disp: 90 tablet, Rfl: 1  •  carvedilol (COREG) 6.25 MG tablet, Take 1 tablet by mouth 2 (Two) Times a Day With Meals., Disp: 180 tablet, Rfl: 3  •  furosemide (LASIX) 40 MG tablet, Take 1 tablet by mouth Daily., Disp: 90 tablet, Rfl: 1  •  losartan (COZAAR) 100 MG tablet, TAKE 1 TABLET BY MOUTH DAILY, Disp: 90 tablet, Rfl: 0  •  potassium chloride (KLOR-CON) 8 MEQ CR tablet, Take 1 tablet by mouth Daily., Disp: 90 tablet, Rfl: 1  •  rivaroxaban (Xarelto) 15 MG tablet, Take 1 tablet by mouth Daily With Dinner., Disp: 90 tablet, Rfl: 3    Current Facility-Administered Medications:   •  cloNIDine (CATAPRES) tablet 0.1 mg, 0.1 mg, Oral, Once, Candida Ryder APRN     Objective   Physical Exam  Constitutional:       General: She is not in acute distress.     Appearance: Normal appearance. She is normal weight.   HENT:      Right Ear: Hearing normal.      Left Ear: Hearing normal.      Nose: No nasal tenderness or congestion.      Mouth/Throat:      Mouth: Mucous membranes are moist. No oral lesions.   Eyes:      Extraocular Movements: Extraocular movements intact.      Pupils: Pupils are equal, round, and reactive to light.   Neck:      Thyroid: No thyroid mass or thyromegaly.   Cardiovascular:      Rate and Rhythm: Normal rate and regular rhythm.      Pulses: Normal pulses.      Heart sounds: Normal heart sounds. No murmur heard.  Pulmonary:      Effort: Pulmonary effort is normal.      Breath sounds: Normal breath sounds. No wheezing, rhonchi or rales.   Chest:   Breasts:      Right: No axillary adenopathy.       Abdominal:      General: Bowel sounds are normal. There is no distension.      Palpations: Abdomen is soft.      Tenderness: There is no abdominal tenderness.   Musculoskeletal:      Cervical back: Neck supple.      Right lower leg: No edema.      Left lower leg: No edema.   Lymphadenopathy:      Cervical: No cervical adenopathy.      Upper Body:      Right upper body: No  "axillary adenopathy.   Skin:     General: Skin is warm and dry.      Findings: No lesion or rash.   Neurological:      General: No focal deficit present.      Mental Status: She is alert and oriented to person, place, and time.      Cranial Nerves: Cranial nerves are intact.   Psychiatric:         Mood and Affect: Affect normal. Mood is not anxious or depressed.         Vital Signs:   BP (!) 146/104 (BP Location: Left arm, Patient Position: Sitting, Cuff Size: Adult)   Pulse 80   Resp 18   Ht 157.5 cm (62\")   Wt 67.6 kg (149 lb)   SpO2 99% Comment: Room air  BMI 27.25 kg/m²        Result Review :     CMP    CMP 11/29/21 12/1/21 4/4/22   Glucose 110 (A) 97 100 (A)   BUN 13 7 (A) 16   Creatinine 0.76 0.81 0.98   eGFR Non African Am 75 69    Sodium 139 139 138   Potassium 4.0 3.9 4.0   Chloride 105 101 101   Calcium 8.8 10.0 9.4   Albumin  4.50 4.70   Total Bilirubin  0.5 0.4   Alkaline Phosphatase  74 85   AST (SGOT)  17 19   ALT (SGPT)  12 16   (A) Abnormal value            CBC w/diff    CBC w/Diff 12/1/21 12/21/21 4/4/22   WBC 8.18 8.60 7.67   RBC 3.17 (A) 3.65 (A) 4.34   Hemoglobin 9.9 (A) 11.3 (A) 12.9   Hematocrit 30.0 (A) 33.7 (A) 38.7   MCV 94.6 92.3 89.2   MCH 31.2 31.0 29.7   MCHC 33.0 33.5 33.3   RDW 12.8 12.8 13.3   Platelets 301 329 300   Neutrophil Rel % 63.8 59.3 58.2   Immature Granulocyte Rel % 0.2 0.3 0.3   Lymphocyte Rel % 24.9 28.1 30.0   Monocyte Rel % 7.0 8.5 7.4   Eosinophil Rel % 3.5 3.1 3.3   Basophil Rel % 0.6 0.7 0.8   (A) Abnormal value            Data reviewed: Radiologic studies Chest CT 3/2/2022, pulmonary function test 12/29/2021 and Dr. Ludwig's last office note   Procedures        Assessment and Plan    Diagnoses and all orders for this visit:    1. Simple chronic bronchitis (HCC) (Primary)    2. Dyspnea on exertion    3. Lung nodule    4. Nicotine dependence, cigarettes, in remission    5. Essential hypertension    6. Paroxysmal atrial fibrillation    7.  Continue follow-up " with cardiology as scheduled.  8.  Patient is due for repeat CT scan in September 2022.  Encouraged her to keep this appointment.  9.  Follow-up with Dr. Ludwig/Megan in 6 months, sooner if needed.        Follow Up   Return in about 6 months (around 12/14/2022) for Recheck with Andrew.  Patient was given instructions and counseling regarding her condition or for health maintenance advice. Please see specific information pulled into the AVS if appropriate.

## 2022-06-15 ENCOUNTER — PATIENT OUTREACH (OUTPATIENT)
Dept: CASE MANAGEMENT | Facility: OTHER | Age: 72
End: 2022-06-15

## 2022-06-15 DIAGNOSIS — I10 ESSENTIAL HYPERTENSION: Primary | ICD-10-CM

## 2022-06-15 PROCEDURE — 99490 CHRNC CARE MGMT STAFF 1ST 20: CPT | Performed by: NURSE PRACTITIONER

## 2022-06-15 NOTE — OUTREACH NOTE
AMBULATORY CASE MANAGEMENT NOTE    Name and Relationship of Patient/Support Person: Rachael South - Self    CCM Interim Update    Spoke to patient regarding pulmonology follow up. Patient states that she received a very good report at her visit. States that they were pleased with her Chest CT. She reports that she does not have COPD, but does have Emphysema.     BP has been running normal at home, mostly systolic in the 120's. States that it is elevated at office visits due to her anxiety. She checks with two cuffs at home and usually gets very similar numbers on each cuff.     States that she attempted to fill her Lasix, but was told insurance denied it. I called [employer]. They state that it is too early to fill, and patient can fill on 6/24. Patient is aware and will call that morning. States she does have enough to get her through to this date.         Education Documentation  No documentation found.        JOANNA MEAD  Ambulatory Case Management    6/15/2022, 15:33 EDT

## 2022-06-22 ENCOUNTER — PATIENT OUTREACH (OUTPATIENT)
Dept: CASE MANAGEMENT | Facility: OTHER | Age: 72
End: 2022-06-22

## 2022-06-22 DIAGNOSIS — I48.91 ATRIAL FIBRILLATION WITH RVR: ICD-10-CM

## 2022-06-22 DIAGNOSIS — I10 ESSENTIAL HYPERTENSION: Primary | ICD-10-CM

## 2022-06-22 NOTE — OUTREACH NOTE
[facility] End of Month Documentation    This Chronic Medical Management Care Plan for Rachael South, 72 y.o. female, has been monitored and managed and a new plan of care implemented for the month of June.  A cumulative time of 26  minutes was spent on this patient record this month, including chart review; phone call with patient; phone call with pharmacist.    Regarding the patient's problems: has Anemia; CAD s/p CABG; S/P MVR (mitral valve replacement); Ischemic cardiomyopathy/Chronic systolic CHF; COPD (chronic obstructive pulmonary disease) ([facility]); Insomnia; Paroxysmal atrial fibrillation; Essential hypertension; and Chronic bronchitis ([facility]) on their problem list., the following items were addressed: medications; medical records and any changes can be found within the plan section of the note.  A detailed listing of time spent for chronic care management is tracked within each outreach encounter.  Current medications include:  has a current medication list which includes the following prescription(s): amiodarone, atorvastatin, carvedilol, furosemide, losartan, potassium chloride, and rivaroxaban, and the following Facility-Administered Medications: clonidine. and the patient is reported to be patient is compliant with medication protocol,  Medications are reported to be effective.  All notes on chart for PCP to review.    The patient was monitored remotely for blood pressure; weight; activity level; medications.    The patient's physical needs include:  medication education; physical healthcare.     The patient's mental support needs include:  not applicable    The patient's cognitive support needs include:  medication    The patient's psychosocial support needs include:  medication management or adherence; continued support    The patient's functional needs include: medication education; physical healthcare    The patient's environmental needs include:  not applicable    Care Plan overall comments:  No data  "recorded    Refer to previous outreach notes for more information on the areas listed above.    Monthly Billing Diagnoses  (I10) Essential hypertension    (I48.91) Atrial fibrillation with RVR (HCC)    Medications   · Medications have been reconciled    Care Plan progress this month:      Recently Modified Care Plans Updates made since 5/22/2022 12:00 AM     Hypertension (Adult)         Problem Priority Last Modified     ELS HYPERTENSION (HYPERTENSION) (ADULT) --  3/16/2022  3:14 PM by Sandra Lantigua, RN              Goal Recent Progress Last Modified     Hypertension Monitored --  3/16/2022  3:14 PM by Sandra Lantigua, RN     Evidence-based guidance:   Promote initial use of ambulatory blood pressure measurements (for 3 days) to rule out \"white-coat\" effect; identify masked hypertension and presence or absence of nocturnal \"dipping\" of blood pressure.    Encourage continued use of home blood pressure monitoring and recording in blood pressure log; include symptoms of hypotension or potential medication side effects in log.   Review blood pressure measurements taken inside and outside of the provider office; establish baseline and monitor trends; compare to target ranges or patient goal.   Share overall cardiovascular risk with patient; encourage changes to lifestyle risk factors, including alcohol consumption, smoking, inadequate exercise, poor dietary habits and stress.    Notes:              Task Due Date Last Modified     Identify and Monitor Blood Pressure Elevation --  5/23/2022  2:53 PM by Sandra Lantigua, RN     Care Management Activities:      - blood pressure equipment and technique reviewed  - blood pressure trends reviewed  - home or ambulatory blood pressure monitoring encouraged      Notes:                        Instructions   · Patient was provided an electronic copy of care plan  · CCM services were explained and offered and patient has accepted these services.  · Patient has given their written consent to " receive CCM services and understands that this includes the authorization of electronic communication of medical information with the other treating providers.  · Patient understands that they may stop CCM services at any time and these changes will be effective at the end of the calendar month and will effectively revocate the agreement of CCM services.  · Patient understands that only one practitioner can furnish and be paid for CCM services during one calendar month.  Patient also understands that there may be co-payment or deductible fees in association with CCM services.  · Patient will continue with at least monthly follow-up calls with the Nurse Navigator.    JOANNA MEAD  Ambulatory Case Management    6/22/2022, 10:45 EDT

## 2022-07-20 ENCOUNTER — PATIENT OUTREACH (OUTPATIENT)
Dept: CASE MANAGEMENT | Facility: OTHER | Age: 72
End: 2022-07-20

## 2022-07-20 DIAGNOSIS — I10 ESSENTIAL HYPERTENSION: Primary | ICD-10-CM

## 2022-07-20 NOTE — OUTREACH NOTE
AMBULATORY CASE MANAGEMENT NOTE    Name and Relationship of Patient/Support Person: Rachael South - Self    CCM Interim Update    Called patient for monthly check in. States she is doing well. Blood pressure readings for the past 3 days are: 127/68, 124/55, 123/65. Med list reviewed. SDOH updated. Patient reports no other needs at this time.    SDOH updated and reviewed with the patient during this program:  Financial Resource Strain: Low Risk    • Difficulty of Paying Living Expenses: Not very hard      Food Insecurity: No Food Insecurity   • Worried About Running Out of Food in the Last Year: Never true   • Ran Out of Food in the Last Year: Never true      Social Connections: Socially Integrated   • Frequency of Communication with Friends and Family: More than three times a week   • Frequency of Social Gatherings with Friends and Family: More than three times a week   • Attends S[employer] Services: More than 4 times per year   • Active Member of Clubs or Organizations: Yes   • Attends Club or Organization Meetings: Never   • Marital Status:       Transportation Needs: No Transportation Needs   • Lack of Transportation (Medical): No   • Lack of Transportation (Non-Medical): No      Housing Stability: Low Risk    • Unable to Pay for Housing in the Last Year: No   • Number of Places Lived in the Last Year: 1   • Unstable Housing in the Last Year: No      Stress: No Stress Concern Present   • Feeling of Stress : Not at all           Education Documentation  No documentation found.        JOANNA MEAD  Ambulatory Case Management    7/20/2022, 12:17 EDT

## 2022-08-24 ENCOUNTER — PATIENT OUTREACH (OUTPATIENT)
Dept: CASE MANAGEMENT | Facility: OTHER | Age: 72
End: 2022-08-24

## 2022-08-24 DIAGNOSIS — I10 ESSENTIAL HYPERTENSION: Primary | ICD-10-CM

## 2022-08-24 NOTE — OUTREACH NOTE
AMBULATORY CASE MANAGEMENT NOTE    Name and Relationship of Patient/Support Person: Rachael South - Self    CCM Interim Update    Called patient for monthly check in.  States she is doing well. She has had some additional stress due to family members that have been in the hospital, but feels like she is handling it well.  Her blood pressure has been doing well. Readings last night were 111/59 and 129/63. Her highest reading in the past two weeks has been a systolic in the 140s. She feels well and is taking medications as prescribed.        Education Documentation  No documentation found.        JOANNA MEAD  Ambulatory Case Management    8/24/2022, 11:55 EDT

## 2022-08-31 DIAGNOSIS — I10 ESSENTIAL HYPERTENSION: ICD-10-CM

## 2022-08-31 RX ORDER — FUROSEMIDE 40 MG/1
40 TABLET ORAL DAILY
Qty: 90 TABLET | Refills: 1 | OUTPATIENT
Start: 2022-08-31

## 2022-09-02 DIAGNOSIS — I10 ESSENTIAL HYPERTENSION: ICD-10-CM

## 2022-09-02 RX ORDER — FUROSEMIDE 40 MG/1
40 TABLET ORAL DAILY
Qty: 90 TABLET | Refills: 1 | OUTPATIENT
Start: 2022-09-02

## 2022-09-02 RX ORDER — FUROSEMIDE 40 MG/1
40 TABLET ORAL DAILY
Qty: 90 TABLET | Refills: 0 | Status: SHIPPED | OUTPATIENT
Start: 2022-09-02 | End: 2022-12-01 | Stop reason: SDUPTHER

## 2022-09-06 ENCOUNTER — APPOINTMENT (OUTPATIENT)
Dept: CT IMAGING | Facility: HOSPITAL | Age: 72
End: 2022-09-06

## 2022-09-08 DIAGNOSIS — I10 ESSENTIAL HYPERTENSION: ICD-10-CM

## 2022-09-08 RX ORDER — LOSARTAN POTASSIUM 100 MG/1
100 TABLET ORAL DAILY
Qty: 90 TABLET | Refills: 0 | Status: SHIPPED | OUTPATIENT
Start: 2022-09-08 | End: 2022-12-01 | Stop reason: SDUPTHER

## 2022-09-12 ENCOUNTER — HOSPITAL ENCOUNTER (OUTPATIENT)
Dept: CT IMAGING | Facility: HOSPITAL | Age: 72
Discharge: HOME | End: 2022-09-12
Admitting: NURSE PRACTITIONER

## 2022-09-12 DIAGNOSIS — R91.1 LUNG NODULE: ICD-10-CM

## 2022-09-12 PROCEDURE — 71250 CT THORAX DX C-: CPT

## 2022-09-13 DIAGNOSIS — R91.1 LUNG NODULE: Primary | ICD-10-CM

## 2022-09-18 DIAGNOSIS — I10 ESSENTIAL HYPERTENSION: ICD-10-CM

## 2022-09-18 DIAGNOSIS — I25.10 CORONARY ARTERY DISEASE INVOLVING NATIVE CORONARY ARTERY OF NATIVE HEART, UNSPECIFIED WHETHER ANGINA PRESENT: ICD-10-CM

## 2022-09-18 RX ORDER — ATORVASTATIN CALCIUM 40 MG/1
TABLET, FILM COATED ORAL
Qty: 90 TABLET | Refills: 1 | Status: CANCELLED | OUTPATIENT
Start: 2022-09-18

## 2022-09-19 DIAGNOSIS — I25.10 CORONARY ARTERY DISEASE INVOLVING NATIVE CORONARY ARTERY OF NATIVE HEART, UNSPECIFIED WHETHER ANGINA PRESENT: ICD-10-CM

## 2022-09-19 RX ORDER — METOPROLOL SUCCINATE 50 MG/1
50 TABLET, EXTENDED RELEASE ORAL NIGHTLY
Qty: 90 TABLET | Refills: 1 | Status: SHIPPED | OUTPATIENT
Start: 2022-09-19 | End: 2022-11-08 | Stop reason: ALTCHOICE

## 2022-09-19 RX ORDER — LOSARTAN POTASSIUM 50 MG/1
50 TABLET ORAL DAILY
Qty: 90 TABLET | Refills: 3 | Status: SHIPPED | OUTPATIENT
Start: 2022-09-19 | End: 2022-11-08 | Stop reason: DRUGHIGH

## 2022-09-19 RX ORDER — AMIODARONE HYDROCHLORIDE 200 MG/1
TABLET ORAL
Qty: 90 TABLET | Refills: 1 | Status: SHIPPED | OUTPATIENT
Start: 2022-09-19 | End: 2022-11-08 | Stop reason: DRUGHIGH

## 2022-09-19 RX ORDER — ATORVASTATIN CALCIUM 40 MG/1
TABLET, FILM COATED ORAL
Qty: 30 TABLET | Refills: 0 | Status: SHIPPED | OUTPATIENT
Start: 2022-09-19

## 2022-09-20 RX ORDER — ATORVASTATIN CALCIUM 40 MG/1
TABLET, FILM COATED ORAL
Qty: 90 TABLET | OUTPATIENT
Start: 2022-09-20

## 2022-09-23 ENCOUNTER — TELEPHONE (OUTPATIENT)
Dept: CASE MANAGEMENT | Facility: OTHER | Age: 72
End: 2022-09-23

## 2022-09-30 ENCOUNTER — PATIENT OUTREACH (OUTPATIENT)
Dept: CASE MANAGEMENT | Facility: OTHER | Age: 72
End: 2022-09-30

## 2022-09-30 DIAGNOSIS — I10 ESSENTIAL HYPERTENSION: Primary | ICD-10-CM

## 2022-09-30 DIAGNOSIS — I48.91 ATRIAL FIBRILLATION WITH RVR: ICD-10-CM

## 2022-09-30 PROCEDURE — 99490 CHRNC CARE MGMT STAFF 1ST 20: CPT | Performed by: NURSE PRACTITIONER

## 2022-09-30 NOTE — OUTREACH NOTE
AMBULATORY CASE MANAGEMENT NOTE    Name and Relationship of Patient/Support Person: Rachael South - Self    CCM Interim Update    Spoke to patient regarding blood pressure update, monthly check in, and CT results.    Patient states that blood pressure is stable. Has not been higher than 140 systolic. She is taking Losartan 100mg.    CT scan showed stable pulmonary nodule, will repeat in 6 months per pulm.     I attempted to get patient to reschedule PCP apt because PCP requested 6 month follow up back in March. Patient declined at this time and wants to keep December appointment.           Education Documentation  No documentation found.        JOANNA MEAD  Ambulatory Case Management    9/30/2022, 15:07 EDT

## 2022-09-30 NOTE — OUTREACH NOTE
[facility] End of Month Documentation    This Chronic Medical Management Care Plan for Rachael South, 72 y.o. female, has been monitored and managed and a new plan of care implemented for the month of September.  A cumulative time of 36  minutes was spent on this patient record this month, including chart review; phone call with patient; phone call with pharmacist.    Regarding the patient's problems: has Anemia; CAD s/p CABG; S/P MVR (mitral valve replacement); Ischemic cardiomyopathy/Chronic systolic CHF; COPD (chronic obstructive pulmonary disease) ([facility]); Insomnia; Paroxysmal atrial fibrillation; Essential hypertension; and Chronic bronchitis ([facility]) on their problem list., the following items were addressed: medications; medical records and any changes can be found within the plan section of the note.  A detailed listing of time spent for chronic care management is tracked within each outreach encounter.  Current medications include:  has a current medication list which includes the following prescription(s): amiodarone, atorvastatin, carvedilol, furosemide, losartan, losartan, metoprolol succinate xl, potassium chloride, and rivaroxaban, and the following Facility-Administered Medications: clonidine. and the patient is reported to be patient is compliant with medication protocol,  Medications are reported to be effective.  All notes on chart for PCP to review.    The patient was monitored remotely for blood pressure; weight; activity level; medications.    The patient's physical needs include:  medication education; physical healthcare.     The patient's mental support needs include:  not applicable    The patient's cognitive support needs include:  medication    The patient's psychosocial support needs include:  medication management or adherence; continued support    The patient's functional needs include: medication education; physical healthcare    The patient's environmental needs include:  not applicable,  Relies on her  for transportation    Care Plan overall comments:  No data recorded    Refer to previous outreach notes for more information on the areas listed above.    Monthly Billing Diagnoses  (I10) Essential hypertension    (I48.91) Atrial fibrillation with RVR (HCC)    Medications   · Medications have been reconciled    Care Plan progress this month:      Recently Modified Care Plans Updates made since 8/30/2022 12:00 AM    No recently modified care plans.        Instructions   · Patient was provided an electronic copy of care plan  · CCM services were explained and offered and patient has accepted these services.  · Patient has given their written consent to receive CCM services and understands that this includes the authorization of electronic communication of medical information with the other treating providers.  · Patient understands that they may stop CCM services at any time and these changes will be effective at the end of the calendar month and will effectively revocate the agreement of CCM services.  · Patient understands that only one practitioner can furnish and be paid for CCM services during one calendar month.  Patient also understands that there may be co-payment or deductible fees in association with CCM services.  · Patient will continue with at least monthly follow-up calls with the Nurse Navigator.    JOANNA MEAD  Ambulatory Case Management    9/30/2022, 15:19 EDT

## 2022-10-18 DIAGNOSIS — I25.10 CORONARY ARTERY DISEASE INVOLVING NATIVE CORONARY ARTERY OF NATIVE HEART, UNSPECIFIED WHETHER ANGINA PRESENT: ICD-10-CM

## 2022-10-18 RX ORDER — ATORVASTATIN CALCIUM 40 MG/1
TABLET, FILM COATED ORAL
Qty: 90 TABLET | OUTPATIENT
Start: 2022-10-18

## 2022-11-08 ENCOUNTER — TELEPHONE (OUTPATIENT)
Dept: CASE MANAGEMENT | Facility: OTHER | Age: 72
End: 2022-11-08

## 2022-11-08 ENCOUNTER — PATIENT OUTREACH (OUTPATIENT)
Dept: CASE MANAGEMENT | Facility: OTHER | Age: 72
End: 2022-11-08

## 2022-11-08 DIAGNOSIS — I10 ESSENTIAL HYPERTENSION: Primary | ICD-10-CM

## 2022-11-08 DIAGNOSIS — I48.91 ATRIAL FIBRILLATION WITH RVR: ICD-10-CM

## 2022-11-08 RX ORDER — AMIODARONE HYDROCHLORIDE 200 MG/1
100 TABLET ORAL DAILY
COMMUNITY
End: 2023-03-13

## 2022-11-08 NOTE — OUTREACH NOTE
AMBULATORY CASE MANAGEMENT NOTE    Name and Relationship of Patient/Support Person: Rachael South - Self    CCM Interim Update    Spoke to patient regarding blood pressure. She states that this is well controlled. She has not had any readings above 140 systolic. Encouraged patient to take blood pressure log to upcoming appointments.    Med reconciliation completed.        Education Documentation  Self-Care, taught by Sandra Lantigua, RN at 11/8/2022  2:15 PM.  Learner: Patient  Readiness: Acceptance  Method: Explanation  Response: Verbalizes Understanding    Provider Follow-Up, taught by Sandra Lantigua RN at 11/8/2022  2:15 PM.  Learner: Patient  Readiness: Acceptance  Method: Explanation  Response: Verbalizes Understanding    Medication Management, taught by Sandra Lantigua RN at 11/8/2022  2:15 PM.  Learner: Patient  Readiness: Acceptance  Method: Explanation  Response: Verbalizes Understanding    Blood Pressure Monitoring, taught by Sandra Lantigua RN at 11/8/2022  2:15 PM.  Learner: Patient  Readiness: Acceptance  Method: Explanation  Response: Verbalizes Understanding    medication management, taught by Sandra Lantigua RN at 11/8/2022  2:15 PM.  Learner: Patient  Readiness: Acceptance  Method: Explanation  Response: Verbalizes Understanding    preventive care, taught by Sandra Lantigua RN at 11/8/2022  2:15 PM.  Learner: Patient  Readiness: Acceptance  Method: Explanation  Response: Verbalizes Understanding    Medication Management, taught by Sandra Lantigua RN at 11/8/2022  2:15 PM.  Learner: Patient  Readiness: Acceptance  Method: Explanation  Response: Verbalizes Understanding    Provider Follow-Up, taught by Sandra Lantigua RN at 11/8/2022  2:15 PM.  Learner: Patient  Readiness: Acceptance  Method: Explanation  Response: Verbalizes Understanding    Diet Modification, taught by Sandra Lantigua RN at 11/8/2022  2:15 PM.  Learner: Patient  Readiness: Acceptance  Method: Explanation  Response: Verbalizes  Understanding    Hypertension, taught by Sandra Lantigua, RN at 11/8/2022  2:15 PM.  Learner: Patient  Readiness: Acceptance  Method: Explanation  Response: Verbalizes Balaji MEAD  Ambulatory Case Management    11/8/2022, 14:18 EST

## 2022-11-28 ENCOUNTER — PATIENT OUTREACH (OUTPATIENT)
Dept: CASE MANAGEMENT | Facility: OTHER | Age: 72
End: 2022-11-28

## 2022-11-28 DIAGNOSIS — I38 ACUTE SYSTOLIC CONGESTIVE HEART FAILURE DUE TO VALVULAR DISEASE: ICD-10-CM

## 2022-11-28 DIAGNOSIS — I10 ESSENTIAL HYPERTENSION: Primary | ICD-10-CM

## 2022-11-28 DIAGNOSIS — I50.21 ACUTE SYSTOLIC CONGESTIVE HEART FAILURE DUE TO VALVULAR DISEASE: ICD-10-CM

## 2022-11-28 NOTE — OUTREACH NOTE
[facility] End of Month Documentation    This Chronic Medical Management Care Plan for Rachael South, 72 y.o. female, has been monitored and managed and a new plan of care implemented for the month of November.  A cumulative time of 60  minutes was spent on this patient record this month, including chart review; phone call with patient.    Regarding the patient's problems: has Anemia; CAD s/p CABG; S/P MVR (mitral valve replacement); Ischemic cardiomyopathy/Chronic systolic CHF; COPD (chronic obstructive pulmonary disease) (HCC); Insomnia; Paroxysmal atrial fibrillation; Essential hypertension; and Chronic bronchitis (HCC) on their problem list., the following items were addressed: medications; medical records and any changes can be found within the plan section of the note.  A detailed listing of time spent for chronic care management is tracked within each outreach encounter.  Current medications include:  has a current medication list which includes the following prescription(s): amiodarone, atorvastatin, carvedilol, furosemide, losartan, potassium chloride, and rivaroxaban, and the following Facility-Administered Medications: clonidine. and the patient is reported to be patient is compliant with medication protocol,  Medications are reported to be effective.  All notes on chart for PCP to review.    The patient was monitored remotely for blood pressure; weight; activity level; medications.    The patient's physical needs include:  medication education; physical healthcare.     The patient's mental support needs include:  not applicable    The patient's cognitive support needs include:  medication    The patient's psychosocial support needs include:  medication management or adherence; continued support    The patient's functional needs include: medication education; physical healthcare    The patient's environmental needs include:  not applicable    Care Plan overall comments:  No data recorded    Refer to previous  "outreach notes for more information on the areas listed above.    Monthly Billing Diagnoses  (I10) Essential hypertension    (I50.21,  I38) Acute systolic congestive heart failure due to valvular disease (HCC)    Medications   · Medications have been reconciled    Care Plan progress this month:      Recently Modified Care Plans Updates made since 10/28/2022 12:00 AM     Hypertension (Adult)         Problem Priority Last Modified     ELS HYPERTENSION (HYPERTENSION) (ADULT) --  3/16/2022  3:14 PM by Sandra Lantigua RN              Goal Recent Progress Last Modified     Hypertension Monitored --  3/16/2022  3:14 PM by Sandra Lantigua RN     Evidence-based guidance:   Promote initial use of ambulatory blood pressure measurements (for 3 days) to rule out \"white-coat\" effect; identify masked hypertension and presence or absence of nocturnal \"dipping\" of blood pressure.    Encourage continued use of home blood pressure monitoring and recording in blood pressure log; include symptoms of hypotension or potential medication side effects in log.   Review blood pressure measurements taken inside and outside of the provider office; establish baseline and monitor trends; compare to target ranges or patient goal.   Share overall cardiovascular risk with patient; encourage changes to lifestyle risk factors, including alcohol consumption, smoking, inadequate exercise, poor dietary habits and stress.    Notes:            Task Due Date Last Modified     Identify and Monitor Blood Pressure Elevation --  11/8/2022  2:16 PM by Sandra Lantigua RN     Care Management Activities:      - blood pressure equipment and technique reviewed  - blood pressure trends reviewed  - home or ambulatory blood pressure monitoring encouraged      Notes:              Problem Priority Last Modified     ELS DISEASE PROGRESSION (HYPERTENSION) (ADULT) --  3/16/2022  3:14 PM by Sandra Lantigua RN              Goal Recent Progress Last Modified     Disease Progression " Prevented or Minimized --  3/16/2022  3:14 PM by Sandra Lantigua, RN     Evidence-based guidance:   Tailor lifestyle advice to individual; review progress regularly; give frequent encouragement and respond positively to incremental successes.   Assess for and promote awareness of worsening disease or development of comorbidity.   Prepare patient for laboratory and diagnostic exams based on risk and presentation.   Prepare patient for use of pharmacologic therapy that may include diuretic, beta-blocker, beta-blocker/thiazide combination, angiotensin-converting enzyme inhibitor, renin-angiotensin blocker or calcium-channel blocker.   Expect periodic adjustments to pharmacologic therapy; manage side effects.   Promote a healthy diet that includes primarily plant-based foods, such as fruits, vegetables, whole grains, beans and legumes, low-fat dairy and lean meats.    Consider moderate reduction in sodium intake by avoiding the addition of salt to prepared foods and limiting processed meats, canned soup, frozen meals and salty snacks.    Promote a regular, daily exercise goal of 150 minutes per week of moderate exercise based on tolerance, ability and patient choice; consider referral to physical therapist, community wellness and/or activity program.   Encourage the avoidance of no more than 2 hours per day of sedentary activity, such as recreational screen time.   Review sources of stress; explore current coping strategies and encourage use of mindfulness, yoga, meditation or exercise to manage stress.    Notes:            Task Due Date Last Modified     Alleviate Barriers to Hypertension Treatment --  11/8/2022  1:37 PM by Sandra Lantigua, RN     Care Management Activities:      - healthy diet promoted  - patient response to treatment assessed  - reduction of dietary sodium encouraged  - response to pharmacologic therapy monitored      Notes:              Problem Priority Last Modified     ELS RESISTANT HYPERTENSION  (HYPERTENSION) (ADULT) --  3/16/2022  3:14 PM by Sandra Lantigua, RN              Goal Recent Progress Last Modified     Response to Treatment Maximized --  3/16/2022  3:14 PM by Sandra Lantigua, RN     Evidence-based guidance:   Assess patient response to treatment, including presence or absence of medication side effects, degree of blood pressure control and patient satisfaction.   Assess technique (including cuff size and placement), measurement times, condition and calibration of blood pressure cuff set (both at-home and in-office equipment).   Assess factors that may influence response to treatment, including nonadherence to pharmacologic treatment plan, diet or activity changes and/or presence of pain, stress or sleep disturbance.   Screen for signs and symptoms of depression; if present, refer for or complete a comprehensive assessment.   Evaluate social and economic barriers that may affect adherence to treatment plan   Address pharmacologic nonadherence by simplifying dosing regimen, counseling or support by pharmacist, financial assistance, self-monitoring of blood pressure, use of motivational interviewing, voice or text messages.   Encourage behavioral adherence strategies, like habit-based interventions that link medication taking with existing daily routines.   Assess barriers to regular, daily physical activity; support family or support person-oriented activity changes and utilization of community activity or sports program.   Address barriers to dietary changes, especially sodium restriction, with referrals to community programs, like cooking classes, meal services or intensive education when available.   Refer to community-based peer support program or nurse home-visiting program.   Assess for chronic pain; when present add additional goals (Chronic Pain Care Plan Guide) as needed.   Provide frequent follow-up by telephone, telemonitoring, patient-practice portal or with home visit.   Review alcohol use  screen; address using brief intervention beginning with risk that interferes with blood pressure control; refer for treatment when excessive alcohol use is noted.   Screen for obstructive sleep apnea; prepare patient for polysomnography based on risk and presentation and use of noninvasive ventilation to relieve obstructive sleep apnea when present.    Notes:            Task Due Date Last Modified     Facilitate Adherence to Lifestyle Change --  11/8/2022  1:37 PM by Sandra Lantigua RN     Care Management Activities:      - depression screen reviewed  - medication adherence assessment completed  - success praised  - support and encouragement provided      Notes:                      Instructions   · Patient was provided an electronic copy of care plan  · CCM services were explained and offered and patient has accepted these services.  · Patient has given their written consent to receive CCM services and understands that this includes the authorization of electronic communication of medical information with the other treating providers.  · Patient understands that they may stop CCM services at any time and these changes will be effective at the end of the calendar month and will effectively revocate the agreement of CCM services.  · Patient understands that only one practitioner can furnish and be paid for CCM services during one calendar month.  Patient also understands that there may be co-payment or deductible fees in association with CCM services.  · Patient will continue with at least monthly follow-up calls with the Ambulatory .    SANDRA MEAD  Ambulatory Case Management    11/28/2022, 09:27 EST

## 2022-12-01 ENCOUNTER — OFFICE VISIT (OUTPATIENT)
Dept: FAMILY MEDICINE CLINIC | Facility: CLINIC | Age: 72
End: 2022-12-01

## 2022-12-01 VITALS
HEART RATE: 91 BPM | TEMPERATURE: 97.1 F | SYSTOLIC BLOOD PRESSURE: 158 MMHG | OXYGEN SATURATION: 99 % | WEIGHT: 151 LBS | BODY MASS INDEX: 28.51 KG/M2 | HEIGHT: 61 IN | DIASTOLIC BLOOD PRESSURE: 88 MMHG

## 2022-12-01 DIAGNOSIS — I10 PRIMARY HYPERTENSION: ICD-10-CM

## 2022-12-01 DIAGNOSIS — I10 ESSENTIAL HYPERTENSION: ICD-10-CM

## 2022-12-01 DIAGNOSIS — Z00.00 MEDICARE ANNUAL WELLNESS VISIT, SUBSEQUENT: ICD-10-CM

## 2022-12-01 DIAGNOSIS — E78.2 MIXED HYPERLIPIDEMIA: Primary | ICD-10-CM

## 2022-12-01 PROCEDURE — 99213 OFFICE O/P EST LOW 20 MIN: CPT | Performed by: FAMILY MEDICINE

## 2022-12-01 PROCEDURE — G0439 PPPS, SUBSEQ VISIT: HCPCS | Performed by: FAMILY MEDICINE

## 2022-12-01 PROCEDURE — 1170F FXNL STATUS ASSESSED: CPT | Performed by: FAMILY MEDICINE

## 2022-12-01 PROCEDURE — 1159F MED LIST DOCD IN RCRD: CPT | Performed by: FAMILY MEDICINE

## 2022-12-01 PROCEDURE — 96160 PT-FOCUSED HLTH RISK ASSMT: CPT | Performed by: FAMILY MEDICINE

## 2022-12-01 RX ORDER — FUROSEMIDE 40 MG/1
40 TABLET ORAL DAILY
Qty: 90 TABLET | Refills: 1 | Status: SHIPPED | OUTPATIENT
Start: 2022-12-01 | End: 2022-12-02 | Stop reason: SDUPTHER

## 2022-12-01 RX ORDER — AMLODIPINE BESYLATE 2.5 MG/1
2.5 TABLET ORAL DAILY
Qty: 90 TABLET | Refills: 1 | Status: SHIPPED | OUTPATIENT
Start: 2022-12-01

## 2022-12-01 RX ORDER — LOSARTAN POTASSIUM 100 MG/1
100 TABLET ORAL DAILY
Qty: 90 TABLET | Refills: 1 | Status: SHIPPED | OUTPATIENT
Start: 2022-12-01 | End: 2022-12-02 | Stop reason: SDUPTHER

## 2022-12-01 NOTE — PROGRESS NOTES
The ABCs of the Annual Wellness Visit  Subsequent Medicare Wellness Visit    Chief Complaint   Patient presents with   • Medicare Wellness-subsequent      Subjective    History of Present Illness:  Rachael South is a 72 y.o. female who presents for a Subsequent Medicare Wellness Visit.      Hypertension  This is a chronic problem. The current episode started more than 1 year ago. The problem has been gradually improving since onset. The problem is uncontrolled. Pertinent negatives include no anxiety, blurred vision, chest pain, headaches, malaise/fatigue, neck pain, orthopnea, palpitations, peripheral edema, PND, shortness of breath or sweats. There are no associated agents to hypertension. Risk factors for coronary artery disease include dyslipidemia, family history and post-menopausal state. Current antihypertension treatment includes angiotensin blockers, beta blockers and diuretics. The current treatment provides moderate improvement. There are no compliance problems.              The following portions of the patient's history were reviewed and   updated as appropriate:   She  has a past medical history of Anemia, CAD s/p CABG (5/5/2021), CHF (congestive heart failure) ([facility]), COPD (chronic obstructive pulmonary disease) ([facility]), Essential hypertension (12/14/2021), GERD (gastroesophageal reflux disease), Hyperlipidemia, Hypertension, Ischemic cardiomyopathy/Chronic systolic CHF (6/29/2021), Ischemic dilated cardiomyopathy (6/29/2021), Mitral regurgitation, Paroxysmal atrial fibrillation (12/14/2021), PONV (postoperative nausea and vomiting), and S/P MVR (mitral valve replacement) (6/9/2021).  She does not have any pertinent problems on file.  She  has a past surgical history that includes Cardiac catheterization; Rotator cuff repair (Right); Cardiac catheterization (N/A, 4/21/2021); Cardiac catheterization (N/A, 4/21/2021); Cardiac catheterization (N/A, 4/21/2021); coronary artery bypass graft with mitral  valve repair/replacement (N/A, 4/27/2021); Coronary artery bypass graft; Mitral valve replacement; Esophagogastroduodenoscopy (N/A, 11/27/2021); and Upper gastrointestinal endoscopy.  Her family history includes Diabetes type II in her grandchild; Heart attack in her father; Heart disease in her brother; Heart failure in her mother.  She  reports that she quit smoking about 20 months ago. Her smoking use included cigarettes. She started smoking about 59 years ago. She has a 22.50 pack-year smoking history. She has never used smokeless tobacco. She reports that she does not drink alcohol and does not use drugs.  Current Outpatient Medications   Medication Sig Dispense Refill   • amiodarone (PACERONE) 200 MG tablet Take 0.5 tablets by mouth Daily. Take 0.5 tablet by mouth once daily     • atorvastatin (LIPITOR) 40 MG tablet TAKE 1 TABLET(40 MG) BY MOUTH EVERY DAY 30 tablet 0   • carvedilol (COREG) 6.25 MG tablet Take 1 tablet by mouth 2 (Two) Times a Day With Meals. 180 tablet 3   • furosemide (LASIX) 40 MG tablet TAKE 1 TABLET BY MOUTH DAILY 90 tablet 0   • losartan (COZAAR) 100 MG tablet TAKE 1 TABLET BY MOUTH DAILY 90 tablet 0   • potassium chloride (KLOR-CON) 8 MEQ CR tablet Take 1 tablet by mouth Daily. 90 tablet 1   • rivaroxaban (Xarelto) 15 MG tablet Take 1 tablet by mouth Daily With Dinner. 90 tablet 3   • amLODIPine (Norvasc) 2.5 MG tablet Take 1 tablet by mouth Daily. 90 tablet 1     Current Facility-Administered Medications   Medication Dose Route Frequency Provider Last Rate Last Admin   • cloNIDine (CATAPRES) tablet 0.1 mg  0.1 mg Oral Once Candida Ryder APRN         Current Outpatient Medications on File Prior to Visit   Medication Sig   • amiodarone (PACERONE) 200 MG tablet Take 0.5 tablets by mouth Daily. Take 0.5 tablet by mouth once daily   • atorvastatin (LIPITOR) 40 MG tablet TAKE 1 TABLET(40 MG) BY MOUTH EVERY DAY   • carvedilol (COREG) 6.25 MG tablet Take 1 tablet by mouth 2 (Two) Times a  Day With Meals.   • furosemide (LASIX) 40 MG tablet TAKE 1 TABLET BY MOUTH DAILY   • losartan (COZAAR) 100 MG tablet TAKE 1 TABLET BY MOUTH DAILY   • potassium chloride (KLOR-CON) 8 MEQ CR tablet Take 1 tablet by mouth Daily.   • rivaroxaban (Xarelto) 15 MG tablet Take 1 tablet by mouth Daily With Dinner.     Current Facility-Administered Medications on File Prior to Visit   Medication   • cloNIDine (CATAPRES) tablet 0.1 mg     She has No Known Allergies..    Compared to one year ago, the patient feels her physical   health is better.    Compared to one year ago, the patient feels her mental   health is better.    Recent Hospitalizations:  She was not admitted to the hospital during the last year.       Current Medical Providers:  Patient Care Team:  Candida Ryder APRN as PCP - General (Nurse Practitioner)  Sandra Lantigua RN as Ambulatory  ([facility])    Outpatient Medications Prior to Visit   Medication Sig Dispense Refill   • amiodarone (PACERONE) 200 MG tablet Take 0.5 tablets by mouth Daily. Take 0.5 tablet by mouth once daily     • atorvastatin (LIPITOR) 40 MG tablet TAKE 1 TABLET(40 MG) BY MOUTH EVERY DAY 30 tablet 0   • carvedilol (COREG) 6.25 MG tablet Take 1 tablet by mouth 2 (Two) Times a Day With Meals. 180 tablet 3   • furosemide (LASIX) 40 MG tablet TAKE 1 TABLET BY MOUTH DAILY 90 tablet 0   • losartan (COZAAR) 100 MG tablet TAKE 1 TABLET BY MOUTH DAILY 90 tablet 0   • potassium chloride (KLOR-CON) 8 MEQ CR tablet Take 1 tablet by mouth Daily. 90 tablet 1   • rivaroxaban (Xarelto) 15 MG tablet Take 1 tablet by mouth Daily With Dinner. 90 tablet 3     Facility-Administered Medications Prior to Visit   Medication Dose Route Frequency Provider Last Rate Last Admin   • cloNIDine (CATAPRES) tablet 0.1 mg  0.1 mg Oral Once Candida Ryder APRN           No opioid medication identified on active medication list. I have reviewed chart for other potential  high risk medication/s and  "harmful drug interactions in the elderly.          Aspirin is not on active medication list.  Aspirin use is not indicated based on review of current medical condition/s. Risk of harm outweighs potential benefits.  .    Patient Active Problem List   Diagnosis   • Anemia   • CAD s/p CABG   • S/P MVR (mitral valve replacement)   • Ischemic cardiomyopathy/Chronic systolic CHF   • COPD (chronic obstructive pulmonary disease) ([facility])   • Insomnia   • Paroxysmal atrial fibrillation   • Essential hypertension   • Chronic bronchitis ([facility])     Advance Care Planning  Advance Directive is not on file.  ACP discussion was held with the patient during this visit. Patient does not have an advance directive, information provided.    Review of Systems   Constitutional: Negative for fatigue, fever and malaise/fatigue.   HENT: Negative for sore throat.    Eyes: Negative for blurred vision and visual disturbance.   Respiratory: Negative for cough, chest tightness, shortness of breath and wheezing.    Cardiovascular: Negative for chest pain, palpitations, orthopnea and PND.   Gastrointestinal: Negative for abdominal pain, diarrhea, nausea and vomiting.   Musculoskeletal: Negative for neck pain.   Skin: Negative for rash.   Neurological: Negative for light-headedness.   Psychiatric/Behavioral: Negative for decreased concentration, dysphoric mood, sleep disturbance and suicidal ideas. The patient is not nervous/anxious.         Objective    Vitals:    12/01/22 1400   BP: 158/88   Pulse: 91   Temp: 97.1 °F (36.2 °C)   SpO2: 99%   Weight: 68.5 kg (151 lb)   Height: 154.9 cm (61\")     Estimated body mass index is 28.53 kg/m² as calculated from the following:    Height as of this encounter: 154.9 cm (61\").    Weight as of this encounter: 68.5 kg (151 lb).    BMI is >= 25 and <30. (Overweight) The following options were offered after discussion;: exercise counseling/recommendations and nutrition counseling/recommendations      Does the patient " have evidence of cognitive impairment? No    Physical Exam  Vitals reviewed.   Constitutional:       Appearance: Normal appearance. She is well-developed.   HENT:      Head: Normocephalic and atraumatic.      Right Ear: External ear normal.      Left Ear: External ear normal.      Mouth/Throat:      Pharynx: No oropharyngeal exudate.   Eyes:      Conjunctiva/sclera: Conjunctivae normal.      Pupils: Pupils are equal, round, and reactive to light.   Cardiovascular:      Rate and Rhythm: Normal rate and regular rhythm.      Pulses: Normal pulses.      Heart sounds: Normal heart sounds. No murmur heard.    No friction rub. No gallop.   Pulmonary:      Effort: Pulmonary effort is normal.      Breath sounds: Normal breath sounds. No wheezing or rhonchi.   Abdominal:      General: Abdomen is flat. Bowel sounds are normal. There is no distension.      Palpations: Abdomen is soft. There is no mass.      Tenderness: There is no abdominal tenderness. There is no guarding or rebound.      Hernia: No hernia is present.   Musculoskeletal:         General: Normal range of motion.   Skin:     General: Skin is warm and dry.      Capillary Refill: Capillary refill takes less than 2 seconds.   Neurological:      General: No focal deficit present.      Mental Status: She is alert and oriented to person, place, and time.      Cranial Nerves: No cranial nerve deficit.   Psychiatric:         Mood and Affect: Mood and affect normal.         Behavior: Behavior normal.         Thought Content: Thought content normal.         Judgment: Judgment normal.                 HEALTH RISK ASSESSMENT    Smoking Status:  Social History     Tobacco Use   Smoking Status Former   • Packs/day: 0.50   • Years: 45.00   • Pack years: 22.50   • Types: Cigarettes   • Start date: 1963   • Quit date: 3/9/2021   • Years since quittin.7   Smokeless Tobacco Never   Tobacco Comments    caffeine use - denies     Alcohol Consumption:  Social History      Substance and Sexual Activity   Alcohol Use Never     Fall Risk Screen:    KEANUADI Fall Risk Assessment was completed, and patient is at LOW risk for falls.Assessment completed on:12/1/2022    Depression Screening:  PHQ-2/PHQ-9 Depression Screening 12/1/2022   Retired PHQ-9 Total Score -   Retired Total Score -   Little Interest or Pleasure in Doing Things 0-->not at all   Feeling Down, Depressed or Hopeless 0-->not at all   PHQ-9: Brief Depression Severity Measure Score 0       Health Habits and Functional and Cognitive Screening:  Functional & Cognitive Status 12/1/2022   Do you have difficulty preparing food and eating? No   Do you have difficulty bathing yourself, getting dressed or grooming yourself? No   Do you have difficulty using the toilet? No   Do you have difficulty moving around from place to place? No   Do you have trouble with steps or getting out of a bed or a chair? No   Current Diet Well Balanced Diet   Dental Exam Not up to date   Eye Exam Not up to date   Exercise (times per week) 7 times per week   Current Exercises Include Walking   Do you need help using the phone?  No   Are you deaf or do you have serious difficulty hearing?  No   Do you need help with transportation? No   Do you need help shopping? No   Do you need help preparing meals?  No   Do you need help with housework?  No   Do you need help with laundry? No   Do you need help taking your medications? No   Do you need help managing money? No   Do you ever drive or ride in a car without wearing a seat belt? Yes   Have you felt unusual stress, anger or loneliness in the last month? No   Who do you live with? Spouse   If you need help, do you have trouble finding someone available to you? No   Have you been bothered in the last four weeks by sexual problems? No   Do you have difficulty concentrating, remembering or making decisions? No       Age-appropriate Screening Schedule:  Refer to the list below for future screening  recommendations based on patient's age, sex and/or medical conditions. Orders for these recommended tests are listed in the plan section. The patient has been provided with a written plan.    Health Maintenance   Topic Date Due   • INFLUENZA VACCINE  08/07/2023 (Originally 8/1/2022)   • TDAP/TD VACCINES (1 - Tdap) 11/08/2023 (Originally [birth year]2/1969)   • ZOSTER VACCINE (1 of 2) 11/08/2023 (Originally 1/2/1998)   • MAMMOGRAM  12/01/2023 (Originally 1950)   • DXA SCAN  12/04/2023 (Originally 1950)   • LIPID PANEL  04/04/2023              Assessment & Plan   CMS Preventative Services Quick Reference  Risk Factors Identified During Encounter  discussed need for dexa scan and mammogram.  The above risks/problems have been discussed with the patient.  Follow up actions/plans if indicated are seen below in the Assessment/Plan Section.  Pertinent information has been shared with the patient in the After Visit Summary.    Diagnoses and all orders for this visit:    1. Mixed hyperlipidemia (Primary)    2. Medicare annual wellness visit, subsequent    3. Primary hypertension  -     amLODIPine (Norvasc) 2.5 MG tablet; Take 1 tablet by mouth Daily.  Dispense: 90 tablet; Refill: 1  -     CBC Auto Differential  -     Comprehensive Metabolic Panel  -     Lipid Panel  -     TSH+Free T4        Follow Up:   Return in about 2 weeks (around 12/15/2022) for Recheck.     An After Visit Summary and PPPS were made available to the patient.    Discussed pt getting dexa scan and mammogram- pt refuses both tests- pt understands risk of breast cancer and need for early detection at getting most effective treatment- she understands that she can die from not finding breast cancer early enough and is willing to take the risk.

## 2022-12-02 ENCOUNTER — CLINICAL SUPPORT (OUTPATIENT)
Dept: FAMILY MEDICINE CLINIC | Facility: CLINIC | Age: 72
End: 2022-12-02

## 2022-12-02 DIAGNOSIS — I10 ESSENTIAL HYPERTENSION: ICD-10-CM

## 2022-12-02 LAB
ALBUMIN SERPL-MCNC: 4.1 G/DL (ref 3.5–5.2)
ALBUMIN/GLOB SERPL: 1.6 G/DL
ALP SERPL-CCNC: 107 U/L (ref 39–117)
ALT SERPL W P-5'-P-CCNC: 6 U/L (ref 1–33)
ANION GAP SERPL CALCULATED.3IONS-SCNC: 10.9 MMOL/L (ref 5–15)
AST SERPL-CCNC: 13 U/L (ref 1–32)
BASOPHILS # BLD AUTO: 0.05 10*3/MM3 (ref 0–0.2)
BASOPHILS NFR BLD AUTO: 0.6 % (ref 0–1.5)
BILIRUB SERPL-MCNC: 0.4 MG/DL (ref 0–1.2)
BUN SERPL-MCNC: 12 MG/DL (ref 8–23)
BUN/CREAT SERPL: 12.5 (ref 7–25)
CALCIUM SPEC-SCNC: 9.2 MG/DL (ref 8.6–10.5)
CHLORIDE SERPL-SCNC: 101 MMOL/L (ref 98–107)
CHOLEST SERPL-MCNC: 169 MG/DL (ref 0–200)
CO2 SERPL-SCNC: 29.1 MMOL/L (ref 22–29)
CREAT SERPL-MCNC: 0.96 MG/DL (ref 0.57–1)
DEPRECATED RDW RBC AUTO: 41.2 FL (ref 37–54)
EGFRCR SERPLBLD CKD-EPI 2021: 62.2 ML/MIN/1.73
EOSINOPHIL # BLD AUTO: 0.24 10*3/MM3 (ref 0–0.4)
EOSINOPHIL NFR BLD AUTO: 3 % (ref 0.3–6.2)
ERYTHROCYTE [DISTWIDTH] IN BLOOD BY AUTOMATED COUNT: 12.8 % (ref 12.3–15.4)
GLOBULIN UR ELPH-MCNC: 2.6 GM/DL
GLUCOSE SERPL-MCNC: 97 MG/DL (ref 65–99)
HCT VFR BLD AUTO: 35.4 % (ref 34–46.6)
HDLC SERPL-MCNC: 40 MG/DL (ref 40–60)
HGB BLD-MCNC: 12.1 G/DL (ref 12–15.9)
IMM GRANULOCYTES # BLD AUTO: 0.03 10*3/MM3 (ref 0–0.05)
IMM GRANULOCYTES NFR BLD AUTO: 0.4 % (ref 0–0.5)
LDLC SERPL CALC-MCNC: 86 MG/DL (ref 0–100)
LDLC/HDLC SERPL: 1.92 {RATIO}
LYMPHOCYTES # BLD AUTO: 1.94 10*3/MM3 (ref 0.7–3.1)
LYMPHOCYTES NFR BLD AUTO: 23.9 % (ref 19.6–45.3)
MCH RBC QN AUTO: 29.8 PG (ref 26.6–33)
MCHC RBC AUTO-ENTMCNC: 34.2 G/DL (ref 31.5–35.7)
MCV RBC AUTO: 87.2 FL (ref 79–97)
MONOCYTES # BLD AUTO: 0.7 10*3/MM3 (ref 0.1–0.9)
MONOCYTES NFR BLD AUTO: 8.6 % (ref 5–12)
NEUTROPHILS NFR BLD AUTO: 5.16 10*3/MM3 (ref 1.7–7)
NEUTROPHILS NFR BLD AUTO: 63.5 % (ref 42.7–76)
NRBC BLD AUTO-RTO: 0 /100 WBC (ref 0–0.2)
PLATELET # BLD AUTO: 292 10*3/MM3 (ref 140–450)
PMV BLD AUTO: 9.4 FL (ref 6–12)
POTASSIUM SERPL-SCNC: 4.3 MMOL/L (ref 3.5–5.2)
PROT SERPL-MCNC: 6.7 G/DL (ref 6–8.5)
RBC # BLD AUTO: 4.06 10*6/MM3 (ref 3.77–5.28)
SODIUM SERPL-SCNC: 141 MMOL/L (ref 136–145)
T4 FREE SERPL-MCNC: 1.49 NG/DL (ref 0.93–1.7)
TRIGL SERPL-MCNC: 261 MG/DL (ref 0–150)
TSH SERPL DL<=0.05 MIU/L-ACNC: 1.81 UIU/ML (ref 0.27–4.2)
VLDLC SERPL-MCNC: 43 MG/DL (ref 5–40)
WBC NRBC COR # BLD: 8.12 10*3/MM3 (ref 3.4–10.8)

## 2022-12-02 PROCEDURE — 84439 ASSAY OF FREE THYROXINE: CPT | Performed by: FAMILY MEDICINE

## 2022-12-02 PROCEDURE — 85025 COMPLETE CBC W/AUTO DIFF WBC: CPT | Performed by: FAMILY MEDICINE

## 2022-12-02 PROCEDURE — 84443 ASSAY THYROID STIM HORMONE: CPT | Performed by: FAMILY MEDICINE

## 2022-12-02 PROCEDURE — 80053 COMPREHEN METABOLIC PANEL: CPT | Performed by: FAMILY MEDICINE

## 2022-12-02 PROCEDURE — 80061 LIPID PANEL: CPT | Performed by: FAMILY MEDICINE

## 2022-12-02 RX ORDER — LOSARTAN POTASSIUM 100 MG/1
100 TABLET ORAL DAILY
Qty: 90 TABLET | Refills: 1 | Status: SHIPPED | OUTPATIENT
Start: 2022-12-02

## 2022-12-02 RX ORDER — FUROSEMIDE 40 MG/1
40 TABLET ORAL DAILY
Qty: 90 TABLET | Refills: 1 | Status: SHIPPED | OUTPATIENT
Start: 2022-12-02

## 2022-12-02 RX ORDER — FUROSEMIDE 40 MG/1
40 TABLET ORAL DAILY
Qty: 90 TABLET | Refills: 0 | Status: SHIPPED | OUTPATIENT
Start: 2022-12-02 | End: 2022-12-02 | Stop reason: SDUPTHER

## 2022-12-02 NOTE — PROGRESS NOTES
Venipuncture Blood Specimen Collection  Venipuncture performed in [place] by Salome Knox MA with good hemostasis. Patient tolerated the procedure well without complications.   12/02/22   Salome Knox MA

## 2022-12-03 NOTE — PROGRESS NOTES
Labs show no significant abnormalities except for elevated cholesterol.  Really watch diet and exercise.  Follow-up if you have any questions.

## 2022-12-05 DIAGNOSIS — I10 ESSENTIAL HYPERTENSION: ICD-10-CM

## 2022-12-05 RX ORDER — POTASSIUM CHLORIDE 600 MG/1
8 TABLET, FILM COATED, EXTENDED RELEASE ORAL DAILY
Qty: 90 TABLET | Refills: 1 | Status: SHIPPED | OUTPATIENT
Start: 2022-12-05

## 2022-12-11 NOTE — PROGRESS NOTES
[facility]  Cardiology progress Note    Patient Name: Rachael South  : 1950    CHIEF COMPLAINT  Coronary artery disease        Subjective   Subjective     HISTORY OF PRESENT ILLNESS    Rachael South is a 72 y.o. female with coronary disease s/p CABG.  No chest pain or shortness of breath.    REVIEW OF SYSTEMS    Constitutional:    No fever, no weight loss  Skin:     No rash  Otolaryngeal:    No difficulty swallowing  Cardiovascular: See HPI.  Pulmonary:    No cough, no sputum production    Personal History     Social History:    reports that she quit smoking about 21 months ago. Her smoking use included cigarettes. She started smoking about 59 years ago. She has a 22.50 pack-year smoking history. She has never used smokeless tobacco. She reports that she does not drink alcohol and does not use drugs.    Home Medications:  Current Outpatient Medications on File Prior to Visit   Medication Sig   • amiodarone (PACERONE) 200 MG tablet Take 0.5 tablets by mouth Daily. Take 0.5 tablet by mouth once daily   • amLODIPine (Norvasc) 2.5 MG tablet Take 1 tablet by mouth Daily.   • atorvastatin (LIPITOR) 40 MG tablet TAKE 1 TABLET(40 MG) BY MOUTH EVERY DAY   • carvedilol (COREG) 6.25 MG tablet Take 1 tablet by mouth 2 (Two) Times a Day With Meals.   • furosemide (LASIX) 40 MG tablet Take 1 tablet by mouth Daily.   • losartan (COZAAR) 100 MG tablet Take 1 tablet by mouth Daily.   • potassium chloride (KLOR-CON) 8 MEQ CR tablet TAKE 1 TABLET BY MOUTH DAILY   • rivaroxaban (Xarelto) 15 MG tablet Take 1 tablet by mouth Daily With Dinner.     Current Facility-Administered Medications on File Prior to Visit   Medication   • cloNIDine (CATAPRES) tablet 0.1 mg       Past Medical History:   Diagnosis Date   • Anemia    • CAD s/p CABG 2021   • CHF (congestive heart failure) ([facility])    • COPD (chronic obstructive pulmonary disease) ([facility])    • Essential hypertension 2021   • GERD (gastroesophageal reflux  disease)    • Hyperlipidemia    • Hypertension    • Ischemic cardiomyopathy/Chronic systolic CHF 6/29/2021   • Ischemic dilated cardiomyopathy 6/29/2021   • Mitral regurgitation    • Paroxysmal atrial fibrillation 12/14/2021   • PONV (postoperative nausea and vomiting)    • S/P MVR (mitral valve replacement) 6/9/2021       Allergies:  No Known Allergies    Objective    Objective       Vitals:   Heart Rate:  [84] 84  BP: (148)/(76) 148/76  Body mass index is 28.15 kg/m².     PHYSICAL EXAM:    General Appearance:   · well developed  · well nourished  HENT:   · oropharynx moist  · lips not cyanotic  Neck:  · thyroid not enlarged  · supple  Respiratory:  · no respiratory distress  · normal breath sounds  · no rales  Cardiovascular:  · no jugular venous distention  · regular rhythm  · apical impulse normal  · S1 normal, S2 normal  · no S3, no S4   · SM GR 3/6   · no rub, no thrill  · carotid pulses normal; no bruit  · pedal pulses normal  · lower extremity edema: none    Skin:   · warm, dry  Psychiatric:  · judgement and insight appropriate  · normal mood and affect        Result Review:  I have personally reviewed the available results from  [x]  Laboratory  [x]  EKG  [x]  Cardiology  [x]  Medications  [x]  Old records  []  Other:     Procedures  Lab Results   Component Value Date    CHOL 169 12/02/2022    CHOL 155 04/04/2022    CHOL 94 04/21/2021     Lab Results   Component Value Date    TRIG 261 (H) 12/02/2022    TRIG 165 (H) 04/04/2022    TRIG 115 04/21/2021     Lab Results   Component Value Date    HDL 40 12/02/2022    HDL 48 04/04/2022    HDL 31 (L) 04/21/2021     Lab Results   Component Value Date    LDL 86 12/02/2022    LDL 79 04/04/2022    LDL 42 04/21/2021     Lab Results   Component Value Date    VLDL 43 (H) 12/02/2022    VLDL 28 04/04/2022    VLDL 21 04/21/2021     Results for orders placed in visit on 05/10/22    Adult Transthoracic Echo Complete W/ Cont if Necessary Per Protocol    Interpretation Summary  ·  Moderate tricuspid valve regurgitation is present.    Normal left ventricular systolic function.  Prosthetic mitral valve functioning normally.  Fibrocalcific aortic valve.  Mild aortic regurgitation.  Moderate tricuspid regurgitation.     Impression/Plan:  1.  Coronary disease s/p CABG stable: Continue aspirin 81 mg a day.  2.  Presence of bioprosthetic mitral valve: Continue carvedilol 6.25 mg twice a day.  Continue aspirin 81 mg a day.  3.  Paroxysmal atrial fibrillation: Continue Xarelto 15 mg once a day for stroke prevention. continue amiodarone 100 mg once a day for rhythm control.  4.  Chronic systolic diastolic heart failure stable: Continue Lasix 40 mg a day.  Recent echo shows normal left ventricular systolic function.  5.  Essential hypertension controlled: Continue losartan 100 mg a day.  Continue carvedilol 6.25 mg twice a day.  Monitor blood pressure regularly.  6.  Hyperlipidemia: Continue Lipitor 40 mg a day.  Monitor lipid and hepatic profile.        Marquez Canales MD   12/13/22   14:34 EST

## 2022-12-11 NOTE — PROGRESS NOTES
"Chief Complaint   Patient presents with    Flu Like Symptoms     Starting Wednesday. Pt endorses sore throat, runny nose, swollen throat, wet cough. Tried OTC meds and tea w/out relief. S/s continue and do not resolve. -flu vaccine, +COVID vaccine     BP (!) 140/102   Pulse (!) 117   Temp 37.3 °C (99.1 °F) (Temporal)   Resp 18   Ht 1.651 m (5' 5\")   Wt 105 kg (231 lb 0.7 oz)   SpO2 96% Comment: Room air  BMI 38.45 kg/m²     Pt here for above cc  Pt took home COVID test Friday and states it was negative  Flu-like s/s continue and ongoing despite OTC meds  Endorses worst complaint is sore throat    Pt to [facility], educated on triage process  " Last INR 7.14.21 2.0   Taking 2/1 MG     Current INR 7.26.21 2.1  Taking 2/1MG

## 2022-12-13 ENCOUNTER — OFFICE VISIT (OUTPATIENT)
Dept: CARDIOLOGY | Facility: CLINIC | Age: 72
End: 2022-12-13

## 2022-12-13 VITALS
HEART RATE: 84 BPM | WEIGHT: 149 LBS | SYSTOLIC BLOOD PRESSURE: 148 MMHG | BODY MASS INDEX: 28.13 KG/M2 | DIASTOLIC BLOOD PRESSURE: 76 MMHG | HEIGHT: 61 IN

## 2022-12-13 DIAGNOSIS — I25.10 CORONARY ARTERY DISEASE INVOLVING NATIVE CORONARY ARTERY OF NATIVE HEART WITHOUT ANGINA PECTORIS: Primary | ICD-10-CM

## 2022-12-13 DIAGNOSIS — I48.0 PAROXYSMAL ATRIAL FIBRILLATION: ICD-10-CM

## 2022-12-13 DIAGNOSIS — Z95.1 HX OF CABG: ICD-10-CM

## 2022-12-13 DIAGNOSIS — I50.32 DIASTOLIC CHF, CHRONIC: ICD-10-CM

## 2022-12-13 DIAGNOSIS — I10 HYPERTENSION, ESSENTIAL: ICD-10-CM

## 2022-12-13 PROCEDURE — 99214 OFFICE O/P EST MOD 30 MIN: CPT | Performed by: SPECIALIST

## 2022-12-15 ENCOUNTER — OFFICE VISIT (OUTPATIENT)
Dept: PULMONOLOGY | Facility: CLINIC | Age: 72
End: 2022-12-15

## 2022-12-15 VITALS
DIASTOLIC BLOOD PRESSURE: 77 MMHG | HEART RATE: 80 BPM | HEIGHT: 61 IN | TEMPERATURE: 97.8 F | BODY MASS INDEX: 28.13 KG/M2 | OXYGEN SATURATION: 100 % | WEIGHT: 149 LBS | RESPIRATION RATE: 18 BRPM | SYSTOLIC BLOOD PRESSURE: 186 MMHG

## 2022-12-15 DIAGNOSIS — R91.1 PULMONARY NODULE: Primary | ICD-10-CM

## 2022-12-15 PROBLEM — I10 ESSENTIAL HYPERTENSION: Status: RESOLVED | Noted: 2021-12-14 | Resolved: 2022-12-15

## 2022-12-15 PROCEDURE — 99213 OFFICE O/P EST LOW 20 MIN: CPT | Performed by: INTERNAL MEDICINE

## 2022-12-15 NOTE — ASSESSMENT & PLAN NOTE
Patient with pulmonary nodule we will repeat CT scan in March 2023    Follow-up afterwards    The nodule is stable then we will no longer need to see patient

## 2022-12-15 NOTE — PROGRESS NOTES
"Chief Complaint  COPD and Chronic bronchitis (HCC)  (/)    Subjective        Rachael South presents to [facility]ICINE  History of Present Illness  72-year-old female here for follow-up  Overall doing good from a breathing standpoint  Her heart failure is under control  No longer having any swelling  PFTs obtained this month since December 2022 shows no evidence of obstruction or restriction  Objective   Vital Signs:  BP (!) 186/77 (BP Location: Left arm, Patient Position: Sitting, Cuff Size: Adult)   Pulse 80   Temp 97.8 °F (36.6 °C) (Temporal)   Resp 18   Ht 154.9 cm (61\")   Wt 67.6 kg (149 lb)   SpO2 100% Comment: room air  BMI 28.15 kg/m²   Estimated body mass index is 28.15 kg/m² as calculated from the following:    Height as of this encounter: 154.9 cm (61\").    Weight as of this encounter: 67.6 kg (149 lb).          Physical Exam   Vital Signs Reviewed  General WDWN, Alert, NAD.    Chest:  good aeration, clear to auscultation bilaterally, tympanic to percussion bilaterally, no work of breathing noted  CV: RRR, no MGR, .  EXT:  no clubbing, no cyanosis, no edema, no joint tenderness  Neuro:  A&Ox3, CN grossly intact, no focal deficits.  Skin: No rashes or lesions noted  Result Review :                Assessment and Plan   Diagnoses and all orders for this visit:    1. Pulmonary nodule (Primary)  Assessment & Plan:  Patient with pulmonary nodule we will repeat CT scan in March 2023    Follow-up afterwards    The nodule is stable then we will no longer need to see patient             Follow Up   Return in about 3 months (around 3/15/2023).  Patient was given instructions and counseling regarding her condition or for health maintenance advice. Please see specific information pulled into the AVS if appropriate.       "

## 2022-12-22 ENCOUNTER — PATIENT OUTREACH (OUTPATIENT)
Dept: CASE MANAGEMENT | Facility: OTHER | Age: 72
End: 2022-12-22

## 2022-12-22 DIAGNOSIS — I38 ACUTE SYSTOLIC CONGESTIVE HEART FAILURE DUE TO VALVULAR DISEASE: ICD-10-CM

## 2022-12-22 DIAGNOSIS — I10 ESSENTIAL HYPERTENSION: Primary | ICD-10-CM

## 2022-12-22 DIAGNOSIS — I50.21 ACUTE SYSTOLIC CONGESTIVE HEART FAILURE DUE TO VALVULAR DISEASE: ICD-10-CM

## 2022-12-22 NOTE — OUTREACH NOTE
AMBULATORY CASE MANAGEMENT NOTE    Name and Relationship of Patient/Support Person: Rachael South - Self    CCM Interim Update    Spoke to patient regarding blood pressure readings and recent office visits. Patient is doing well. States that her blood pressure has been elevated at her office visits, but this is because of her white coat syndrome. At home, her blood pressure this morning was 123/79. She has not had any readings over 145 systolic.  She is managing blood pressure with medications and diet control, specifically watching her sodium intake.    Patient will see Pulm in March, Cardio in July, and PCP in December of 2023.        Education Documentation  No documentation found.        JOANNA MEAD  Ambulatory Case Management    12/22/2022, 15:33 EST

## 2022-12-28 ENCOUNTER — PATIENT OUTREACH (OUTPATIENT)
Dept: CASE MANAGEMENT | Facility: OTHER | Age: 72
End: 2022-12-28

## 2022-12-28 DIAGNOSIS — I38 ACUTE SYSTOLIC CONGESTIVE HEART FAILURE DUE TO VALVULAR DISEASE: ICD-10-CM

## 2022-12-28 DIAGNOSIS — I50.21 ACUTE SYSTOLIC CONGESTIVE HEART FAILURE DUE TO VALVULAR DISEASE: ICD-10-CM

## 2022-12-28 DIAGNOSIS — I10 ESSENTIAL HYPERTENSION: Primary | ICD-10-CM

## 2022-12-28 PROCEDURE — 99490 CHRNC CARE MGMT STAFF 1ST 20: CPT | Performed by: NURSE PRACTITIONER

## 2022-12-28 NOTE — OUTREACH NOTE
[facility] End of Month Documentation    This Chronic Medical Management Care Plan for Rachael South, 72 y.o. female, has been monitored and managed and a new plan of care implemented for the month of December.  A cumulative time of 30  minutes was spent on this patient record this month, including chart review; phone call with patient.    Regarding the patient's problems: has Anemia; CAD s/p CABG; S/P MVR (mitral valve replacement); Ischemic cardiomyopathy/Chronic systolic CHF; Insomnia; Paroxysmal atrial fibrillation; Chronic bronchitis (HCC); and Pulmonary nodule on their problem list., the following items were addressed: medications; medical records and any changes can be found within the plan section of the note.  A detailed listing of time spent for chronic care management is tracked within each outreach encounter.  Current medications include:  has a current medication list which includes the following prescription(s): amiodarone, amlodipine, atorvastatin, carvedilol, furosemide, losartan, potassium chloride, and rivaroxaban, and the following Facility-Administered Medications: clonidine. and the patient is reported to be patient is compliant with medication protocol,  Medications are reported to be effective.  Regarding these diagnoses, referrals were made to the following provider(s):  n/a.  All notes on chart for PCP to review.    The patient was monitored remotely for blood pressure; weight; activity level; medications.    The patient's physical needs include:  medication education; physical healthcare.     The patient's mental support needs include:  not applicable    The patient's cognitive support needs include:  medication    The patient's psychosocial support needs include:  medication management or adherence; continued support    The patient's functional needs include: medication education; physical healthcare    The patient's environmental needs include:  not applicable    Care Plan overall comments:   No data recorded    Refer to previous outreach notes for more information on the areas listed above.    Monthly Billing Diagnoses  (I10) Essential hypertension    (I50.21,  I38) Acute systolic congestive heart failure due to valvular disease (HCC)    Medications   · Medications have been reconciled    Care Plan progress this month:      Recently Modified Care Plans Updates made since 11/27/2022 12:00 AM    No recently modified care plans.            Instructions   · Patient was provided an electronic copy of care plan  · CCM services were explained and offered and patient has accepted these services.  · Patient has given their written consent to receive CCM services and understands that this includes the authorization of electronic communication of medical information with the other treating providers.  · Patient understands that they may stop CCM services at any time and these changes will be effective at the end of the calendar month and will effectively revocate the agreement of CCM services.  · Patient understands that only one practitioner can furnish and be paid for CCM services during one calendar month.  Patient also understands that there may be co-payment or deductible fees in association with CCM services.  · Patient will continue with at least monthly follow-up calls with the Ambulatory .    JOANNA MEAD  Ambulatory Case Management    12/28/2022, 13:50 EST

## 2023-01-23 ENCOUNTER — TELEPHONE (OUTPATIENT)
Dept: CASE MANAGEMENT | Facility: OTHER | Age: 73
End: 2023-01-23
Payer: MEDICARE

## 2023-02-13 RX ORDER — RIVAROXABAN 15 MG/1
TABLET, FILM COATED ORAL
Qty: 90 TABLET | Refills: 3 | Status: SHIPPED | OUTPATIENT
Start: 2023-02-13

## 2023-03-13 RX ORDER — AMIODARONE HYDROCHLORIDE 100 MG/1
100 TABLET ORAL DAILY
Qty: 90 TABLET | Refills: 0 | Status: SHIPPED | OUTPATIENT
Start: 2023-03-13

## 2023-03-13 RX ORDER — CARVEDILOL 6.25 MG/1
TABLET ORAL
Qty: 180 TABLET | Refills: 3 | Status: SHIPPED | OUTPATIENT
Start: 2023-03-13

## 2023-03-16 ENCOUNTER — HOSPITAL ENCOUNTER (OUTPATIENT)
Dept: CT IMAGING | Facility: HOSPITAL | Age: 73
Discharge: HOME | End: 2023-03-16
Admitting: NURSE PRACTITIONER
Payer: MEDICARE

## 2023-03-16 DIAGNOSIS — R91.1 LUNG NODULE: ICD-10-CM

## 2023-03-16 PROCEDURE — 71250 CT THORAX DX C-: CPT

## 2023-03-17 DIAGNOSIS — R91.1 PULMONARY NODULE: Primary | ICD-10-CM

## 2023-03-21 ENCOUNTER — PATIENT OUTREACH (OUTPATIENT)
Dept: CASE MANAGEMENT | Facility: OTHER | Age: 73
End: 2023-03-21
Payer: MEDICARE

## 2023-03-21 DIAGNOSIS — I38 ACUTE SYSTOLIC CONGESTIVE HEART FAILURE DUE TO VALVULAR DISEASE: ICD-10-CM

## 2023-03-21 DIAGNOSIS — I50.21 ACUTE SYSTOLIC CONGESTIVE HEART FAILURE DUE TO VALVULAR DISEASE: ICD-10-CM

## 2023-03-21 DIAGNOSIS — I10 ESSENTIAL HYPERTENSION: Primary | ICD-10-CM

## 2023-03-21 NOTE — OUTREACH NOTE
AMBULATORY CASE MANAGEMENT NOTE    Name and Relationship of Patient/Support Person: Rachael South - Self    CCM Interim Update    Patient is doing well. Continues to monitor blood pressure morning and evening with reported good control. Keeping appointments with PCP and cardio. Patient feels comfortable with graduation from CCM program. She is aware to reach out to myself or PCP should she wish to re-enroll in the future if additional needs arise.        Education Documentation  No documentation found.        JOANNA MEAD  Ambulatory Case Management    3/21/2023, 10:42 EDT

## 2023-05-16 DIAGNOSIS — I10 PRIMARY HYPERTENSION: ICD-10-CM

## 2023-05-16 RX ORDER — AMLODIPINE BESYLATE 2.5 MG/1
2.5 TABLET ORAL DAILY
Qty: 90 TABLET | Refills: 1 | Status: SHIPPED | OUTPATIENT
Start: 2023-05-16

## 2023-06-15 RX ORDER — AMIODARONE HYDROCHLORIDE 100 MG/1
100 TABLET ORAL DAILY
Qty: 90 TABLET | Refills: 0 | Status: SHIPPED | OUTPATIENT
Start: 2023-06-15

## 2023-09-13 ENCOUNTER — HOSPITAL ENCOUNTER (OUTPATIENT)
Dept: CT IMAGING | Facility: HOSPITAL | Age: 73
Discharge: HOME | End: 2023-09-13
Payer: MEDICARE

## 2023-09-13 DIAGNOSIS — R91.1 PULMONARY NODULE: ICD-10-CM

## 2023-09-13 PROCEDURE — 71250 CT THORAX DX C-: CPT

## 2023-10-11 DIAGNOSIS — I10 ESSENTIAL HYPERTENSION: ICD-10-CM

## 2023-10-11 RX ORDER — LOSARTAN POTASSIUM 100 MG/1
100 TABLET ORAL DAILY
Qty: 90 TABLET | Refills: 0 | Status: SHIPPED | OUTPATIENT
Start: 2023-10-11

## 2023-10-13 DIAGNOSIS — I10 ESSENTIAL HYPERTENSION: ICD-10-CM

## 2023-10-16 RX ORDER — FUROSEMIDE 40 MG/1
40 TABLET ORAL DAILY
Qty: 90 TABLET | Refills: 1 | OUTPATIENT
Start: 2023-10-16

## 2023-10-23 ENCOUNTER — OFFICE VISIT (OUTPATIENT)
Dept: FAMILY MEDICINE CLINIC | Facility: CLINIC | Age: 73
End: 2023-10-23
Payer: MEDICARE

## 2023-10-23 VITALS
SYSTOLIC BLOOD PRESSURE: 170 MMHG | TEMPERATURE: 97.3 F | OXYGEN SATURATION: 99 % | HEIGHT: 62 IN | WEIGHT: 154 LBS | HEART RATE: 81 BPM | BODY MASS INDEX: 28.34 KG/M2 | DIASTOLIC BLOOD PRESSURE: 88 MMHG

## 2023-10-23 DIAGNOSIS — I10 WHITE COAT SYNDROME WITH DIAGNOSIS OF HYPERTENSION: ICD-10-CM

## 2023-10-23 DIAGNOSIS — I25.10 CORONARY ARTERY DISEASE INVOLVING NATIVE CORONARY ARTERY OF NATIVE HEART, UNSPECIFIED WHETHER ANGINA PRESENT: ICD-10-CM

## 2023-10-23 DIAGNOSIS — I10 ESSENTIAL HYPERTENSION: Primary | ICD-10-CM

## 2023-10-23 DIAGNOSIS — F41.9 ANXIETY: ICD-10-CM

## 2023-10-23 LAB
ALBUMIN SERPL-MCNC: 4.4 G/DL (ref 3.5–5.2)
ALBUMIN/GLOB SERPL: 1.9 G/DL
ALP SERPL-CCNC: 97 U/L (ref 39–117)
ALT SERPL W P-5'-P-CCNC: 18 U/L (ref 1–33)
ANION GAP SERPL CALCULATED.3IONS-SCNC: 9.8 MMOL/L (ref 5–15)
AST SERPL-CCNC: 22 U/L (ref 1–32)
BASOPHILS # BLD AUTO: 0.06 10*3/MM3 (ref 0–0.2)
BASOPHILS NFR BLD AUTO: 0.7 % (ref 0–1.5)
BILIRUB SERPL-MCNC: 0.3 MG/DL (ref 0–1.2)
BILIRUB UR QL STRIP: NEGATIVE
BUN SERPL-MCNC: 16 MG/DL (ref 8–23)
BUN/CREAT SERPL: 15.5 (ref 7–25)
CALCIUM SPEC-SCNC: 9 MG/DL (ref 8.6–10.5)
CHLORIDE SERPL-SCNC: 101 MMOL/L (ref 98–107)
CHOLEST SERPL-MCNC: 161 MG/DL (ref 0–200)
CLARITY UR: ABNORMAL
CO2 SERPL-SCNC: 28.2 MMOL/L (ref 22–29)
COLOR UR: YELLOW
CREAT SERPL-MCNC: 1.03 MG/DL (ref 0.57–1)
DEPRECATED RDW RBC AUTO: 39.6 FL (ref 37–54)
EGFRCR SERPLBLD CKD-EPI 2021: 56.8 ML/MIN/1.73
EOSINOPHIL # BLD AUTO: 0.23 10*3/MM3 (ref 0–0.4)
EOSINOPHIL NFR BLD AUTO: 2.9 % (ref 0.3–6.2)
ERYTHROCYTE [DISTWIDTH] IN BLOOD BY AUTOMATED COUNT: 12.3 % (ref 12.3–15.4)
GLOBULIN UR ELPH-MCNC: 2.3 GM/DL
GLUCOSE SERPL-MCNC: 95 MG/DL (ref 65–99)
GLUCOSE UR STRIP-MCNC: NEGATIVE MG/DL
HCT VFR BLD AUTO: 36.5 % (ref 34–46.6)
HDLC SERPL-MCNC: 42 MG/DL (ref 40–60)
HGB BLD-MCNC: 12.3 G/DL (ref 12–15.9)
HGB UR QL STRIP.AUTO: NEGATIVE
HOLD SPECIMEN: NORMAL
IMM GRANULOCYTES # BLD AUTO: 0.03 10*3/MM3 (ref 0–0.05)
IMM GRANULOCYTES NFR BLD AUTO: 0.4 % (ref 0–0.5)
KETONES UR QL STRIP: NEGATIVE
LDLC SERPL CALC-MCNC: 84 MG/DL (ref 0–100)
LDLC/HDLC SERPL: 1.83 {RATIO}
LEUKOCYTE ESTERASE UR QL STRIP.AUTO: NEGATIVE
LYMPHOCYTES # BLD AUTO: 2.86 10*3/MM3 (ref 0.7–3.1)
LYMPHOCYTES NFR BLD AUTO: 35.7 % (ref 19.6–45.3)
MCH RBC QN AUTO: 30.6 PG (ref 26.6–33)
MCHC RBC AUTO-ENTMCNC: 33.7 G/DL (ref 31.5–35.7)
MCV RBC AUTO: 90.8 FL (ref 79–97)
MONOCYTES # BLD AUTO: 0.77 10*3/MM3 (ref 0.1–0.9)
MONOCYTES NFR BLD AUTO: 9.6 % (ref 5–12)
NEUTROPHILS NFR BLD AUTO: 4.07 10*3/MM3 (ref 1.7–7)
NEUTROPHILS NFR BLD AUTO: 50.7 % (ref 42.7–76)
NITRITE UR QL STRIP: NEGATIVE
NRBC BLD AUTO-RTO: 0.1 /100 WBC (ref 0–0.2)
PH UR STRIP.AUTO: 5.5 [PH] (ref 5–8)
PLATELET # BLD AUTO: 288 10*3/MM3 (ref 140–450)
PMV BLD AUTO: 9.8 FL (ref 6–12)
POTASSIUM SERPL-SCNC: 4.1 MMOL/L (ref 3.5–5.2)
PROT SERPL-MCNC: 6.7 G/DL (ref 6–8.5)
PROT UR QL STRIP: NEGATIVE
RBC # BLD AUTO: 4.02 10*6/MM3 (ref 3.77–5.28)
SODIUM SERPL-SCNC: 139 MMOL/L (ref 136–145)
SP GR UR STRIP: 1.02 (ref 1–1.03)
TRIGL SERPL-MCNC: 210 MG/DL (ref 0–150)
TSH SERPL DL<=0.05 MIU/L-ACNC: 2.15 UIU/ML (ref 0.27–4.2)
UROBILINOGEN UR QL STRIP: ABNORMAL
VLDLC SERPL-MCNC: 35 MG/DL (ref 5–40)
WBC NRBC COR # BLD: 8.02 10*3/MM3 (ref 3.4–10.8)

## 2023-10-23 PROCEDURE — 80061 LIPID PANEL: CPT | Performed by: NURSE PRACTITIONER

## 2023-10-23 PROCEDURE — 80053 COMPREHEN METABOLIC PANEL: CPT | Performed by: NURSE PRACTITIONER

## 2023-10-23 PROCEDURE — 84443 ASSAY THYROID STIM HORMONE: CPT | Performed by: NURSE PRACTITIONER

## 2023-10-23 PROCEDURE — 81003 URINALYSIS AUTO W/O SCOPE: CPT | Performed by: NURSE PRACTITIONER

## 2023-10-23 PROCEDURE — 85025 COMPLETE CBC W/AUTO DIFF WBC: CPT | Performed by: NURSE PRACTITIONER

## 2023-10-23 RX ORDER — POTASSIUM CHLORIDE 600 MG/1
8 TABLET, FILM COATED, EXTENDED RELEASE ORAL DAILY
Qty: 90 TABLET | Refills: 1 | Status: SHIPPED | OUTPATIENT
Start: 2023-10-23

## 2023-10-23 RX ORDER — AMLODIPINE BESYLATE 2.5 MG/1
2.5 TABLET ORAL DAILY
Qty: 90 TABLET | Refills: 1 | Status: SHIPPED | OUTPATIENT
Start: 2023-10-23

## 2023-10-23 RX ORDER — BUSPIRONE HYDROCHLORIDE 5 MG/1
5 TABLET ORAL 3 TIMES DAILY PRN
Qty: 90 TABLET | Refills: 0 | Status: SHIPPED | OUTPATIENT
Start: 2023-10-23

## 2023-10-23 RX ORDER — FUROSEMIDE 40 MG/1
40 TABLET ORAL DAILY
Qty: 90 TABLET | Refills: 1 | Status: SHIPPED | OUTPATIENT
Start: 2023-10-23

## 2023-10-23 RX ORDER — ATORVASTATIN CALCIUM 40 MG/1
40 TABLET, FILM COATED ORAL DAILY
Qty: 90 TABLET | Refills: 1 | Status: SHIPPED | OUTPATIENT
Start: 2023-10-23

## 2023-10-23 RX ORDER — LOSARTAN POTASSIUM 100 MG/1
100 TABLET ORAL DAILY
Qty: 90 TABLET | Refills: 1 | Status: SHIPPED | OUTPATIENT
Start: 2023-10-23

## 2023-10-23 NOTE — PROGRESS NOTES
Chief Complaint  Hypertension (Refills and labs, patient is fasting)    PHQ-2 Total Score: 0    Subjective        Past Medical History:   Diagnosis Date    Anemia     CAD s/p CABG 2021    CHF (congestive heart failure)     COPD (chronic obstructive pulmonary disease)     Essential hypertension 2021    GERD (gastroesophageal reflux disease)     Hyperlipidemia     Hypertension     Ischemic cardiomyopathy/Chronic systolic CHF 2021    Ischemic dilated cardiomyopathy 2021    Mitral regurgitation     Paroxysmal atrial fibrillation 2021    PONV (postoperative nausea and vomiting)     S/P MVR (mitral valve replacement) 2021     Social History     Tobacco Use    Smoking status: Former     Packs/day: 0.50     Years: 45.00     Additional pack years: 0.00     Total pack years: 22.50     Types: Cigarettes     Start date: 1963     Quit date: 3/9/2021     Years since quittin.6     Passive exposure: Past    Smokeless tobacco: Never    Tobacco comments:     caffeine use - denies   Vaping Use    Vaping Use: Never used   Substance Use Topics    Alcohol use: Never    Drug use: Never      Current Outpatient Medications on File Prior to Visit   Medication Sig    amiodarone (PACERONE) 100 MG tablet TAKE 1 TABLET BY MOUTH DAILY    carvedilol (COREG) 6.25 MG tablet TAKE 1 TABLET BY MOUTH TWICE DAILY WITH MEALS    Xarelto 15 MG tablet TAKE 1 TABLET BY MOUTH DAILY WITH DINNER    [DISCONTINUED] amLODIPine (NORVASC) 2.5 MG tablet TAKE 1 TABLET BY MOUTH DAILY    [DISCONTINUED] atorvastatin (LIPITOR) 40 MG tablet TAKE 1 TABLET(40 MG) BY MOUTH EVERY DAY    [DISCONTINUED] furosemide (LASIX) 40 MG tablet Take 1 tablet by mouth Daily.    [DISCONTINUED] losartan (COZAAR) 100 MG tablet TAKE 1 TABLET BY MOUTH DAILY    [DISCONTINUED] potassium chloride (KLOR-CON) 8 MEQ CR tablet TAKE 1 TABLET BY MOUTH DAILY     No current facility-administered medications on file prior to visit.      No Known Allergies   There are  "no preventive care reminders to display for this patient.     Rachael South presents to [facility]  History of Present Illness  Patient presents to the office today to follow-up on chronic health conditions and obtain refills of medications.  Patient is fasting for labs.    HTN: elevated in office, pt states her home BP readings are 138 systolic.     HLD: pt is also established with Cardiology due to hx of A-fib.     Patient also notes that she has been having some intermittent anxiety symptoms.  Patient states that she has not been on medication in the past but would like to try something to take as needed for anxiety.      Objective   Vital Signs:   /88 (BP Location: Left arm)   Pulse 81   Temp 97.3 °F (36.3 °C)   Ht 157.5 cm (62\")   Wt 69.9 kg (154 lb)   SpO2 99%   BMI 28.17 kg/m²     Review of Systems   Respiratory:  Negative for cough, shortness of breath and wheezing.    Cardiovascular:  Negative for chest pain and palpitations.   Neurological:  Negative for dizziness, headache and memory problem.      Physical Exam  Vitals reviewed.   Constitutional:       General: She is not in acute distress.     Appearance: Normal appearance. She is well-developed.   Eyes:      Conjunctiva/sclera: Conjunctivae normal.      Pupils: Pupils are equal, round, and reactive to light.   Cardiovascular:      Rate and Rhythm: Normal rate and regular rhythm.      Heart sounds: Normal heart sounds.   Pulmonary:      Effort: Pulmonary effort is normal.      Breath sounds: Normal breath sounds.   Musculoskeletal:      Cervical back: Neck supple.      Right lower leg: No edema.      Left lower leg: No edema.   Skin:     General: Skin is warm and dry.   Neurological:      Mental Status: She is alert and oriented to person, place, and time.   Psychiatric:         Mood and Affect: Mood and affect normal.         Behavior: Behavior normal.         Thought Content: Thought content normal.    "      Judgment: Judgment normal.        Result Review :   The following data was reviewed by: KENNETH Jones on 10/23/2023:  Common labs          12/2/2022    13:08   Common Labs   Glucose 97    BUN 12    Creatinine 0.96    Sodium 141    Potassium 4.3    Chloride 101    Calcium 9.2    Albumin 4.10    Total Bilirubin 0.4    Alkaline Phosphatase 107    AST (SGOT) 13    ALT (SGPT) 6    WBC 8.12    Hemoglobin 12.1    Hematocrit 35.4    Platelets 292    Total Cholesterol 169    Triglycerides 261    HDL Cholesterol 40    LDL Cholesterol  86      TSH          12/2/2022    13:08   TSH   TSH 1.810                Assessment and Plan    Diagnoses and all orders for this visit:    1. Essential hypertension (Primary)  -     amLODIPine (NORVASC) 2.5 MG tablet; Take 1 tablet by mouth Daily.  Dispense: 90 tablet; Refill: 1  -     furosemide (LASIX) 40 MG tablet; Take 1 tablet by mouth Daily.  Dispense: 90 tablet; Refill: 1  -     losartan (COZAAR) 100 MG tablet; Take 1 tablet by mouth Daily.  Dispense: 90 tablet; Refill: 1  -     potassium chloride (KLOR-CON) 8 MEQ CR tablet; Take 1 tablet by mouth Daily.  Dispense: 90 tablet; Refill: 1  -     CBC & Differential  -     Comprehensive Metabolic Panel  -     Lipid Panel  -     TSH Rfx On Abnormal To Free T4  -     Urinalysis With Culture If Indicated - Urine, Clean Catch    2. Coronary artery disease involving native coronary artery of native heart, unspecified whether angina present  -     atorvastatin (LIPITOR) 40 MG tablet; Take 1 tablet by mouth Daily.  Dispense: 90 tablet; Refill: 1    3. Anxiety  -     busPIRone (BUSPAR) 5 MG tablet; Take 1 tablet by mouth 3 (Three) Times a Day As Needed (anxiety).  Dispense: 90 tablet; Refill: 0    4. White coat syndrome with diagnosis of hypertension              Follow Up   Return in about 6 weeks (around 12/4/2023) for Medicare Wellness.  Patient was given instructions and counseling regarding her condition or for health maintenance  advice. Please see specific information pulled into the AVS if appropriate.

## 2023-10-23 NOTE — PROGRESS NOTES
Venipuncture Blood Specimen Collection  Venipuncture performed in left arm by Lilibeth Gupta with good hemostasis. Patient tolerated the procedure well without complications.   10/23/23   Lilibeth Gupta

## 2023-11-11 DIAGNOSIS — I10 ESSENTIAL HYPERTENSION: ICD-10-CM

## 2023-11-11 RX ORDER — POTASSIUM CHLORIDE 600 MG/1
8 TABLET, FILM COATED, EXTENDED RELEASE ORAL DAILY
Qty: 90 TABLET | Refills: 1 | OUTPATIENT
Start: 2023-11-11

## 2023-12-04 ENCOUNTER — OFFICE VISIT (OUTPATIENT)
Dept: FAMILY MEDICINE CLINIC | Facility: CLINIC | Age: 73
End: 2023-12-04
Payer: MEDICARE

## 2023-12-04 VITALS
HEIGHT: 62 IN | OXYGEN SATURATION: 97 % | DIASTOLIC BLOOD PRESSURE: 80 MMHG | HEART RATE: 99 BPM | SYSTOLIC BLOOD PRESSURE: 170 MMHG | WEIGHT: 154 LBS | TEMPERATURE: 97.3 F | BODY MASS INDEX: 28.34 KG/M2

## 2023-12-04 DIAGNOSIS — Z00.00 MEDICARE ANNUAL WELLNESS VISIT, SUBSEQUENT: Primary | ICD-10-CM

## 2023-12-04 NOTE — PROGRESS NOTES
The ABCs of the Annual Wellness Visit  Subsequent Medicare Wellness Visit    Subjective    Rachael South is a 73 y.o. female who presents for a Subsequent Medicare Wellness Visit.    The following portions of the patient's history were reviewed and   updated as appropriate: allergies, current medications, past family history, past medical history, past social history, past surgical history, and problem list.    Compared to one year ago, the patient feels her physical   health is better. Notes increased energy.     Compared to one year ago, the patient feels her mental   health is better.    Recent Hospitalizations:  She was not admitted to the hospital during the last year.       Current Medical Providers:  Patient Care Team:  Candida Ryder APRN as PCP - General (Nurse Practitioner)  Marquez Canales MD as Consulting Physician (Cardiology)  Alonso Ludwig DO as Consulting Physician (Pulmonary Disease)    Outpatient Medications Prior to Visit   Medication Sig Dispense Refill    amiodarone (PACERONE) 100 MG tablet TAKE 1 TABLET BY MOUTH DAILY 90 tablet 0    amLODIPine (NORVASC) 2.5 MG tablet Take 1 tablet by mouth Daily. 90 tablet 1    atorvastatin (LIPITOR) 40 MG tablet Take 1 tablet by mouth Daily. 90 tablet 1    busPIRone (BUSPAR) 5 MG tablet Take 1 tablet by mouth 3 (Three) Times a Day As Needed (anxiety). 90 tablet 0    carvedilol (COREG) 6.25 MG tablet TAKE 1 TABLET BY MOUTH TWICE DAILY WITH MEALS 180 tablet 3    furosemide (LASIX) 40 MG tablet Take 1 tablet by mouth Daily. 90 tablet 1    losartan (COZAAR) 100 MG tablet Take 1 tablet by mouth Daily. 90 tablet 1    potassium chloride (KLOR-CON) 8 MEQ CR tablet Take 1 tablet by mouth Daily. 90 tablet 1    Xarelto 15 MG tablet TAKE 1 TABLET BY MOUTH DAILY WITH DINNER 90 tablet 3     No facility-administered medications prior to visit.       No opioid medication identified on active medication list. I have reviewed chart for other potential   "high risk medication/s and harmful drug interactions in the elderly.        Aspirin is not on active medication list.  Aspirin use is contraindicated for this patient due to: current use of Xarelto.  .    Patient Active Problem List   Diagnosis    Anemia    CAD s/p CABG    S/P MVR (mitral valve replacement)    Ischemic cardiomyopathy/Chronic systolic CHF    Insomnia    Paroxysmal atrial fibrillation    Chronic bronchitis    Pulmonary nodule     Advance Care Planning   Advance Care Planning     Advance Directive is not on file.  ACP discussion was held with the patient during this visit. Patient does not have an advance directive, declines further assistance.     Objective    Vitals:    23 1350   BP: 170/80   BP Location: Left arm   Pulse: 99   Temp: 97.3 °F (36.3 °C)   SpO2: 97%   Weight: 69.9 kg (154 lb)   Height: 157.5 cm (62\")     Estimated body mass index is 28.17 kg/m² as calculated from the following:    Height as of this encounter: 157.5 cm (62\").    Weight as of this encounter: 69.9 kg (154 lb).           Does the patient have evidence of cognitive impairment? No    Lab Results   Component Value Date    TRIG 210 (H) 10/23/2023    HDL 42 10/23/2023    LDL 84 10/23/2023    VLDL 35 10/23/2023        HEALTH RISK ASSESSMENT    Smoking Status:  Social History     Tobacco Use   Smoking Status Former    Packs/day: 0.50    Years: 45.00    Additional pack years: 0.00    Total pack years: 22.50    Types: Cigarettes    Start date: 1963    Quit date: 3/9/2021    Years since quittin.7    Passive exposure: Past   Smokeless Tobacco Never   Tobacco Comments    caffeine use - denies     Alcohol Consumption:  Social History     Substance and Sexual Activity   Alcohol Use Never     Fall Risk Screen:    STEADI Fall Risk Assessment was completed, and patient is at LOW risk for falls.Assessment completed on:2023    Depression Screenin/4/2023     1:54 PM   PHQ-2/PHQ-9 Depression Screening   Little " Interest or Pleasure in Doing Things 0-->not at all   Feeling Down, Depressed or Hopeless 0-->not at all   PHQ-9: Brief Depression Severity Measure Score 0       Health Habits and Functional and Cognitive Screenin/4/2023     1:51 PM   Functional & Cognitive Status   Do you have difficulty preparing food and eating? No   Do you have difficulty bathing yourself, getting dressed or grooming yourself? No   Do you have difficulty using the toilet? No   Do you have difficulty moving around from place to place? No   Do you have trouble with steps or getting out of a bed or a chair? No   Current Diet Well Balanced Diet   Dental Exam Not up to date   Eye Exam Not up to date   Exercise (times per week) 6 times per week   Current Exercises Include Walking   Do you need help using the phone?  No   Are you deaf or do you have serious difficulty hearing?  No   Do you need help to go to places out of walking distance? No   Do you need help shopping? No   Do you need help preparing meals?  No   Do you need help with housework?  No   Do you need help with laundry? No   Do you need help taking your medications? No   Do you need help managing money? No   Do you ever drive or ride in a car without wearing a seat belt? No   Have you felt unusual stress, anger or loneliness in the last month? No   Who do you live with? Spouse   If you need help, do you have trouble finding someone available to you? No   Do you have difficulty concentrating, remembering or making decisions? No       Age-appropriate Screening Schedule:  Refer to the list below for future screening recommendations based on patient's age, sex and/or medical conditions. Orders for these recommended tests are listed in the plan section. The patient has been provided with a written plan.    Health Maintenance   Topic Date Due    TDAP/TD VACCINES (1 - Tdap) Never done    ZOSTER VACCINE (1 of 2) Never done    DXA SCAN  2023 (Originally 1950)    INFLUENZA  "VACCINE  03/31/2024 (Originally 8/1/2023)    Pneumococcal Vaccine 65+ (1 - PCV) 10/23/2024 (Originally 1/2/1956)    LUNG CANCER SCREENING  09/13/2024    LIPID PANEL  10/23/2024    ANNUAL WELLNESS VISIT  12/04/2024    BMI FOLLOWUP  12/04/2024    COLORECTAL CANCER SCREENING  03/28/2025    HEPATITIS C SCREENING  Completed    COVID-19 Vaccine  Discontinued                  CMS Preventative Services Quick Reference  Risk Factors Identified During Encounter  Immunizations Discussed/Encouraged: Tdap  The above risks/problems have been discussed with the patient.  Pertinent information has been shared with the patient in the After Visit Summary.  An After Visit Summary and PPPS were made available to the patient.    Follow Up:   Next Medicare Wellness visit to be scheduled in 1 year.       Additional E&M Note during same encounter follows:  Patient has multiple medical problems which are significant and separately identifiable that require additional work above and beyond the Medicare Wellness Visit.      Chief Complaint  Medicare Wellness-subsequent (Annual Medicare Wellness)    Subjective        HPI  Rachael South is also being seen today for   BP is normal at home.   Review of Systems   HENT:  Negative for trouble swallowing.    Respiratory:  Negative for shortness of breath.    Cardiovascular:  Negative for chest pain and palpitations.   Neurological:  Negative for dizziness.       Objective   Vital Signs:  /80 (BP Location: Left arm)   Pulse 99   Temp 97.3 °F (36.3 °C)   Ht 157.5 cm (62\")   Wt 69.9 kg (154 lb)   SpO2 97%   BMI 28.17 kg/m²     Physical Exam  Vitals reviewed.   Constitutional:       General: She is not in acute distress.     Appearance: Normal appearance. She is well-developed.   HENT:      Head: Normocephalic and atraumatic.   Eyes:      Conjunctiva/sclera: Conjunctivae normal.      Pupils: Pupils are equal, round, and reactive to light.   Cardiovascular:      Rate and Rhythm: Normal " rate and regular rhythm.      Heart sounds: Normal heart sounds.   Pulmonary:      Effort: Pulmonary effort is normal.      Breath sounds: Normal breath sounds.   Musculoskeletal:      Cervical back: Neck supple.      Right lower leg: No edema.      Left lower leg: No edema.   Skin:     General: Skin is warm and dry.   Neurological:      Mental Status: She is alert and oriented to person, place, and time.   Psychiatric:         Mood and Affect: Mood and affect normal.         Behavior: Behavior normal.         Thought Content: Thought content normal.         Judgment: Judgment normal.          The following data was reviewed by: KENNETH Jones on 12/04/2023:  Common labs          10/23/2023    11:17   Common Labs   Glucose 95    BUN 16    Creatinine 1.03    Sodium 139    Potassium 4.1    Chloride 101    Calcium 9.0    Albumin 4.4    Total Bilirubin 0.3    Alkaline Phosphatase 97    AST (SGOT) 22    ALT (SGPT) 18    WBC 8.02    Hemoglobin 12.3    Hematocrit 36.5    Platelets 288    Total Cholesterol 161    Triglycerides 210    HDL Cholesterol 42    LDL Cholesterol  84      TSH          10/23/2023    11:17   TSH   TSH 2.150                 Assessment and Plan   Diagnoses and all orders for this visit:    1. Medicare annual wellness visit, subsequent (Primary)             Follow Up   Return in about 1 year (around 12/4/2024) for Medicare Wellness.  Patient was given instructions and counseling regarding her condition or for health maintenance advice. Please see specific information pulled into the AVS if appropriate.

## 2023-12-11 NOTE — PROGRESS NOTES
Primary Care Provider  Candida Ryder APRN     Referring Provider  No ref. provider found     Chief Complaint  Follow-up (Ct scan results. )    Subjective          History of Presenting Illness  Patient is a 73-year-old female, patient of Dr. Richardson who presents for management of a lung nodule who presents for a follow-up visit today.  Patient states that since last visit her breathing is doing well.  Patient currently denies any dyspnea, cough, or wheezing.  Since last office visit patient had chest CT scan completed on 9/13/2023.  Report states stable 6 mm right lower lobe nodule now for 1 year.  Guidelines recommend follow-up chest CT in 18-24 months.  In the setting of emphysema recommend correlation for patient eligibility for enrollment into the annual low-dose lung chest CT screening program. No active pulmonary process. Moderate to large hiatal hernia.  Patient denies fever, chills, night sweats, swollen glands in the head and neck, unintentional weight loss, hemoptysis, purulent sputum production, dysphagia, chest pain, palpitations, chest tightness, abdominal pain, nausea, vomiting, and diarrhea.  Patient also denies any myalgias, changes in sense of taste and/or smell, sore throat, any other coronavirus or flu-like symptoms.  Patient denies any leg swelling, orthopnea, paroxysmal nocturnal dyspnea.  Patient is able to perform activities of daily living.        Review of Systems   Constitutional:  Negative for activity change, appetite change, chills, diaphoresis, fatigue, fever, unexpected weight gain and unexpected weight loss.        Negative for Insomnia   HENT:  Negative for congestion (Nasal), mouth sores, nosebleeds, postnasal drip, sore throat, swollen glands and trouble swallowing.         Negative for Thrush  Negative for Hoarseness  Negative for Allergies/Hay Fever  Negative for Recent Head injury  Negative for Ear Fullness  Negative for Nasal or Sinus pain  Negative for Dry lips  Negative  for Nasal discharge   Respiratory:  Negative for apnea, cough, chest tightness, shortness of breath and wheezing.         Negative for Hemoptysis  Negative for Pleuritic pain   Cardiovascular:  Negative for chest pain, palpitations and leg swelling.        Negative for Claudication  Negative for Cyanosis  Negative for Dyspnea on exertion   Gastrointestinal:  Negative for abdominal pain, diarrhea, nausea, vomiting and GERD.   Musculoskeletal:  Negative for joint swelling and myalgias.        Negative for Joint pain  Negative for Joint stiffness   Skin:  Negative for color change, dry skin, pallor and rash.   Neurological:  Negative for syncope, weakness and headache.   Hematological:  Negative for adenopathy. Does not bruise/bleed easily.        Family History   Problem Relation Age of Onset    Heart failure Mother     Heart disease Brother         blue baby    Diabetes type II Grandchild     Heart attack Father     Colon cancer Neg Hx         Social History     Socioeconomic History    Marital status:     Number of children: 2   Tobacco Use    Smoking status: Former     Packs/day: 0.50     Years: 45.00     Additional pack years: 0.00     Total pack years: 22.50     Types: Cigarettes     Start date: 1963     Quit date: 3/9/2021     Years since quittin.7     Passive exposure: Past    Smokeless tobacco: Never    Tobacco comments:     caffeine use - denies   Vaping Use    Vaping Use: Never used   Substance and Sexual Activity    Alcohol use: Never    Drug use: Never    Sexual activity: Defer        Past Medical History:   Diagnosis Date    Anemia     CAD s/p CABG 2021    CHF (congestive heart failure)     COPD (chronic obstructive pulmonary disease)     Essential hypertension 2021    GERD (gastroesophageal reflux disease)     Hyperlipidemia     Hypertension     Ischemic cardiomyopathy/Chronic systolic CHF 2021    Ischemic dilated cardiomyopathy 2021    Mitral regurgitation      "Paroxysmal atrial fibrillation 12/14/2021    PONV (postoperative nausea and vomiting)     S/P MVR (mitral valve replacement) 6/9/2021          There is no immunization history on file for this patient.    No Known Allergies       Current Outpatient Medications:     amiodarone (PACERONE) 100 MG tablet, TAKE 1 TABLET BY MOUTH DAILY, Disp: 90 tablet, Rfl: 0    amLODIPine (NORVASC) 2.5 MG tablet, Take 1 tablet by mouth Daily., Disp: 90 tablet, Rfl: 1    atorvastatin (LIPITOR) 40 MG tablet, Take 1 tablet by mouth Daily., Disp: 90 tablet, Rfl: 1    busPIRone (BUSPAR) 5 MG tablet, Take 1 tablet by mouth 3 (Three) Times a Day As Needed (anxiety)., Disp: 90 tablet, Rfl: 0    carvedilol (COREG) 6.25 MG tablet, TAKE 1 TABLET BY MOUTH TWICE DAILY WITH MEALS, Disp: 180 tablet, Rfl: 3    furosemide (LASIX) 40 MG tablet, Take 1 tablet by mouth Daily., Disp: 90 tablet, Rfl: 1    losartan (COZAAR) 100 MG tablet, Take 1 tablet by mouth Daily., Disp: 90 tablet, Rfl: 1    potassium chloride (KLOR-CON) 8 MEQ CR tablet, Take 1 tablet by mouth Daily., Disp: 90 tablet, Rfl: 1    Xarelto 15 MG tablet, TAKE 1 TABLET BY MOUTH DAILY WITH DINNER, Disp: 90 tablet, Rfl: 3     Objective     Physical Exam  Vital Signs:   WDWN, Alert, NAD.    HEENT:  PERRL, EOMI.  OP, nares clear, no sinus tenderness  Neck:  Supple, no JVD, no thyromegaly.  Lymph: no axillary, cervical, supraclavicular lymphadenopathy noted bilaterally  Chest:  good aeration, clear to auscultation bilaterally, tympanic to percussion bilaterally, no work of breathing noted  CV: RRR, no MGR, pulses 2+, equal.  Abd:  Soft, NT, ND, + BS, no HSM  EXT:  no clubbing, no cyanosis, no edema, no joint tenderness  Neuro:  A&Ox3, CN grossly intact, no focal deficits.  Skin: No rashes or lesions noted.    /69 (BP Location: Left arm, Patient Position: Sitting, Cuff Size: Large Adult)   Pulse 76   Resp 16   Ht 157.5 cm (62.01\")   Wt 69.8 kg (153 lb 12.8 oz)   SpO2 97% Comment: room air "  BMI 28.12 kg/m²         Result Review :   I have reviewed Dr. Ludwig's last office visit note.  I also reviewed chest CT report dated from 9/13/2023.  See scanned report.    Procedures:      CT Chest Without Contrast Diagnostic    Result Date: 9/14/2023    1. Stable 6 mm right lower lobe nodule now for 1 year.  Guidelines recommend follow-up chest CT in 18-24 months.  In the setting of emphysema recommend correlation for patient eligibility for enrollment into the annual low-dose lung chest CT screening program. 2. No active pulmonary process. 3. Moderate to large hiatal hernia.    NATHANIEL GUZMAN MD       Electronically Signed and Approved By: NATHANIEL GUZMAN MD on 9/14/2023 at 8:05                 Assessment and Plan      Assessment:  1.  Pulmonary nodule.  Stable 6 mm right lower lobe nodule.  2.  Proximal atrial fibrillation.  3.  Tobacco obese cigarettes in remission.        Plan:  1.  Will repeat chest CT scan again in September 2024.  Order placed today.  2.  Follow-up with cardiologist as scheduled.  3.  Vaccination status:  patient declines flu, pneumonia, and COVID-19 vaccinations.  Discussed with patient the benefits of vaccination including decreased risk of severe illness, hospitalization and death related to flu, pneumonia, and COVID-19.  Patient verbalized understanding and will consider getting vaccinated.  Patient is advised to continue to follow CDC recommendations such as social distancing, wearing a mask, and washing hands for least 20 seconds.  4.  Smoking status: Patient is a former cigarette smoker.  5.  Patient to call the office, 911, or go to the ER with new or worsening symptoms.  6.  Follow-up in September 2024, sooner if needed.              Follow Up   Return for September 2024 with Dr. Ludwig.  Patient was given instructions and counseling regarding her condition or for health maintenance advice. Please see specific information pulled into the AVS if appropriate.

## 2023-12-20 ENCOUNTER — OFFICE VISIT (OUTPATIENT)
Dept: PULMONOLOGY | Facility: CLINIC | Age: 73
End: 2023-12-20
Payer: MEDICARE

## 2023-12-20 VITALS
RESPIRATION RATE: 16 BRPM | OXYGEN SATURATION: 97 % | BODY MASS INDEX: 28.3 KG/M2 | SYSTOLIC BLOOD PRESSURE: 143 MMHG | DIASTOLIC BLOOD PRESSURE: 69 MMHG | WEIGHT: 153.8 LBS | HEIGHT: 62 IN | HEART RATE: 76 BPM

## 2023-12-20 DIAGNOSIS — R91.1 PULMONARY NODULE: Primary | ICD-10-CM

## 2023-12-20 DIAGNOSIS — F17.201 TOBACCO ABUSE, IN REMISSION: ICD-10-CM

## 2023-12-20 DIAGNOSIS — I48.0 PAROXYSMAL ATRIAL FIBRILLATION: ICD-10-CM

## 2023-12-20 PROCEDURE — 1160F RVW MEDS BY RX/DR IN RCRD: CPT | Performed by: NURSE PRACTITIONER

## 2023-12-20 PROCEDURE — 99213 OFFICE O/P EST LOW 20 MIN: CPT | Performed by: NURSE PRACTITIONER

## 2023-12-20 PROCEDURE — 1159F MED LIST DOCD IN RCRD: CPT | Performed by: NURSE PRACTITIONER

## 2024-01-11 RX ORDER — RIVAROXABAN 15 MG/1
TABLET, FILM COATED ORAL
Qty: 90 TABLET | Refills: 1 | Status: SHIPPED | OUTPATIENT
Start: 2024-01-11

## 2024-01-13 DIAGNOSIS — I10 ESSENTIAL HYPERTENSION: ICD-10-CM

## 2024-01-15 RX ORDER — LOSARTAN POTASSIUM 100 MG/1
100 TABLET ORAL DAILY
Qty: 90 TABLET | Refills: 0 | Status: SHIPPED | OUTPATIENT
Start: 2024-01-15

## 2024-04-10 DIAGNOSIS — I10 ESSENTIAL HYPERTENSION: ICD-10-CM

## 2024-04-10 RX ORDER — CARVEDILOL 6.25 MG/1
TABLET ORAL
Qty: 180 TABLET | Refills: 3 | Status: SHIPPED | OUTPATIENT
Start: 2024-04-10

## 2024-04-10 RX ORDER — FUROSEMIDE 40 MG/1
40 TABLET ORAL DAILY
Qty: 30 TABLET | Refills: 0 | Status: SHIPPED | OUTPATIENT
Start: 2024-04-10

## 2024-05-07 DIAGNOSIS — I10 ESSENTIAL HYPERTENSION: ICD-10-CM

## 2024-05-07 DIAGNOSIS — I25.10 CORONARY ARTERY DISEASE INVOLVING NATIVE CORONARY ARTERY OF NATIVE HEART, UNSPECIFIED WHETHER ANGINA PRESENT: ICD-10-CM

## 2024-05-09 RX ORDER — ATORVASTATIN CALCIUM 40 MG/1
40 TABLET, FILM COATED ORAL DAILY
Qty: 90 TABLET | Refills: 1 | OUTPATIENT
Start: 2024-05-09

## 2024-05-09 RX ORDER — FUROSEMIDE 40 MG/1
40 TABLET ORAL DAILY
Qty: 90 TABLET | OUTPATIENT
Start: 2024-05-09

## 2024-05-13 DIAGNOSIS — I25.10 CORONARY ARTERY DISEASE INVOLVING NATIVE CORONARY ARTERY OF NATIVE HEART, UNSPECIFIED WHETHER ANGINA PRESENT: ICD-10-CM

## 2024-05-14 ENCOUNTER — OFFICE VISIT (OUTPATIENT)
Dept: FAMILY MEDICINE CLINIC | Facility: CLINIC | Age: 74
End: 2024-05-14
Payer: MEDICARE

## 2024-05-14 VITALS
SYSTOLIC BLOOD PRESSURE: 168 MMHG | HEART RATE: 85 BPM | BODY MASS INDEX: 28.52 KG/M2 | HEIGHT: 62 IN | DIASTOLIC BLOOD PRESSURE: 72 MMHG | OXYGEN SATURATION: 96 % | TEMPERATURE: 97 F | WEIGHT: 155 LBS

## 2024-05-14 DIAGNOSIS — I10 WHITE COAT SYNDROME WITH DIAGNOSIS OF HYPERTENSION: ICD-10-CM

## 2024-05-14 DIAGNOSIS — F41.9 ANXIETY: ICD-10-CM

## 2024-05-14 DIAGNOSIS — I10 ESSENTIAL HYPERTENSION: Primary | ICD-10-CM

## 2024-05-14 DIAGNOSIS — I25.10 CORONARY ARTERY DISEASE INVOLVING NATIVE CORONARY ARTERY OF NATIVE HEART, UNSPECIFIED WHETHER ANGINA PRESENT: ICD-10-CM

## 2024-05-14 LAB
ALBUMIN SERPL-MCNC: 4.4 G/DL (ref 3.5–5.2)
ALBUMIN/GLOB SERPL: 2 G/DL
ALP SERPL-CCNC: 79 U/L (ref 39–117)
ALT SERPL W P-5'-P-CCNC: 13 U/L (ref 1–33)
ANION GAP SERPL CALCULATED.3IONS-SCNC: 10 MMOL/L (ref 5–15)
AST SERPL-CCNC: 16 U/L (ref 1–32)
BASOPHILS # BLD AUTO: 0.04 10*3/MM3 (ref 0–0.2)
BASOPHILS NFR BLD AUTO: 0.5 % (ref 0–1.5)
BILIRUB SERPL-MCNC: 0.4 MG/DL (ref 0–1.2)
BILIRUB UR QL STRIP: NEGATIVE
BUN SERPL-MCNC: 12 MG/DL (ref 8–23)
BUN/CREAT SERPL: 12.5 (ref 7–25)
CALCIUM SPEC-SCNC: 8.7 MG/DL (ref 8.6–10.5)
CHLORIDE SERPL-SCNC: 104 MMOL/L (ref 98–107)
CHOLEST SERPL-MCNC: 129 MG/DL (ref 0–200)
CLARITY UR: CLEAR
CO2 SERPL-SCNC: 26 MMOL/L (ref 22–29)
COLOR UR: YELLOW
CREAT SERPL-MCNC: 0.96 MG/DL (ref 0.57–1)
DEPRECATED RDW RBC AUTO: 41.5 FL (ref 37–54)
EGFRCR SERPLBLD CKD-EPI 2021: 61.4 ML/MIN/1.73
EOSINOPHIL # BLD AUTO: 0.28 10*3/MM3 (ref 0–0.4)
EOSINOPHIL NFR BLD AUTO: 3.7 % (ref 0.3–6.2)
ERYTHROCYTE [DISTWIDTH] IN BLOOD BY AUTOMATED COUNT: 13.2 % (ref 12.3–15.4)
GLOBULIN UR ELPH-MCNC: 2.2 GM/DL
GLUCOSE SERPL-MCNC: 103 MG/DL (ref 65–99)
GLUCOSE UR STRIP-MCNC: NEGATIVE MG/DL
HCT VFR BLD AUTO: 33.9 % (ref 34–46.6)
HDLC SERPL-MCNC: 43 MG/DL (ref 40–60)
HGB BLD-MCNC: 11 G/DL (ref 12–15.9)
HGB UR QL STRIP.AUTO: NEGATIVE
HOLD SPECIMEN: NORMAL
IMM GRANULOCYTES # BLD AUTO: 0.03 10*3/MM3 (ref 0–0.05)
IMM GRANULOCYTES NFR BLD AUTO: 0.4 % (ref 0–0.5)
KETONES UR QL STRIP: NEGATIVE
LDLC SERPL CALC-MCNC: 58 MG/DL (ref 0–100)
LDLC/HDLC SERPL: 1.21 {RATIO}
LEUKOCYTE ESTERASE UR QL STRIP.AUTO: ABNORMAL
LYMPHOCYTES # BLD AUTO: 1.91 10*3/MM3 (ref 0.7–3.1)
LYMPHOCYTES NFR BLD AUTO: 25.4 % (ref 19.6–45.3)
MCH RBC QN AUTO: 28.1 PG (ref 26.6–33)
MCHC RBC AUTO-ENTMCNC: 32.4 G/DL (ref 31.5–35.7)
MCV RBC AUTO: 86.7 FL (ref 79–97)
MONOCYTES # BLD AUTO: 0.59 10*3/MM3 (ref 0.1–0.9)
MONOCYTES NFR BLD AUTO: 7.8 % (ref 5–12)
NEUTROPHILS NFR BLD AUTO: 4.68 10*3/MM3 (ref 1.7–7)
NEUTROPHILS NFR BLD AUTO: 62.2 % (ref 42.7–76)
NITRITE UR QL STRIP: POSITIVE
NRBC BLD AUTO-RTO: 0 /100 WBC (ref 0–0.2)
PH UR STRIP.AUTO: 7 [PH] (ref 5–8)
PLATELET # BLD AUTO: 290 10*3/MM3 (ref 140–450)
PMV BLD AUTO: 9.5 FL (ref 6–12)
POTASSIUM SERPL-SCNC: 4.3 MMOL/L (ref 3.5–5.2)
PROT SERPL-MCNC: 6.6 G/DL (ref 6–8.5)
PROT UR QL STRIP: NEGATIVE
RBC # BLD AUTO: 3.91 10*6/MM3 (ref 3.77–5.28)
SODIUM SERPL-SCNC: 140 MMOL/L (ref 136–145)
SP GR UR STRIP: 1.01 (ref 1–1.03)
TRIGL SERPL-MCNC: 169 MG/DL (ref 0–150)
UROBILINOGEN UR QL STRIP: ABNORMAL
VLDLC SERPL-MCNC: 28 MG/DL (ref 5–40)
WBC NRBC COR # BLD AUTO: 7.53 10*3/MM3 (ref 3.4–10.8)

## 2024-05-14 PROCEDURE — 87086 URINE CULTURE/COLONY COUNT: CPT | Performed by: NURSE PRACTITIONER

## 2024-05-14 PROCEDURE — 1160F RVW MEDS BY RX/DR IN RCRD: CPT | Performed by: NURSE PRACTITIONER

## 2024-05-14 PROCEDURE — 80061 LIPID PANEL: CPT | Performed by: NURSE PRACTITIONER

## 2024-05-14 PROCEDURE — 1126F AMNT PAIN NOTED NONE PRSNT: CPT | Performed by: NURSE PRACTITIONER

## 2024-05-14 PROCEDURE — 99214 OFFICE O/P EST MOD 30 MIN: CPT | Performed by: NURSE PRACTITIONER

## 2024-05-14 PROCEDURE — 87077 CULTURE AEROBIC IDENTIFY: CPT | Performed by: NURSE PRACTITIONER

## 2024-05-14 PROCEDURE — 81001 URINALYSIS AUTO W/SCOPE: CPT | Performed by: NURSE PRACTITIONER

## 2024-05-14 PROCEDURE — 1159F MED LIST DOCD IN RCRD: CPT | Performed by: NURSE PRACTITIONER

## 2024-05-14 PROCEDURE — 85025 COMPLETE CBC W/AUTO DIFF WBC: CPT | Performed by: NURSE PRACTITIONER

## 2024-05-14 PROCEDURE — 36415 COLL VENOUS BLD VENIPUNCTURE: CPT | Performed by: NURSE PRACTITIONER

## 2024-05-14 PROCEDURE — 80053 COMPREHEN METABOLIC PANEL: CPT | Performed by: NURSE PRACTITIONER

## 2024-05-14 PROCEDURE — 87186 SC STD MICRODIL/AGAR DIL: CPT | Performed by: NURSE PRACTITIONER

## 2024-05-14 RX ORDER — AMLODIPINE BESYLATE 2.5 MG/1
2.5 TABLET ORAL DAILY
Qty: 90 TABLET | Refills: 1 | Status: SHIPPED | OUTPATIENT
Start: 2024-05-14

## 2024-05-14 RX ORDER — FUROSEMIDE 40 MG/1
40 TABLET ORAL DAILY
Qty: 90 TABLET | Refills: 1 | Status: SHIPPED | OUTPATIENT
Start: 2024-05-14

## 2024-05-14 RX ORDER — LOSARTAN POTASSIUM 100 MG/1
100 TABLET ORAL DAILY
Qty: 90 TABLET | Refills: 1 | Status: SHIPPED | OUTPATIENT
Start: 2024-05-14

## 2024-05-14 RX ORDER — ATORVASTATIN CALCIUM 40 MG/1
40 TABLET, FILM COATED ORAL DAILY
Qty: 30 TABLET | Refills: 0 | Status: SHIPPED | OUTPATIENT
Start: 2024-05-14 | End: 2024-05-14 | Stop reason: SDUPTHER

## 2024-05-14 RX ORDER — ATORVASTATIN CALCIUM 40 MG/1
40 TABLET, FILM COATED ORAL DAILY
Qty: 90 TABLET | OUTPATIENT
Start: 2024-05-14

## 2024-05-14 RX ORDER — BUSPIRONE HYDROCHLORIDE 5 MG/1
5 TABLET ORAL 3 TIMES DAILY PRN
Qty: 90 TABLET | Refills: 1 | Status: SHIPPED | OUTPATIENT
Start: 2024-05-14

## 2024-05-14 RX ORDER — POTASSIUM CHLORIDE 600 MG/1
8 TABLET, FILM COATED, EXTENDED RELEASE ORAL DAILY
Qty: 90 TABLET | Refills: 1 | Status: SHIPPED | OUTPATIENT
Start: 2024-05-14

## 2024-05-14 RX ORDER — ATORVASTATIN CALCIUM 40 MG/1
40 TABLET, FILM COATED ORAL DAILY
Qty: 90 TABLET | Refills: 1 | Status: SHIPPED | OUTPATIENT
Start: 2024-05-14

## 2024-05-14 RX ORDER — FUROSEMIDE 40 MG/1
40 TABLET ORAL DAILY
Qty: 90 TABLET | OUTPATIENT
Start: 2024-05-14

## 2024-05-14 NOTE — PROGRESS NOTES
Venipuncture Blood Specimen Collection  Venipuncture performed in left arm  by Mayi Estevez with good hemostasis. Patient tolerated the procedure well without complications.   05/14/24   Mayi Estevez

## 2024-05-14 NOTE — PROGRESS NOTES
Chief Complaint  Hypertension (Refills and labs, patient is fasting)    Subjective        Past Medical History:   Diagnosis Date    Anemia     CAD s/p CABG 5/5/2021    CHF (congestive heart failure)     COPD (chronic obstructive pulmonary disease)     Essential hypertension 12/14/2021    GERD (gastroesophageal reflux disease)     Hyperlipidemia     Hypertension     Ischemic cardiomyopathy/Chronic systolic CHF 6/29/2021    Ischemic dilated cardiomyopathy 6/29/2021    Mitral regurgitation     Paroxysmal atrial fibrillation 12/14/2021    PONV (postoperative nausea and vomiting)     S/P MVR (mitral valve replacement) 6/9/2021     Social History     Tobacco Use    Smoking status: Former     Current packs/day: 0.00     Average packs/day: 0.5 packs/day for 57.3 years (28.6 ttl pk-yrs)     Types: Cigarettes     Start date: 12/1/1963     Quit date: 3/9/2021     Years since quitting: 3.1     Passive exposure: Past    Smokeless tobacco: Never    Tobacco comments:     caffeine use - denies   Vaping Use    Vaping status: Never Used   Substance Use Topics    Alcohol use: Never    Drug use: Never      Current Outpatient Medications on File Prior to Visit   Medication Sig    amiodarone (PACERONE) 100 MG tablet TAKE 1 TABLET BY MOUTH DAILY    carvedilol (COREG) 6.25 MG tablet TAKE 1 TABLET BY MOUTH TWICE DAILY WITH MEALS    Xarelto 15 MG tablet TAKE 1 TABLET BY MOUTH DAILY WITH DINNER    [DISCONTINUED] amLODIPine (NORVASC) 2.5 MG tablet Take 1 tablet by mouth Daily.    [DISCONTINUED] atorvastatin (LIPITOR) 40 MG tablet TAKE 1 TABLET BY MOUTH DAILY    [DISCONTINUED] busPIRone (BUSPAR) 5 MG tablet Take 1 tablet by mouth 3 (Three) Times a Day As Needed (anxiety).    [DISCONTINUED] furosemide (LASIX) 40 MG tablet TAKE 1 TABLET BY MOUTH DAILY    [DISCONTINUED] losartan (COZAAR) 100 MG tablet TAKE 1 TABLET BY MOUTH DAILY    [DISCONTINUED] potassium chloride (KLOR-CON) 8 MEQ CR tablet Take 1 tablet by mouth Daily.    [DISCONTINUED]  "atorvastatin (LIPITOR) 40 MG tablet Take 1 tablet by mouth Daily.     No current facility-administered medications on file prior to visit.      No Known Allergies   Health Maintenance Due   Topic Date Due    DXA SCAN  Never done    TDAP/TD VACCINES (1 - Tdap) Never done    ZOSTER VACCINE (1 of 2) Never done    RSV Vaccine - Adults (1 - 1-dose 60+ series) Never done      Rachael Brannon presents to [facility]  History of Present Illness  Patient presents to the office today to follow-up on chronic health conditions and obtain refills of medications.  Patient is fasting for labs.    HTN: elevated in office. Pt is established with Cardiology. Pt notes always elevated BP in office. She worries she will get bad new when she comes in. Home readings are usually less than 140 systolic.      HLD: denies muscle aches, cramps, or weakness. She will occasionally get a bahman horse. Takes potassium and eats bananas. Tries to follow a healthy diet.     Pt notes her brother passed recently and taking buspar more frequently. Sister was also recently placed in Hospice care. Does not take daily but as needed. Medication helps to calm her down without causing drowsiness.     Objective   Vital Signs:   /72 (BP Location: Left arm)   Pulse 85   Temp 97 °F (36.1 °C)   Ht 157.5 cm (62\")   Wt 70.3 kg (155 lb)   SpO2 96%   BMI 28.35 kg/m²     Review of Systems   Physical Exam  Vitals reviewed.   Constitutional:       General: She is not in acute distress.     Appearance: Normal appearance. She is well-developed.   HENT:      Head: Normocephalic and atraumatic.   Eyes:      Conjunctiva/sclera: Conjunctivae normal.      Pupils: Pupils are equal, round, and reactive to light.   Cardiovascular:      Rate and Rhythm: Normal rate and regular rhythm.      Heart sounds: Normal heart sounds.   Pulmonary:      Effort: Pulmonary effort is normal.      Breath sounds: Normal breath sounds. "   Musculoskeletal:      Cervical back: Neck supple.      Right lower leg: No edema.      Left lower leg: No edema.   Skin:     General: Skin is warm and dry.   Neurological:      Mental Status: She is alert and oriented to person, place, and time.   Psychiatric:         Mood and Affect: Mood and affect normal.         Behavior: Behavior normal.         Thought Content: Thought content normal.         Judgment: Judgment normal.        Result Review :   The following data was reviewed by: KENNETH Jones on 05/14/2024:  Common labs          10/23/2023    11:17   Common Labs   Glucose 95    BUN 16    Creatinine 1.03    Sodium 139    Potassium 4.1    Chloride 101    Calcium 9.0    Albumin 4.4    Total Bilirubin 0.3    Alkaline Phosphatase 97    AST (SGOT) 22    ALT (SGPT) 18    WBC 8.02    Hemoglobin 12.3    Hematocrit 36.5    Platelets 288    Total Cholesterol 161    Triglycerides 210    HDL Cholesterol 42    LDL Cholesterol  84      TSH          10/23/2023    11:17   TSH   TSH 2.150                Assessment and Plan    Diagnoses and all orders for this visit:    1. Essential hypertension (Primary)  -     amLODIPine (NORVASC) 2.5 MG tablet; Take 1 tablet by mouth Daily.  Dispense: 90 tablet; Refill: 1  -     furosemide (LASIX) 40 MG tablet; Take 1 tablet by mouth Daily.  Dispense: 90 tablet; Refill: 1  -     losartan (COZAAR) 100 MG tablet; Take 1 tablet by mouth Daily.  Dispense: 90 tablet; Refill: 1  -     potassium chloride (KLOR-CON) 8 MEQ CR tablet; Take 1 tablet by mouth Daily.  Dispense: 90 tablet; Refill: 1  -     CBC & Differential  -     Comprehensive Metabolic Panel  -     Urinalysis With Culture If Indicated - Urine, Clean Catch    2. Coronary artery disease involving native coronary artery of native heart, unspecified whether angina present  -     atorvastatin (LIPITOR) 40 MG tablet; Take 1 tablet by mouth Daily.  Dispense: 90 tablet; Refill: 1  -     Lipid Panel    3. Anxiety  -     busPIRone  (BUSPAR) 5 MG tablet; Take 1 tablet by mouth 3 (Three) Times a Day As Needed (anxiety).  Dispense: 90 tablet; Refill: 1    4. White coat syndrome with diagnosis of hypertension        Refill current medications.  Fasting labs pending.       Follow Up   Return in about 7 months (around 12/5/2024) for Refills and fasting labs, Medicare Wellness.  Patient was given instructions and counseling regarding her condition or for health maintenance advice. Please see specific information pulled into the AVS if appropriate.

## 2024-05-15 LAB
BACTERIA UR QL AUTO: ABNORMAL /HPF
HYALINE CASTS UR QL AUTO: ABNORMAL /LPF
RBC # UR STRIP: ABNORMAL /HPF
REF LAB TEST METHOD: ABNORMAL
SQUAMOUS #/AREA URNS HPF: ABNORMAL /HPF
STARCH GRANULES URNS QL MICRO: PRESENT /HPF
WBC # UR STRIP: ABNORMAL /HPF

## 2024-05-17 DIAGNOSIS — N39.0 URINARY TRACT INFECTION WITHOUT HEMATURIA, SITE UNSPECIFIED: Primary | ICD-10-CM

## 2024-05-17 LAB — BACTERIA SPEC AEROBE CULT: ABNORMAL

## 2024-05-17 RX ORDER — AMOXICILLIN AND CLAVULANATE POTASSIUM 500; 125 MG/1; MG/1
1 TABLET, FILM COATED ORAL 2 TIMES DAILY
Qty: 10 TABLET | Refills: 0 | Status: SHIPPED | OUTPATIENT
Start: 2024-05-17

## 2024-05-20 DIAGNOSIS — I10 ESSENTIAL HYPERTENSION: ICD-10-CM

## 2024-05-20 RX ORDER — POTASSIUM CHLORIDE 600 MG/1
8 TABLET, FILM COATED, EXTENDED RELEASE ORAL DAILY
Qty: 90 TABLET | Refills: 1 | OUTPATIENT
Start: 2024-05-20

## 2024-06-12 NOTE — PROGRESS NOTES
[facility]  Cardiology progress Note    Patient Name: Rachael South  : 1950    CHIEF COMPLAINT  CAD        Subjective   Subjective     HISTORY OF PRESENT ILLNESS    Rachael South is a 74 y.o. female with CAD status post CABG.  No chest pain or shortness of breath.    REVIEW OF SYSTEMS    Constitutional:    No fever, no weight loss  Skin:     No rash  Otolaryngeal:    No difficulty swallowing  Cardiovascular: See HPI.  Pulmonary:    No cough, no sputum production    Personal History     Social History:    reports that she quit smoking about 3 years ago. Her smoking use included cigarettes. She started smoking about 60 years ago. She has a 28.6 pack-year smoking history. She has been exposed to tobacco smoke. She has never used smokeless tobacco. She reports that she does not drink alcohol and does not use drugs.    Home Medications:  Current Outpatient Medications on File Prior to Visit   Medication Sig    amiodarone (PACERONE) 100 MG tablet TAKE 1 TABLET BY MOUTH DAILY    atorvastatin (LIPITOR) 40 MG tablet Take 1 tablet by mouth Daily.    busPIRone (BUSPAR) 5 MG tablet Take 1 tablet by mouth 3 (Three) Times a Day As Needed (anxiety).    carvedilol (COREG) 6.25 MG tablet TAKE 1 TABLET BY MOUTH TWICE DAILY WITH MEALS    furosemide (LASIX) 40 MG tablet Take 1 tablet by mouth Daily.    losartan (COZAAR) 100 MG tablet Take 1 tablet by mouth Daily.    potassium chloride (KLOR-CON) 8 MEQ CR tablet Take 1 tablet by mouth Daily.    Xarelto 15 MG tablet TAKE 1 TABLET BY MOUTH DAILY WITH DINNER    [DISCONTINUED] amLODIPine (NORVASC) 2.5 MG tablet Take 1 tablet by mouth Daily.    [DISCONTINUED] amoxicillin-clavulanate (Augmentin) 500-125 MG per tablet Take 1 tablet by mouth 2 (Two) Times a Day.     No current facility-administered medications on file prior to visit.       Past Medical History:   Diagnosis Date    Anemia     CAD s/p CABG 2021    CHF (congestive heart failure)     COPD (chronic  obstructive pulmonary disease)     Essential hypertension 12/14/2021    GERD (gastroesophageal reflux disease)     Hyperlipidemia     Hypertension     Ischemic cardiomyopathy/Chronic systolic CHF 6/29/2021    Ischemic dilated cardiomyopathy 6/29/2021    Mitral regurgitation     Paroxysmal atrial fibrillation 12/14/2021    PONV (postoperative nausea and vomiting)     S/P MVR (mitral valve replacement) 6/9/2021       Allergies:  No Known Allergies    Objective    Objective       Vitals:   Heart Rate:  [69-70] 69  BP: (170-179)/(76-77) 179/77  Body mass index is 28.35 kg/m².     PHYSICAL EXAM:    General Appearance:   well developed  well nourished  HENT:   oropharynx moist  lips not cyanotic  Neck:  thyroid not enlarged  supple  Respiratory:  no respiratory distress  normal breath sounds  no rales  Cardiovascular:  no jugular venous distention  regular rhythm  apical impulse normal  S1 normal, S2 normal  no S3, no S4   SM GR 2/6  no rub, no thrill  carotid pulses normal; no bruit  pedal pulses normal  lower extremity edema: none    Skin:   warm, dry  Psychiatric:  judgement and insight appropriate  normal mood and affect        Result Review:  I have personally reviewed the available results from  [x]  Laboratory  [x]  EKG  [x]  Cardiology  [x]  Medications  [x]  Old records  []  Other:     Procedures  Lab Results   Component Value Date    CHOL 129 05/14/2024    CHOL 161 10/23/2023    CHOL 169 12/02/2022     Lab Results   Component Value Date    TRIG 169 (H) 05/14/2024    TRIG 210 (H) 10/23/2023    TRIG 261 (H) 12/02/2022     Lab Results   Component Value Date    HDL 43 05/14/2024    HDL 42 10/23/2023    HDL 40 12/02/2022     Lab Results   Component Value Date    LDL 58 05/14/2024    LDL 84 10/23/2023    LDL 86 12/02/2022     Lab Results   Component Value Date    VLDL 28 05/14/2024    VLDL 35 10/23/2023    VLDL 43 (H) 12/02/2022     Results for orders placed in visit on 05/10/22    Adult Transthoracic Echo Complete W/  Cont if Necessary Per Protocol    Interpretation Summary  · Moderate tricuspid valve regurgitation is present.    Normal left ventricular systolic function.  Prosthetic mitral valve functioning normally.  Fibrocalcific aortic valve.  Mild aortic regurgitation.  Moderate tricuspid regurgitation.     Impression/Plan:  1. Coronary disease s/p CABG stable: Continue aspirin 81 mg a day.  2.  Presence of bioprosthetic mitral valve: Continue carvedilol 6.25 mg twice a day.  Continue aspirin 81 mg a day.  3.  Paroxysmal atrial fibrillation: Continue Xarelto 15 mg once a day for stroke prevention. continue amiodarone 100 mg once a day for rhythm control.  4.  Chronic systolic diastolic heart failure stable: Continue Lasix 40 mg a day.  Recent echo shows normal left ventricular systolic function.  5.  Essential hypertension controlled: Continue losartan 100 mg a day.  Increase amlodipine to 5 mg once a day.  Continue carvedilol 6.25 mg twice a day.  Monitor blood pressure regularly.  6.  Mixed hyperlipidemia: Continue Lipitor 40 mg a day.  Monitor lipid and hepatic profile.              Marquez Canales MD   06/14/24   10:17 EDT

## 2024-06-14 ENCOUNTER — OFFICE VISIT (OUTPATIENT)
Dept: CARDIOLOGY | Facility: CLINIC | Age: 74
End: 2024-06-14
Payer: MEDICARE

## 2024-06-14 VITALS
DIASTOLIC BLOOD PRESSURE: 77 MMHG | HEART RATE: 69 BPM | SYSTOLIC BLOOD PRESSURE: 179 MMHG | BODY MASS INDEX: 28.52 KG/M2 | HEIGHT: 62 IN | WEIGHT: 155 LBS

## 2024-06-14 DIAGNOSIS — I10 HYPERTENSION, ESSENTIAL: ICD-10-CM

## 2024-06-14 DIAGNOSIS — Z95.2 H/O MITRAL VALVE REPLACEMENT: ICD-10-CM

## 2024-06-14 DIAGNOSIS — E78.2 HYPERLIPEMIA, MIXED: ICD-10-CM

## 2024-06-14 DIAGNOSIS — Z95.1 HX OF CABG: ICD-10-CM

## 2024-06-14 DIAGNOSIS — I25.10 CORONARY ARTERY DISEASE INVOLVING NATIVE CORONARY ARTERY OF NATIVE HEART WITHOUT ANGINA PECTORIS: Primary | ICD-10-CM

## 2024-06-14 DIAGNOSIS — I50.22 SYSTOLIC CHF, CHRONIC: ICD-10-CM

## 2024-06-14 DIAGNOSIS — I48.0 PAROXYSMAL ATRIAL FIBRILLATION: ICD-10-CM

## 2024-06-14 DIAGNOSIS — I10 ESSENTIAL HYPERTENSION: ICD-10-CM

## 2024-06-14 RX ORDER — AMLODIPINE BESYLATE 5 MG/1
5 TABLET ORAL DAILY
Qty: 90 TABLET | Refills: 4 | Status: SHIPPED | OUTPATIENT
Start: 2024-06-14

## 2024-06-22 DIAGNOSIS — I25.10 CORONARY ARTERY DISEASE INVOLVING NATIVE CORONARY ARTERY OF NATIVE HEART, UNSPECIFIED WHETHER ANGINA PRESENT: ICD-10-CM

## 2024-06-24 RX ORDER — ATORVASTATIN CALCIUM 40 MG/1
40 TABLET, FILM COATED ORAL DAILY
Qty: 90 TABLET | Refills: 0 | Status: SHIPPED | OUTPATIENT
Start: 2024-06-24

## 2024-07-16 DIAGNOSIS — I10 ESSENTIAL HYPERTENSION: ICD-10-CM

## 2024-07-16 RX ORDER — LOSARTAN POTASSIUM 100 MG/1
100 TABLET ORAL DAILY
Qty: 90 TABLET | Refills: 0 | Status: SHIPPED | OUTPATIENT
Start: 2024-07-16

## 2024-08-06 RX ORDER — RIVAROXABAN 15 MG/1
TABLET, FILM COATED ORAL
Qty: 90 TABLET | Refills: 1 | Status: SHIPPED | OUTPATIENT
Start: 2024-08-06

## 2024-08-19 RX ORDER — RIVAROXABAN 15 MG/1
TABLET, FILM COATED ORAL
Qty: 90 TABLET | Refills: 1 | Status: SHIPPED | OUTPATIENT
Start: 2024-08-19 | End: 2024-08-22 | Stop reason: SDUPTHER

## 2024-08-22 ENCOUNTER — TELEPHONE (OUTPATIENT)
Dept: CARDIOLOGY | Facility: CLINIC | Age: 74
End: 2024-08-22
Payer: MEDICARE

## 2024-08-22 NOTE — TELEPHONE ENCOUNTER
Caller: Rachael South    Relationship: Self    Best call back number: [phone]     Requested Prescriptions:   Requested Prescriptions     Pending Prescriptions Disp Refills    rivaroxaban (Xarelto) 15 MG tablet 90 tablet 1     Sig: Take 1 tablet by mouth Daily With Dinner.        Pharmacy where request should be sent: [id number] - [facility] [address] AT [facility] [phone] [facility] [phone] FX     Last office visit with prescribing clinician: Visit date not found   Last telemedicine visit with prescribing clinician: Visit date not found   Next office visit with prescribing clinician: Visit date not found     Additional details provided by patient: 4-5 DAYS LEFT    Does the patient have less than a 3 day supply:  [] Yes  [x] No    Would you like a call back once the refill request has been completed: [] Yes [] No    If the office needs to give you a call back, can they leave a voicemail: [] Yes [] No    Lyric Berry Rep   08/22/24 11:09 EDT

## 2024-08-22 NOTE — TELEPHONE ENCOUNTER
Message from Plan  PA Case: [id number], Status: Approved, Coverage Starts on: 1/1/2024 12:00:00 AM, Coverage Ends on: 12/31/2024 12:00:00 AM. Questions? Contact   [phone].    Authorization Expiration Date: December 31, 2024.

## 2024-10-11 DIAGNOSIS — F41.9 ANXIETY: ICD-10-CM

## 2024-10-15 RX ORDER — BUSPIRONE HYDROCHLORIDE 5 MG/1
5 TABLET ORAL 3 TIMES DAILY PRN
Qty: 90 TABLET | Refills: 0 | Status: SHIPPED | OUTPATIENT
Start: 2024-10-15

## 2024-11-08 ENCOUNTER — DOCUMENTATION (OUTPATIENT)
Dept: FAMILY MEDICINE CLINIC | Facility: CLINIC | Age: 74
End: 2024-11-08
Payer: MEDICARE

## 2024-11-08 DIAGNOSIS — I25.10 CORONARY ARTERY DISEASE INVOLVING NATIVE CORONARY ARTERY OF NATIVE HEART WITHOUT ANGINA PECTORIS: Primary | ICD-10-CM

## 2024-11-09 DIAGNOSIS — I10 ESSENTIAL HYPERTENSION: ICD-10-CM

## 2024-11-09 RX ORDER — FUROSEMIDE 40 MG/1
40 TABLET ORAL DAILY
Qty: 30 TABLET | Refills: 0 | Status: SHIPPED | OUTPATIENT
Start: 2024-11-09

## 2024-12-20 DIAGNOSIS — F41.9 ANXIETY: ICD-10-CM

## 2024-12-20 DIAGNOSIS — I10 ESSENTIAL HYPERTENSION: ICD-10-CM

## 2024-12-21 RX ORDER — FUROSEMIDE 40 MG/1
40 TABLET ORAL DAILY
Qty: 90 TABLET | OUTPATIENT
Start: 2024-12-21

## 2024-12-21 RX ORDER — BUSPIRONE HYDROCHLORIDE 5 MG/1
5 TABLET ORAL 3 TIMES DAILY PRN
Qty: 90 TABLET | Refills: 0 | OUTPATIENT
Start: 2024-12-21

## 2024-12-31 ENCOUNTER — TELEPHONE (OUTPATIENT)
Dept: CARDIOLOGY | Facility: CLINIC | Age: 74
End: 2024-12-31
Payer: MEDICARE

## 2024-12-31 NOTE — TELEPHONE ENCOUNTER
Approved today by [facility] 2017    PA Case: [id number], Status: Approved, Coverage Starts on: 1/1/2024 12:00:00 AM, Coverage Ends on: 12/31/2025 12:00:00 AM. Questions? Contact [phone].    Authorization Expiration Date: 12/30/2025    APPROVAL LETTER RECEIVED AND SCANNED IN MEDIA.   Patient would like communication of their results via:      Cell Phone:   Telephone Information:   Mobile [phone]     Okay to leave a message containing results? Yes     Health Maintenance Due   Topic Date Due   • Varicella Vaccine (2 of 2 - 2-dose childhood series) 05/09/2016

## 2025-01-31 ENCOUNTER — OFFICE VISIT (OUTPATIENT)
Dept: FAMILY MEDICINE CLINIC | Facility: CLINIC | Age: 75
End: 2025-01-31
Payer: MEDICARE

## 2025-01-31 VITALS
HEIGHT: 62 IN | WEIGHT: 151.3 LBS | BODY MASS INDEX: 27.84 KG/M2 | OXYGEN SATURATION: 100 % | SYSTOLIC BLOOD PRESSURE: 158 MMHG | TEMPERATURE: 97.3 F | HEART RATE: 79 BPM | DIASTOLIC BLOOD PRESSURE: 88 MMHG

## 2025-01-31 DIAGNOSIS — R09.89 CHEST CONGESTION: ICD-10-CM

## 2025-01-31 DIAGNOSIS — J20.9 ACUTE BRONCHITIS, UNSPECIFIED ORGANISM: ICD-10-CM

## 2025-01-31 DIAGNOSIS — I10 ESSENTIAL HYPERTENSION: ICD-10-CM

## 2025-01-31 DIAGNOSIS — Z00.00 MEDICARE ANNUAL WELLNESS VISIT, SUBSEQUENT: Primary | ICD-10-CM

## 2025-01-31 DIAGNOSIS — R68.89 FLU-LIKE SYMPTOMS: ICD-10-CM

## 2025-01-31 DIAGNOSIS — J04.0 LARYNGITIS: ICD-10-CM

## 2025-01-31 DIAGNOSIS — I10 WHITE COAT SYNDROME WITH DIAGNOSIS OF HYPERTENSION: ICD-10-CM

## 2025-01-31 LAB
ALBUMIN SERPL-MCNC: 4.4 G/DL (ref 3.5–5.2)
ALBUMIN/GLOB SERPL: 1.4 G/DL
ALP SERPL-CCNC: 96 U/L (ref 39–117)
ALT SERPL W P-5'-P-CCNC: 11 U/L (ref 1–33)
ANION GAP SERPL CALCULATED.3IONS-SCNC: 11.1 MMOL/L (ref 5–15)
AST SERPL-CCNC: 16 U/L (ref 1–32)
BASOPHILS # BLD AUTO: 0.03 10*3/MM3 (ref 0–0.2)
BASOPHILS NFR BLD AUTO: 0.2 % (ref 0–1.5)
BILIRUB SERPL-MCNC: 0.2 MG/DL (ref 0–1.2)
BUN SERPL-MCNC: 13 MG/DL (ref 8–23)
BUN/CREAT SERPL: 11.1 (ref 7–25)
CALCIUM SPEC-SCNC: 9.7 MG/DL (ref 8.6–10.5)
CHLORIDE SERPL-SCNC: 101 MMOL/L (ref 98–107)
CHOLEST SERPL-MCNC: 171 MG/DL (ref 0–200)
CO2 SERPL-SCNC: 26.9 MMOL/L (ref 22–29)
CREAT SERPL-MCNC: 1.17 MG/DL (ref 0.57–1)
DEPRECATED RDW RBC AUTO: 39.8 FL (ref 37–54)
EGFRCR SERPLBLD CKD-EPI 2021: 48.2 ML/MIN/1.73
EOSINOPHIL # BLD AUTO: 0.06 10*3/MM3 (ref 0–0.4)
EOSINOPHIL NFR BLD AUTO: 0.5 % (ref 0.3–6.2)
ERYTHROCYTE [DISTWIDTH] IN BLOOD BY AUTOMATED COUNT: 14.2 % (ref 12.3–15.4)
EXPIRATION DATE: NORMAL
FLUAV AG UPPER RESP QL IA.RAPID: NOT DETECTED
FLUBV AG UPPER RESP QL IA.RAPID: NOT DETECTED
GLOBULIN UR ELPH-MCNC: 3.2 GM/DL
GLUCOSE SERPL-MCNC: 114 MG/DL (ref 65–99)
HCT VFR BLD AUTO: 30.7 % (ref 34–46.6)
HDLC SERPL-MCNC: 51 MG/DL (ref 40–60)
HGB BLD-MCNC: 9.1 G/DL (ref 12–15.9)
IMM GRANULOCYTES # BLD AUTO: 0.06 10*3/MM3 (ref 0–0.05)
IMM GRANULOCYTES NFR BLD AUTO: 0.5 % (ref 0–0.5)
INTERNAL CONTROL: NORMAL
LDLC SERPL CALC-MCNC: 102 MG/DL (ref 0–100)
LDLC/HDLC SERPL: 1.97 {RATIO}
LYMPHOCYTES # BLD AUTO: 2.22 10*3/MM3 (ref 0.7–3.1)
LYMPHOCYTES NFR BLD AUTO: 17.7 % (ref 19.6–45.3)
Lab: NORMAL
MCH RBC QN AUTO: 22.9 PG (ref 26.6–33)
MCHC RBC AUTO-ENTMCNC: 29.6 G/DL (ref 31.5–35.7)
MCV RBC AUTO: 77.3 FL (ref 79–97)
MONOCYTES # BLD AUTO: 0.73 10*3/MM3 (ref 0.1–0.9)
MONOCYTES NFR BLD AUTO: 5.8 % (ref 5–12)
NEUTROPHILS NFR BLD AUTO: 75.3 % (ref 42.7–76)
NEUTROPHILS NFR BLD AUTO: 9.44 10*3/MM3 (ref 1.7–7)
NRBC BLD AUTO-RTO: 0 /100 WBC (ref 0–0.2)
PLATELET # BLD AUTO: 405 10*3/MM3 (ref 140–450)
PMV BLD AUTO: 9.4 FL (ref 6–12)
POTASSIUM SERPL-SCNC: 4.2 MMOL/L (ref 3.5–5.2)
PROT SERPL-MCNC: 7.6 G/DL (ref 6–8.5)
RBC # BLD AUTO: 3.97 10*6/MM3 (ref 3.77–5.28)
SARS-COV-2 AG UPPER RESP QL IA.RAPID: NOT DETECTED
SODIUM SERPL-SCNC: 139 MMOL/L (ref 136–145)
TRIGL SERPL-MCNC: 98 MG/DL (ref 0–150)
TSH SERPL DL<=0.05 MIU/L-ACNC: 1.09 UIU/ML (ref 0.27–4.2)
VLDLC SERPL-MCNC: 18 MG/DL (ref 5–40)
WBC NRBC COR # BLD AUTO: 12.54 10*3/MM3 (ref 3.4–10.8)

## 2025-01-31 PROCEDURE — 84443 ASSAY THYROID STIM HORMONE: CPT | Performed by: NURSE PRACTITIONER

## 2025-01-31 PROCEDURE — 1160F RVW MEDS BY RX/DR IN RCRD: CPT | Performed by: NURSE PRACTITIONER

## 2025-01-31 PROCEDURE — 1125F AMNT PAIN NOTED PAIN PRSNT: CPT | Performed by: NURSE PRACTITIONER

## 2025-01-31 PROCEDURE — 96160 PT-FOCUSED HLTH RISK ASSMT: CPT | Performed by: NURSE PRACTITIONER

## 2025-01-31 PROCEDURE — 80061 LIPID PANEL: CPT | Performed by: NURSE PRACTITIONER

## 2025-01-31 PROCEDURE — G0439 PPPS, SUBSEQ VISIT: HCPCS | Performed by: NURSE PRACTITIONER

## 2025-01-31 PROCEDURE — G2211 COMPLEX E/M VISIT ADD ON: HCPCS | Performed by: NURSE PRACTITIONER

## 2025-01-31 PROCEDURE — 99213 OFFICE O/P EST LOW 20 MIN: CPT | Performed by: NURSE PRACTITIONER

## 2025-01-31 PROCEDURE — 1159F MED LIST DOCD IN RCRD: CPT | Performed by: NURSE PRACTITIONER

## 2025-01-31 PROCEDURE — 85025 COMPLETE CBC W/AUTO DIFF WBC: CPT | Performed by: NURSE PRACTITIONER

## 2025-01-31 PROCEDURE — 87428 SARSCOV & INF VIR A&B AG IA: CPT | Performed by: NURSE PRACTITIONER

## 2025-01-31 PROCEDURE — 1170F FXNL STATUS ASSESSED: CPT | Performed by: NURSE PRACTITIONER

## 2025-01-31 PROCEDURE — 36415 COLL VENOUS BLD VENIPUNCTURE: CPT | Performed by: NURSE PRACTITIONER

## 2025-01-31 PROCEDURE — 80053 COMPREHEN METABOLIC PANEL: CPT | Performed by: NURSE PRACTITIONER

## 2025-01-31 RX ORDER — AMOXICILLIN AND CLAVULANATE POTASSIUM 500; 125 MG/1; MG/1
1 TABLET, FILM COATED ORAL 2 TIMES DAILY
Qty: 20 TABLET | Refills: 0 | Status: SHIPPED | OUTPATIENT
Start: 2025-01-31

## 2025-01-31 RX ORDER — PREDNISONE 20 MG/1
20 TABLET ORAL DAILY
Qty: 4 TABLET | Refills: 0 | Status: SHIPPED | OUTPATIENT
Start: 2025-01-31

## 2025-01-31 NOTE — PROGRESS NOTES
..  Venipuncture Blood Specimen Collection  Venipuncture performed in Lt arm by Oly Vuong MA with good hemostasis. Patient tolerated the procedure well without complications.   01/31/25   Oly Vuong MA

## 2025-01-31 NOTE — PROGRESS NOTES
Subjective   The ABCs of the Annual Wellness Visit  Medicare Wellness Visit      Rachael South is a 75 y.o. patient who presents for a Medicare Wellness Visit.    The following portions of the patient's history were reviewed and   updated as appropriate: allergies, current medications, past family history, past medical history, past social history, past surgical history, and problem list.    Compared to one year ago, the patient's physical   health is the same.    Compared to one year ago, the patient's mental   health is the same.    Recent Hospitalizations:  She was not admitted to the hospital during the last year.     Current Medical Providers:  Patient Care Team:  Candida Ryder APRN as PCP - General (Nurse Practitioner)  Marquez Canales MD as Consulting Physician (Cardiology)  Alonso Ludwig DO as Consulting Physician (Pulmonary Disease)    Outpatient Medications Prior to Visit   Medication Sig Dispense Refill    amiodarone (PACERONE) 100 MG tablet TAKE 1 TABLET BY MOUTH DAILY 90 tablet 0    amLODIPine (NORVASC) 5 MG tablet Take 1 tablet by mouth Daily. 90 tablet 4    atorvastatin (LIPITOR) 40 MG tablet TAKE 1 TABLET BY MOUTH DAILY 90 tablet 0    busPIRone (BUSPAR) 5 MG tablet TAKE 1 TABLET BY MOUTH THREE TIMES DAILY AS NEEDED FOR ANXIETY 90 tablet 0    carvedilol (COREG) 6.25 MG tablet TAKE 1 TABLET BY MOUTH TWICE DAILY WITH MEALS 180 tablet 3    furosemide (LASIX) 40 MG tablet TAKE 1 TABLET BY MOUTH DAILY 30 tablet 0    losartan (COZAAR) 100 MG tablet TAKE 1 TABLET BY MOUTH DAILY 90 tablet 0    potassium chloride (KLOR-CON) 8 MEQ CR tablet Take 1 tablet by mouth Daily. 90 tablet 1    rivaroxaban (Xarelto) 15 MG tablet Take 1 tablet by mouth Daily With Dinner. 90 tablet 1     No facility-administered medications prior to visit.     No opioid medication identified on active medication list. I have reviewed chart for other potential  high risk medication/s and harmful drug interactions  "in the elderly.      Aspirin is not on active medication list.  Aspirin use is contraindicated for this patient due to: current use of Xarelto.  .    Patient Active Problem List   Diagnosis    Anemia    CAD s/p CABG    S/P MVR (mitral valve replacement)    Ischemic cardiomyopathy/Chronic systolic CHF    Insomnia    Paroxysmal atrial fibrillation    Chronic bronchitis    Pulmonary nodule    Tobacco abuse, in remission     Advance Care Planning Advance Directive is not on file.  ACP discussion was held with the patient during this visit. Patient does not have an advance directive, information provided.            Objective   Vitals:    01/31/25 0815   BP: 158/88   BP Location: Left arm   Patient Position: Sitting   Pulse: 79   Temp: 97.3 °F (36.3 °C)   SpO2: 100%   Weight: 68.6 kg (151 lb 4.8 oz)   Height: 157.5 cm (62\")   PainSc:   5   PainLoc: Chest       Estimated body mass index is 27.67 kg/m² as calculated from the following:    Height as of this encounter: 157.5 cm (62\").    Weight as of this encounter: 68.6 kg (151 lb 4.8 oz).    BMI is >= 25 and <30. (Overweight) The following options were offered after discussion;: weight loss educational material (shared in after visit summary)           Does the patient have evidence of cognitive impairment? No                                                                                                Health  Risk Assessment    Smoking Status:  Social History     Tobacco Use   Smoking Status Former    Current packs/day: 0.00    Average packs/day: 0.5 packs/day for 57.3 years (28.6 ttl pk-yrs)    Types: Cigarettes    Start date: 12/1/1963    Quit date: 3/9/2021    Years since quitting: 3.9    Passive exposure: Past   Smokeless Tobacco Never   Tobacco Comments    caffeine use - denies     Alcohol Consumption:  Social History     Substance and Sexual Activity   Alcohol Use Never       Fall Risk Screen  STEADI Fall Risk Assessment was completed, and patient is at LOW risk " for falls.Assessment completed on:2025    Depression Screening   Little interest or pleasure in doing things? Not at all   Feeling down, depressed, or hopeless? Not at all   PHQ-2 Total Score 0      Health Habits and Functional and Cognitive Screenin/31/2025     8:00 AM   Functional & Cognitive Status   Do you have difficulty preparing food and eating? No   Do you have difficulty bathing yourself, getting dressed or grooming yourself? No   Do you have difficulty using the toilet? No   Do you have difficulty moving around from place to place? No   Do you have trouble with steps or getting out of a bed or a chair? No   Current Diet Well Balanced Diet   Dental Exam Up to date   Eye Exam Up to date   Exercise (times per week) 7 times per week   Current Exercises Include Walking        Exercise Comment 50 laps around her house every night   Do you need help using the phone?  No   Are you deaf or do you have serious difficulty hearing?  No   Do you need help to go to places out of walking distance? No   Do you need help shopping? No   Do you need help preparing meals?  No   Do you need help with housework?  No   Do you need help with laundry? No   Do you need help taking your medications? No   Do you need help managing money? No   Do you ever drive or ride in a car without wearing a seat belt? Yes   Have you felt unusual stress, anger or loneliness in the last month? No   Who do you live with? Spouse   If you need help, do you have trouble finding someone available to you? No   Have you been bothered in the last four weeks by sexual problems? No   Do you have difficulty concentrating, remembering or making decisions? No           Age-appropriate Screening Schedule:  Refer to the list below for future screening recommendations based on patient's age, sex and/or medical conditions. Orders for these recommended tests are listed in the plan section. The patient has been provided with a written plan.    Health  Maintenance List  Health Maintenance   Topic Date Due    DXA SCAN  Never done    LUNG CANCER SCREENING  09/13/2024    ANNUAL WELLNESS VISIT  12/04/2024    INFLUENZA VACCINE  03/31/2025 (Originally 7/1/2024)    ZOSTER VACCINE (1 of 2) 03/31/2025 (Originally 1/2/1998)    RSV Vaccine - Adults (1 - 1-dose 75+ series) 01/31/2026 (Originally 1/2/2023)    Pneumococcal Vaccine 65+ (1 of 2 - PCV) 01/31/2026 (Originally 1/2/1967)    TDAP/TD VACCINES (1 - Tdap) 01/31/2026 (Originally 1/2/1967)    LIPID PANEL  05/14/2025    BMI FOLLOWUP  01/31/2026    HEPATITIS C SCREENING  Completed    COVID-19 Vaccine  Discontinued    COLORECTAL CANCER SCREENING  Discontinued                                                                                                                                                CMS Preventative Services Quick Reference  Risk Factors Identified During Encounter  None Identified    The above risks/problems have been discussed with the patient.  Pertinent information has been shared with the patient in the After Visit Summary.  An After Visit Summary and PPPS were made available to the patient.    Follow Up:   Next Medicare Wellness visit to be scheduled in 1 year.          Additional E&M Note during same encounter follows:  Patient has multiple medical problems which are significant and separately identifiable that require additional work above and beyond the Medicare Wellness Visit.      Chief Complaint  Cough (Feels like her lungs hurt and can't get anything up for the last 3 days. ) and Medicare Wellness-subsequent    Rachael South is a 75 y.o. female who presents to [facility]     History of Present Illness  The patient is a 75-year-old female who presents for a Medicare wellness visit and evaluation of chest congestion.    She reports experiencing severe chest congestion, which she attempts to alleviate through expectoration. However, this action results in  significant discomfort in her lungs. The onset of her cough was approximately 3 to 4 days ago, initially accompanied by rhinorrhea, which has since resolved. She does not report any associated wheezing or shortness of breath. She has previously been prescribed steroids and expresses a preference for penicillin injections, citing their efficacy in her experience. Over-the-counter cold and flu medications have been ineffective in managing her symptoms. She recalls a previous episode of pleural effusion requiring hospitalization due to her anticoagulation therapy, during which the fluid was successfully drained.    She attributes her elevated blood pressure readings to white coat syndrome. At home, her blood pressure typically ranges from 118 to 132 systolic, with occasional readings as low as 118. She uses two different monitors for these measurements, both of which consistently indicate lower readings than those recorded in the clinic.    She perceives her physical health to be slightly improved compared to the previous year, attributing this to the presence of her grandchildren. Her mental health remains stable. She has not required hospitalization within the past year. She is under the care of Dr. Cevallos and Dr. Ludwig. She occasionally takes aspirin but is also on Xarelto. She does not have a living will or advanced directive in place. She reports no concerns related to dementia. She abstains from alcohol and drug use and has no history of chronic pain or depression. She maintains an active lifestyle, particularly with her grandchildren. She is a non-smoker. She is due for a CT scan of her chest for lung cancer screening, as ordered by Dr. Ludwig, but has not had it in 6 months. She has not had blood work done since 05/24/2024. She has a history of warfarin use, which was discontinued due to a near-fatal bleeding episode.    SOCIAL HISTORY  She does not drink alcohol. She does not smoke. She does not use  "drugs.    FAMILY HISTORY  Her father had glaucoma.    MEDICATIONS  Current: BuSpar, Tylenol, Xarelto    Objective   Vital Signs:   Vitals:    01/31/25 0815   BP: 158/88   BP Location: Left arm   Patient Position: Sitting   Pulse: 79   Temp: 97.3 °F (36.3 °C)   SpO2: 100%   Weight: 68.6 kg (151 lb 4.8 oz)   Height: 157.5 cm (62\")   PainSc:   5   PainLoc: Chest       Wt Readings from Last 3 Encounters:   01/31/25 68.6 kg (151 lb 4.8 oz)   06/14/24 70.3 kg (155 lb)   05/14/24 70.3 kg (155 lb)     BP Readings from Last 3 Encounters:   01/31/25 158/88   06/14/24 179/77   05/14/24 168/72       Physical Exam  Vitals reviewed.   Constitutional:       General: She is not in acute distress.     Appearance: Normal appearance. She is well-developed.   HENT:      Head: Normocephalic and atraumatic.      Right Ear: External ear normal.      Left Ear: External ear normal.   Eyes:      Conjunctiva/sclera: Conjunctivae normal.      Pupils: Pupils are equal, round, and reactive to light.   Cardiovascular:      Rate and Rhythm: Normal rate and regular rhythm.      Heart sounds: Normal heart sounds.   Pulmonary:      Effort: Pulmonary effort is normal.      Breath sounds: Normal breath sounds.   Musculoskeletal:      Cervical back: Neck supple.      Right lower leg: No edema.      Left lower leg: No edema.   Skin:     General: Skin is warm and dry.   Neurological:      Mental Status: She is alert and oriented to person, place, and time.   Psychiatric:         Mood and Affect: Mood and affect normal.         Behavior: Behavior normal.         Thought Content: Thought content normal.         Judgment: Judgment normal.         Physical Exam  Lungs were auscultated.  Heart was examined.    Vital Signs  Blood pressure is 158/88.    The following data was reviewed by KENNETH Jones on 01/31/2025  Common Labs   Common labs          5/14/2024    15:49   Common Labs   Glucose 103    BUN 12    Creatinine 0.96    Sodium 140    Potassium " 4.3    Chloride 104    Calcium 8.7    Albumin 4.4    Total Bilirubin 0.4    Alkaline Phosphatase 79    AST (SGOT) 16    ALT (SGPT) 13    WBC 7.53    Hemoglobin 11.0    Hematocrit 33.9    Platelets 290    Total Cholesterol 129    Triglycerides 169    HDL Cholesterol 43    LDL Cholesterol  58      TSH     Results  Imaging  Chest x-ray shows a little haziness.    Testing  COVID-19 and influenza tests were negative.      Assessment & Plan   Diagnoses and all orders for this visit:    1. Medicare annual wellness visit, subsequent (Primary)    2. Chest congestion  -     XR Chest PA & Lateral (In Office)    3. Flu-like symptoms  -     POCT SARS-CoV-2 + Flu Antigen ELEANOR    4. Laryngitis  -     POCT SARS-CoV-2 + Flu Antigen ELEANOR    5. Essential hypertension  -     CBC & Differential  -     Comprehensive Metabolic Panel  -     Lipid Panel  -     TSH Rfx On Abnormal To Free T4    6. White coat syndrome with diagnosis of hypertension    7. Acute bronchitis, unspecified organism  -     predniSONE (DELTASONE) 20 MG tablet; Take 1 tablet by mouth Daily.  Dispense: 4 tablet; Refill: 0  -     amoxicillin-clavulanate (Augmentin) 500-125 MG per tablet; Take 1 tablet by mouth 2 (Two) Times a Day.  Dispense: 20 tablet; Refill: 0        Assessment & Plan  1. Chest congestion.  The chest x-ray appears slightly hazy, suggesting a possible pneumonia diagnosis. COVID-19 and influenza tests returned negative results. A chest x-ray and swab test will be conducted. A 4-day course of steroids, one daily, and Augmentin 500 mg/125 mg twice daily for 10 days have been prescribed. She has been advised to reschedule her CT scan due to the presence of pulmonary nodules that require monitoring.    2. Hypertension.  Her blood pressure was recorded at 158/88 today. She reports that her blood pressure readings at home are usually lower, ranging from 118 to 132 systolic. She is advised to continue monitoring her blood pressure at home and ensure she takes  her blood pressure medication daily.    3. Medicare wellness visit.  She has not had blood work done since 05/24/2024. Blood work will be ordered today to check her cholesterol and other parameters. Information on creating a living will or advanced directive has been offered.    Follow-up  The patient will follow up in 6 months.       BMI is >= 25 and <30. (Overweight) The following options were offered after discussion;: weight loss educational material (shared in after visit summary)       FOLLOW UP  Return in about 6 months (around 7/31/2025) for Refills and fasting labs.  Patient was given instructions and counseling regarding her condition or for health maintenance advice. Please see specific information pulled into the AVS if appropriate.     Patient or patient representative verbalized consent for the use of Ambient Listening during the visit with  KENNETH Jones for chart documentation. 1/31/2025  08:34 KENNETH Dias  01/31/25  13:58 ANDIE

## 2025-02-03 DIAGNOSIS — D64.9 ANEMIA, UNSPECIFIED TYPE: Primary | ICD-10-CM

## 2025-02-07 ENCOUNTER — OFFICE VISIT (OUTPATIENT)
Dept: FAMILY MEDICINE CLINIC | Facility: CLINIC | Age: 75
End: 2025-02-07
Payer: MEDICARE

## 2025-02-07 VITALS
DIASTOLIC BLOOD PRESSURE: 80 MMHG | HEIGHT: 62 IN | BODY MASS INDEX: 27.36 KG/M2 | SYSTOLIC BLOOD PRESSURE: 132 MMHG | OXYGEN SATURATION: 99 % | TEMPERATURE: 97.5 F | HEART RATE: 84 BPM | WEIGHT: 148.7 LBS

## 2025-02-07 DIAGNOSIS — R09.89 CHEST CONGESTION: ICD-10-CM

## 2025-02-07 DIAGNOSIS — D64.9 ANEMIA, UNSPECIFIED TYPE: ICD-10-CM

## 2025-02-07 DIAGNOSIS — M43.9 COMPRESSION DEFORMITY OF VERTEBRA: Primary | ICD-10-CM

## 2025-02-07 LAB
BASOPHILS # BLD AUTO: 0.09 10*3/MM3 (ref 0–0.2)
BASOPHILS NFR BLD AUTO: 0.6 % (ref 0–1.5)
DEPRECATED RDW RBC AUTO: 37.9 FL (ref 37–54)
EOSINOPHIL # BLD AUTO: 0.49 10*3/MM3 (ref 0–0.4)
EOSINOPHIL NFR BLD AUTO: 3.1 % (ref 0.3–6.2)
ERYTHROCYTE [DISTWIDTH] IN BLOOD BY AUTOMATED COUNT: 14.4 % (ref 12.3–15.4)
FERRITIN SERPL-MCNC: 12.9 NG/ML (ref 13–150)
FOLATE SERPL-MCNC: 13.9 NG/ML (ref 4.78–24.2)
HCT VFR BLD AUTO: 32 % (ref 34–46.6)
HGB BLD-MCNC: 9.8 G/DL (ref 12–15.9)
IMM GRANULOCYTES # BLD AUTO: 0.07 10*3/MM3 (ref 0–0.05)
IMM GRANULOCYTES NFR BLD AUTO: 0.4 % (ref 0–0.5)
IRON 24H UR-MRATE: 33 MCG/DL (ref 37–145)
IRON SATN MFR SERPL: 5 % (ref 20–50)
LYMPHOCYTES # BLD AUTO: 5.17 10*3/MM3 (ref 0.7–3.1)
LYMPHOCYTES NFR BLD AUTO: 32.5 % (ref 19.6–45.3)
MCH RBC QN AUTO: 23.1 PG (ref 26.6–33)
MCHC RBC AUTO-ENTMCNC: 30.6 G/DL (ref 31.5–35.7)
MCV RBC AUTO: 75.5 FL (ref 79–97)
MONOCYTES # BLD AUTO: 1.3 10*3/MM3 (ref 0.1–0.9)
MONOCYTES NFR BLD AUTO: 8.2 % (ref 5–12)
NEUTROPHILS NFR BLD AUTO: 55.2 % (ref 42.7–76)
NEUTROPHILS NFR BLD AUTO: 8.77 10*3/MM3 (ref 1.7–7)
NRBC BLD AUTO-RTO: 0 /100 WBC (ref 0–0.2)
PLATELET # BLD AUTO: 447 10*3/MM3 (ref 140–450)
PMV BLD AUTO: 9.1 FL (ref 6–12)
RBC # BLD AUTO: 4.24 10*6/MM3 (ref 3.77–5.28)
TIBC SERPL-MCNC: 624 MCG/DL (ref 298–536)
TRANSFERRIN SERPL-MCNC: 419 MG/DL (ref 200–360)
VIT B12 BLD-MCNC: 422 PG/ML (ref 211–946)
WBC NRBC COR # BLD AUTO: 15.89 10*3/MM3 (ref 3.4–10.8)

## 2025-02-07 PROCEDURE — 85025 COMPLETE CBC W/AUTO DIFF WBC: CPT | Performed by: NURSE PRACTITIONER

## 2025-02-07 PROCEDURE — 1125F AMNT PAIN NOTED PAIN PRSNT: CPT | Performed by: NURSE PRACTITIONER

## 2025-02-07 PROCEDURE — 36415 COLL VENOUS BLD VENIPUNCTURE: CPT | Performed by: NURSE PRACTITIONER

## 2025-02-07 PROCEDURE — 99214 OFFICE O/P EST MOD 30 MIN: CPT | Performed by: NURSE PRACTITIONER

## 2025-02-07 PROCEDURE — 84466 ASSAY OF TRANSFERRIN: CPT | Performed by: NURSE PRACTITIONER

## 2025-02-07 PROCEDURE — 1159F MED LIST DOCD IN RCRD: CPT | Performed by: NURSE PRACTITIONER

## 2025-02-07 PROCEDURE — 82746 ASSAY OF FOLIC ACID SERUM: CPT | Performed by: NURSE PRACTITIONER

## 2025-02-07 PROCEDURE — 82728 ASSAY OF FERRITIN: CPT | Performed by: NURSE PRACTITIONER

## 2025-02-07 PROCEDURE — 82607 VITAMIN B-12: CPT | Performed by: NURSE PRACTITIONER

## 2025-02-07 PROCEDURE — 83540 ASSAY OF IRON: CPT | Performed by: NURSE PRACTITIONER

## 2025-02-07 PROCEDURE — 1160F RVW MEDS BY RX/DR IN RCRD: CPT | Performed by: NURSE PRACTITIONER

## 2025-02-07 RX ORDER — GUAIFENESIN 600 MG/1
1200 TABLET, EXTENDED RELEASE ORAL 2 TIMES DAILY
Qty: 20 TABLET | Refills: 0 | Status: SHIPPED | OUTPATIENT
Start: 2025-02-07

## 2025-02-07 NOTE — PROGRESS NOTES
Chief Complaint  Cough (Congestion and some back pain when coughing. Was given an antibiotic on 1-31-25. )    The PHQ has not been completed during this encounter.       History of Present Illness  Rachael South is a 75 y.o. female who presents to [facility] with a past medical history of  Past Medical History:   Diagnosis Date    Anemia     CAD s/p CABG 5/5/2021    CHF (congestive heart failure)     COPD (chronic obstructive pulmonary disease)     Essential hypertension 12/14/2021    GERD (gastroesophageal reflux disease)     Hyperlipidemia     Hypertension     Ischemic cardiomyopathy/Chronic systolic CHF 6/29/2021    Ischemic dilated cardiomyopathy 6/29/2021    Mitral regurgitation     Paroxysmal atrial fibrillation 12/14/2021    PONV (postoperative nausea and vomiting)     S/P MVR (mitral valve replacement) 6/9/2021       HPI     The patient is a 75-year-old female who presents for evaluation of cough, back pain, and anemia.    She reports a persistent cough that has not improved despite a course of antibiotics initiated last week. The cough is accompanied by thick mucus production, which she finds difficult to expectorate. She has 2 more days of antibiotics left. She has completed a steroid regimen and underwent a chest x-ray, which was reported as cloudy, prompting the initiation of antibiotic therapy. She reports no fevers or chills. She is not experiencing any chest pain or palpitations.    She also experiences back pain during coughing episodes, localized to the lung area. She speculates that this could be due to arthritis in her back. Additionally, she reports sciatic nerve pain. Despite these symptoms, she maintains an active lifestyle, engaging in household chores, caring for her grandchildren, and performing 50 to 60 laps of exercise each night.    She was previously diagnosed with anemia and is scheduled for a follow-up blood work.    Supplemental Information  Her  "blood pressure today was recorded at 132/80, and her oxygen saturation level increased to 99 after initial low readings attributed to cold hands.    MEDICATIONS  Current: Coumadin       Objective   Vital Signs:   Vitals:    02/07/25 0846   BP: 132/80   BP Location: Left arm   Patient Position: Sitting   Pulse: 84   Temp: 97.5 °F (36.4 °C)   SpO2: 99%   Weight: 67.4 kg (148 lb 11.2 oz)   Height: 157.5 cm (62\")   PainLoc: Back     Body mass index is 27.2 kg/m².    Wt Readings from Last 3 Encounters:   02/07/25 67.4 kg (148 lb 11.2 oz)   01/31/25 68.6 kg (151 lb 4.8 oz)   06/14/24 70.3 kg (155 lb)     BP Readings from Last 3 Encounters:   02/07/25 132/80   01/31/25 158/88   06/14/24 179/77       Health Maintenance   Topic Date Due    DXA SCAN  Never done    LUNG CANCER SCREENING  09/13/2024    INFLUENZA VACCINE  03/31/2025 (Originally 7/1/2024)    ZOSTER VACCINE (1 of 2) 03/31/2025 (Originally 1/2/1998)    RSV Vaccine - Adults (1 - 1-dose 75+ series) 01/31/2026 (Originally 1/2/2023)    Pneumococcal Vaccine 65+ (1 of 2 - PCV) 01/31/2026 (Originally 1/2/1967)    TDAP/TD VACCINES (1 - Tdap) 01/31/2026 (Originally 1/2/1967)    ANNUAL WELLNESS VISIT  01/31/2026    LIPID PANEL  01/31/2026    BMI FOLLOWUP  01/31/2026    HEPATITIS C SCREENING  Completed    COVID-19 Vaccine  Discontinued    COLORECTAL CANCER SCREENING  Discontinued       Physical Exam  Vitals reviewed.   Constitutional:       General: She is not in acute distress.     Appearance: Normal appearance. She is well-developed.   HENT:      Head: Normocephalic and atraumatic.   Eyes:      Conjunctiva/sclera: Conjunctivae normal.      Pupils: Pupils are equal, round, and reactive to light.   Cardiovascular:      Rate and Rhythm: Normal rate and regular rhythm.      Heart sounds: Normal heart sounds.   Pulmonary:      Effort: Pulmonary effort is normal.      Breath sounds: Normal breath sounds.   Musculoskeletal:      Cervical back: Neck supple.      Right lower leg: No " edema.      Left lower leg: No edema.   Skin:     General: Skin is warm and dry.   Neurological:      Mental Status: She is alert and oriented to person, place, and time.   Psychiatric:         Mood and Affect: Mood and affect normal.         Behavior: Behavior normal.         Thought Content: Thought content normal.         Judgment: Judgment normal.            Result Review :  The following data was reviewed by: KENNETH Jones on 02/07/2025:  Results  Imaging  Chest x-ray showed a mild age indeterminate compression deformity in the mid thoracic spine.     Common labs          5/14/2024    15:49 1/31/2025    09:24   Common Labs   Glucose 103  114    BUN 12  13    Creatinine 0.96  1.17    Sodium 140  139    Potassium 4.3  4.2    Chloride 104  101    Calcium 8.7  9.7    Albumin 4.4  4.4    Total Bilirubin 0.4  0.2    Alkaline Phosphatase 79  96    AST (SGOT) 16  16    ALT (SGPT) 13  11    WBC 7.53  12.54    Hemoglobin 11.0  9.1    Hematocrit 33.9  30.7    Platelets 290  405    Total Cholesterol 129  171    Triglycerides 169  98    HDL Cholesterol 43  51    LDL Cholesterol  58  102      TSH          1/31/2025    09:24   TSH   TSH 1.090        Procedures        Assessment and Plan   Diagnoses and all orders for this visit:    1. Compression deformity of vertebra (Primary)  -     XR Spine Thoracic 3 View (In Office)    2. Chest congestion  -     guaiFENesin (Mucinex) 600 MG 12 hr tablet; Take 2 tablets by mouth 2 (Two) Times a Day.  Dispense: 20 tablet; Refill: 0    3. Anemia, unspecified type  -     CBC & Differential  -     Iron Profile  -     Ferritin  -     Vitamin B12 & Folate         1. Cough.  The patient reports a persistent cough with thick mucus that is difficult to expectorate. She has been on antibiotics and steroids, but the symptoms have not fully resolved. She is advised to complete her current course of antibiotics and start taking Mucinex tablets. She should call on Monday if there is no  improvement.    2. Back pain.  The patient's back pain, particularly when coughing, may be related to a mild age-indeterminate compression deformity in the mid-thoracic spine noted on her chest x-ray. A spinal x-ray will be conducted today for further evaluation. Depending on the radiologist's interpretation, a referral to a neurosurgeon or spine specialist may be considered.    3. Anemia.  The patient mentions a previous diagnosis of anemia and the need for follow-up blood work. Blood work will be done today to reassess her hemoglobin levels.               FOLLOW UP  Return if symptoms worsen or fail to improve.    Patient was given instructions and counseling regarding her condition or for health maintenance advice. Please see specific information pulled into the AVS if appropriate.       KENNETH Jones  02/07/25  10:01 EST    CURRENT & DISCONTINUED MEDICATIONS  Current Outpatient Medications   Medication Instructions    amiodarone (PACERONE) 100 mg, Oral, Daily    amLODIPine (NORVASC) 5 mg, Oral, Daily    amoxicillin-clavulanate (Augmentin) 500-125 MG per tablet 500 mg, Oral, 2 Times Daily    atorvastatin (LIPITOR) 40 mg, Oral, Daily    busPIRone (BUSPAR) 5 mg, Oral, 3 Times Daily PRN, for anxiety    carvedilol (COREG) 6.25 MG tablet TAKE 1 TABLET BY MOUTH TWICE DAILY WITH MEALS    furosemide (LASIX) 40 mg, Oral, Daily    guaiFENesin (MUCINEX) 1,200 mg, Oral, 2 Times Daily    losartan (COZAAR) 100 mg, Oral, Daily    potassium chloride (KLOR-CON) 8 MEQ CR tablet 8 mEq, Oral, Daily    rivaroxaban (XARELTO) 15 mg, Oral, Daily With Dinner       Medications Discontinued During This Encounter   Medication Reason    predniSONE (DELTASONE) 20 MG tablet *Therapy completed        Patient or patient representative verbalized consent for the use of Ambient Listening during the visit with  KENNETH Jones for chart documentation. 2/7/2025  10:01 EST

## 2025-02-07 NOTE — PROGRESS NOTES
Venipuncture Blood Specimen Collection  Venipuncture performed in left arm by Mayi Estevez with good hemostasis. Patient tolerated the procedure well without complications.   02/07/25   Estefania Franco

## 2025-02-08 DIAGNOSIS — I10 ESSENTIAL HYPERTENSION: ICD-10-CM

## 2025-02-08 DIAGNOSIS — Z95.2 H/O MITRAL VALVE REPLACEMENT: ICD-10-CM

## 2025-02-08 DIAGNOSIS — I25.10 CORONARY ARTERY DISEASE INVOLVING NATIVE CORONARY ARTERY OF NATIVE HEART WITHOUT ANGINA PECTORIS: Primary | ICD-10-CM

## 2025-02-08 DIAGNOSIS — I25.10 CORONARY ARTERY DISEASE INVOLVING NATIVE CORONARY ARTERY OF NATIVE HEART, UNSPECIFIED WHETHER ANGINA PRESENT: ICD-10-CM

## 2025-02-10 ENCOUNTER — TELEPHONE (OUTPATIENT)
Dept: FAMILY MEDICINE CLINIC | Facility: CLINIC | Age: 75
End: 2025-02-10
Payer: MEDICARE

## 2025-02-10 DIAGNOSIS — J20.9 ACUTE BRONCHITIS, UNSPECIFIED ORGANISM: ICD-10-CM

## 2025-02-10 DIAGNOSIS — D72.829 LEUKOCYTOSIS, UNSPECIFIED TYPE: Primary | ICD-10-CM

## 2025-02-10 DIAGNOSIS — M43.9 COMPRESSION DEFORMITY OF VERTEBRA: Primary | ICD-10-CM

## 2025-02-10 DIAGNOSIS — S22.060A CLOSED WEDGE COMPRESSION FRACTURE OF T7 VERTEBRA, INITIAL ENCOUNTER: ICD-10-CM

## 2025-02-10 RX ORDER — AMOXICILLIN AND CLAVULANATE POTASSIUM 500; 125 MG/1; MG/1
1 TABLET, FILM COATED ORAL 2 TIMES DAILY
Qty: 20 TABLET | Refills: 0 | Status: SHIPPED | OUTPATIENT
Start: 2025-02-10

## 2025-02-10 RX ORDER — POTASSIUM CHLORIDE 600 MG/1
8 TABLET, FILM COATED, EXTENDED RELEASE ORAL DAILY
Qty: 90 TABLET | Refills: 1 | Status: SHIPPED | OUTPATIENT
Start: 2025-02-10

## 2025-02-10 RX ORDER — RIVAROXABAN 15 MG/1
15 TABLET, FILM COATED ORAL
Qty: 90 TABLET | Refills: 3 | Status: SHIPPED | OUTPATIENT
Start: 2025-02-10

## 2025-02-10 RX ORDER — ATORVASTATIN CALCIUM 40 MG/1
40 TABLET, FILM COATED ORAL DAILY
Qty: 90 TABLET | Refills: 1 | Status: SHIPPED | OUTPATIENT
Start: 2025-02-10

## 2025-02-10 NOTE — TELEPHONE ENCOUNTER
Pt would like to know if she can get another round of Augmentin. She said she is feeling better and the mucus is clearing up some.

## 2025-02-21 DIAGNOSIS — F41.9 ANXIETY: ICD-10-CM

## 2025-02-21 DIAGNOSIS — I10 ESSENTIAL HYPERTENSION: ICD-10-CM

## 2025-02-21 RX ORDER — BUSPIRONE HYDROCHLORIDE 5 MG/1
5 TABLET ORAL 3 TIMES DAILY PRN
Qty: 90 TABLET | Refills: 0 | Status: SHIPPED | OUTPATIENT
Start: 2025-02-21

## 2025-02-21 RX ORDER — FUROSEMIDE 40 MG/1
40 TABLET ORAL DAILY
Qty: 90 TABLET | Refills: 0 | Status: SHIPPED | OUTPATIENT
Start: 2025-02-21

## 2025-03-07 ENCOUNTER — TELEPHONE (OUTPATIENT)
Dept: FAMILY MEDICINE CLINIC | Facility: CLINIC | Age: 75
End: 2025-03-07

## 2025-03-07 NOTE — TELEPHONE ENCOUNTER
Caller: Rachael South    Relationship: Self    Best call back number: [phone]     What is the best time to reach you: ANYTIME     Who are you requesting to speak with (clinical staff, provider,  specific staff member): CLINICAL     What was the call regarding: PATIENT IS CALLING TO CHECK THE STATUS OF MEDICATIONS, PHARMACY HAS NOT RECEIVED FOR     furosemide (LASIX) 40 MG tablet

## 2025-04-03 DIAGNOSIS — I10 ESSENTIAL HYPERTENSION: ICD-10-CM

## 2025-04-04 RX ORDER — FUROSEMIDE 40 MG/1
40 TABLET ORAL DAILY
Qty: 90 TABLET | Refills: 0 | Status: SHIPPED | OUTPATIENT
Start: 2025-04-04

## 2025-04-11 DIAGNOSIS — F41.9 ANXIETY: ICD-10-CM

## 2025-04-11 RX ORDER — BUSPIRONE HYDROCHLORIDE 5 MG/1
5 TABLET ORAL 3 TIMES DAILY PRN
Qty: 90 TABLET | Refills: 0 | Status: SHIPPED | OUTPATIENT
Start: 2025-04-11

## 2025-04-14 ENCOUNTER — CLINICAL SUPPORT (OUTPATIENT)
Dept: FAMILY MEDICINE CLINIC | Facility: CLINIC | Age: 75
End: 2025-04-14
Payer: MEDICARE

## 2025-04-14 DIAGNOSIS — D72.829 LEUKOCYTOSIS, UNSPECIFIED TYPE: ICD-10-CM

## 2025-04-14 PROCEDURE — 36415 COLL VENOUS BLD VENIPUNCTURE: CPT | Performed by: NURSE PRACTITIONER

## 2025-04-14 NOTE — PROGRESS NOTES
Venipuncture Blood Specimen Collection  Venipuncture performed in left arm by Oz Yan MA with good hemostasis. Patient tolerated the procedure well without complications.   04/14/25   Oz Yan MA

## 2025-04-15 ENCOUNTER — CLINICAL SUPPORT (OUTPATIENT)
Dept: FAMILY MEDICINE CLINIC | Facility: CLINIC | Age: 75
End: 2025-04-15
Payer: MEDICARE

## 2025-04-15 DIAGNOSIS — D72.829 LEUKOCYTOSIS, UNSPECIFIED TYPE: ICD-10-CM

## 2025-04-15 PROCEDURE — 36415 COLL VENOUS BLD VENIPUNCTURE: CPT | Performed by: NURSE PRACTITIONER

## 2025-04-15 PROCEDURE — 85007 BL SMEAR W/DIFF WBC COUNT: CPT | Performed by: NURSE PRACTITIONER

## 2025-04-15 PROCEDURE — 85025 COMPLETE CBC W/AUTO DIFF WBC: CPT | Performed by: NURSE PRACTITIONER

## 2025-04-15 RX ORDER — CARVEDILOL 6.25 MG/1
6.25 TABLET ORAL 2 TIMES DAILY WITH MEALS
Qty: 180 TABLET | Refills: 0 | Status: SHIPPED | OUTPATIENT
Start: 2025-04-15

## 2025-04-15 NOTE — PROGRESS NOTES
Venipuncture Blood Specimen Collection  Venipuncture performed in left arm by Mayi Estevez with good hemostasis. Patient tolerated the procedure well without complications.   04/15/25   Estefania Franco

## 2025-04-15 NOTE — TELEPHONE ENCOUNTER
Rx Refill Note  Requested Prescriptions     Pending Prescriptions Disp Refills    carvedilol (COREG) 6.25 MG tablet [Pharmacy Med Name: CARVEDILOL 6.25MG TABLETS] 180 tablet 3     Sig: TAKE 1 TABLET BY MOUTH TWICE DAILY WITH MEALS        LAST OFFICE VISIT:  6/14/2024     NEXT OFFICE VISIT:  8/8/2025     Does the medication requests match the last office note:    [x] Yes   [] No    Does this refill request meet protocol details for MA to approve:     [] Yes   [x] No   [] No Protocols Provided   BP under 130/80 in past year and patient has diabetes, CAD or PVD

## 2025-04-16 LAB
ANISOCYTOSIS BLD QL: ABNORMAL
BASOPHILS # BLD MANUAL: 0 10*3/MM3 (ref 0–0.2)
BASOPHILS NFR BLD MANUAL: 0 % (ref 0–1.5)
DEPRECATED RDW RBC AUTO: 39 FL (ref 37–54)
EOSINOPHIL # BLD MANUAL: 0.07 10*3/MM3 (ref 0–0.4)
EOSINOPHIL NFR BLD MANUAL: 1 % (ref 0.3–6.2)
ERYTHROCYTE [DISTWIDTH] IN BLOOD BY AUTOMATED COUNT: 15.1 % (ref 12.3–15.4)
HCT VFR BLD AUTO: 26.6 % (ref 34–46.6)
HGB BLD-MCNC: 8 G/DL (ref 12–15.9)
LYMPHOCYTES # BLD MANUAL: 1.28 10*3/MM3 (ref 0.7–3.1)
LYMPHOCYTES NFR BLD MANUAL: 5.1 % (ref 5–12)
MCH RBC QN AUTO: 21.8 PG (ref 26.6–33)
MCHC RBC AUTO-ENTMCNC: 30.1 G/DL (ref 31.5–35.7)
MCV RBC AUTO: 72.5 FL (ref 79–97)
MICROCYTES BLD QL: ABNORMAL
MONOCYTES # BLD: 0.38 10*3/MM3 (ref 0.1–0.9)
NEUTROPHILS # BLD AUTO: 5.7 10*3/MM3 (ref 1.7–7)
NEUTROPHILS NFR BLD MANUAL: 76.8 % (ref 42.7–76)
NRBC BLD AUTO-RTO: 0 /100 WBC (ref 0–0.2)
PLAT MORPH BLD: NORMAL
PLATELET # BLD AUTO: 389 10*3/MM3 (ref 140–450)
PMV BLD AUTO: 9.5 FL (ref 6–12)
POIKILOCYTOSIS BLD QL SMEAR: ABNORMAL
RBC # BLD AUTO: 3.67 10*6/MM3 (ref 3.77–5.28)
VARIANT LYMPHS NFR BLD MANUAL: 1 % (ref 0–5)
VARIANT LYMPHS NFR BLD MANUAL: 16.2 % (ref 19.6–45.3)
WBC MORPH BLD: NORMAL
WBC NRBC COR # BLD AUTO: 7.42 10*3/MM3 (ref 3.4–10.8)

## 2025-04-21 ENCOUNTER — TELEPHONE (OUTPATIENT)
Dept: FAMILY MEDICINE CLINIC | Facility: CLINIC | Age: 75
End: 2025-04-21
Payer: MEDICARE

## 2025-04-21 ENCOUNTER — CLINICAL SUPPORT (OUTPATIENT)
Dept: FAMILY MEDICINE CLINIC | Facility: CLINIC | Age: 75
End: 2025-04-21
Payer: MEDICARE

## 2025-04-21 DIAGNOSIS — D64.9 ANEMIA, UNSPECIFIED TYPE: ICD-10-CM

## 2025-04-21 LAB
BASOPHILS # BLD AUTO: 0.06 10*3/MM3 (ref 0–0.2)
BASOPHILS NFR BLD AUTO: 0.6 % (ref 0–1.5)
DEPRECATED RDW RBC AUTO: 38.6 FL (ref 37–54)
EOSINOPHIL # BLD AUTO: 0.17 10*3/MM3 (ref 0–0.4)
EOSINOPHIL NFR BLD AUTO: 1.6 % (ref 0.3–6.2)
ERYTHROCYTE [DISTWIDTH] IN BLOOD BY AUTOMATED COUNT: 15.1 % (ref 12.3–15.4)
HCT VFR BLD AUTO: 27.2 % (ref 34–46.6)
HGB BLD-MCNC: 8.1 G/DL (ref 12–15.9)
IMM GRANULOCYTES # BLD AUTO: 0.05 10*3/MM3 (ref 0–0.05)
IMM GRANULOCYTES NFR BLD AUTO: 0.5 % (ref 0–0.5)
IRON 24H UR-MRATE: 176 MCG/DL (ref 37–145)
IRON SATN MFR SERPL: 28 % (ref 20–50)
LYMPHOCYTES # BLD AUTO: 2.03 10*3/MM3 (ref 0.7–3.1)
LYMPHOCYTES NFR BLD AUTO: 19.1 % (ref 19.6–45.3)
MCH RBC QN AUTO: 21.5 PG (ref 26.6–33)
MCHC RBC AUTO-ENTMCNC: 29.8 G/DL (ref 31.5–35.7)
MCV RBC AUTO: 72.1 FL (ref 79–97)
MONOCYTES # BLD AUTO: 0.75 10*3/MM3 (ref 0.1–0.9)
MONOCYTES NFR BLD AUTO: 7 % (ref 5–12)
NEUTROPHILS NFR BLD AUTO: 7.59 10*3/MM3 (ref 1.7–7)
NEUTROPHILS NFR BLD AUTO: 71.2 % (ref 42.7–76)
PLATELET # BLD AUTO: 415 10*3/MM3 (ref 140–450)
PMV BLD AUTO: 9.8 FL (ref 6–12)
RBC # BLD AUTO: 3.77 10*6/MM3 (ref 3.77–5.28)
TIBC SERPL-MCNC: 635 MCG/DL (ref 298–536)
TRANSFERRIN SERPL-MCNC: 426 MG/DL (ref 200–360)
WBC NRBC COR # BLD AUTO: 10.65 10*3/MM3 (ref 3.4–10.8)

## 2025-04-21 PROCEDURE — 84466 ASSAY OF TRANSFERRIN: CPT | Performed by: NURSE PRACTITIONER

## 2025-04-21 PROCEDURE — 82728 ASSAY OF FERRITIN: CPT | Performed by: NURSE PRACTITIONER

## 2025-04-21 PROCEDURE — 83540 ASSAY OF IRON: CPT | Performed by: NURSE PRACTITIONER

## 2025-04-21 PROCEDURE — 36415 COLL VENOUS BLD VENIPUNCTURE: CPT | Performed by: NURSE PRACTITIONER

## 2025-04-21 PROCEDURE — 85025 COMPLETE CBC W/AUTO DIFF WBC: CPT | Performed by: NURSE PRACTITIONER

## 2025-04-21 PROCEDURE — 82746 ASSAY OF FOLIC ACID SERUM: CPT | Performed by: NURSE PRACTITIONER

## 2025-04-21 PROCEDURE — 82607 VITAMIN B-12: CPT | Performed by: NURSE PRACTITIONER

## 2025-04-21 NOTE — PROGRESS NOTES
..  Venipuncture Blood Specimen Collection  Venipuncture performed in LT arm by Oly Vuong MA with good hemostasis. Patient tolerated the procedure well without complications.   04/21/25   Oly Vuong MA

## 2025-04-21 NOTE — TELEPHONE ENCOUNTER
Bo Cano called and is wondering why she needs to see a blood specialist. She spoke with an MA already and is confused to why she would need to seek other care.

## 2025-04-22 ENCOUNTER — RESULTS FOLLOW-UP (OUTPATIENT)
Dept: FAMILY MEDICINE CLINIC | Facility: CLINIC | Age: 75
End: 2025-04-22
Payer: MEDICARE

## 2025-04-22 DIAGNOSIS — E61.1 IRON DEFICIENCY: ICD-10-CM

## 2025-04-22 DIAGNOSIS — D64.9 ANEMIA, UNSPECIFIED TYPE: Primary | ICD-10-CM

## 2025-04-22 LAB
FERRITIN SERPL-MCNC: 10.1 NG/ML (ref 13–150)
FOLATE SERPL-MCNC: >20 NG/ML (ref 4.78–24.2)
VIT B12 BLD-MCNC: 705 PG/ML (ref 211–946)

## 2025-04-22 RX ORDER — FERROUS SULFATE 324(65)MG
324 TABLET, DELAYED RELEASE (ENTERIC COATED) ORAL
Qty: 90 TABLET | Refills: 0 | Status: SHIPPED | OUTPATIENT
Start: 2025-04-22

## 2025-07-11 DIAGNOSIS — I10 ESSENTIAL HYPERTENSION: ICD-10-CM

## 2025-07-11 RX ORDER — CARVEDILOL 6.25 MG/1
6.25 TABLET ORAL 2 TIMES DAILY WITH MEALS
Qty: 180 TABLET | Refills: 0 | Status: SHIPPED | OUTPATIENT
Start: 2025-07-11

## 2025-07-11 RX ORDER — AMLODIPINE BESYLATE 2.5 MG/1
2.5 TABLET ORAL DAILY
Qty: 90 TABLET | Refills: 1 | OUTPATIENT
Start: 2025-07-11

## 2025-07-11 NOTE — TELEPHONE ENCOUNTER
Patient requested refill of medication.    Refilled Carvedilol 6.25 90 supply with 0 refills.    Patient next appointment 8/8/25

## 2025-07-17 DIAGNOSIS — I10 ESSENTIAL HYPERTENSION: ICD-10-CM

## 2025-07-17 RX ORDER — AMLODIPINE BESYLATE 5 MG/1
5 TABLET ORAL DAILY
Qty: 90 TABLET | Refills: 3 | Status: SHIPPED | OUTPATIENT
Start: 2025-07-17

## 2025-07-17 RX ORDER — AMLODIPINE BESYLATE 2.5 MG/1
2.5 TABLET ORAL DAILY
Qty: 90 TABLET | Refills: 1 | OUTPATIENT
Start: 2025-07-17

## 2025-07-17 NOTE — TELEPHONE ENCOUNTER
Rx Refill Note  Requested Prescriptions     Pending Prescriptions Disp Refills    amLODIPine (NORVASC) 5 MG tablet [Pharmacy Med Name: AMLODIPINE BESYLATE 5MG TABLETS] 90 tablet 4     Sig: TAKE 1 TABLET BY MOUTH DAILY        LAST OFFICE VISIT:  6/14/2024     NEXT OFFICE VISIT:  8/8/2025     Does the medication requests match the last office note:    [x] Yes   [] No    Does this refill request meet protocol details for MA to approve:     [x] Yes   [] No   [] No Protocols Provided

## 2025-07-21 RX ORDER — LOSARTAN POTASSIUM 100 MG/1
100 TABLET ORAL DAILY
Qty: 90 TABLET | Refills: 0 | Status: SHIPPED | OUTPATIENT
Start: 2025-07-21

## 2025-07-23 DIAGNOSIS — I10 ESSENTIAL HYPERTENSION: ICD-10-CM

## 2025-07-23 RX ORDER — FUROSEMIDE 40 MG/1
40 TABLET ORAL DAILY
Qty: 90 TABLET | Refills: 0 | Status: SHIPPED | OUTPATIENT
Start: 2025-07-23

## 2025-08-08 ENCOUNTER — OFFICE VISIT (OUTPATIENT)
Dept: CARDIOLOGY | Facility: CLINIC | Age: 75
End: 2025-08-08
Payer: MEDICARE

## 2025-08-08 VITALS
SYSTOLIC BLOOD PRESSURE: 141 MMHG | HEART RATE: 73 BPM | BODY MASS INDEX: 27.05 KG/M2 | DIASTOLIC BLOOD PRESSURE: 74 MMHG | WEIGHT: 147 LBS | HEIGHT: 62 IN

## 2025-08-08 DIAGNOSIS — I10 HYPERTENSION, ESSENTIAL: ICD-10-CM

## 2025-08-08 DIAGNOSIS — Z95.1 HX OF CABG: ICD-10-CM

## 2025-08-08 DIAGNOSIS — E78.2 HYPERLIPEMIA, MIXED: ICD-10-CM

## 2025-08-08 DIAGNOSIS — I48.0 PAROXYSMAL ATRIAL FIBRILLATION: ICD-10-CM

## 2025-08-08 DIAGNOSIS — I25.10 CORONARY ARTERY DISEASE INVOLVING NATIVE CORONARY ARTERY OF NATIVE HEART WITHOUT ANGINA PECTORIS: Primary | ICD-10-CM

## 2025-08-08 PROCEDURE — 1159F MED LIST DOCD IN RCRD: CPT | Performed by: SPECIALIST

## 2025-08-08 PROCEDURE — 99214 OFFICE O/P EST MOD 30 MIN: CPT | Performed by: SPECIALIST

## 2025-08-08 PROCEDURE — 1160F RVW MEDS BY RX/DR IN RCRD: CPT | Performed by: SPECIALIST

## 2025-08-12 DIAGNOSIS — I25.10 CORONARY ARTERY DISEASE INVOLVING NATIVE CORONARY ARTERY OF NATIVE HEART, UNSPECIFIED WHETHER ANGINA PRESENT: ICD-10-CM

## 2025-08-12 DIAGNOSIS — I10 ESSENTIAL HYPERTENSION: ICD-10-CM

## 2025-08-12 RX ORDER — POTASSIUM CHLORIDE 600 MG/1
8 TABLET, EXTENDED RELEASE ORAL DAILY
Qty: 90 TABLET | Refills: 1 | OUTPATIENT
Start: 2025-08-12

## 2025-08-12 RX ORDER — ATORVASTATIN CALCIUM 40 MG/1
40 TABLET, FILM COATED ORAL DAILY
Qty: 90 TABLET | Refills: 1 | OUTPATIENT
Start: 2025-08-12

## 2025-08-14 ENCOUNTER — OFFICE VISIT (OUTPATIENT)
Dept: FAMILY MEDICINE CLINIC | Facility: CLINIC | Age: 75
End: 2025-08-14
Payer: MEDICARE

## 2025-08-14 VITALS
HEART RATE: 84 BPM | BODY MASS INDEX: 27.94 KG/M2 | WEIGHT: 151.8 LBS | OXYGEN SATURATION: 98 % | TEMPERATURE: 98.6 F | HEIGHT: 62 IN | DIASTOLIC BLOOD PRESSURE: 70 MMHG | SYSTOLIC BLOOD PRESSURE: 132 MMHG

## 2025-08-14 DIAGNOSIS — E61.1 IRON DEFICIENCY: ICD-10-CM

## 2025-08-14 DIAGNOSIS — I25.10 CORONARY ARTERY DISEASE INVOLVING NATIVE CORONARY ARTERY OF NATIVE HEART, UNSPECIFIED WHETHER ANGINA PRESENT: ICD-10-CM

## 2025-08-14 DIAGNOSIS — F41.9 ANXIETY: ICD-10-CM

## 2025-08-14 DIAGNOSIS — I10 ESSENTIAL HYPERTENSION: ICD-10-CM

## 2025-08-14 DIAGNOSIS — D64.9 ANEMIA, UNSPECIFIED TYPE: ICD-10-CM

## 2025-08-14 LAB
ALBUMIN SERPL-MCNC: 4.1 G/DL (ref 3.5–5.2)
ALBUMIN/GLOB SERPL: 1.6 G/DL
ALP SERPL-CCNC: 108 U/L (ref 39–117)
ALT SERPL W P-5'-P-CCNC: 15 U/L (ref 1–33)
ANION GAP SERPL CALCULATED.3IONS-SCNC: 13 MMOL/L (ref 5–15)
AST SERPL-CCNC: 24 U/L (ref 1–32)
BACTERIA UR QL AUTO: ABNORMAL /HPF
BASOPHILS # BLD AUTO: 0.06 10*3/MM3 (ref 0–0.2)
BASOPHILS NFR BLD AUTO: 0.8 % (ref 0–1.5)
BILIRUB SERPL-MCNC: <0.2 MG/DL (ref 0–1.2)
BILIRUB UR QL STRIP: NEGATIVE
BUN SERPL-MCNC: 16 MG/DL (ref 8–23)
BUN/CREAT SERPL: 16.3 (ref 7–25)
CALCIUM SPEC-SCNC: 8.9 MG/DL (ref 8.6–10.5)
CHLORIDE SERPL-SCNC: 103 MMOL/L (ref 98–107)
CHOLEST SERPL-MCNC: 212 MG/DL (ref 0–200)
CLARITY UR: CLEAR
CO2 SERPL-SCNC: 25 MMOL/L (ref 22–29)
COD CRY URNS QL: PRESENT /HPF
COLOR UR: YELLOW
CREAT SERPL-MCNC: 0.98 MG/DL (ref 0.57–1)
DEPRECATED RDW RBC AUTO: 46.1 FL (ref 37–54)
EGFRCR SERPLBLD CKD-EPI 2021: 59.6 ML/MIN/1.73
EOSINOPHIL # BLD AUTO: 0.25 10*3/MM3 (ref 0–0.4)
EOSINOPHIL NFR BLD AUTO: 3.3 % (ref 0.3–6.2)
ERYTHROCYTE [DISTWIDTH] IN BLOOD BY AUTOMATED COUNT: 15.6 % (ref 12.3–15.4)
FERRITIN SERPL-MCNC: 34.6 NG/ML (ref 13–150)
GLOBULIN UR ELPH-MCNC: 2.5 GM/DL
GLUCOSE SERPL-MCNC: 106 MG/DL (ref 65–99)
GLUCOSE UR STRIP-MCNC: NEGATIVE MG/DL
HCT VFR BLD AUTO: 32.2 % (ref 34–46.6)
HDLC SERPL-MCNC: 35 MG/DL (ref 40–60)
HGB BLD-MCNC: 10.3 G/DL (ref 12–15.9)
HGB UR QL STRIP.AUTO: NEGATIVE
HOLD SPECIMEN: NORMAL
HYALINE CASTS UR QL AUTO: ABNORMAL /LPF
IMM GRANULOCYTES # BLD AUTO: 0.04 10*3/MM3 (ref 0–0.05)
IMM GRANULOCYTES NFR BLD AUTO: 0.5 % (ref 0–0.5)
IRON 24H UR-MRATE: 40 MCG/DL (ref 37–145)
IRON SATN MFR SERPL: 8 % (ref 20–50)
KETONES UR QL STRIP: ABNORMAL
LDLC SERPL CALC-MCNC: 111 MG/DL (ref 0–100)
LDLC/HDLC SERPL: 2.89 {RATIO}
LEUKOCYTE ESTERASE UR QL STRIP.AUTO: ABNORMAL
LYMPHOCYTES # BLD AUTO: 2.02 10*3/MM3 (ref 0.7–3.1)
LYMPHOCYTES NFR BLD AUTO: 26.9 % (ref 19.6–45.3)
MCH RBC QN AUTO: 26.6 PG (ref 26.6–33)
MCHC RBC AUTO-ENTMCNC: 32 G/DL (ref 31.5–35.7)
MCV RBC AUTO: 83.2 FL (ref 79–97)
MONOCYTES # BLD AUTO: 0.67 10*3/MM3 (ref 0.1–0.9)
MONOCYTES NFR BLD AUTO: 8.9 % (ref 5–12)
NEUTROPHILS NFR BLD AUTO: 4.48 10*3/MM3 (ref 1.7–7)
NEUTROPHILS NFR BLD AUTO: 59.6 % (ref 42.7–76)
NITRITE UR QL STRIP: NEGATIVE
NRBC BLD AUTO-RTO: 0 /100 WBC (ref 0–0.2)
PH UR STRIP.AUTO: 5.5 [PH] (ref 5–8)
PLATELET # BLD AUTO: 315 10*3/MM3 (ref 140–450)
PMV BLD AUTO: 9.4 FL (ref 6–12)
POTASSIUM SERPL-SCNC: 3.6 MMOL/L (ref 3.5–5.2)
PROT SERPL-MCNC: 6.6 G/DL (ref 6–8.5)
PROT UR QL STRIP: NEGATIVE
RBC # BLD AUTO: 3.87 10*6/MM3 (ref 3.77–5.28)
RBC # UR STRIP: ABNORMAL /HPF
REF LAB TEST METHOD: ABNORMAL
SODIUM SERPL-SCNC: 141 MMOL/L (ref 136–145)
SP GR UR STRIP: 1.02 (ref 1–1.03)
SQUAMOUS #/AREA URNS HPF: ABNORMAL /HPF
STARCH GRANULES URNS QL MICRO: PRESENT /HPF
TIBC SERPL-MCNC: 483 MCG/DL (ref 298–536)
TRANSFERRIN SERPL-MCNC: 324 MG/DL (ref 200–360)
TRIGL SERPL-MCNC: 379 MG/DL (ref 0–150)
TSH SERPL DL<=0.05 MIU/L-ACNC: 2.44 UIU/ML (ref 0.27–4.2)
UROBILINOGEN UR QL STRIP: ABNORMAL
VLDLC SERPL-MCNC: 66 MG/DL (ref 5–40)
WBC # UR STRIP: ABNORMAL /HPF
WBC NRBC COR # BLD AUTO: 7.52 10*3/MM3 (ref 3.4–10.8)

## 2025-08-14 PROCEDURE — 82728 ASSAY OF FERRITIN: CPT | Performed by: NURSE PRACTITIONER

## 2025-08-14 PROCEDURE — 80061 LIPID PANEL: CPT | Performed by: NURSE PRACTITIONER

## 2025-08-14 PROCEDURE — 87086 URINE CULTURE/COLONY COUNT: CPT | Performed by: NURSE PRACTITIONER

## 2025-08-14 PROCEDURE — 83540 ASSAY OF IRON: CPT | Performed by: NURSE PRACTITIONER

## 2025-08-14 PROCEDURE — 84443 ASSAY THYROID STIM HORMONE: CPT | Performed by: NURSE PRACTITIONER

## 2025-08-14 PROCEDURE — 87186 SC STD MICRODIL/AGAR DIL: CPT | Performed by: NURSE PRACTITIONER

## 2025-08-14 PROCEDURE — 84466 ASSAY OF TRANSFERRIN: CPT | Performed by: NURSE PRACTITIONER

## 2025-08-14 PROCEDURE — 85025 COMPLETE CBC W/AUTO DIFF WBC: CPT | Performed by: NURSE PRACTITIONER

## 2025-08-14 PROCEDURE — 80053 COMPREHEN METABOLIC PANEL: CPT | Performed by: NURSE PRACTITIONER

## 2025-08-14 PROCEDURE — 81001 URINALYSIS AUTO W/SCOPE: CPT | Performed by: NURSE PRACTITIONER

## 2025-08-14 PROCEDURE — 87088 URINE BACTERIA CULTURE: CPT | Performed by: NURSE PRACTITIONER

## 2025-08-14 RX ORDER — POTASSIUM CHLORIDE 600 MG/1
8 TABLET, EXTENDED RELEASE ORAL DAILY
Qty: 90 TABLET | Refills: 1 | Status: SHIPPED | OUTPATIENT
Start: 2025-08-14

## 2025-08-14 RX ORDER — FERROUS SULFATE 324(65)MG
324 TABLET, DELAYED RELEASE (ENTERIC COATED) ORAL
Qty: 90 TABLET | Refills: 1 | Status: SHIPPED | OUTPATIENT
Start: 2025-08-14

## 2025-08-14 RX ORDER — ATORVASTATIN CALCIUM 40 MG/1
40 TABLET, FILM COATED ORAL DAILY
Qty: 90 TABLET | Refills: 1 | Status: SHIPPED | OUTPATIENT
Start: 2025-08-14

## 2025-08-14 RX ORDER — LOSARTAN POTASSIUM 100 MG/1
100 TABLET ORAL DAILY
Qty: 90 TABLET | Refills: 1 | Status: SHIPPED | OUTPATIENT
Start: 2025-08-14

## 2025-08-14 RX ORDER — BUSPIRONE HYDROCHLORIDE 5 MG/1
5 TABLET ORAL 3 TIMES DAILY PRN
Qty: 90 TABLET | Refills: 2 | Status: SHIPPED | OUTPATIENT
Start: 2025-08-14

## 2025-08-14 RX ORDER — FUROSEMIDE 40 MG/1
40 TABLET ORAL DAILY
Qty: 90 TABLET | Refills: 1 | Status: SHIPPED | OUTPATIENT
Start: 2025-08-14

## 2025-08-17 LAB — BACTERIA SPEC AEROBE CULT: ABNORMAL

## (undated) DEVICE — BLOWER/MISTER AXIOUS OPCAB W/TBG

## (undated) DEVICE — SPNG GZ WOVN 4X4IN 12PLY 10/BX STRL

## (undated) DEVICE — GLIDESHEATH BASIC HYDROPHILIC COATED INTRODUCER SHEATH: Brand: GLIDESHEATH

## (undated) DEVICE — MYOCARDIAL PROBE NON-PYROGENIC: Brand: DEROYAL

## (undated) DEVICE — ST TOURNI COMPL A/ 7IN

## (undated) DEVICE — SOL IRRG H2O PL/BG 1000ML STRL

## (undated) DEVICE — Device

## (undated) DEVICE — SOL NACL 0.9PCT 1000ML

## (undated) DEVICE — CANN VESL FREE FLO 2MM

## (undated) DEVICE — SOL IRR NACL 0.9PCT BT 1000ML

## (undated) DEVICE — TOWEL,OR,DSP,ST,BLUE,STD,4/PK,20PK/CS: Brand: MEDLINE

## (undated) DEVICE — SENSR CERBRL O2 PK/2

## (undated) DEVICE — IRRIGATOR BULB ASEPTO 60CC STRL

## (undated) DEVICE — CATH VENT DLP W/CONN MALL NOVNT SILICON 16FR 16IN

## (undated) DEVICE — CANN AORT ROOT DLP VNT 14G 7F

## (undated) DEVICE — Y-TYPE BLOOD/SOLUTION SET WITH STANDARD BLOOD FILTER, 170 TO 260 MICRON FILTER, LUER ACTIVATED VALVE, MALE LUER LOCK ADAPTER WITH RETRACTABLE COLLAR: Brand: CLEARLINK

## (undated) DEVICE — CATH DIAG IMPULSE FR4 5F 100CM

## (undated) DEVICE — TEMP PACING WIRE: Brand: MYO/WIRE

## (undated) DEVICE — DRP SLUSH WARMR MACH RECTG 66X44IN

## (undated) DEVICE — SOL IRR H2O BTL 1000ML STRL

## (undated) DEVICE — SUT ETHIBOND 2/0 CV V5  30IN PXX52

## (undated) DEVICE — EGD OR ERCP KIT: Brand: MEDLINE INDUSTRIES, INC.

## (undated) DEVICE — SPNG DISECTOR KTNER XRAY COTN 1/4X9/16IN PK/5

## (undated) DEVICE — OASIS DRAIN, SINGLE, INLINE & ATS COMPATIBLE: Brand: OASIS

## (undated) DEVICE — HEMOCONCENTRATOR PERFUS LPS06

## (undated) DEVICE — GLIDESHEATH SLENDER STAINLESS STEEL KIT: Brand: GLIDESHEATH SLENDER

## (undated) DEVICE — CATH VENT MIV RADL PIG ST TIP 5F 110CM

## (undated) DEVICE — PK CATH CARD 40

## (undated) DEVICE — CANN RETRGR STYLET RSCP 15F

## (undated) DEVICE — ROTATING SURGICAL PUNCHES, 1 PER POUCH: Brand: A&E MEDICAL / ROTATING SURGICAL PUNCHES

## (undated) DEVICE — Device: Brand: DEFENDO AIR/WATER/SUCTION AND BIOPSY VALVE

## (undated) DEVICE — BNDG ELAS ELITE V/CLOSE 4IN 5YD LF STRL

## (undated) DEVICE — 28 FR STRAIGHT – SOFT PVC CATHETER: Brand: PVC THORACIC CATHETERS

## (undated) DEVICE — SOL ISO/ALC RUB 70PCT 4OZ

## (undated) DEVICE — CATH DIAG IMPULSE FL3.5 5F 100CM

## (undated) DEVICE — TBG INSUFFLATION LUER LOCK: Brand: MEDLINE INDUSTRIES, INC.

## (undated) DEVICE — GLV SURG BIOGEL LTX PF 7 1/2

## (undated) DEVICE — BIOPATCH™ ANTIMICROBIAL DRESSING WITH CHLORHEXIDINE GLUCONATE IS A HYDROPHILLIC POLYURETHANE ABSORPTIVE FOAM WITH CHLORHEXIDINE GLUCONATE (CHG) WHICH INHIBITS BACTERIAL GROWTH UNDER THE DRESSING. THE DRESSING IS INTENDED TO BE USED TO ABSORB EXUDATE, COVER A WOUND CAUSED BY VASCULAR AND NONVASCULAR PERCUTANEOUS MEDICAL DEVICES DURING SURGERY, AS WELL AS REDUCE LOCAL INFECTION AND COLONIZATION OF MICROORGANISMS.: Brand: BIOPATCH

## (undated) DEVICE — DRSNG WND BORDR/ADHS NONADHR/GZ LF 4X14IN STRL

## (undated) DEVICE — BNDG ELAS ELITE V/CLOSE 6IN 5YD LF STRL

## (undated) DEVICE — 8 FOOT DISPOSABLE EXTENSION CABLE WITH SAFE CONNECT / ALLIGATOR CLIP

## (undated) DEVICE — PK PERFUS CUST W/CARDIOPLEGIA

## (undated) DEVICE — GW EMR FIX EXCHG J STD .035 3MM 260CM

## (undated) DEVICE — BALN PRESS WEDGE 5F 110CM

## (undated) DEVICE — BNDG ELAS MATRX V/CLS 4IN 5YD LF

## (undated) DEVICE — CANN ART EOPA 3D NV W/CONN 20F

## (undated) DEVICE — CORONARY ARTERY BYPASS GRAFT MARKERS, STAINLESS STEEL, DISTAL, WITHOUT HOLDER: Brand: ANASTOMARK CORONARY ARTERY BYPASS GRAFT MARKERS, STAINLESS STEEL, DISTAL

## (undated) DEVICE — CLAMP INSERT: Brand: STEALTH® CLAMP INSERT

## (undated) DEVICE — AVID DUAL STAGE VENOUS DRAINAGE CANNULA: Brand: AVID DUAL STAGE VENOUS DRAINAGE CANNULA

## (undated) DEVICE — PK ATS CUST W CARDIOTOMY RESEVOIR

## (undated) DEVICE — PK HEART OPN 40

## (undated) DEVICE — ST. SORBAVIEW ULTIMATE IJ SYSTEM A,C: Brand: CENTURION

## (undated) DEVICE — CONN STR 1/2INX3/8IN

## (undated) DEVICE — BNDG ELAS MATRX V/CLS 6IN 5YD LF

## (undated) DEVICE — SYS VASOVIEW HEMOPRO ENDOSCOPIC HARVST VESL

## (undated) DEVICE — KT MANIFLD CARDIAC

## (undated) DEVICE — SYS PERFUS SEP PLATLT W TIPS CUST

## (undated) DEVICE — 28 FR RIGHT ANGLE – SOFT PVC CATHETER: Brand: PVC THORACIC CATHETERS

## (undated) DEVICE — ADAPT ANTEGRADE RETRGR